# Patient Record
Sex: FEMALE | Race: WHITE | Employment: OTHER | URBAN - METROPOLITAN AREA
[De-identification: names, ages, dates, MRNs, and addresses within clinical notes are randomized per-mention and may not be internally consistent; named-entity substitution may affect disease eponyms.]

---

## 2020-11-02 RX ORDER — DOXAZOSIN MESYLATE 4 MG/1
4 TABLET ORAL NIGHTLY
COMMUNITY
End: 2020-11-19 | Stop reason: SDUPTHER

## 2020-11-02 RX ORDER — LOSARTAN POTASSIUM 100 MG/1
100 TABLET ORAL DAILY
COMMUNITY
End: 2020-11-19 | Stop reason: SDUPTHER

## 2020-11-02 RX ORDER — ATORVASTATIN CALCIUM 40 MG/1
40 TABLET, FILM COATED ORAL DAILY
COMMUNITY
End: 2020-11-19 | Stop reason: SDUPTHER

## 2020-11-02 RX ORDER — TRAZODONE HYDROCHLORIDE 100 MG/1
100 TABLET ORAL NIGHTLY
COMMUNITY
End: 2021-07-28 | Stop reason: SDUPTHER

## 2020-11-02 RX ORDER — FUROSEMIDE 40 MG/1
40 TABLET ORAL 2 TIMES DAILY
COMMUNITY
End: 2020-11-19 | Stop reason: SDUPTHER

## 2020-11-02 RX ORDER — CHLORAL HYDRATE 500 MG
3000 CAPSULE ORAL 3 TIMES DAILY
COMMUNITY
End: 2021-01-28

## 2020-11-02 RX ORDER — ASPIRIN 81 MG/1
81 TABLET ORAL DAILY
COMMUNITY

## 2020-11-19 ENCOUNTER — OFFICE VISIT (OUTPATIENT)
Dept: CARDIOLOGY CLINIC | Age: 80
End: 2020-11-19
Payer: COMMERCIAL

## 2020-11-19 VITALS
HEART RATE: 62 BPM | TEMPERATURE: 97.1 F | WEIGHT: 145 LBS | SYSTOLIC BLOOD PRESSURE: 140 MMHG | DIASTOLIC BLOOD PRESSURE: 70 MMHG

## 2020-11-19 PROBLEM — I65.23 BILATERAL CAROTID ARTERY STENOSIS: Status: ACTIVE | Noted: 2020-11-19

## 2020-11-19 PROBLEM — Z95.1 HX OF CABG: Status: ACTIVE | Noted: 2020-11-19

## 2020-11-19 PROBLEM — E78.5 DYSLIPIDEMIA: Status: ACTIVE | Noted: 2020-11-19

## 2020-11-19 PROBLEM — N18.4 STAGE 4 CHRONIC KIDNEY DISEASE (HCC): Status: ACTIVE | Noted: 2020-11-19

## 2020-11-19 PROBLEM — I10 ESSENTIAL HYPERTENSION: Status: ACTIVE | Noted: 2020-11-19

## 2020-11-19 PROBLEM — Z72.0 TOBACCO ABUSE: Status: ACTIVE | Noted: 2020-11-19

## 2020-11-19 PROCEDURE — G8484 FLU IMMUNIZE NO ADMIN: HCPCS | Performed by: INTERNAL MEDICINE

## 2020-11-19 PROCEDURE — 4040F PNEUMOC VAC/ADMIN/RCVD: CPT | Performed by: INTERNAL MEDICINE

## 2020-11-19 PROCEDURE — 1123F ACP DISCUSS/DSCN MKR DOCD: CPT | Performed by: INTERNAL MEDICINE

## 2020-11-19 PROCEDURE — 4004F PT TOBACCO SCREEN RCVD TLK: CPT | Performed by: INTERNAL MEDICINE

## 2020-11-19 PROCEDURE — G8421 BMI NOT CALCULATED: HCPCS | Performed by: INTERNAL MEDICINE

## 2020-11-19 PROCEDURE — 99205 OFFICE O/P NEW HI 60 MIN: CPT | Performed by: INTERNAL MEDICINE

## 2020-11-19 PROCEDURE — G8400 PT W/DXA NO RESULTS DOC: HCPCS | Performed by: INTERNAL MEDICINE

## 2020-11-19 PROCEDURE — G8427 DOCREV CUR MEDS BY ELIG CLIN: HCPCS | Performed by: INTERNAL MEDICINE

## 2020-11-19 PROCEDURE — 1090F PRES/ABSN URINE INCON ASSESS: CPT | Performed by: INTERNAL MEDICINE

## 2020-11-19 PROCEDURE — 93000 ELECTROCARDIOGRAM COMPLETE: CPT | Performed by: INTERNAL MEDICINE

## 2020-11-19 RX ORDER — FUROSEMIDE 40 MG/1
40 TABLET ORAL 2 TIMES DAILY
Qty: 180 TABLET | Refills: 3 | Status: SHIPPED | OUTPATIENT
Start: 2020-11-19 | End: 2021-10-29

## 2020-11-19 RX ORDER — DOXAZOSIN MESYLATE 4 MG/1
4 TABLET ORAL NIGHTLY
Qty: 90 TABLET | Refills: 3 | Status: SHIPPED | OUTPATIENT
Start: 2020-11-19 | End: 2021-09-08

## 2020-11-19 RX ORDER — LOSARTAN POTASSIUM 100 MG/1
100 TABLET ORAL DAILY
Qty: 90 TABLET | Refills: 3 | Status: SHIPPED | OUTPATIENT
Start: 2020-11-19 | End: 2021-10-29

## 2020-11-19 RX ORDER — ATORVASTATIN CALCIUM 40 MG/1
40 TABLET, FILM COATED ORAL DAILY
Qty: 90 TABLET | Refills: 3 | Status: SHIPPED | OUTPATIENT
Start: 2020-11-19 | End: 2021-10-29

## 2020-11-19 NOTE — PROGRESS NOTES
Chief Complaint   Patient presents with    Establish Cardiologist       11/19/2020: Patient presents for initial medical evaluation. Patient is followed on a regular basis by Dr. Fierro Suresh primary care provider on file. recently moved from [facility]. Hx of CAD s/p CABGx5 in 2010. Status post negative nuclear stress test in 2017. Pt denies chest pain, dyspnea, dyspnea on exertion, change in exercise capacity, fatigue,  nausea, vomiting, diarrhea, constipation, motor weakness, insomnia, weight loss, syncope, dizziness, lightheadedness, palpitations, PND, orthopnea, or claudication. No hx of PAD . No hx of CHF of arrhythmia. He continues to smoke approximate 10 cigarettes a day. Status post carotid ultrasound on April 2020 with 60% stenosis in the left and right internal carotid arteries. Patient Active Problem List   Diagnosis    Carotid bruit    Kidney disease    CAD (coronary artery disease)    Bilateral carotid artery stenosis    Hx of CABGx5    Tobacco abuse    Stage 4 chronic kidney disease ([facility] [address]    Essential hypertension    Dyslipidemia       No past surgical history on file. Social History     Socioeconomic History    Marital status:       Spouse name: Not on file    Number of children: Not on file    Years of education: Not on file    Highest education level: Not on file   Occupational History    Not on file   Social Needs    Financial resource strain: Not on file    Food insecurity     Worry: Not on file     Inability: Not on file    Transportation needs     Medical: Not on file     Non-medical: Not on file   Tobacco Use    Smoking status: Not on file   Substance and Sexual Activity    Alcohol use: Not on file    Drug use: Not on file    Sexual activity: Not on file   Lifestyle    Physical activity     Days per week: Not on file     Minutes per session: Not on file    Stress: Not on file   Relationships    Social connections     Talks on phone: Not on file     Gets together: Not on file     Attends Rastafarian service: Not on file     Active member of club or organization: Not on file     Attends meetings of clubs or organizations: Not on file     Relationship status: Not on file    Intimate partner violence     Fear of current or ex partner: Not on file     Emotionally abused: Not on file     Physically abused: Not on file     Forced sexual activity: Not on file   Other Topics Concern    Not on file   Social History Narrative    Not on file       No family history on file. Current Outpatient Medications   Medication Sig Dispense Refill    doxazosin (CARDURA) 4 MG tablet Take 1 tablet by mouth nightly 90 tablet 3    atorvastatin (LIPITOR) 40 MG tablet Take 1 tablet by mouth daily 90 tablet 3    furosemide (LASIX) 40 MG tablet Take 1 tablet by mouth 2 times daily 180 tablet 3    losartan (COZAAR) 100 MG tablet Take 1 tablet by mouth daily 90 tablet 3    metoprolol tartrate (LOPRESSOR) 25 MG tablet Take 1 tablet by mouth 2 times daily 180 tablet 3    aspirin 81 MG EC tablet Take 81 mg by mouth daily      Omega-3 Fatty Acids (FISH OIL) 1000 MG CAPS Take 3,000 mg by mouth 3 times daily      traZODone (DESYREL) 100 MG tablet Take 100 mg by mouth nightly       No current facility-administered medications for this visit. Amoxicillin    Review of Systems:  General ROS: negative  Psychological ROS: negative  Hematological and Lymphatic ROS: No history of blood clots or bleeding disorder.    Respiratory ROS: no cough, shortness of breath, or wheezing  Cardiovascular ROS: no chest pain or dyspnea on exertion  Gastrointestinal ROS: no abdominal pain, change in bowel habits, or black or bloody stools  Genito-Urinary ROS: no dysuria, trouble voiding, or hematuria  Musculoskeletal ROS: negative  Neurological ROS: no TIA or stroke symptoms  Dermatological ROS: negative    VITALS:  Blood pressure (!) 140/70, pulse 62, temperature 97.1 °F (36.2 °C), temperature source Infrared, weight 145 lb (65.8 kg). There is no height or weight on file to calculate BMI. Physical Examination:  General appearance - alert, well appearing, and in no distress  Mental status - alert, oriented to person, place, and time  Neck - Neck is supple, no JVD or carotid bruits. No thyromegaly or adenopathy. Chest - clear to auscultation, no wheezes, rales or rhonchi, symmetric air entry  Heart - normal rate, regular rhythm, normal S1, S2, no murmurs, rubs, clicks or gallops  Abdomen - soft, nontender, nondistended, no masses or organomegaly  Neurological - alert, oriented, normal speech, no focal findings or movement disorder noted  Extremities - peripheral pulses normal, no pedal edema, no clubbing or cyanosis  Skin - normal coloration and turgor, no rashes, no suspicious skin lesions noted      EKG: normal sinus rhythm, nonspecific ST and T waves changes    Orders Placed This Encounter   Procedures    US SCREENING FOR AAA    CT lung screen [Initial/Annual]    Comprehensive Metabolic Panel    Lipid Panel    CBC    TSH without Reflex    Magnesium    Sánchez Brock MD, Family Medicine, Ja Torre [facility], Nephrology, [facility] E[facility] 12 Lead    ECHO Complete 2D W Doppler W Color       ASSESSMENT:     Diagnosis Orders   1. Coronary artery disease involving native coronary artery of native heart, angina presence unspecified  EKG 12 Lead    ECHO Complete 2D W Doppler W Color   2. Carotid bruit, unspecified laterality     3. Bilateral carotid artery stenosis     4. Hx of CABGx5     5. Tobacco abuse  Petra Deshpande MD, [facility]   6. Stage 4 chronic kidney disease (HCC)  Fabian Yee MD, Nephrology, [place]   7. Essential hypertension  Comprehensive Metabolic Panel    Lipid Panel    CBC    TSH without Reflex    Magnesium    ECHO Complete 2D W Doppler W Color   8.  Dyslipidemia  Comprehensive Metabolic Panel    Lipid Panel    CBC    TSH without Reflex    Magnesium    Petra Tolbert MD, [facility], [place]   9. Screening for AAA (aortic abdominal aneurysm)  US SCREENING FOR AAA   10. Encounter for screening for lung cancer  CT lung screen [Initial/Annual]         PLAN:     Patient will need to continue to follow up with you for their general medical care    As always, aggressive risk factor modification is strongly recommended. We should adhere to the JNC VIII guidelines for HTN management and the NCEP ATP III guidelines for LDL-C management. Cardiac diet is always recommended with low fat, cholesterol, calories and sodium. Continue medications at current doses. Check Echo    Check labs    Check EKG    Check AAA US screen    Check CT of lungs screen. Refer to PCP- DR Micheal Asencio. Refer to Nephro. CT lung screen    Patient was advised and encouraged to check blood pressure at home or at a pharmacy, maintain a logbook, and also call us back if blood pressure are above the target ranges or if it is low. Patient clearly understands and agrees to the instructions. We will need to continue to monitor muscle and liver enzymes, BUN, CR, and electrolytes. Details of medical condition explained and patient was warned about adverse consequences of uncontrolled medical conditions and possible side effects of prescribed medications.

## 2020-12-10 ENCOUNTER — HOSPITAL ENCOUNTER (OUTPATIENT)
Dept: ULTRASOUND IMAGING | Age: 80
Discharge: HOME | End: 2020-12-12
Payer: MEDICARE

## 2020-12-10 ENCOUNTER — HOSPITAL ENCOUNTER (OUTPATIENT)
Dept: NON INVASIVE DIAGNOSTICS | Age: 80
Discharge: HOME | End: 2020-12-10
Payer: MEDICARE

## 2020-12-10 DIAGNOSIS — I10 ESSENTIAL HYPERTENSION: ICD-10-CM

## 2020-12-10 DIAGNOSIS — E78.5 DYSLIPIDEMIA: ICD-10-CM

## 2020-12-10 LAB
CHOLESTEROL, TOTAL: 186 MG/DL (ref 0–199)
HCT VFR BLD CALC: 33.6 % (ref 37–47)
HDLC SERPL-MCNC: 55 MG/DL (ref 40–59)
HEMOGLOBIN: 11.3 G/DL (ref 12–16)
LDL CHOLESTEROL CALCULATED: 97 MG/DL (ref 0–129)
LV EF: 55 %
LVEF MODALITY: NORMAL
MAGNESIUM: 2.4 MG/DL (ref 1.7–2.4)
MCH RBC QN AUTO: 31.4 PG (ref 27–31.3)
MCHC RBC AUTO-ENTMCNC: 33.7 % (ref 33–37)
MCV RBC AUTO: 93.4 FL (ref 82–100)
PDW BLD-RTO: 12.3 % (ref 11.5–14.5)
PLATELET # BLD: 224 K/UL (ref 130–400)
RBC # BLD: 3.6 M/UL (ref 4.2–5.4)
TRIGL SERPL-MCNC: 169 MG/DL (ref 0–150)
TSH SERPL DL<=0.05 MIU/L-ACNC: 2.07 UIU/ML (ref 0.44–3.86)
WBC # BLD: 6.7 K/UL (ref 4.8–10.8)

## 2020-12-10 PROCEDURE — 93306 TTE W/DOPPLER COMPLETE: CPT

## 2020-12-10 PROCEDURE — 76706 US ABDL AORTA SCREEN AAA: CPT | Performed by: INTERNAL MEDICINE

## 2020-12-10 PROCEDURE — 76706 US ABDL AORTA SCREEN AAA: CPT

## 2021-01-07 ENCOUNTER — OFFICE VISIT (OUTPATIENT)
Dept: CARDIOLOGY CLINIC | Age: 81
End: 2021-01-07
Payer: MEDICARE

## 2021-01-07 VITALS
HEART RATE: 56 BPM | OXYGEN SATURATION: 99 % | SYSTOLIC BLOOD PRESSURE: 136 MMHG | WEIGHT: 146 LBS | DIASTOLIC BLOOD PRESSURE: 72 MMHG | TEMPERATURE: 97 F | RESPIRATION RATE: 14 BRPM

## 2021-01-07 DIAGNOSIS — E78.5 DYSLIPIDEMIA: ICD-10-CM

## 2021-01-07 DIAGNOSIS — N18.4 STAGE 4 CHRONIC KIDNEY DISEASE (HCC): ICD-10-CM

## 2021-01-07 DIAGNOSIS — I25.10 CORONARY ARTERY DISEASE INVOLVING NATIVE CORONARY ARTERY OF NATIVE HEART, ANGINA PRESENCE UNSPECIFIED: Primary | ICD-10-CM

## 2021-01-07 DIAGNOSIS — Z95.1 HX OF CABG: ICD-10-CM

## 2021-01-07 DIAGNOSIS — Z72.0 TOBACCO ABUSE: ICD-10-CM

## 2021-01-07 DIAGNOSIS — I10 ESSENTIAL HYPERTENSION: ICD-10-CM

## 2021-01-07 DIAGNOSIS — R09.89 CAROTID BRUIT, UNSPECIFIED LATERALITY: ICD-10-CM

## 2021-01-07 DIAGNOSIS — I65.23 BILATERAL CAROTID ARTERY STENOSIS: ICD-10-CM

## 2021-01-07 DIAGNOSIS — N28.9 KIDNEY DISEASE: ICD-10-CM

## 2021-01-07 PROCEDURE — G8400 PT W/DXA NO RESULTS DOC: HCPCS | Performed by: INTERNAL MEDICINE

## 2021-01-07 PROCEDURE — G8427 DOCREV CUR MEDS BY ELIG CLIN: HCPCS | Performed by: INTERNAL MEDICINE

## 2021-01-07 PROCEDURE — 1090F PRES/ABSN URINE INCON ASSESS: CPT | Performed by: INTERNAL MEDICINE

## 2021-01-07 PROCEDURE — 99214 OFFICE O/P EST MOD 30 MIN: CPT | Performed by: INTERNAL MEDICINE

## 2021-01-07 PROCEDURE — 4004F PT TOBACCO SCREEN RCVD TLK: CPT | Performed by: INTERNAL MEDICINE

## 2021-01-07 PROCEDURE — G8484 FLU IMMUNIZE NO ADMIN: HCPCS | Performed by: INTERNAL MEDICINE

## 2021-01-07 PROCEDURE — G8421 BMI NOT CALCULATED: HCPCS | Performed by: INTERNAL MEDICINE

## 2021-01-07 PROCEDURE — 4040F PNEUMOC VAC/ADMIN/RCVD: CPT | Performed by: INTERNAL MEDICINE

## 2021-01-07 PROCEDURE — 1123F ACP DISCUSS/DSCN MKR DOCD: CPT | Performed by: INTERNAL MEDICINE

## 2021-01-07 NOTE — PROGRESS NOTES
Chief Complaint   Patient presents with    Results     echo 12/10/20    Carotid Disease    Coronary Artery Disease    Hypertension       11/19/2020: Patient presents for initial medical evaluation. Patient is followed on a regular basis by Dr. Thalia Lindsey MD recently moved from [facility]. Hx of CAD s/p CABGx5 in 2010. Status post negative nuclear stress test in 2017. Pt denies chest pain, dyspnea, dyspnea on exertion, change in exercise capacity, fatigue,  nausea, vomiting, diarrhea, constipation, motor weakness, insomnia, weight loss, syncope, dizziness, lightheadedness, palpitations, PND, orthopnea, or claudication. No hx of PAD . No hx of CHF of arrhythmia. He continues to smoke approximate 10 cigarettes a day. Status post carotid ultrasound on April 2020 with 60% stenosis in the left and right internal carotid arteries. 1/7/2021: Status post echocardiogram ejection fraction of 55%, 1-2+ aortic regurgitation, 1+ mitral and tricuspid regurgitation, RVSP of 27 mmHg, grade 2 diastolic dysfunction. Status post abdominal aorta ultrasound with no evidence of any aneurysm. She will follow up with primary care Dr. Alpha Dakin as well as nephrology. Pt denies chest pain, dyspnea, dyspnea on exertion, change in exercise capacity, fatigue,  nausea, vomiting, diarrhea, constipation, motor weakness, insomnia, weight loss, syncope, dizziness, lightheadedness, palpitations, PND, orthopnea, or claudication. she continues to smoke. Has CKD. Blood pressure is 136/71 heart rate of 56 bpm.  With history of coronary disease status post CABG x5 in 2010      Patient Active Problem List   Diagnosis    Carotid bruit    Kidney disease    CAD (coronary artery disease)    Bilateral carotid artery stenosis    Hx of CABGx5    Tobacco abuse    Stage 4 chronic kidney disease ([facility] [address]    Essential hypertension    Dyslipidemia       No past surgical history on file.     Social History     Socioeconomic History    Marital status:      Spouse name: Not on file    Number of children: Not on file    Years of education: Not on file    Highest education level: Not on file   Occupational History    Not on file   Social Needs    Financial resource strain: Not on file    Food insecurity     Worry: Not on file     Inability: Not on file    Transportation needs     Medical: Not on file     Non-medical: Not on file   Tobacco Use    Smoking status: Not on file   Substance and Sexual Activity    Alcohol use: Not on file    Drug use: Not on file    Sexual activity: Not on file   Lifestyle    Physical activity     Days per week: Not on file     Minutes per session: Not on file    Stress: Not on file   Relationships    Social connections     Talks on phone: Not on file     Gets together: Not on file     Attends Confucianism service: Not on file     Active member of club or organization: Not on file     Attends meetings of clubs or organizations: Not on file     Relationship status: Not on file    Intimate partner violence     Fear of current or ex partner: Not on file     Emotionally abused: Not on file     Physically abused: Not on file     Forced sexual activity: Not on file   Other Topics Concern    Not on file   Social History Narrative    Not on file       No family history on file.     Current Outpatient Medications   Medication Sig Dispense Refill    doxazosin (CARDURA) 4 MG tablet Take 1 tablet by mouth nightly 90 tablet 3    atorvastatin (LIPITOR) 40 MG tablet Take 1 tablet by mouth daily 90 tablet 3    furosemide (LASIX) 40 MG tablet Take 1 tablet by mouth 2 times daily 180 tablet 3    losartan (COZAAR) 100 MG tablet Take 1 tablet by mouth daily 90 tablet 3    metoprolol tartrate (LOPRESSOR) 25 MG tablet Take 1 tablet by mouth 2 times daily 180 tablet 3    aspirin 81 MG EC tablet Take 81 mg by mouth daily      Omega-3 Fatty Acids (FISH OIL) 1000 MG CAPS Take 3,000 mg by mouth 3 times daily      traZODone (DESYREL) 100 MG tablet Take 100 mg by mouth nightly       No current facility-administered medications for this visit. Amoxicillin    Review of Systems:  General ROS: negative  Psychological ROS: negative  Hematological and Lymphatic ROS: No history of blood clots or bleeding disorder. Respiratory ROS: no cough, shortness of breath, or wheezing  Cardiovascular ROS: no chest pain or dyspnea on exertion  Gastrointestinal ROS: no abdominal pain, change in bowel habits, or black or bloody stools  Genito-Urinary ROS: no dysuria, trouble voiding, or hematuria  Musculoskeletal ROS: negative  Neurological ROS: no TIA or stroke symptoms  Dermatological ROS: negative    VITALS:  Blood pressure 136/72, pulse 56, temperature 97 °F (36.1 °C), temperature source Infrared, resp. rate 14, weight 146 lb (66.2 kg), SpO2 99 %. There is no height or weight on file to calculate BMI. Physical Examination:  General appearance - alert, well appearing, and in no distress  Mental status - alert, oriented to person, place, and time  Neck - Neck is supple, no JVD or carotid bruits. No thyromegaly or adenopathy. Chest - clear to auscultation, no wheezes, rales or rhonchi, symmetric air entry  Heart - normal rate, regular rhythm, normal S1, S2, no murmurs, rubs, clicks or gallops  Abdomen - soft, nontender, nondistended, no masses or organomegaly  Neurological - alert, oriented, normal speech, no focal findings or movement disorder noted  Extremities - peripheral pulses normal, no pedal edema, no clubbing or cyanosis  Skin - normal coloration and turgor, no rashes, no suspicious skin lesions noted      EKG: normal sinus rhythm, nonspecific ST and T waves changes    No orders of the defined types were placed in this encounter. ASSESSMENT:     Diagnosis Orders   1. Coronary artery disease involving native coronary artery of native heart, angina presence unspecified     2. Bilateral carotid artery stenosis     3.  Essential hypertension     4. Dyslipidemia     5. Hx of CABGx5     6. Stage 4 chronic kidney disease ([address]     7. Tobacco abuse     8. Kidney disease     9. Carotid bruit, unspecified laterality           PLAN:     Patient will need to continue to follow up with you for their general medical care    As always, aggressive risk factor modification is strongly recommended. We should adhere to the JNC VIII guidelines for HTN management and the NCEP ATP III guidelines for LDL-C management. Cardiac diet is always recommended with low fat, cholesterol, calories and sodium. Continue medications at current doses. Check CT of lungs screen. Check carotid ultrasound in 1 year    Check echo in 1 to 2 years for aortic regurgitation    Patient was advised and encouraged to check blood pressure at home or at a pharmacy, maintain a logbook, and also call us back if blood pressure are above the target ranges or if it is low. Patient clearly understands and agrees to the instructions. We will need to continue to monitor muscle and liver enzymes, BUN, CR, and electrolytes. Details of medical condition explained and patient was warned about adverse consequences of uncontrolled medical conditions and possible side effects of prescribed medications.

## 2021-01-28 ENCOUNTER — OFFICE VISIT (OUTPATIENT)
Dept: FAMILY MEDICINE CLINIC | Age: 81
End: 2021-01-28
Payer: MEDICARE

## 2021-01-28 VITALS
DIASTOLIC BLOOD PRESSURE: 68 MMHG | SYSTOLIC BLOOD PRESSURE: 138 MMHG | HEART RATE: 68 BPM | RESPIRATION RATE: 16 BRPM | TEMPERATURE: 97.7 F | WEIGHT: 146 LBS | BODY MASS INDEX: 27.56 KG/M2 | OXYGEN SATURATION: 98 % | HEIGHT: 61 IN

## 2021-01-28 DIAGNOSIS — M81.0 SENILE OSTEOPOROSIS: ICD-10-CM

## 2021-01-28 DIAGNOSIS — N18.4 STAGE 4 CHRONIC KIDNEY DISEASE (HCC): ICD-10-CM

## 2021-01-28 DIAGNOSIS — E78.5 DYSLIPIDEMIA: ICD-10-CM

## 2021-01-28 DIAGNOSIS — I10 ESSENTIAL HYPERTENSION: Primary | ICD-10-CM

## 2021-01-28 DIAGNOSIS — T82.858D STENOSIS OF NONCORONARY BYPASS GRAFT, SUBSEQUENT ENCOUNTER: ICD-10-CM

## 2021-01-28 DIAGNOSIS — M43.16 SPONDYLOLISTHESIS OF LUMBAR REGION: ICD-10-CM

## 2021-01-28 DIAGNOSIS — R53.83 FATIGUE, UNSPECIFIED TYPE: ICD-10-CM

## 2021-01-28 PROCEDURE — 4004F PT TOBACCO SCREEN RCVD TLK: CPT | Performed by: FAMILY MEDICINE

## 2021-01-28 PROCEDURE — G8417 CALC BMI ABV UP PARAM F/U: HCPCS | Performed by: FAMILY MEDICINE

## 2021-01-28 PROCEDURE — G8484 FLU IMMUNIZE NO ADMIN: HCPCS | Performed by: FAMILY MEDICINE

## 2021-01-28 PROCEDURE — 99203 OFFICE O/P NEW LOW 30 MIN: CPT | Performed by: FAMILY MEDICINE

## 2021-01-28 PROCEDURE — G8400 PT W/DXA NO RESULTS DOC: HCPCS | Performed by: FAMILY MEDICINE

## 2021-01-28 PROCEDURE — 1123F ACP DISCUSS/DSCN MKR DOCD: CPT | Performed by: FAMILY MEDICINE

## 2021-01-28 PROCEDURE — 1090F PRES/ABSN URINE INCON ASSESS: CPT | Performed by: FAMILY MEDICINE

## 2021-01-28 PROCEDURE — G8427 DOCREV CUR MEDS BY ELIG CLIN: HCPCS | Performed by: FAMILY MEDICINE

## 2021-01-28 PROCEDURE — 4040F PNEUMOC VAC/ADMIN/RCVD: CPT | Performed by: FAMILY MEDICINE

## 2021-01-28 RX ORDER — TRAZODONE HYDROCHLORIDE 100 MG/1
100 TABLET ORAL NIGHTLY
Qty: 90 TABLET | Refills: 1 | Status: SHIPPED | OUTPATIENT
Start: 2021-01-28 | End: 2021-10-21 | Stop reason: SDUPTHER

## 2021-01-28 ASSESSMENT — PATIENT HEALTH QUESTIONNAIRE - PHQ9
SUM OF ALL RESPONSES TO PHQ9 QUESTIONS 1 & 2: 0
SUM OF ALL RESPONSES TO PHQ QUESTIONS 1-9: 0
SUM OF ALL RESPONSES TO PHQ QUESTIONS 1-9: 0

## 2021-01-28 NOTE — PROGRESS NOTES
Patient: Marlys Dawson (: 1940) is a 79 y.o. female,New patient, here for evaluation of the following chief complaint(s):  Chief Complaint   Patient presents with   [facility].     She is here today to establish care. She is due for labs and DEXA scan. Date: 21    Allergies   Allergen Reactions    Amoxicillin     Nsaids      CRF 4       Vitals:    21 1041 21 1052   BP: (!) 148/60 138/68   Site: Left Upper Arm Left Upper Arm   Position: Sitting Sitting   Cuff Size: Small Adult Small Adult   Pulse: 68    Resp: 16    Temp: 97.7 °F (36.5 °C)    TempSrc: Oral    SpO2: 98%    Weight: 146 lb (66.2 kg)    Height: 5' 1\" (1.549 m)       Body mass index is 27.59 kg/m². PHQ Scores 2021   PHQ2 Score 0   PHQ9 Score 0     Interpretation of Total Score Depression Severity: 1-4 = Minimal depression, 5-9 = Mild depression, 10-14 = Moderate depression, 15-19 = Moderately severe depression, 20-27 = Severe depression    HPI    This is a new patient. This is a new patient to me. I have reviewed all of the past medical history, social history and family history. I have reviewed all of the information on medication, surgeries and previous testing and working diagnoses. I have reviewed the allergies and health maintenance information and correlated it into my decision making for this patient for care, diagnostics, consultations and treatment for today's visit. She moved here from [facility] and she has her daughter with her today. Patient does not want a lot of active intervention or treatment. Her main complaint today is that her back hurts. She had a lumbar stabilization surgery many years ago and now she has trouble walking and has trouble with balance and must use a cane. She has not fallen. She has pain in her low back that is intermittent and pain in her left thigh. She has trouble flexing her right leg to get in the car she has to physically grab her leg and pick it up.   She has insomnia and uses trazodone which helps. Review of Systems    Constitutional: positive for fatigue, negative for fever and sweats. HEENT: Negative for eye discharge and vision loss. Negative for ear drainage, hearing loss and nasal drainage. Respiratory: positive for cough, negative for dyspnea and wheezing. Cardiovascular:  Negative for chest pain, claudication and irregular heartbeat/palpitations. Gastrointestinal: Negative for abdominal pain, nausea, constipation and diarrhea. Genitourinary: Negative for dysuria, patient postmenopausal.  Metabolic/Endocrine: Negative for cold intolerance, heat intolerance, polydipsia and polyphagia. No unintended weight loss or weight gain. Neuro/Psychiatric: Negative for gait disturbance. Negative for psychiatric symptoms. Dermatologic: Negative for pruritus and rash. Musculoskeletal: positive for bone/joint symptoms. No numbness or tingling. No loss of function. Hematology: Negative for bleeding and easy bruising. Immunology:  Negative for environmental allergies and food allergies. Physical Exam    Patient's medication, allergies, past medical, surgical, social and family histories were reviewed and updated as appropriate. PHYSICAL EXAM     General: Alert oriented pleasant cooperative no acute distress. When she walks she is ataxic and she drifts and would lose her balance if she did have something to hang onto she is nonicteric and she is not pale  Neck: Soft nontender no adenopathy or masses no carotid bruits  Lungs: Clear to auscultation without wheezes rhonchi or rales  Heart: Regular rate and rhythm without murmurs rubs or gallops  Extremities: No rashes or edema she has negative straight leg raises and no pain to palpation of her back she has weak right hip flexors but she has adequate strength in straightening and bending her knees              Assessment:   Diagnosis Orders   1. Essential hypertension  Comprehensive Metabolic Panel   2.  Senile osteoporosis  DEXA BONE DENSITY AXIAL SKELETON   3. Dyslipidemia  Lipid, Fasting   4. Stenosis of noncoronary bypass graft, subsequent encounter     5. Fatigue, unspecified type  TSH Without Reflex    CBC Auto Differential   6. Spondylolisthesis of lumbar region  XR LUMBAR SPINE (MIN 4 VIEWS)    XR LUMBAR SPINE FLEXION AND EXTENSION ONLY  Discussion several options as this patient would have a difficult time with an MRI with the hardware that she has. Wanted to get an EMG but she does not want to consider any intervention for treatment and does not want PT she cannot take nonsteroidal medications because of her kidneys. We will see what the x-ray looks like and then recommend something based on that         On this date 01/28/21 I have spent 35 minutes reviewing previous notes, test results and face to face with the patient discussing the diagnosis and importance of compliance with the treatment plan. Plan:  Current Outpatient Medications   Medication Sig Dispense Refill    traZODone (DESYREL) 100 MG tablet Take 1 tablet by mouth nightly 90 tablet 1    Handicap Placard MISC by Does not apply route Applying from 1/28/2021-1/27/2026. 1 each 0    doxazosin (CARDURA) 4 MG tablet Take 1 tablet by mouth nightly 90 tablet 3    atorvastatin (LIPITOR) 40 MG tablet Take 1 tablet by mouth daily 90 tablet 3    furosemide (LASIX) 40 MG tablet Take 1 tablet by mouth 2 times daily 180 tablet 3    losartan (COZAAR) 100 MG tablet Take 1 tablet by mouth daily 90 tablet 3    metoprolol tartrate (LOPRESSOR) 25 MG tablet Take 1 tablet by mouth 2 times daily 180 tablet 3    aspirin 81 MG EC tablet Take 81 mg by mouth daily      traZODone (DESYREL) 100 MG tablet Take 100 mg by mouth nightly       No current facility-administered medications for this visit.       Orders Placed This Encounter   Procedures    DEXA BONE DENSITY AXIAL SKELETON     Standing Status:   Future     Standing Expiration Date:   1/28/2022     Order Specific Question:   Reason for exam:     Answer:   routine    XR LUMBAR SPINE (MIN 4 VIEWS)     Standing Status:   Future     Standing Expiration Date:   1/28/2022    XR LUMBAR SPINE FLEXION AND EXTENSION ONLY     Standing Status:   Future     Standing Expiration Date:   1/28/2022    Lipid, Fasting     Standing Status:   Future     Standing Expiration Date:   1/28/2022    Comprehensive Metabolic Panel     Standing Status:   Future     Standing Expiration Date:   1/28/2022    TSH Without Reflex     Standing Status:   Future     Standing Expiration Date:   1/28/2022    CBC Auto Differential     Standing Status:   Future     Standing Expiration Date:   1/28/2022       Orders Placed This Encounter   Medications    traZODone (DESYREL) 100 MG tablet     Sig: Take 1 tablet by mouth nightly     Dispense:  90 tablet     Refill:  1    Handicap Placard MISC     Sig: by Does not apply route Applying from 1/28/2021-1/27/2026. Dispense:  1 each     Refill:  0              Return in about 6 months (around 7/28/2021). An electronic signature was used to authenticate this note.   Dr. Jeannine Bustillo      1/28/21  12:38 PM

## 2021-02-11 ENCOUNTER — HOSPITAL ENCOUNTER (OUTPATIENT)
Dept: WOMENS IMAGING | Age: 81
Discharge: HOME | End: 2021-02-13
Payer: MEDICARE

## 2021-02-11 ENCOUNTER — HOSPITAL ENCOUNTER (OUTPATIENT)
Dept: GENERAL RADIOLOGY | Age: 81
Discharge: HOME | End: 2021-02-13
Payer: MEDICARE

## 2021-02-11 DIAGNOSIS — M43.16 SPONDYLOLISTHESIS OF LUMBAR REGION: ICD-10-CM

## 2021-02-11 DIAGNOSIS — M81.0 SENILE OSTEOPOROSIS: ICD-10-CM

## 2021-02-11 PROCEDURE — 77080 DXA BONE DENSITY AXIAL: CPT

## 2021-02-11 PROCEDURE — 72114 X-RAY EXAM L-S SPINE BENDING: CPT

## 2021-07-06 ENCOUNTER — HOSPITAL ENCOUNTER (OUTPATIENT)
Dept: LAB | Age: 81
Discharge: HOME | End: 2021-07-06
Payer: MEDICARE

## 2021-07-06 LAB
ALBUMIN SERPL-MCNC: 4.3 G/DL (ref 3.5–4.6)
ANION GAP SERPL CALCULATED.3IONS-SCNC: 16 MEQ/L (ref 9–15)
BUN BLDV-MCNC: 30 MG/DL (ref 8–23)
CALCIUM SERPL-MCNC: 9.7 MG/DL (ref 8.5–9.9)
CHLORIDE BLD-SCNC: 105 MEQ/L (ref 95–107)
CO2: 22 MEQ/L (ref 20–31)
CREAT SERPL-MCNC: 1.32 MG/DL (ref 0.5–0.9)
GFR AFRICAN AMERICAN: 46.9
GFR NON-AFRICAN AMERICAN: 38.7
GLUCOSE BLD-MCNC: 129 MG/DL (ref 70–99)
HCT VFR BLD CALC: 32.6 % (ref 37–47)
HEMOGLOBIN: 11.1 G/DL (ref 12–16)
MCH RBC QN AUTO: 31.3 PG (ref 27–31.3)
MCHC RBC AUTO-ENTMCNC: 33.9 % (ref 33–37)
MCV RBC AUTO: 92.1 FL (ref 82–100)
PARATHYROID HORMONE INTACT: 64.2 PG/ML (ref 15–65)
PDW BLD-RTO: 13 % (ref 11.5–14.5)
PHOSPHORUS: 3.6 MG/DL (ref 2.3–4.8)
PLATELET # BLD: 215 K/UL (ref 130–400)
POTASSIUM SERPL-SCNC: 4.1 MEQ/L (ref 3.4–4.9)
RBC # BLD: 3.54 M/UL (ref 4.2–5.4)
REASON FOR REJECTION: NORMAL
REJECTED TEST: NORMAL
SODIUM BLD-SCNC: 143 MEQ/L (ref 135–144)
VITAMIN D 25-HYDROXY: 43.2 NG/ML (ref 30–100)
WBC # BLD: 6.4 K/UL (ref 4.8–10.8)

## 2021-07-06 PROCEDURE — 85027 COMPLETE CBC AUTOMATED: CPT

## 2021-07-06 PROCEDURE — 36415 COLL VENOUS BLD VENIPUNCTURE: CPT

## 2021-07-06 PROCEDURE — 80069 RENAL FUNCTION PANEL: CPT

## 2021-07-06 PROCEDURE — 83970 ASSAY OF PARATHORMONE: CPT

## 2021-07-06 PROCEDURE — 82306 VITAMIN D 25 HYDROXY: CPT

## 2021-07-08 ENCOUNTER — HOSPITAL ENCOUNTER (OUTPATIENT)
Dept: LAB | Age: 81
Discharge: HOME | End: 2021-07-08
Payer: MEDICARE

## 2021-07-08 LAB
BACTERIA: NEGATIVE /HPF
BILIRUBIN URINE: NEGATIVE
BLOOD, URINE: NEGATIVE
CLARITY: CLEAR
COLOR: YELLOW
CREATININE URINE: 22.2 MG/DL
EPITHELIAL CELLS, UA: NORMAL /HPF (ref 0–5)
GLUCOSE URINE: NEGATIVE MG/DL
HYALINE CASTS: NORMAL /HPF (ref 0–5)
KETONES, URINE: NEGATIVE MG/DL
LEUKOCYTE ESTERASE, URINE: ABNORMAL
NITRITE, URINE: NEGATIVE
PH UA: 6 (ref 5–9)
PROTEIN PROTEIN: <4 MG/DL
PROTEIN UA: NEGATIVE MG/DL
PROTEIN/CREAT RATIO: 0.2 ML/ML
PROTEIN/CREAT RATIO: 0.2 ML/ML (ref 0–0.2)
RBC UA: NORMAL /HPF (ref 0–5)
SPECIFIC GRAVITY UA: 1.01 (ref 1–1.03)
UROBILINOGEN, URINE: 0.2 E.U./DL
WBC UA: NORMAL /HPF (ref 0–5)

## 2021-07-08 PROCEDURE — 84156 ASSAY OF PROTEIN URINE: CPT

## 2021-07-08 PROCEDURE — 81001 URINALYSIS AUTO W/SCOPE: CPT

## 2021-07-28 ENCOUNTER — OFFICE VISIT (OUTPATIENT)
Dept: FAMILY MEDICINE CLINIC | Age: 81
End: 2021-07-28
Payer: MEDICARE

## 2021-07-28 VITALS
RESPIRATION RATE: 12 BRPM | DIASTOLIC BLOOD PRESSURE: 60 MMHG | OXYGEN SATURATION: 98 % | BODY MASS INDEX: 27.38 KG/M2 | TEMPERATURE: 97.9 F | HEIGHT: 61 IN | WEIGHT: 145 LBS | SYSTOLIC BLOOD PRESSURE: 132 MMHG | HEART RATE: 67 BPM

## 2021-07-28 DIAGNOSIS — N18.4 STAGE 4 CHRONIC KIDNEY DISEASE (HCC): ICD-10-CM

## 2021-07-28 DIAGNOSIS — E78.5 DYSLIPIDEMIA: ICD-10-CM

## 2021-07-28 DIAGNOSIS — R59.0 CERVICAL ADENOPATHY: ICD-10-CM

## 2021-07-28 DIAGNOSIS — I10 ESSENTIAL HYPERTENSION: ICD-10-CM

## 2021-07-28 DIAGNOSIS — Z23 NEED FOR PNEUMOCOCCAL VACCINATION: ICD-10-CM

## 2021-07-28 DIAGNOSIS — I10 ESSENTIAL HYPERTENSION: Primary | ICD-10-CM

## 2021-07-28 LAB
ALBUMIN SERPL-MCNC: 4.3 G/DL (ref 3.5–4.6)
ALP BLD-CCNC: 81 U/L (ref 40–130)
ALT SERPL-CCNC: 28 U/L (ref 0–33)
ANION GAP SERPL CALCULATED.3IONS-SCNC: 12 MEQ/L (ref 9–15)
AST SERPL-CCNC: 39 U/L (ref 0–35)
BILIRUB SERPL-MCNC: 0.3 MG/DL (ref 0.2–0.7)
BUN BLDV-MCNC: 27 MG/DL (ref 8–23)
CALCIUM SERPL-MCNC: 9.3 MG/DL (ref 8.5–9.9)
CHLORIDE BLD-SCNC: 105 MEQ/L (ref 95–107)
CHOLESTEROL, FASTING: 139 MG/DL (ref 0–199)
CO2: 23 MEQ/L (ref 20–31)
CREAT SERPL-MCNC: 1.61 MG/DL (ref 0.5–0.9)
GFR AFRICAN AMERICAN: 37.3
GFR NON-AFRICAN AMERICAN: 30.8
GLOBULIN: 2.7 G/DL (ref 2.3–3.5)
GLUCOSE BLD-MCNC: 106 MG/DL (ref 70–99)
HDLC SERPL-MCNC: 38 MG/DL (ref 40–59)
LDL CHOLESTEROL CALCULATED: 66 MG/DL (ref 0–129)
POTASSIUM SERPL-SCNC: 5.2 MEQ/L (ref 3.4–4.9)
SODIUM BLD-SCNC: 140 MEQ/L (ref 135–144)
TOTAL PROTEIN: 7 G/DL (ref 6.3–8)
TRIGLYCERIDE, FASTING: 174 MG/DL (ref 0–150)

## 2021-07-28 PROCEDURE — 1090F PRES/ABSN URINE INCON ASSESS: CPT | Performed by: FAMILY MEDICINE

## 2021-07-28 PROCEDURE — 90670 PCV13 VACCINE IM: CPT | Performed by: FAMILY MEDICINE

## 2021-07-28 PROCEDURE — 4040F PNEUMOC VAC/ADMIN/RCVD: CPT | Performed by: FAMILY MEDICINE

## 2021-07-28 PROCEDURE — 1123F ACP DISCUSS/DSCN MKR DOCD: CPT | Performed by: FAMILY MEDICINE

## 2021-07-28 PROCEDURE — G0009 ADMIN PNEUMOCOCCAL VACCINE: HCPCS | Performed by: FAMILY MEDICINE

## 2021-07-28 PROCEDURE — G8427 DOCREV CUR MEDS BY ELIG CLIN: HCPCS | Performed by: FAMILY MEDICINE

## 2021-07-28 PROCEDURE — 4004F PT TOBACCO SCREEN RCVD TLK: CPT | Performed by: FAMILY MEDICINE

## 2021-07-28 PROCEDURE — 99213 OFFICE O/P EST LOW 20 MIN: CPT | Performed by: FAMILY MEDICINE

## 2021-07-28 PROCEDURE — G8417 CALC BMI ABV UP PARAM F/U: HCPCS | Performed by: FAMILY MEDICINE

## 2021-07-28 PROCEDURE — G8399 PT W/DXA RESULTS DOCUMENT: HCPCS | Performed by: FAMILY MEDICINE

## 2021-07-28 RX ORDER — ZOSTER VACCINE RECOMBINANT, ADJUVANTED 50 MCG/0.5
0.5 KIT INTRAMUSCULAR SEE ADMIN INSTRUCTIONS
Qty: 0.5 ML | Refills: 0 | Status: SHIPPED | OUTPATIENT
Start: 2021-07-28 | End: 2022-01-24

## 2021-07-28 RX ORDER — CLINDAMYCIN HYDROCHLORIDE 150 MG/1
150 CAPSULE ORAL 3 TIMES DAILY
Qty: 30 CAPSULE | Refills: 0 | Status: SHIPPED | OUTPATIENT
Start: 2021-07-28 | End: 2021-07-28 | Stop reason: SDUPTHER

## 2021-07-28 RX ORDER — ZOSTER VACCINE RECOMBINANT, ADJUVANTED 50 MCG/0.5
0.5 KIT INTRAMUSCULAR SEE ADMIN INSTRUCTIONS
Qty: 0.5 ML | Refills: 0 | Status: SHIPPED | OUTPATIENT
Start: 2021-07-28 | End: 2021-07-28 | Stop reason: SDUPTHER

## 2021-07-28 RX ORDER — CLINDAMYCIN HYDROCHLORIDE 150 MG/1
150 CAPSULE ORAL 3 TIMES DAILY
Qty: 30 CAPSULE | Refills: 0 | Status: SHIPPED | OUTPATIENT
Start: 2021-07-28 | End: 2021-08-03 | Stop reason: SINTOL

## 2021-07-28 SDOH — ECONOMIC STABILITY: FOOD INSECURITY: WITHIN THE PAST 12 MONTHS, THE FOOD YOU BOUGHT JUST DIDN'T LAST AND YOU DIDN'T HAVE MONEY TO GET MORE.: NEVER TRUE

## 2021-07-28 SDOH — ECONOMIC STABILITY: FOOD INSECURITY: WITHIN THE PAST 12 MONTHS, YOU WORRIED THAT YOUR FOOD WOULD RUN OUT BEFORE YOU GOT MONEY TO BUY MORE.: NEVER TRUE

## 2021-07-28 ASSESSMENT — SOCIAL DETERMINANTS OF HEALTH (SDOH): HOW HARD IS IT FOR YOU TO PAY FOR THE VERY BASICS LIKE FOOD, HOUSING, MEDICAL CARE, AND HEATING?: NOT HARD AT ALL

## 2021-07-28 NOTE — PROGRESS NOTES
Patient: Faye Tariq (: 1940) is a [de-identified] y.o. female,Established patient, here for evaluation of the following chief complaint(s):  Chief Complaint   Patient presents with    6 Month Follow-Up       Date: 21    Allergies   Allergen Reactions    Amoxicillin     Nsaids      CRF 4       Vitals:    21 1024   BP: 132/60   Pulse: 67   Resp: 12   Temp: 97.9 °F (36.6 °C)   SpO2: 98%   Weight: 145 lb (65.8 kg)   Height: 5' 1\" (1.549 m)      Body mass index is 27.4 kg/m². PHQ Scores 2021   PHQ2 Score 0   PHQ9 Score 0     Interpretation of Total Score Depression Severity: 1-4 = Minimal depression, 5-9 = Mild depression, 10-14 = Moderate depression, 15-19 = Moderately severe depression, 20-27 = Severe depression    HPI    She comes in today to follow-up on her chronic medical illnesses. She recently had some lab work done by her nephrologist which I reviewed and is in the chart. Her only complaint today is of right-sided neck pain. Its been there for about 2 months and she has no ear pain no sore throat it feels just kind of sore and she rubs the front part of the right side of her neck. She has no cough fever chills chest pressure or difficulty swallowing nausea vomiting diarrhea weight loss or weight gain    Review of Systems    Constitutional: Negative for fatigue, fever and sweats. HEENT: Negative for eye discharge and vision loss. Negative for ear drainage, hearing loss and nasal drainage. Respiratory: Negative for cough, dyspnea and wheezing. Cardiovascular:  Negative for chest pain, claudication and irregular heartbeat/palpitations. Gastrointestinal: Negative for abdominal pain, nausea, constipation and diarrhea. Genitourinary: Negative for dysuria, hematuria, polyuria, dysmenorrhea, menorrhagia and vaginal discharge. Metabolic/Endocrine: Negative for cold intolerance, heat intolerance, polydipsia and polyphagia. No unintended weight loss or weight gain.   Neuro/Psychiatric: Negative for gait disturbance. Negative for psychiatric symptoms. Dermatologic: Negative for pruritus and rash. Musculoskeletal: Negative for bone/joint symptoms. No numbness or tingling. No loss of function. Hematology: Negative for bleeding and easy bruising. Immunology:  Negative for environmental allergies and food allergies. Physical Exam    Patient's medication, allergies, past medical, surgical, social and family histories were reviewed and updated as appropriate. PHYSICAL EXAM     General: Alert oriented pleasant cooperative no acute distress  HEENT: Eyes clear nonicteric facial muscles symmetrical.  Ears she has some ear wax in the canals and some occlusive bilaterally but no erythema or inflammation noted oropharynx clear no swelling induration exudates or ulceration. Uvula midline  Neck: Soft with one enlarged lymph node just to the anterior mid part of the sternocleidomastoid muscle. There is no tenderness no supraclavicular adenopathy no thyroid enlargement  Lungs: Clear to auscultation without wheezes rhonchi or rales  Heart: Regular rate and rhythm without murmurs rubs or gallops  Extremities: No rashes or edema. Assessment:   Diagnosis Orders   1. Essential hypertension  Comprehensive Metabolic Panel   2. Dyslipidemia  Lipid, Fasting   3. Stage 4 chronic kidney disease (HCC)  Comprehensive Metabolic Panel   4. Cervical adenopathy  US HEAD NECK SOFT TISSUE THYROID    clindamycin (CLEOCIN) 150 MG capsule CBC a few weeks ago had normal white blood cell count and distribution     Patient given Prevnar today in the office    On this date 07/28/21 I have spent 33 minutes reviewing previous notes, test results and face to face with the patient discussing the diagnosis and importance of compliance with the treatment plan.        Plan:  Current Outpatient Medications   Medication Sig Dispense Refill    clindamycin (CLEOCIN) 150 MG capsule Take 1 capsule by mouth 3 times daily for 10 days 30 capsule 0    zoster recombinant adjuvanted vaccine (SHINGRIX) 50 MCG/0.5ML SUSR injection Inject 0.5 mLs into the muscle See Admin Instructions 1 dose now and repeat in 2-6 months 0.5 mL 0    traZODone (DESYREL) 100 MG tablet Take 1 tablet by mouth nightly 90 tablet 1    Handicap Placard MISC by Does not apply route Applying from 1/28/2021-1/27/2026. 1 each 0    doxazosin (CARDURA) 4 MG tablet Take 1 tablet by mouth nightly 90 tablet 3    atorvastatin (LIPITOR) 40 MG tablet Take 1 tablet by mouth daily 90 tablet 3    furosemide (LASIX) 40 MG tablet Take 1 tablet by mouth 2 times daily 180 tablet 3    losartan (COZAAR) 100 MG tablet Take 1 tablet by mouth daily 90 tablet 3    metoprolol tartrate (LOPRESSOR) 25 MG tablet Take 1 tablet by mouth 2 times daily 180 tablet 3    aspirin 81 MG EC tablet Take 81 mg by mouth daily       No current facility-administered medications for this visit.      Orders Placed This Encounter   Procedures    US HEAD NECK SOFT TISSUE THYROID     Standing Status:   Future     Standing Expiration Date:   7/28/2022    PREVNAR 13 IM (Pneumococcal conjugate vaccine 13-valent)    Comprehensive Metabolic Panel     Standing Status:   Future     Standing Expiration Date:   7/28/2022    Lipid, Fasting     Standing Status:   Future     Standing Expiration Date:   7/28/2022       Orders Placed This Encounter   Medications    DISCONTD: zoster recombinant adjuvanted vaccine (SHINGRIX) 50 MCG/0.5ML SUSR injection     Sig: Inject 0.5 mLs into the muscle See Admin Instructions 1 dose now and repeat in 2-6 months     Dispense:  0.5 mL     Refill:  0    DISCONTD: clindamycin (CLEOCIN) 150 MG capsule     Sig: Take 1 capsule by mouth 3 times daily for 10 days     Dispense:  30 capsule     Refill:  0    clindamycin (CLEOCIN) 150 MG capsule     Sig: Take 1 capsule by mouth 3 times daily for 10 days     Dispense:  30 capsule     Refill:  0    zoster recombinant adjuvanted vaccine [facility]) 50 MCG/0.5ML SUSR injection     Sig: Inject 0.5 mLs into the muscle See Admin Instructions 1 dose now and repeat in 2-6 months     Dispense:  0.5 mL     Refill:  0              Return in about 6 months (around 1/28/2022), or Dr. Maria Luisa Good. An electronic signature was used to authenticate this note.   Dr. Ingrid Dominguez MD      7/28/21  11:09 AM

## 2021-07-29 DIAGNOSIS — E87.5 HYPERKALEMIA: Primary | ICD-10-CM

## 2021-07-30 ENCOUNTER — TELEPHONE (OUTPATIENT)
Dept: FAMILY MEDICINE CLINIC | Age: 81
End: 2021-07-30

## 2021-08-06 ENCOUNTER — HOSPITAL ENCOUNTER (OUTPATIENT)
Dept: ULTRASOUND IMAGING | Age: 81
Discharge: HOME | End: 2021-08-08
Payer: MEDICARE

## 2021-08-06 DIAGNOSIS — R59.0 CERVICAL ADENOPATHY: ICD-10-CM

## 2021-08-06 PROCEDURE — 76536 US EXAM OF HEAD AND NECK: CPT

## 2021-08-10 DIAGNOSIS — Z72.0 TOBACCO ABUSE: Primary | ICD-10-CM

## 2021-09-08 RX ORDER — DOXAZOSIN MESYLATE 4 MG/1
TABLET ORAL
Qty: 90 TABLET | Refills: 3 | Status: SHIPPED | OUTPATIENT
Start: 2021-09-08 | End: 2022-09-01 | Stop reason: SDUPTHER

## 2021-09-27 ENCOUNTER — TELEPHONE (OUTPATIENT)
Dept: CASE MANAGEMENT | Age: 81
End: 2021-09-27

## 2021-09-27 NOTE — TELEPHONE ENCOUNTER
According to CMS Guidelines, the patient does not meet the requirements for a CT Lung Screening. Waiting for insurance authorization to see if patient will be covered for CT Lung Screening. Patient is over 69. Ins approved scan.

## 2021-10-07 ENCOUNTER — HOSPITAL ENCOUNTER (OUTPATIENT)
Dept: CT IMAGING | Age: 81
Discharge: HOME | End: 2021-10-09
Payer: MEDICARE

## 2021-10-07 DIAGNOSIS — Z72.0 TOBACCO ABUSE: ICD-10-CM

## 2021-10-07 PROCEDURE — 71271 CT THORAX LUNG CANCER SCR C-: CPT

## 2021-10-14 ENCOUNTER — OFFICE VISIT (OUTPATIENT)
Dept: CARDIOLOGY CLINIC | Age: 81
End: 2021-10-14
Payer: MEDICARE

## 2021-10-14 VITALS
WEIGHT: 138 LBS | BODY MASS INDEX: 26.07 KG/M2 | HEART RATE: 62 BPM | TEMPERATURE: 97.2 F | DIASTOLIC BLOOD PRESSURE: 70 MMHG | SYSTOLIC BLOOD PRESSURE: 110 MMHG

## 2021-10-14 DIAGNOSIS — E78.5 DYSLIPIDEMIA: ICD-10-CM

## 2021-10-14 DIAGNOSIS — I25.10 CORONARY ARTERY DISEASE INVOLVING NATIVE CORONARY ARTERY OF NATIVE HEART WITHOUT ANGINA PECTORIS: ICD-10-CM

## 2021-10-14 DIAGNOSIS — Z72.0 TOBACCO ABUSE: ICD-10-CM

## 2021-10-14 DIAGNOSIS — I65.23 BILATERAL CAROTID ARTERY STENOSIS: ICD-10-CM

## 2021-10-14 DIAGNOSIS — Z95.1 HX OF CABG: ICD-10-CM

## 2021-10-14 DIAGNOSIS — I10 ESSENTIAL HYPERTENSION: Primary | ICD-10-CM

## 2021-10-14 DIAGNOSIS — N18.4 STAGE 4 CHRONIC KIDNEY DISEASE (HCC): ICD-10-CM

## 2021-10-14 DIAGNOSIS — R09.89 CAROTID BRUIT, UNSPECIFIED LATERALITY: ICD-10-CM

## 2021-10-14 PROCEDURE — 4040F PNEUMOC VAC/ADMIN/RCVD: CPT | Performed by: INTERNAL MEDICINE

## 2021-10-14 PROCEDURE — 93000 ELECTROCARDIOGRAM COMPLETE: CPT | Performed by: INTERNAL MEDICINE

## 2021-10-14 PROCEDURE — 4004F PT TOBACCO SCREEN RCVD TLK: CPT | Performed by: INTERNAL MEDICINE

## 2021-10-14 PROCEDURE — G8427 DOCREV CUR MEDS BY ELIG CLIN: HCPCS | Performed by: INTERNAL MEDICINE

## 2021-10-14 PROCEDURE — 1090F PRES/ABSN URINE INCON ASSESS: CPT | Performed by: INTERNAL MEDICINE

## 2021-10-14 PROCEDURE — G8417 CALC BMI ABV UP PARAM F/U: HCPCS | Performed by: INTERNAL MEDICINE

## 2021-10-14 PROCEDURE — 1123F ACP DISCUSS/DSCN MKR DOCD: CPT | Performed by: INTERNAL MEDICINE

## 2021-10-14 PROCEDURE — G8399 PT W/DXA RESULTS DOCUMENT: HCPCS | Performed by: INTERNAL MEDICINE

## 2021-10-14 PROCEDURE — 99214 OFFICE O/P EST MOD 30 MIN: CPT | Performed by: INTERNAL MEDICINE

## 2021-10-14 PROCEDURE — G8484 FLU IMMUNIZE NO ADMIN: HCPCS | Performed by: INTERNAL MEDICINE

## 2021-10-14 NOTE — PROGRESS NOTES
Chief Complaint   Patient presents with    Coronary Artery Disease    Carotid Disease       11/19/2020: Patient presents for initial medical evaluation. Patient is followed on a regular basis by Dr. Kylie Manzo MD recently moved from [facility]. Hx of CAD s/p CABGx5 in 2010. Status post negative nuclear stress test in 2017. Pt denies chest pain, dyspnea, dyspnea on exertion, change in exercise capacity, fatigue,  nausea, vomiting, diarrhea, constipation, motor weakness, insomnia, weight loss, syncope, dizziness, lightheadedness, palpitations, PND, orthopnea, or claudication. No hx of PAD . No hx of CHF of arrhythmia. He continues to smoke approximate 10 cigarettes a day. Status post carotid ultrasound on April 2020 with 60% stenosis in the left and right internal carotid arteries. 1/7/2021: Status post echocardiogram ejection fraction of 55%, 1-2+ aortic regurgitation, 1+ mitral and tricuspid regurgitation, RVSP of 27 mmHg, grade 2 diastolic dysfunction. Status post abdominal aorta ultrasound with no evidence of any aneurysm. She will follow up with primary care Dr. Jamila Elmore as well as nephrology. Pt denies chest pain, dyspnea, dyspnea on exertion, change in exercise capacity, fatigue,  nausea, vomiting, diarrhea, constipation, motor weakness, insomnia, weight loss, syncope, dizziness, lightheadedness, palpitations, PND, orthopnea, or claudication. she continues to smoke. Has CKD. Blood pressure is 136/71 heart rate of 56 bpm.  With history of coronary disease status post CABG x5 in 2010    10-14-21: Pt denies chest pain, dyspnea, dyspnea on exertion, change in exercise capacity, fatigue,  nausea, vomiting, diarrhea, constipation, motor weakness, insomnia, weight loss, syncope, dizziness, lightheadedness, palpitations, PND, orthopnea, or claudication. No nitro use. BP and hr are good. CAD is stable. No LE discoloration or ulcers. No LE edema. No CHF type symptoms.  Lipid profile is normal. No recent hospitalization. No change in meds. With history of coronary disease status post CABG x5 in 2010. Hx of moderate carotid LICA, 64%. He is to smoke unfortunately. Status post CT of the lung screening with an 11 mm thyroid nodule and some bilateral lung nodules recommended follow-up with 12 months repeat CT of the chest  EKG with NSR, no ischemia. Patient Active Problem List   Diagnosis    Carotid bruit    Kidney disease    CAD (coronary artery disease)    Bilateral carotid artery stenosis    Hx of CABGx5    Tobacco abuse    Stage 4 chronic kidney disease ([facility] [address]    Essential hypertension    Dyslipidemia    Stenosis of noncoronary bypass graft ([facility] [address]       Past Surgical History:   Procedure Laterality Date    BACK SURGERY  2003    BRAIN ANEURYSM SURGERY  2012    BREAST LUMPECTOMY Right     CARPAL TUNNEL RELEASE Bilateral     CORONARY ARTERY BYPASS GRAFT      PARTIAL HYSTERECTOMY  1997    TOTAL HIP ARTHROPLASTY Bilateral left side 2017 right side 2004       Social History     Socioeconomic History    Marital status:       Spouse name: Not on file    Number of children: Not on file    Years of education: Not on file    Highest education level: Not on file   Occupational History    Not on file   Tobacco Use    Smoking status: Current Every Day Smoker     Packs/day: 0.50     Years: 60.00     Pack years: 30.00    Smokeless tobacco: Never Used   Substance and Sexual Activity    Alcohol use: Not on file    Drug use: Not on file    Sexual activity: Not on file   Other Topics Concern    Not on file   Social History Narrative    Not on file     Social Determinants of Health     Financial Resource Strain: Low Risk     Difficulty of Paying Living Expenses: Not hard at all   Food Insecurity: No Food Insecurity    Worried About [address] in the Last Year: Never true    Jeannine of Food in the Last Year: Never true   Transportation Needs:     Lack of Transportation (Medical):  Lack of Transportation (Non-Medical):    Physical Activity:     Days of Exercise per Week:     Minutes of Exercise per Session:    Stress:     Feeling of Stress :    Social Connections:     Frequency of Communication with Friends and Family:     Frequency of Social Gatherings with Friends and Family:     Attends Restoration[employer] Services:     Active Member of Clubs or Organizations:     Attends Club or Organization Meetings:     Marital Status:    Intimate Partner Violence:     Fear of Current or Ex-Partner:     Emotionally Abused:     Physically Abused:     Sexually Abused:        No family history on file. Current Outpatient Medications   Medication Sig Dispense Refill    doxazosin (CARDURA) 4 MG tablet TAKE 1 TABLET AT BEDTIME 90 tablet 3    zoster recombinant adjuvanted vaccine (SHINGRIX) 50 MCG/0.5ML SUSR injection Inject 0.5 mLs into the muscle See Admin Instructions 1 dose now and repeat in 2-6 months 0.5 mL 0    traZODone (DESYREL) 100 MG tablet Take 1 tablet by mouth nightly 90 tablet 1    Handicap Placard MISC by Does not apply route Applying from 1/28/2021-1/27/2026. 1 each 0    atorvastatin (LIPITOR) 40 MG tablet Take 1 tablet by mouth daily 90 tablet 3    furosemide (LASIX) 40 MG tablet Take 1 tablet by mouth 2 times daily 180 tablet 3    losartan (COZAAR) 100 MG tablet Take 1 tablet by mouth daily 90 tablet 3    metoprolol tartrate (LOPRESSOR) 25 MG tablet Take 1 tablet by mouth 2 times daily 180 tablet 3    aspirin 81 MG EC tablet Take 81 mg by mouth daily       No current facility-administered medications for this visit. Amoxicillin and Nsaids    Review of Systems:  General ROS: negative  Psychological ROS: negative  Hematological and Lymphatic ROS: No history of blood clots or bleeding disorder.    Respiratory ROS: no cough, shortness of breath, or wheezing  Cardiovascular ROS: no chest pain or dyspnea on exertion  Gastrointestinal ROS: no abdominal pain, change in bowel habits, or black or bloody stools  Genito-Urinary ROS: no dysuria, trouble voiding, or hematuria  Musculoskeletal ROS: negative  Neurological ROS: no TIA or stroke symptoms  Dermatological ROS: negative    VITALS:  Blood pressure 110/70, pulse 62, temperature 97.2 °F (36.2 °C), temperature source Infrared, weight 138 lb (62.6 kg). Body mass index is 26.07 kg/m². Physical Examination:  General appearance - alert, well appearing, and in no distress  Mental status - alert, oriented to person, place, and time  Neck - Neck is supple, no JVD or carotid bruits. No thyromegaly or adenopathy. Chest - clear to auscultation, no wheezes, rales or rhonchi, symmetric air entry  Heart - normal rate, regular rhythm, normal S1, S2, no murmurs, rubs, clicks or gallops  Abdomen - soft, nontender, nondistended, no masses or organomegaly  Neurological - alert, oriented, normal speech, no focal findings or movement disorder noted  Extremities - peripheral pulses normal, no pedal edema, no clubbing or cyanosis  Skin - normal coloration and turgor, no rashes, no suspicious skin lesions noted      EKG: normal sinus rhythm, nonspecific ST and T waves changes    Orders Placed This Encounter   Procedures    EKG 12 Lead       ASSESSMENT:     Diagnosis Orders   1. Essential hypertension  EKG 12 Lead   2. Bilateral carotid artery stenosis     3. Coronary artery disease involving native coronary artery of native heart without angina pectoris     4. Carotid bruit, unspecified laterality     5. Dyslipidemia     6. Stage 4 chronic kidney disease ([address]     7. Tobacco abuse     8. Hx of CABGx5           PLAN:     Patient will need to continue to follow up with you for their general medical care    As always, aggressive risk factor modification is strongly recommended. We should adhere to the JNC VIII guidelines for HTN management and the NCEP ATP III guidelines for LDL-C management.     Cardiac diet is always recommended with low fat, cholesterol, calories and sodium. Continue medications at current doses. Follow-up with primary care physician for lung nodule/thyroid nodule follow-up. Check carotid ultrasound in 1 year    Check echo in 1 to 2 years for aortic regurgitation    Smoking cessation was strongly recommended    Patient was advised and encouraged to check blood pressure at home or at a pharmacy, maintain a logbook, and also call us back if blood pressure are above the target ranges or if it is low. Patient clearly understands and agrees to the instructions. We will need to continue to monitor muscle and liver enzymes, BUN, CR, and electrolytes. Details of medical condition explained and patient was warned about adverse consequences of uncontrolled medical conditions and possible side effects of prescribed medications.

## 2021-10-20 NOTE — TELEPHONE ENCOUNTER
Lisbeth Covert requesting medication refill. She states she has 5 day's left.     Rx requested:  Requested Prescriptions     Pending Prescriptions Disp Refills    traZODone (DESYREL) 100 MG tablet 90 tablet 1     Sig: Take 1 tablet by mouth nightly     Last Office Visit:   7/28/2021 Dr Verito Lee    Next Visit Date:  Future Appointments   Date Time Provider Aiden Guzman   1/27/2022  9:00 AM Glenna Chavez MD [facility]   7/21/2022 12:15 PM Phill De Paz, DO [facility]   conf pt's future appt //sxc

## 2021-10-21 RX ORDER — TRAZODONE HYDROCHLORIDE 100 MG/1
100 TABLET ORAL NIGHTLY
Qty: 90 TABLET | Refills: 1 | Status: SHIPPED | OUTPATIENT
Start: 2021-10-21 | End: 2022-04-15

## 2021-10-29 DIAGNOSIS — I10 ESSENTIAL HYPERTENSION: Primary | ICD-10-CM

## 2021-10-29 DIAGNOSIS — E78.5 DYSLIPIDEMIA: ICD-10-CM

## 2021-10-29 RX ORDER — FUROSEMIDE 40 MG/1
TABLET ORAL
Qty: 180 TABLET | Refills: 3 | Status: ON HOLD | OUTPATIENT
Start: 2021-10-29 | End: 2022-10-12

## 2021-10-29 RX ORDER — LOSARTAN POTASSIUM 100 MG/1
TABLET ORAL
Qty: 90 TABLET | Refills: 3 | Status: SHIPPED | OUTPATIENT
Start: 2021-10-29 | End: 2022-10-24

## 2021-10-29 RX ORDER — ATORVASTATIN CALCIUM 40 MG/1
TABLET, FILM COATED ORAL
Qty: 90 TABLET | Refills: 3 | Status: SHIPPED | OUTPATIENT
Start: 2021-10-29 | End: 2022-10-24

## 2021-10-29 NOTE — TELEPHONE ENCOUNTER
Requesting medication refill. Please approve or deny this request.    Rx requested:  Requested Prescriptions     Pending Prescriptions Disp Refills    metoprolol tartrate (LOPRESSOR) 25 MG tablet [Pharmacy Med Name: METOPROLOL TARTRATE 25 MG TAB] 180 tablet 3     Sig: TAKE 1 TABLET BY MOUTH TWICE A DAY    losartan (COZAAR) 100 MG tablet [Pharmacy Med Name: LOSARTAN POTASSIUM 100 MG TAB] 90 tablet 3     Sig: TAKE 1 TABLET BY MOUTH EVERY DAY    atorvastatin (LIPITOR) 40 MG tablet [Pharmacy Med Name: ATORVASTATIN 40 MG TABLET] 90 tablet 3     Sig: TAKE 1 TABLET BY MOUTH EVERY DAY    furosemide (LASIX) 40 MG tablet [Pharmacy Med Name: FUROSEMIDE 40 MG TABLET] 180 tablet 3     Sig: TAKE 1 TABLET BY MOUTH TWICE A DAY         Last Office Visit:   10/14/2021      Next Visit Date:  Future Appointments   Date Time Provider Aiden Guzman   1/27/2022  9:00 AM Lord Alistair MD [facility]   7/21/2022 12:15 PM Wes Monnie Meckel, DO [facility]               Last refill 11/19/20. Please approve or deny.

## 2022-01-27 ENCOUNTER — OFFICE VISIT (OUTPATIENT)
Dept: FAMILY MEDICINE CLINIC | Age: 82
End: 2022-01-27
Payer: MEDICARE

## 2022-01-27 VITALS
WEIGHT: 141 LBS | TEMPERATURE: 97.8 F | OXYGEN SATURATION: 98 % | HEIGHT: 61 IN | DIASTOLIC BLOOD PRESSURE: 64 MMHG | RESPIRATION RATE: 14 BRPM | HEART RATE: 67 BPM | BODY MASS INDEX: 26.62 KG/M2 | SYSTOLIC BLOOD PRESSURE: 110 MMHG

## 2022-01-27 VITALS
BODY MASS INDEX: 26.62 KG/M2 | TEMPERATURE: 97.8 F | OXYGEN SATURATION: 98 % | RESPIRATION RATE: 14 BRPM | DIASTOLIC BLOOD PRESSURE: 64 MMHG | HEIGHT: 61 IN | SYSTOLIC BLOOD PRESSURE: 110 MMHG | HEART RATE: 68 BPM | WEIGHT: 141 LBS

## 2022-01-27 DIAGNOSIS — I10 ESSENTIAL HYPERTENSION: Primary | ICD-10-CM

## 2022-01-27 DIAGNOSIS — E87.5 HYPERKALEMIA: ICD-10-CM

## 2022-01-27 DIAGNOSIS — F17.200 NICOTINE DEPENDENCE, UNCOMPLICATED, UNSPECIFIED NICOTINE PRODUCT TYPE: ICD-10-CM

## 2022-01-27 DIAGNOSIS — E78.5 DYSLIPIDEMIA: ICD-10-CM

## 2022-01-27 DIAGNOSIS — Z00.00 ROUTINE GENERAL MEDICAL EXAMINATION AT A HEALTH CARE FACILITY: Primary | ICD-10-CM

## 2022-01-27 DIAGNOSIS — N18.31 STAGE 3A CHRONIC KIDNEY DISEASE (HCC): ICD-10-CM

## 2022-01-27 PROCEDURE — G8427 DOCREV CUR MEDS BY ELIG CLIN: HCPCS | Performed by: INTERNAL MEDICINE

## 2022-01-27 PROCEDURE — 1123F ACP DISCUSS/DSCN MKR DOCD: CPT | Performed by: INTERNAL MEDICINE

## 2022-01-27 PROCEDURE — 4040F PNEUMOC VAC/ADMIN/RCVD: CPT | Performed by: INTERNAL MEDICINE

## 2022-01-27 PROCEDURE — 4004F PT TOBACCO SCREEN RCVD TLK: CPT | Performed by: INTERNAL MEDICINE

## 2022-01-27 PROCEDURE — 99214 OFFICE O/P EST MOD 30 MIN: CPT | Performed by: INTERNAL MEDICINE

## 2022-01-27 PROCEDURE — 1090F PRES/ABSN URINE INCON ASSESS: CPT | Performed by: INTERNAL MEDICINE

## 2022-01-27 PROCEDURE — G8399 PT W/DXA RESULTS DOCUMENT: HCPCS | Performed by: INTERNAL MEDICINE

## 2022-01-27 PROCEDURE — G0439 PPPS, SUBSEQ VISIT: HCPCS | Performed by: INTERNAL MEDICINE

## 2022-01-27 PROCEDURE — G8417 CALC BMI ABV UP PARAM F/U: HCPCS | Performed by: INTERNAL MEDICINE

## 2022-01-27 PROCEDURE — G8482 FLU IMMUNIZE ORDER/ADMIN: HCPCS | Performed by: INTERNAL MEDICINE

## 2022-01-27 ASSESSMENT — ENCOUNTER SYMPTOMS
VOICE CHANGE: 0
CONSTIPATION: 0
WHEEZING: 0
COLOR CHANGE: 0
EYE DISCHARGE: 0
COUGH: 0
ABDOMINAL PAIN: 0
SORE THROAT: 0
EYE PAIN: 0
VOMITING: 0
FACIAL SWELLING: 0
TROUBLE SWALLOWING: 0
BLOOD IN STOOL: 0
CHEST TIGHTNESS: 0
SHORTNESS OF BREATH: 0
PHOTOPHOBIA: 0
SINUS PRESSURE: 0
RHINORRHEA: 0
BACK PAIN: 0
DIARRHEA: 0
EYE ITCHING: 0
NAUSEA: 0
SINUS PAIN: 0
APNEA: 0
ABDOMINAL DISTENTION: 0
RECTAL PAIN: 0
EYE REDNESS: 0

## 2022-01-27 ASSESSMENT — PATIENT HEALTH QUESTIONNAIRE - PHQ9
SUM OF ALL RESPONSES TO PHQ QUESTIONS 1-9: 0
SUM OF ALL RESPONSES TO PHQ9 QUESTIONS 1 & 2: 0
SUM OF ALL RESPONSES TO PHQ QUESTIONS 1-9: 0
SUM OF ALL RESPONSES TO PHQ QUESTIONS 1-9: 0
2. FEELING DOWN, DEPRESSED OR HOPELESS: 0
SUM OF ALL RESPONSES TO PHQ QUESTIONS 1-9: 0
1. LITTLE INTEREST OR PLEASURE IN DOING THINGS: 0

## 2022-01-27 NOTE — PROGRESS NOTES
Medicare Annual Wellness Visit  Name: Libby Askew Date: 2022   MRN: [id number] Sex: Female   Age: [de-identified] y.o. Ethnicity: Non- / Non    : 1940 Race: White (non-)      Mile Reilly is here for Medicare AWV (Here for Medicare AWV)    Screenings for behavioral, psychosocial and functional/safety risks, and cognitive dysfunction are all negative except as indicated below. These results, as well as other patient data from the [address] form, are documented in Flowsheets linked to this Encounter. Allergies   Allergen Reactions    Amoxicillin     Nsaids      CRF 4       Prior to Visit Medications    Medication Sig Taking? Authorizing Provider   metoprolol tartrate (LOPRESSOR) 25 MG tablet TAKE 1 TABLET BY MOUTH TWICE A DAY Yes Sangeethasa WAQAR Craig, APRN - CNP   losartan (COZAAR) 100 MG tablet TAKE 1 TABLET BY MOUTH EVERY DAY Yes Sangeetha A Kiara, APRN - CNP   atorvastatin (LIPITOR) 40 MG tablet TAKE 1 TABLET BY MOUTH EVERY DAY Yes Sangeethasa WAQAR Craig, APRN - CNP   furosemide (LASIX) 40 MG tablet TAKE 1 TABLET BY MOUTH TWICE A DAY Yes Sangeetha A ko, APRN - CNP   traZODone (DESYREL) 100 MG tablet Take 1 tablet by mouth nightly Yes Yuni Dugan MD   doxazosin (CARDURA) 4 MG tablet TAKE 1 TABLET AT BEDTIME Yes Phill PRESLEY Holiday, DO   aspirin 81 MG EC tablet Take 81 mg by mouth daily Yes Historical Provider, MD   Handicap Placard MISC by Does not apply route Applying from 2021-2026.   Carolyne Seals MD       Past Medical History:   Diagnosis Date    Bilateral carotid artery stenosis 2020    CAD (coronary artery disease)     Carotid bruit     Dyslipidemia 2020    Essential hypertension 2020    Hx of CABG 2020    Hyperlipidemia     Kidney disease     Stage 4 chronic kidney disease ([address] 2020    Stenosis of noncoronary bypass graft ([facility] [address]     Tobacco abuse 2020       Past Surgical History:   Procedure Laterality Date    BACK SURGERY  2003    BRAIN ANEURYSM SURGERY  2012    BREAST LUMPECTOMY Right     CARPAL TUNNEL RELEASE Bilateral     CORONARY ARTERY BYPASS GRAFT      PARTIAL HYSTERECTOMY  1997    TOTAL HIP ARTHROPLASTY Bilateral left side 2017 right side 2004       History reviewed. No pertinent family history. CareTeam (Including outside providers/suppliers regularly involved in providing care):   Patient Care Team:  Leonard Gregory MD as PCP - General (Internal Medicine)  Leonard Gregory MD as PCP - [facility] Empaneled Provider  Leonard Gregory MD as Consulting Physician (Internal Medicine)    Wt Readings from Last 3 Encounters:   01/27/22 141 lb (64 kg)   01/27/22 141 lb (64 kg)   10/14/21 138 lb (62.6 kg)     Vitals:    01/27/22 0939   BP: 110/64   Site: Right Upper Arm   Position: Sitting   Cuff Size: Medium Adult   Pulse: 68   Resp: 14   Temp: 97.8 °F (36.6 °C)   TempSrc: Oral   SpO2: 98%   Weight: 141 lb (64 kg)   Height: 5' 1\" (1.549 m)     Body mass index is 26.64 kg/m². Based upon direct observation of the patient, evaluation of cognition reveals recent and remote memory intact. Patient's complete Health Risk Assessment and screening values have been reviewed and are found in Flowsheets. The following problems were reviewed today and where indicated follow up appointments were made and/or referrals ordered. Positive Risk Factor Screenings with Interventions:         Substance History:  Social History     Tobacco History     Smoking Status  Current Every Day Smoker Smoking Frequency  0.5 packs/day for 60 years (30 pk yrs)    Smokeless Tobacco Use  Never Used          Alcohol History     Alcohol Use Status  Never          Drug Use     Drug Use Status  Not Asked          Sexual Activity     Sexually Active  Not Currently               Alcohol Screening:       A score of 8 or more is associated with harmful or hazardous drinking.  A score of 13 or more in women, and 15 or more in men, is likely to indicate alcohol dependence. Substance Abuse Interventions:  · Tobacco abuse:  tobacco cessation tips and resources provided    General Health and ACP:  General  In general, how would you say your health is?: Very Good  In the past 7 days, have you experienced any of the following?  New or Increased Pain, New or Increased Fatigue, Loneliness, Social Isolation, Stress or Anger?: None of These  Do you get the social and emotional support that you need?: Yes  Do you have a Living Will?: Yes  Advance Directives     Power of [address] [place] [address] Will ACP-Advance Directive ACP-Power of     Not on File Not on File Not on File Not on File      General Health Risk Interventions:  · Stress: relaxation techniques discussed        Personalized Preventive Plan   Current Health Maintenance Status  Immunization History   Administered Date(s) Administered    COVID-19, Johnathan [employer], Primary or Immunocompromised, PF, 100mcg/0.5mL 02/12/2021, 03/12/2021, 12/13/2021    Influenza Virus Vaccine 09/15/2020, 09/15/2020    Influenza, Quadv, IM, PF (6 mo and older Fluzone, Flulaval, Fluarix, and 3 yrs and older Afluria) 09/11/2021    Pneumococcal Conjugate 13-valent (Arthuro Alejandra) 07/28/2021    Zoster Recombinant (Shingrix) 07/28/2021, 10/29/2021        Health Maintenance   Topic Date Due    DTaP/Tdap/Td vaccine (1 - Tdap) Never done   [employer] Visit (AWV)  Never done    Depression Screen  01/28/2022    Lipid screen  07/28/2022    Pneumococcal 65+ yrs at Risk Vaccine (2 of 2 - PPSV23) 07/28/2022    Potassium monitoring  07/28/2022    Creatinine monitoring  07/28/2022    Low dose CT lung screening  10/07/2022    DEXA (modify frequency per FRAX score)  Completed    Flu vaccine  Completed    Shingles Vaccine  Completed    COVID-19 Vaccine  Completed    Hepatitis A vaccine  Aged Out    Hepatitis B vaccine  Aged Out    Hib vaccine  Aged Out    Meningococcal (ACWY) vaccine  Aged Out     Recommendations for [employer] Due: see orders and patient instructions/AVS.  . Recommended screening schedule for the next 5-10 years is provided to the patient in written form: see Patient Instructions/AVS.    IMatthias LPN, 5/37/1789, performed the documented evaluation under the direct supervision of the attending physician. This encounter was performed under Julita gurrola MDs, direct supervision, 1/27/2022.

## 2022-01-27 NOTE — PATIENT INSTRUCTIONS
Personalized Preventive Plan for Mariangel Barnett - 1/27/2022  Medicare offers a range of preventive health benefits. Some of the tests and screenings are paid in full while other may be subject to a deductible, co-insurance, and/or copay. Some of these benefits include a comprehensive review of your medical history including lifestyle, illnesses that may run in your family, and various assessments and screenings as appropriate. After reviewing your medical record and screening and assessments performed today your provider may have ordered immunizations, labs, imaging, and/or referrals for you. A list of these orders (if applicable) as well as your Preventive Care list are included within your After Visit Summary for your review. Other Preventive Recommendations:    · A preventive eye exam performed by an eye specialist is recommended every 1-2 years to screen for glaucoma; cataracts, macular degeneration, and other eye disorders. · A preventive dental visit is recommended every 6 months. · Try to get at least 150 minutes of exercise per week or 10,000 steps per day on a pedometer . · Order or download the FREE \"Exercise & Physical Activity: Your Everyday Guide\" from [employer]. Call 3-975.347.7644 or search [employer] online. · You need 0826-5649 mg of calcium and 5980-8951 IU of vitamin D per day. It is possible to meet your calcium requirement with diet alone, but a vitamin D supplement is usually necessary to meet this goal.  · When exposed to the sun, use a sunscreen that protects against both UVA and UVB radiation with an SPF of 30 or greater. Reapply every 2 to 3 hours or after sweating, drying off with a towel, or swimming. · Always wear a seat belt when traveling in a car. Always wear a helmet when riding a bicycle or motorcycle. Heart-Healthy Diet   Sodium, Fat, and Cholesterol Controlled Diet       What Is a Heart Healthy Diet?    A heart-healthy diet is one that limits sodium , certain types of fat , and cholesterol . This type of diet is recommended for:   People with any form of cardiovascular disease (eg, coronary heart disease , peripheral vascular disease , previous heart attack , previous stroke )   People with risk factors for cardiovascular disease, such as high blood pressure , high cholesterol , or diabetes   Anyone who wants to lower their risk of developing cardiovascular disease   Sodium    Sodium is a mineral found in many foods. In general, most people consume much more sodium than they need. Diets high in sodium can increase blood pressure and lead to edema (water retention). On a heart-healthy diet, you should consume no more than 2,300 mg (milligrams) of sodium per dayabout the amount in one teaspoon of table salt. The foods highest in sodium include table salt (about 50% sodium), processed foods, convenience foods, and preserved foods. Cholesterol    Cholesterol is a fat-like, waxy substance in your blood. Our bodies make some cholesterol. It is also found in animal products, with the highest amounts in fatty meat, egg yolks, whole milk, cheese, shellfish, and organ meats. On a heart-healthy diet, you should limit your cholesterol intake to less than 200 mg per day. It is normal and important to have some cholesterol in your bloodstream. But too much cholesterol can cause plaque to build up within your arteries, which can eventually lead to a heart attack or stroke. The two types of cholesterol that are most commonly referred to are:   Low-density lipoprotein (LDL) cholesterol  Also known as bad cholesterol, this is the cholesterol that tends to build up along your arteries. Bad cholesterol levels are increased by eating fats that are saturated or hydrogenated. Optimal level of this cholesterol is less than 100. Over 130 starts to get risky for heart disease.    High-density lipoprotein (HDL) cholesterol  Also known as good cholesterol, this type of cholesterol actually carries cholesterol away from your arteries and may, therefore, help lower your risk of having a heart attack. You want this level to be high (ideally greater than 60). It is a risk to have a level less than 40. You can raise this good cholesterol by eating olive oil, canola oil, avocados, or nuts. Exercise raises this level, too. Fat    Fat is calorie dense and packs a lot of calories into a small amount of food. Even though fats should be limited due to their high calorie content, not all fats are bad. In fact, some fats are quite healthful. Fat can be broken down into four main types. The good-for-you fats are:   Monounsaturated fat  found in oils such as olive and canola, avocados, and nuts and natural nut butters; can decrease cholesterol levels, while keeping levels of HDL cholesterol high   Polyunsaturated fat  found in oils such as safflower, sunflower, soybean, corn, and sesame; can decrease total cholesterol and LDL cholesterol   Omega-3 fatty acids  particularly those found in fatty fish (such as salmon, trout, tuna, mackerel, herring, and sardines); can decrease risk of arrhythmias, decrease triglyceride levels, and slightly lower blood pressure   The fats that you want to limit are:   Saturated fat  found in animal products, many fast foods, and a few vegetables; increases total blood cholesterol, including LDL levels   Animal fats that are saturated include: butter, lard, whole-milk dairy products, meat fat, and poultry skin   Vegetable fats that are saturated include: hydrogenated shortening, palm oil, coconut oil, cocoa butter   Hydrogenated or trans fat  found in margarine and vegetable shortening, most shelf stable snack foods, and fried foods; increases LDL and decreases HDL     It is generally recommended that you limit your total fat for the day to less than 30% of your total calories.  If you follow an 1800-calorie heart healthy diet, for example, this would mean 60 grams of fat or less per day. Saturated fat and trans fat in your diet raises your blood cholesterol the most, much more than dietary cholesterol does. For this reason, on a heart-healthy diet, less than 7% of your calories should come from saturated fat and ideally 0% from trans fat. On an 1800-calorie diet, this translates into less than 14 grams of saturated fat per day, leaving 46 grams of fat to come from mono- and polyunsaturated fats.    Food Choices on a Heart Healthy Diet   Food Category   Foods Recommended   Foods to Avoid   Grains   Breads and rolls without salted tops Most dry and cooked cereals Unsalted crackers and breadsticks Low-sodium or homemade breadcrumbs or stuffing All rice and pastas   Breads, rolls, and crackers with salted tops High-fat baked goods (eg, muffins, donuts, pastries) Quick breads, self-rising flour, and biscuit mixes Regular bread crumbs Instant hot cereals Commercially prepared rice, pasta, or stuffing mixes   Vegetables   Most fresh, frozen, and low-sodium canned vegetables Low-sodium and salt-free vegetable juices Canned vegetables if unsalted or rinsed   Regular canned vegetables and juices, including sauerkraut and pickled vegetables Frozen vegetables with sauces Commercially prepared potato and vegetable mixes   Fruits   Most fresh, frozen, and canned fruits All fruit juices   Fruits processed with salt or sodium   Milk   Nonfat or low-fat (1%) milk Nonfat or low-fat yogurt Cottage cheese, low-fat ricotta, cheeses labeled as low-fat and low-sodium   Whole milk Reduced-fat (2%) milk Malted and chocolate milk Full fat yogurt Most cheeses (unless low-fat and low salt) Buttermilk (no more than 1 cup per week)   Meats and Beans   Lean cuts of fresh or frozen beef, veal, lamb, or pork (look for the word loin) Fresh or frozen poultry without the skin Fresh or frozen fish and some shellfish Egg whites and egg substitutes (Limit whole eggs to three per week) Tofu Nuts or seeds (unsalted, dry-roasted), low-sodium peanut butter Dried peas, beans, and lentils   Any smoked, cured, salted, or canned meat, fish, or poultry (including shields, chipped beef, cold cuts, hot dogs, sausages, sardines, and anchovies) Poultry skins Breaded and/or fried fish or meats Canned peas, beans, and lentils Salted nuts   Fats and Oils   Olive oil and canola oil Low-sodium, low-fat salad dressings and mayonnaise   Butter, margarine, coconut and palm oils, shields fat   Snacks, Sweets, and Condiments   Low-sodium or unsalted versions of broths, soups, soy sauce, and condiments Pepper, herbs, and spices; vinegar, lemon, or lime juice Low-fat frozen desserts (yogurt, sherbet, fruit bars) Sugar, cocoa powder, honey, syrup, jam, and preserves Low-fat, trans-fat free cookies, cakes, and pies [place] and animal crackers, fig bars, caroline snaps   High-fat desserts Broth, soups, gravies, and sauces, made from instant mixes or other high-sodium ingredients Salted snack foods Canned olives Meat tenderizers, seasoning salt, and most flavored vinegars   Beverages   Low-sodium carbonated beverages Tea and coffee in moderation Soy milk   Commercially softened water   Suggestions   Make whole grains, fruits, and vegetables the base of your diet. Choose heart-healthy fats such as canola, olive, and flaxseed oil, and foods high in heart-healthy fats, such as nuts, seeds, soybeans, tofu, and fish. Eat fish at least twice per week; the fish highest in omega-3 fatty acids and lowest in mercury include salmon, herring, mackerel, sardines, and canned chunk light tuna. If you eat fish less than twice per week or have high triglycerides, talk to your doctor about taking fish oil supplements. Read food labels.    For products low in fat and cholesterol, look for fat free, low-fat, cholesterol free, saturated fat free, and trans fat freeAlso scan the Nutrition Facts Label, which lists saturated fat, trans fat, and cholesterol amounts. For products low in sodium, look for sodium free, very low sodium, low sodium, no added salt, and unsalted   Skip the salt when cooking or at the table; if food needs more flavor, get creative and try out different herbs and spices. Garlic and onion also add substantial flavor to foods. Trim any visible fat off meat and poultry before cooking, and drain the fat off after mccann. Use cooking methods that require little or no added fat, such as grilling, boiling, baking, poaching, broiling, roasting, steaming, stir-frying, and sauting. Avoid fast food and convenience food. They tend to be high in saturated and trans fat and have a lot of added salt. Talk to a registered dietitian for individualized diet advice. Last Reviewed: March 2011 Donna Eddy MS, MPH, RD   Updated: 3/29/2011   ·     Keep Your Memory Chau Hines       Many factors can affect your ability to remembera hectic lifestyle, aging, stress, chronic disease, and certain medicines. But, there are steps you can take to sharpen your mind and help preserve your memory. Challenge Your Brain   Regularly challenging your mind may help keeps it in top shape. Good mental exercises include:   Crossword puzzlesUse a dictionary if you need it; you will learn more that way. Brainteasers Try some! Crafts, such as wood working and sewing   Hobbies, such as gardening and building model airplanes   SocializingVisit old friends or join groups to meet new ones. Reading   Learning a new language   Taking a class, whether it be art history or donna chi   TravelingExperience the food, history, and culture of your destination   Learning to use a computer   Going to museums, the theater, or thought-provoking movies   Changing things in your daily life, such as reversing your pattern in the grocery store or brushing your teeth using your nondominant hand   Use Memory Aids   There is no need to remember every detail on your own.  These memory aids can help:   Calendars and day planners   Electronic organizers to store all sorts of helpful informationThese devices can \"beep\" to remind you of appointments. A book of days to record birthdays, anniversaries, and other occasions that occur on the same date every year   Detailed \"to-do\" lists and strategically placed sticky notes   Quick \"study\" sessionsBefore a gathering, review who will be there so their names will be fresh in your mind. Establish routinesFor example, keep your keys, wallet, and umbrella in the same place all the time or take medicine with your 8:00 AM glass of juice   Live a Healthy Life   Many actions that will keep your body strong will do the same for your mind. For example:   Talk to Your Doctor About Herbs and Supplements    Malnutrition and vitamin deficiencies can impair your mental function. For example, vitamin B12 deficiency can cause a range of symptoms, including confusion. But, what if your nutritional needs are being met? Can herbs and supplements still offer a benefit? Researchers have investigated a range of natural remedies, such as ginkgo , ginseng , and the supplement phosphatidylserine (PS). So far, though, the evidence is inconsistent as to whether these products can improve memory or thinking. If you are interested in taking herbs and supplements, talk to your doctor first because they may interact with other medicines that you are taking. Exercise Regularly    Among the many benefits of regular exercise are increased blood flow to the brain and decreased risk of certain diseases that can interfere with memory function. One study found that even moderate exercise has a beneficial effect. Examples of \"moderate\" exercise include:   Playing 18 holes of golf once a week, without a cart   Playing tennis twice a week   Walking one mile per day   Manage Stress    It can be tough to remember what is important when your mind is cluttered.  Make time for relaxation. Choose activities that calm you down, and make it routine. Manage Chronic Conditions    Side effects of high blood pressure , diabetes, and heart disease can interfere with mental function. Many of the lifestyle steps discussed here can help manage these conditions. Strive to eat a healthy diet, exercise regularly, get stress under control, and follow your doctor's advice for your condition. Minimize Medications    Talk to your doctor about the medicines that you take. Some may be unnecessary. Also, healthy lifestyle habits may lower the need for certain drugs. Last Reviewed: April 2010 Ciara Bose MD   Updated: 4/13/2010   ·   Smoking Cessation  This document explains the best ways for you to quit smoking and new treatments to help. It lists new medicines that can double or triple your chances of quitting and quitting for good. It also considers ways to avoid relapses and concerns you may have about quitting, including weight gain. NICOTINE: A POWERFUL ADDICTION  If you have tried to quit smoking, you know how hard it can be. It is hard because nicotine is a very addictive drug. For some people, it can be as addictive as heroin or cocaine. Usually, people make 2 or 3 tries, or more, before finally being able to quit. Each time you try to quit, you can learn about what helps and what hurts. Quitting takes hard work and a lot of effort, but you can quit smoking. QUITTING SMOKING IS ONE OF THE MOST IMPORTANT THINGS YOU WILL EVER DO. You will live longer, feel better, and live better. The impact on your body of quitting smoking is felt almost immediately:  Within 20 minutes, blood pressure decreases. Pulse returns to its normal level. After 8 hours, carbon monoxide levels in the blood return to normal. Oxygen level increases. After 24 hours, chance of heart attack starts to decrease. Breath, hair, and body stop smelling like smoke. After 48 hours, damaged nerve endings begin to recover. Sense of taste and smell improve. After 72 hours, the body is virtually free of nicotine. Bronchial tubes relax and breathing becomes easier. After 2 to 12 weeks, lungs can hold more air. Exercise becomes easier and circulation improves. Quitting will reduce your risk of having a heart attack, stroke, cancer, or lung disease:  After 1 year, the risk of coronary heart disease is cut in half. After 5 years, the risk of stroke falls to the same as a nonsmoker. After 10 years, the risk of lung cancer is cut in half and the risk of other cancers decreases significantly. After 15 years, the risk of coronary heart disease drops, usually to the level of a nonsmoker. If you are pregnant, quitting smoking will improve your chances of having a healthy baby. The people you live with, especially your children, will be healthier. You will have extra money to spend on things other than cigarettes. FIVE KEYS TO QUITTING  Studies have shown that these 5 steps will help you quit smoking and quit for good. You have the best chances of quitting if you use them together:  Get ready. Get support and encouragement. Learn new skills and behaviors. Get medicine to reduce your nicotine addiction and use it correctly. Be prepared for relapse or difficult situations. Be determined to continue trying to quit, even if you do not succeed at first.  1. GET READY  Set a quit date. Change your environment. Get rid of ALL cigarettes, ashtrays, matches, and lighters in your home, car, and place of work. Do not let people smoke in your home. Review your past attempts to quit. Think about what worked and what did not. Once you quit, do not smoke. NOT EVEN A PUFF! 2. GET SUPPORT AND ENCOURAGEMENT  Studies have shown that you have a better chance of being successful if you have help. You can get support in many ways. Tell your family, friends, and coworkers that you are going to quit and need their support.  Ask them not to smoke around you. Talk to your caregivers (doctor, dentist, nurse, pharmacist, psychologist, and/or smoking counselor). Get individual, group, or telephone counseling and support. The more counseling you have, the better your chances are of quitting. Programs are available at [facility]. Call your local health department for information about programs in your area. Spiritual beliefs and practices may help some smokers quit. Quit meters are small computer programs online or downloadable that keep track of quit statistics, such as amount of \"quit-time,\" cigarettes not smoked, and money saved. Many smokers find one or more of the many self-help books available useful in helping them quit and stay off tobacco.  3. LEARN NEW SKILLS AND BEHAVIORS  Try to distract yourself from urges to smoke. Talk to someone, go for a walk, or occupy your time with a task. When you first try to quit, change your routine. Take a different route to work. Drink tea instead of coffee. Eat breakfast in a different place. Do something to reduce your stress. Take a hot bath, exercise, or read a book. Plan something enjoyable to do every day. Reward yourself for not smoking. Explore interactive web-based programs that specialize in helping you quit. 4. GET MEDICINE AND USE IT CORRECTLY  Medicines can help you stop smoking and decrease the urge to smoke. Combining medicine with the above behavioral methods and support can quadruple your chances of successfully quitting smoking. The [place] Food and Drug Administration (FDA) has approved 7 medicines to help you quit smoking. These medicines fall into 3 categories. Nicotine replacement therapy (delivers nicotine to your body without the negative effects and risks of smoking):  Nicotine gum: Available over-the-counter. Nicotine lozenges: Available over-the-counter. Nicotine inhaler: Available by prescription. Nicotine nasal spray: Available by prescription.   Nicotine skin patches (transdermal): Available by prescription and over-the-counter. Antidepressant medicine (helps people abstain from smoking, but how this works is unknown): Bupropion sustained-release (SR) tablets: Available by prescription. Nicotinic receptor partial agonist (simulates the effect of nicotine in your brain):  Varenicline tartrate tablets: Available by prescription. Ask your caregiver for advice about which medicines to use and how to use them. Carefully read the information on the package. Everyone who is trying to quit may benefit from using a medicine. If you are pregnant or trying to become pregnant, nursing an infant, you are under age 25, or you smoke fewer than 10 cigarettes per day, talk to your caregiver before taking any nicotine replacement medicines. You should stop using a nicotine replacement product and call your caregiver if you experience nausea, dizziness, weakness, vomiting, fast or irregular heartbeat, mouth problems with the lozenge or gum, or redness or swelling of the skin around the patch that does not go away. Do not use any other product containing nicotine while using a nicotine replacement product. Talk to your caregiver before using these products if you have diabetes, heart disease, asthma, stomach ulcers, you had a recent heart attack, you have high blood pressure that is not controlled with medicine, a history of irregular heartbeat, or you have been prescribed medicine to help you quit smoking. 5. BE PREPARED FOR RELAPSE OR DIFFICULT SITUATIONS  Most relapses occur within the first 3 months after quitting. Do not be discouraged if you start smoking again. Remember, most people try several times before they finally quit. You may have symptoms of withdrawal because your body is used to nicotine. You may crave cigarettes, be irritable, feel very hungry, cough often, get headaches, or have difficulty concentrating. The withdrawal symptoms are only temporary.  They are strongest when you first quit, but they will go away within 10 to 14 days. Here are some difficult situations to watch for:  Alcohol. Avoid drinking alcohol. Drinking lowers your chances of successfully quitting. Caffeine. Try to reduce the amount of caffeine you consume. It also lowers your chances of successfully quitting. Other smokers. Being around smoking can make you want to smoke. Avoid smokers. Weight gain. Many smokers will gain weight when they quit, usually less than 10 pounds. Eat a healthy diet and stay active. Do not let weight gain distract you from your main goal, quitting smoking. Some medicines that help you quit smoking may also help delay weight gain. You can always lose the weight gained after you quit. Bad mood or depression. There are a lot of ways to improve your mood other than smoking. If you are having problems with any of these situations, talk to your caregiver. SPECIAL SITUATIONS AND CONDITIONS  Studies suggest that everyone can quit smoking. Your situation or condition can give you a special reason to quit. Pregnant women/new mothers: By quitting, you protect your baby's health and your own. Hospitalized patients: By quitting, you reduce health problems and help healing. Heart attack patients: By quitting, you reduce your risk of a second heart attack. Lung, head, and neck cancer patients: By quitting, you reduce your chance of a second cancer. Parents of children and adolescents: By quitting, you protect your children from illnesses caused by secondhand smoke. QUESTIONS TO THINK ABOUT  Think about the following questions before you try to stop smoking. You may want to talk about your answers with your caregiver. Why do you want to quit? If you tried to quit in the past, what helped and what did not? What will be the most difficult situations for you after you quit? How will you plan to handle them? Who can help you through the tough times? Your family? Friends? Caregiver? What pleasures do you get from smoking? What ways can you still get pleasure if you quit? Here are some questions to ask your caregiver: How can you help me to be successful at quitting? What medicine do you think would be best for me and how should I take it? What should I do if I need more help? What is smoking withdrawal like? How can I get information on withdrawal?  Quitting takes hard work and a lot of effort, but you can quit smoking. FOR MORE INFORMATION   Smokefree. gov ([employer]) provides free, accurate, evidence-based information and professional assistance to help support the immediate and long-term needs of people trying to quit smoking. Document Released: 12/12/2002 Document Revised: 12/06/2012 Document Reviewed: 10/04/2010  MIRELA RIVERA [facility] Patient Information ©2012 [employer]. ·     Keeping Home a [facility]       As we get older, changes in balance, gait, strength, vision, hearing, and cognition make even the most youthful senior more prone to accidents. Falls are one of the leading health risks for older people. This increased risk of falling is related to:   Aging process (eg, decreased muscle strength, slowed reflexes)   Higher incidence of chronic health problems (eg, arthritis, diabetes) that may limit mobility, agility or sensory awareness   Side effects of medicine (eg, dizziness, blurred vision)especially medicines like prescription pain medicines and drugs used to treat mental health conditions   Depending on the brittleness of your bones, the consequences of a fall can be serious and long lasting. Home Life   Research by the Association of Aging [facility]) shows that some home accidents among older adults can be prevented by making simple lifestyle changes and basic modifications and repairs to the home environment. Here are some lifestyle changes that experts recommend:   Have your hearing and vision checked regularly.  Be sure to wear prescription glasses that are right for you. Speak to your doctor or pharmacist about the possible side effects of your medicines. A number of medicines can cause dizziness. If you have problems with sleep, talk to your doctor. Limit your intake of alcohol. If necessary, use a cane or walker to help maintain your balance. Wear supportive, rubber-soled shoes, even at home. If you live in a region that gets wintry weather, you may want to put special cleats on your shoes to prevent you from slipping on the snow and ice. Exercise regularly to help maintain muscle tone, agility, and balance. Always hold the banister when going up or down stairs. Also, use  bars when getting in or out of the bath or shower, or using the toilet. To avoid dizziness, get up slowly from a lying down position. Sit up first, dangling your legs for a minute or two before rising to a standing position. Overall Home Safety Check   According to the Consumer Product Safety Commision's \"Older Consumer Home Safety Checklist,\" it is important to check for potential hazards in each room. And remember, proper lighting is an essential factor in home safety. If you cannot see clearly, you are more likely to fall. Important questions to ask yourself include:   Are lamp, electric, extension, and telephone cords placed out of the flow of traffic and maintained in good condition? Have frayed cords been replaced? Are all small rugs and runners slip resistant? If not, you can secure them to the floor with a special double-sided carpet tape. Are smoke detectors properly locatedone on every floor of your home and one outside of every sleeping area? Are they in good working order? Are batteries replaced at least once a year? Do you have a well-maintained carbon monoxide detector outside every sleeping are in your home? Does your furniture layout leave plenty of space to maneuver between and around chairs, tables, beds, and sofas?    Are hallways, stairs not use built-in soap holders or glass shower doors as grab bars.)    Tub seats fitted with non-slip material on the legs allow you to wash sitting down. For people with limited mobility, bathtub transfer benches allow you to slide safely into the tub. Raised toilet seats and toilet safety rails are helpful for those with knee or hip problems. In the [facility]    Make sure you use a nightlight and that the area around your bed is clear of potential obstacles. Be careful with electric blankets and never go to sleep with a heating pad, which can cause serious burns even if on a low setting. Use fire-resistant mattress covers and pillows, and NEVER smoke in bed. Keep a phone next to the bed that is programmed to dial 911 at the push of a button. If you have a chronic condition, you may want to sign on with an automatic call-in service. Typically the system includes a small pendant that connects directly to an emergency medical voice-response system. You should also make arrangements to stay in contact with someonefriend, neighbor, family memberon a regular schedule. Fire Prevention   According to the [employer] (Smoke Alarms for Every) [address] [place] [address], senior citizens are one of the two highest risk groups for death and serious injuries due to residential fires. When cooking, wear short-sleeved items, never a bulky long-sleeved robe. The [facility]'s Safety Checklist for Older Consumers emphasizes the importance of checking basements, garages, workshops and storage areas for fire hazards, such as volatile liquids, piles of old rags or clothing and overloaded circuits. Never smoke in bed or when lying down on a couch or recliner chair. Small portable electric or kerosene heaters are responsible for many home fires and should be used cautiously if at all. If you do use one, be sure to keep them away from flammable materials.     In case of fire, make sure you have a pre-established emergency exit plan. Have a professional check your fireplace and other fuel-burning appliances yearly. Helping Hands   Baby boomers entering the goodwin years will continue to see the development of new products to help older adults live safely and independently in spite of age-related changes. Making Life More Livable  , by [employer], lists over 1,000 products for \"living well in the mature years,\" such as bathing and mobility aids, household security devices, ergonomically designed knives and peelers, and faucet valves and knobs for temperature control. Medical supply stores and organizations are good sources of information about products that improve your quality of life and insure your safety.      Last Reviewed: November 2009 Antoine Perdomo MD   Updated: 3/7/2011     ·

## 2022-01-27 NOTE — PROGRESS NOTES
Subjective:      Patient ID: Anila Joyce is a [de-identified] y.o. female Established patient, here for evaluation of the following chief complaint(s):  Chief Complaint   Patient presents with   Mayelin Bustillos Doctor       HPI     81-year-old female with history of hypertension, chronic kidney disease stage III, dyslipidemia, hyperlipidemia, nicotine dependence presents to establish continuity with me as her primary care doctor. Omar Smallwood was seen today for established new doctor. Diagnoses and all orders for this visit:    Essential hypertension-compliant with Cozaar 100 mg orally daily, Cardura 4 mg orally daily and Lopressor 25 mg twice daily    Stage 3 chronic kidney disease (HCC)    Dyslipidemia-compliant with Lipitor 40 mg orally daily    Hyperkalemia    Cervical adenopathy    Fatigue, unspecified type    Nicotine dependence, uncomplicated, unspecified nicotine product type-the patient continues to engage in nicotine dependence    Social : 2 dogs brook and briseno     At present he denies polyuria,  Polydipsia, constitutional, sinus, visual, cardiopulmonary, urologic, gastrointestinal, immunologic/hematologic, musculoskeletal, neurologic,dermatologic, or psychiatric complaints.     Current Outpatient Medications on File Prior to Visit   Medication Sig Dispense Refill    metoprolol tartrate (LOPRESSOR) 25 MG tablet TAKE 1 TABLET BY MOUTH TWICE A  tablet 3    losartan (COZAAR) 100 MG tablet TAKE 1 TABLET BY MOUTH EVERY DAY 90 tablet 3    atorvastatin (LIPITOR) 40 MG tablet TAKE 1 TABLET BY MOUTH EVERY DAY 90 tablet 3    furosemide (LASIX) 40 MG tablet TAKE 1 TABLET BY MOUTH TWICE A  tablet 3    traZODone (DESYREL) 100 MG tablet Take 1 tablet by mouth nightly 90 tablet 1    doxazosin (CARDURA) 4 MG tablet TAKE 1 TABLET AT BEDTIME 90 tablet 3    Handicap Placard MISC by Does not apply route Applying from 1/28/2021-1/27/2026. 1 each 0    aspirin 81 MG EC tablet Take 81 mg by mouth daily       No current facility-administered medications on file prior to visit. 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    Hyperlipidemia     Kidney disease     Stage 4 chronic kidney disease ([facility] [address] 11/19/2020    Stenosis of noncoronary bypass graft ([facility] [address]     Tobacco abuse 11/19/2020     Past Surgical History:   Procedure Laterality Date    BACK SURGERY  2003    BRAIN ANEURYSM SURGERY  2012    BREAST LUMPECTOMY Right     CARPAL TUNNEL RELEASE Bilateral     CORONARY ARTERY BYPASS GRAFT      PARTIAL HYSTERECTOMY  1997    TOTAL HIP ARTHROPLASTY Bilateral left side 2017 right side 2004     Social History     Socioeconomic History    Marital status:      Spouse name: Not on file    Number of children: Not on file    Years of education: Not on file    Highest education level: Not on file   Occupational History    Not on file   Tobacco Use    Smoking status: Current Every Day Smoker     Packs/day: 0.50     Years: 60.00     Pack years: 30.00    Smokeless tobacco: Never Used   Vaping Use    Vaping Use: Never used   Substance and Sexual Activity    Alcohol use: Never    Drug use: Not on file    Sexual activity: Not Currently   Other Topics Concern    Not on file   Social History Narrative    Not on file     Social Determinants of Health     Financial Resource Strain: Low Risk     Difficulty of Paying Living Expenses: Not hard at all   Food Insecurity: No Food Insecurity    Worried About Running Out of Food in the Last Year: Never true    Jeannine of Food in the Last Year: Never true   Transportation Needs:     Lack of Transportation (Medical): Not on file    Lack of Transportation (Non-Medical):  Not on file   Physical Activity:     Days of Exercise per Week: Not on file    Minutes of Exercise per Session: Not on file   Stress:     Feeling of Stress : Not on file   Social Connections:     Frequency of Communication with Friends and Family: Not on file    Frequency of Social Gatherings with Friends and Family: Not on file    Attends Sabianism Services: Not on file    Active Member of Clubs or Organizations: Not on file    Attends Club or Organization Meetings: Not on file    Marital Status: Not on file   Intimate Partner Violence:     Fear of Current or Ex-Partner: Not on file    Emotionally Abused: Not on file    Physically Abused: Not on file    Sexually Abused: Not on file   Housing Stability:     Unable to Pay for Housing in the Last Year: Not on file    Number of [place] in the Last Year: Not on file    Unstable Housing in the Last Year: Not on file     History reviewed. No pertinent family history. Review of Systems   Constitutional: Negative for chills, diaphoresis, fatigue and fever. HENT: Negative for congestion, dental problem, drooling, ear discharge, ear pain, facial swelling, hearing loss, mouth sores, nosebleeds, postnasal drip, rhinorrhea, sinus pressure, sinus pain, sneezing, sore throat, tinnitus, trouble swallowing and voice change. Eyes: Negative for photophobia, pain, discharge, redness, itching and visual disturbance. Respiratory: Negative for apnea, cough, chest tightness, shortness of breath and wheezing. Cardiovascular: Negative for chest pain, palpitations and leg swelling. Gastrointestinal: Negative for abdominal distention, abdominal pain, blood in stool, constipation, diarrhea, nausea, rectal pain and vomiting. Endocrine: Negative for cold intolerance, heat intolerance, polydipsia, polyphagia and polyuria. Genitourinary: Negative for decreased urine volume, difficulty urinating, dysuria, flank pain, frequency, genital sores, hematuria and urgency.    Musculoskeletal: Negative for arthralgias, back pain, gait problem, joint swelling, myalgias, neck pain and neck stiffness. Skin: Negative for color change, rash and wound. Allergic/Immunologic: Negative for environmental allergies and food allergies. Neurological: Negative for dizziness, tremors, seizures, syncope, facial asymmetry, speech difficulty, weakness, light-headedness, numbness and headaches. Hematological: Negative for adenopathy. Does not bruise/bleed easily. Psychiatric/Behavioral: Negative for agitation, confusion, decreased concentration, hallucinations, self-injury, sleep disturbance and suicidal ideas. The patient is not nervous/anxious. Objective:   /64   Pulse 67   Temp 97.8 °F (36.6 °C)   Resp 14   Ht 5' 1\" (1.549 m)   Wt 141 lb (64 kg)   SpO2 98%   BMI 26.64 kg/m²     Physical Exam  Constitutional:       General: She is not in acute distress. Appearance: She is well-developed. HENT:      Head: Normocephalic. Right Ear: External ear normal.      Left Ear: External ear normal.   Eyes:      Conjunctiva/sclera: Conjunctivae normal.   Neck:      Vascular: No JVD. Trachea: No tracheal deviation. Cardiovascular:      Rate and Rhythm: Normal rate and regular rhythm. Heart sounds: Normal heart sounds. Pulmonary:      Effort: Pulmonary effort is normal. No respiratory distress. Breath sounds: Normal breath sounds. No wheezing or rales. Chest:      Chest wall: No tenderness. Abdominal:      General: Bowel sounds are normal. There is no distension. Palpations: Abdomen is soft. There is no mass. Tenderness: There is no abdominal tenderness. There is no guarding or rebound. Musculoskeletal:         General: No tenderness or deformity. Cervical back: Neck supple. Skin:     General: Skin is warm and dry. Coloration: Skin is not pale. Findings: No erythema or rash. Neurological:      Mental Status: She is alert and oriented to person, place, and time. Motor: No abnormal muscle tone. Psychiatric:         Thought Content: Thought content normal.         Judgment: Judgment normal.         Assessment:       Diagnosis Orders   1. Essential hypertension  Comprehensive Metabolic Panel    CBC With Auto Differential    Lipid Panel   2. Dyslipidemia     3. Hyperkalemia     4. Nicotine dependence, uncomplicated, unspecified nicotine product type     5. Stage 3a chronic kidney disease ([facility] [address]          No results found for: LIPIDPAN, BMP, CMP, CBC, CBCAUTODIF  Plan:      [place] was seen today for established new doctor. Diagnoses and all orders for this visit:    Essential hypertension  -     Comprehensive Metabolic Panel; Future  -     CBC With Auto Differential; Future  -     Lipid Panel; Future  -Continue current antihypertensive regimen    Dyslipidemia-continue Lipitor 40 mg orally daily    Hyperkalemia-we will obtain a complete metabolic panel    Nicotine dependence, uncomplicated, unspecified nicotine product type-strongly encourage smoking cessation    Stage 3a chronic kidney disease (HCC)-we will monitor renal function closely and encourage the patient to avoid nephrotoxic agents. We will renally dose all medications at the time of prescribing them. Return in about 4 months (around 5/27/2022). On this date 01/27/22 I have spent 30   minutes reviewing previous notes, test results and face to face with the patient discussing the diagnosis and importance of compliance with the treatment plan. Royal Mcguire MD    Please note, this report has been partially produced using speech recognition software  and may cause  and /or contain errors related to that system including grammar, punctuation and spelling as well as words and phrases that may seem inappropriate. If there are questions or concerns please feel free to contact me to clarify.

## 2022-04-15 RX ORDER — TRAZODONE HYDROCHLORIDE 100 MG/1
TABLET ORAL
Qty: 90 TABLET | Refills: 1 | Status: SHIPPED | OUTPATIENT
Start: 2022-04-15 | End: 2022-10-10

## 2022-04-15 NOTE — TELEPHONE ENCOUNTER
pharmacy requesting medication refill.  Please approve or deny this request.    Rx requested:  Requested Prescriptions     Pending Prescriptions Disp Refills    traZODone (DESYREL) 100 MG tablet [Pharmacy Med Name: TRAZODONE 100 MG TABLET] 90 tablet 1     Sig: TAKE 1 TABLET BY MOUTH EVERY DAY AT NIGHT         Last Office Visit:   1/27/2022      Next Visit Date:  Future Appointments   Date Time Provider Aiden Guzman   5/27/2022 10:30 AM Ashlyn Watkins MD [facility]   7/21/2022 12:15 PM Phill Alvarado, DO [facility]

## 2022-04-22 ENCOUNTER — HOSPITAL ENCOUNTER (OUTPATIENT)
Dept: LAB | Age: 82
Discharge: HOME | End: 2022-04-22
Payer: MEDICARE

## 2022-04-22 LAB
ALBUMIN SERPL-MCNC: 4.2 G/DL (ref 3.5–4.6)
ALP BLD-CCNC: 77 U/L (ref 40–130)
ALT SERPL-CCNC: 8 U/L (ref 0–33)
ANION GAP SERPL CALCULATED.3IONS-SCNC: 12 MEQ/L (ref 9–15)
AST SERPL-CCNC: 15 U/L (ref 0–35)
BASOPHILS ABSOLUTE: 0 K/UL (ref 0–0.2)
BASOPHILS RELATIVE PERCENT: 0.3 %
BILIRUB SERPL-MCNC: 0.3 MG/DL (ref 0.2–0.7)
BUN BLDV-MCNC: 25 MG/DL (ref 8–23)
CALCIUM SERPL-MCNC: 9.4 MG/DL (ref 8.5–9.9)
CHLORIDE BLD-SCNC: 108 MEQ/L (ref 95–107)
CHOLESTEROL, TOTAL: 198 MG/DL (ref 0–199)
CO2: 23 MEQ/L (ref 20–31)
CREAT SERPL-MCNC: 1.47 MG/DL (ref 0.5–0.9)
EOSINOPHILS ABSOLUTE: 0.2 K/UL (ref 0–0.7)
EOSINOPHILS RELATIVE PERCENT: 3.2 %
GFR AFRICAN AMERICAN: 41.3
GFR NON-AFRICAN AMERICAN: 34.1
GLOBULIN: 3.1 G/DL (ref 2.3–3.5)
GLUCOSE BLD-MCNC: 98 MG/DL (ref 70–99)
HCT VFR BLD CALC: 31 % (ref 37–47)
HDLC SERPL-MCNC: 46 MG/DL (ref 40–59)
HEMOGLOBIN: 10.4 G/DL (ref 12–16)
LDL CHOLESTEROL CALCULATED: 118 MG/DL (ref 0–129)
LYMPHOCYTES ABSOLUTE: 1.7 K/UL (ref 1–4.8)
LYMPHOCYTES RELATIVE PERCENT: 24.3 %
MCH RBC QN AUTO: 31.4 PG (ref 27–31.3)
MCHC RBC AUTO-ENTMCNC: 33.7 % (ref 33–37)
MCV RBC AUTO: 93.2 FL (ref 82–100)
MONOCYTES ABSOLUTE: 0.5 K/UL (ref 0.2–0.8)
MONOCYTES RELATIVE PERCENT: 7.2 %
NEUTROPHILS ABSOLUTE: 4.4 K/UL (ref 1.4–6.5)
NEUTROPHILS RELATIVE PERCENT: 65 %
PDW BLD-RTO: 12.7 % (ref 11.5–14.5)
PLATELET # BLD: 245 K/UL (ref 130–400)
POTASSIUM SERPL-SCNC: 4.5 MEQ/L (ref 3.4–4.9)
RBC # BLD: 3.33 M/UL (ref 4.2–5.4)
SODIUM BLD-SCNC: 143 MEQ/L (ref 135–144)
TOTAL PROTEIN: 7.3 G/DL (ref 6.3–8)
TRIGL SERPL-MCNC: 170 MG/DL (ref 0–150)
WBC # BLD: 6.8 K/UL (ref 4.8–10.8)

## 2022-04-22 PROCEDURE — 80061 LIPID PANEL: CPT

## 2022-04-22 PROCEDURE — 80053 COMPREHEN METABOLIC PANEL: CPT

## 2022-04-22 PROCEDURE — 85025 COMPLETE CBC W/AUTO DIFF WBC: CPT

## 2022-05-23 ASSESSMENT — ENCOUNTER SYMPTOMS
TROUBLE SWALLOWING: 0
SINUS PRESSURE: 0
EYE PAIN: 0
NAUSEA: 0
DIARRHEA: 0
EYE DISCHARGE: 0
VOMITING: 0
ABDOMINAL PAIN: 0
BLOOD IN STOOL: 0
RECTAL PAIN: 0
APNEA: 0
SORE THROAT: 0
EYE ITCHING: 0
SHORTNESS OF BREATH: 0
RHINORRHEA: 0
COLOR CHANGE: 0
CHEST TIGHTNESS: 0
CONSTIPATION: 0
ABDOMINAL DISTENTION: 0
SINUS PAIN: 0
PHOTOPHOBIA: 0
WHEEZING: 0
EYE REDNESS: 0
VOICE CHANGE: 0
BACK PAIN: 0
FACIAL SWELLING: 0
COUGH: 0

## 2022-05-23 NOTE — PROGRESS NOTES
Subjective:      Patient ID: Talisha Zimmerman is a [de-identified] y.o. female Established patient, here for evaluation of the following chief complaint(s):  Chief Complaint   Patient presents with    Hypertension       HPI     81-year-old female with history of hypertension, chronic kidney disease stage III, dyslipidemia, hyperlipidemia, nicotine dependence presents for follow-up visit. The patient reports bilateral shoulder pain that is characterizes achy, nonradiating, associated with reduced ability to abduct her arms. Her discomfort is mildly relieved by over-the-counter analgesics    Essential hypertension-compliant with Cozaar 100 mg orally daily, Cardura 4 mg orally daily and Lopressor 25 mg twice daily    Stage 3 chronic kidney disease (HCC)    Dyslipidemia-compliant with Lipitor 40 mg orally daily    Hyperkalemia        Fatigue, unspecified type    Nicotine dependence, uncomplicated, unspecified nicotine product type-the patient continues to engage in nicotine dependence    Social : 2 dogs librado and briseno     At present he denies polyuria,  Polydipsia, constitutional, sinus, visual, cardiopulmonary, urologic, gastrointestinal, immunologic/hematologic, musculoskeletal, neurologic,dermatologic, or psychiatric complaints.     Current Outpatient Medications on File Prior to Visit   Medication Sig Dispense Refill    traZODone (DESYREL) 100 MG tablet TAKE 1 TABLET BY MOUTH EVERY DAY AT NIGHT 90 tablet 1    metoprolol tartrate (LOPRESSOR) 25 MG tablet TAKE 1 TABLET BY MOUTH TWICE A  tablet 3    losartan (COZAAR) 100 MG tablet TAKE 1 TABLET BY MOUTH EVERY DAY 90 tablet 3    atorvastatin (LIPITOR) 40 MG tablet TAKE 1 TABLET BY MOUTH EVERY DAY 90 tablet 3    furosemide (LASIX) 40 MG tablet TAKE 1 TABLET BY MOUTH TWICE A  tablet 3    doxazosin (CARDURA) 4 MG tablet TAKE 1 TABLET AT BEDTIME 90 tablet 3    Handicap Placard MISC by Does not apply route Applying from 1/28/2021-1/27/2026. 1 each 0    aspirin 81 MG EC tablet Take 81 mg by mouth daily       No current facility-administered medications on file prior to visit. 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    Hyperlipidemia     Kidney disease     Stage 4 chronic kidney disease ([address] 11/19/2020    Stenosis of noncoronary bypass graft ([address]     Tobacco abuse 11/19/2020     Past Surgical History:   Procedure Laterality Date    BACK SURGERY  2003    BRAIN ANEURYSM SURGERY  2012    BREAST LUMPECTOMY Right     CARPAL TUNNEL RELEASE Bilateral     CORONARY ARTERY BYPASS GRAFT      PARTIAL HYSTERECTOMY  1997    TOTAL HIP ARTHROPLASTY Bilateral left side 2017 right side 2004     Social History     Socioeconomic History    Marital status:      Spouse name: Not on file    Number of children: Not on file    Years of education: Not on file    Highest education level: Not on file   Occupational History    Not on file   Tobacco Use    Smoking status: Current Every Day Smoker     Packs/day: 0.50     Years: 60.00     Pack years: 30.00    Smokeless tobacco: Never Used   Vaping Use    Vaping Use: Never used   Substance and Sexual Activity    Alcohol use: Never    Drug use: Not on file    Sexual activity: Not Currently   Other Topics Concern    Not on file   Social History Narrative    Not on file     Social Determinants of Health     Financial Resource Strain: Low Risk     Difficulty of Paying Living Expenses: Not hard at all   Food Insecurity: No Food Insecurity    Worried About Running Out of Food in the Last Year: Never true    Jeannine of Food in the Last Year: Never true   Transportation Needs:     Lack of Transportation (Medical): Not on file    Lack of Transportation (Non-Medical):  Not on file   Physical Activity:  Days of Exercise per Week: Not on file    Minutes of Exercise per Session: Not on file   Stress:     Feeling of Stress : Not on file   Social Connections:     Frequency of Communication with Friends and Family: Not on file    Frequency of Social Gatherings with Friends and Family: Not on file    Attends [employer] Services: Not on file    Active Member of Clubs or Organizations: Not on file    Attends Club or Organization Meetings: Not on file    Marital Status: Not on file   Intimate Partner Violence:     Fear of Current or Ex-Partner: Not on file    Emotionally Abused: Not on file    Physically Abused: Not on file    Sexually Abused: Not on file   Housing Stability:     Unable to Pay for Housing in the Last Year: Not on file    Number of [place] in the Last Year: Not on file    Unstable Housing in the Last Year: Not on file     No family history on file. Review of Systems   Constitutional: Negative for chills, diaphoresis, fatigue and fever. HENT: Negative for congestion, dental problem, drooling, ear discharge, ear pain, facial swelling, hearing loss, mouth sores, nosebleeds, postnasal drip, rhinorrhea, sinus pressure, sinus pain, sneezing, sore throat, tinnitus, trouble swallowing and voice change. Eyes: Negative for photophobia, pain, discharge, redness, itching and visual disturbance. Respiratory: Negative for apnea, cough, chest tightness, shortness of breath and wheezing. Cardiovascular: Negative for chest pain, palpitations and leg swelling. Gastrointestinal: Negative for abdominal distention, abdominal pain, blood in stool, constipation, diarrhea, nausea, rectal pain and vomiting. Endocrine: Negative for cold intolerance, heat intolerance, polydipsia, polyphagia and polyuria. Genitourinary: Negative for decreased urine volume, difficulty urinating, dysuria, flank pain, frequency, genital sores, hematuria and urgency.    Musculoskeletal: Negative for arthralgias, back pain, gait problem, joint swelling, myalgias, neck pain and neck stiffness. Skin: Negative for color change, rash and wound. Allergic/Immunologic: Negative for environmental allergies and food allergies. Neurological: Negative for dizziness, tremors, seizures, syncope, facial asymmetry, speech difficulty, weakness, light-headedness, numbness and headaches. Hematological: Negative for adenopathy. Does not bruise/bleed easily. Psychiatric/Behavioral: Negative for agitation, confusion, decreased concentration, hallucinations, self-injury, sleep disturbance and suicidal ideas. The patient is not nervous/anxious. Objective:   /70   Pulse 70   Resp 14   Ht 5' 1\" (1.549 m)   BMI 26.64 kg/m²     Physical Exam  Constitutional:       General: She is not in acute distress. Appearance: She is well-developed. HENT:      Head: Normocephalic. Right Ear: External ear normal.      Left Ear: External ear normal.   Eyes:      Conjunctiva/sclera: Conjunctivae normal.   Neck:      Vascular: No JVD. Trachea: No tracheal deviation. Cardiovascular:      Rate and Rhythm: Normal rate and regular rhythm. Heart sounds: Normal heart sounds. Pulmonary:      Effort: Pulmonary effort is normal. No respiratory distress. Breath sounds: Normal breath sounds. No wheezing or rales. Chest:      Chest wall: No tenderness. Abdominal:      General: Bowel sounds are normal. There is no distension. Palpations: Abdomen is soft. There is no mass. Tenderness: There is no abdominal tenderness. There is no guarding or rebound. Musculoskeletal:         General: No tenderness or deformity. Cervical back: Neck supple. Comments: Reduction of the ability to abduct her arms greater than 45 degrees bilaterally with active movements. Skin:     General: Skin is warm and dry. Coloration: Skin is not pale. Findings: No erythema or rash.    Neurological:      Mental Status: She is alert and oriented to person, place, and time. Motor: No abnormal muscle tone. Psychiatric:         Thought Content: Thought content normal.         Judgment: Judgment normal.         Assessment:       Diagnosis Orders   1. Essential hypertension     2. Stage 3a chronic kidney disease ([address]     3. Nicotine dependence, uncomplicated, unspecified nicotine product type     4. Acute pain of right shoulder  XR SCAPULA RIGHT (COMPLETE)    traMADol (ULTRAM) 50 MG tablet    ANGELICA Kenney MD, Orthopaedic Surgery   5. Acute pain of left shoulder  XR SCAPULA LEFT (COMPLETE)    traMADol (ULTRAM) 50 MG tablet    ANGELICA Kenney MD, Orthopaedic Surgery        No results found for: LIPIDPAN, BMP, CMP, CBC, CBCAUTODIF  Plan:      Ady Fox was seen today for established new doctor. Diagnoses and all orders for this visit:    Essential hypertension-continue Cardura 4 mg orally daily Cozaar 100 mg orally daily and metoprolol 25 mg orally daily      Dyslipidemia-continue Lipitor 40 mg orally daily      Hyperkalemia-we will obtain a complete metabolic panel      Nicotine dependence, uncomplicated, unspecified nicotine product type-strongly encourage smoking cessation      Stage 3a chronic kidney disease (HCC)-we will monitor renal function closely and encourage the patient to avoid nephrotoxic agents. We will renally dose all medications at the time of prescribing them. Return in about 6 months (around 11/30/2022). On this date 05/29/22 I have spent 30   minutes reviewing previous notes, test results and face to face with the patient discussing the diagnosis and importance of compliance with the treatment plan. Addi Thurston MD    Please note, this report has been partially produced using speech recognition software  and may cause  and /or contain errors related to that system including grammar, punctuation and spelling as well as words and phrases that may seem inappropriate.  If there are questions or concerns please feel free to contact me to clarify.

## 2022-05-27 ENCOUNTER — OFFICE VISIT (OUTPATIENT)
Dept: FAMILY MEDICINE CLINIC | Age: 82
End: 2022-05-27

## 2022-05-27 VITALS
HEIGHT: 61 IN | RESPIRATION RATE: 14 BRPM | DIASTOLIC BLOOD PRESSURE: 70 MMHG | BODY MASS INDEX: 26.64 KG/M2 | HEART RATE: 70 BPM | SYSTOLIC BLOOD PRESSURE: 136 MMHG

## 2022-05-27 DIAGNOSIS — I10 ESSENTIAL HYPERTENSION: Primary | ICD-10-CM

## 2022-05-27 DIAGNOSIS — M25.511 ACUTE PAIN OF RIGHT SHOULDER: ICD-10-CM

## 2022-05-27 DIAGNOSIS — F17.200 NICOTINE DEPENDENCE, UNCOMPLICATED, UNSPECIFIED NICOTINE PRODUCT TYPE: ICD-10-CM

## 2022-05-27 DIAGNOSIS — N18.31 STAGE 3A CHRONIC KIDNEY DISEASE (HCC): ICD-10-CM

## 2022-05-27 DIAGNOSIS — M25.512 ACUTE PAIN OF LEFT SHOULDER: ICD-10-CM

## 2022-05-27 PROBLEM — N18.4 STAGE 4 CHRONIC KIDNEY DISEASE (HCC): Status: RESOLVED | Noted: 2020-11-19 | Resolved: 2022-05-27

## 2022-05-27 PROCEDURE — 4004F PT TOBACCO SCREEN RCVD TLK: CPT | Performed by: INTERNAL MEDICINE

## 2022-05-27 PROCEDURE — 1090F PRES/ABSN URINE INCON ASSESS: CPT | Performed by: INTERNAL MEDICINE

## 2022-05-27 PROCEDURE — 99214 OFFICE O/P EST MOD 30 MIN: CPT | Performed by: INTERNAL MEDICINE

## 2022-05-27 PROCEDURE — G8399 PT W/DXA RESULTS DOCUMENT: HCPCS | Performed by: INTERNAL MEDICINE

## 2022-05-27 PROCEDURE — G8417 CALC BMI ABV UP PARAM F/U: HCPCS | Performed by: INTERNAL MEDICINE

## 2022-05-27 PROCEDURE — G8427 DOCREV CUR MEDS BY ELIG CLIN: HCPCS | Performed by: INTERNAL MEDICINE

## 2022-05-27 PROCEDURE — 1123F ACP DISCUSS/DSCN MKR DOCD: CPT | Performed by: INTERNAL MEDICINE

## 2022-05-27 RX ORDER — GREEN TEA/HOODIA GORDONII 315-12.5MG
1 CAPSULE ORAL 2 TIMES DAILY
Qty: 20 TABLET | Refills: 0 | Status: CANCELLED | OUTPATIENT
Start: 2022-05-27 | End: 2022-06-06

## 2022-05-27 RX ORDER — TRAMADOL HYDROCHLORIDE 50 MG/1
50 TABLET ORAL EVERY 6 HOURS PRN
Qty: 28 TABLET | Refills: 0 | Status: SHIPPED | OUTPATIENT
Start: 2022-05-27 | End: 2022-06-03

## 2022-05-27 RX ORDER — SULFAMETHOXAZOLE AND TRIMETHOPRIM 800; 160 MG/1; MG/1
1 TABLET ORAL 2 TIMES DAILY
Qty: 14 TABLET | Refills: 0 | Status: CANCELLED | OUTPATIENT
Start: 2022-05-27 | End: 2022-06-03

## 2022-06-10 ENCOUNTER — OFFICE VISIT (OUTPATIENT)
Dept: ORTHOPEDIC SURGERY | Age: 82
End: 2022-06-10
Payer: MEDICARE

## 2022-06-10 VITALS
OXYGEN SATURATION: 99 % | RESPIRATION RATE: 16 BRPM | HEIGHT: 61 IN | WEIGHT: 141 LBS | BODY MASS INDEX: 26.62 KG/M2 | HEART RATE: 59 BPM | TEMPERATURE: 96.8 F

## 2022-06-10 DIAGNOSIS — M54.12 CERVICAL RADICULOPATHY: Primary | ICD-10-CM

## 2022-06-10 PROBLEM — N18.30 CHRONIC RENAL DISEASE, STAGE III (HCC): Status: ACTIVE | Noted: 2022-06-10

## 2022-06-10 PROCEDURE — 99204 OFFICE O/P NEW MOD 45 MIN: CPT | Performed by: ORTHOPAEDIC SURGERY

## 2022-06-10 PROCEDURE — G8427 DOCREV CUR MEDS BY ELIG CLIN: HCPCS | Performed by: ORTHOPAEDIC SURGERY

## 2022-06-10 PROCEDURE — G8399 PT W/DXA RESULTS DOCUMENT: HCPCS | Performed by: ORTHOPAEDIC SURGERY

## 2022-06-10 PROCEDURE — 1123F ACP DISCUSS/DSCN MKR DOCD: CPT | Performed by: ORTHOPAEDIC SURGERY

## 2022-06-10 PROCEDURE — 1090F PRES/ABSN URINE INCON ASSESS: CPT | Performed by: ORTHOPAEDIC SURGERY

## 2022-06-10 PROCEDURE — 4004F PT TOBACCO SCREEN RCVD TLK: CPT | Performed by: ORTHOPAEDIC SURGERY

## 2022-06-10 PROCEDURE — G8417 CALC BMI ABV UP PARAM F/U: HCPCS | Performed by: ORTHOPAEDIC SURGERY

## 2022-06-10 RX ORDER — METHYLPREDNISOLONE 4 MG/1
TABLET ORAL
Qty: 42 TABLET | Refills: 0 | Status: ON HOLD | OUTPATIENT
Start: 2022-06-10 | End: 2022-10-12

## 2022-06-10 NOTE — PROGRESS NOTES
Subjective:      Patient ID: Girma Coyle is a [de-identified] y.o. female who presents today for:  Chief Complaint   Patient presents with    Shoulder Pain     Bilateral shoulder pain, XR done 5/27/2022. Pt states they started to bother her a few months ago, denies any injury. Pt rates pain 9/10. HPI    Patient presents with shoulder pain. The pain is bilateral.  She has had x-rays done of her shoulders. Of that the opportunity review those films. Both the right and left shoulder. None of which demonstrate any osteoarthritis any significant abnormality in her glenohumeral changes or subluxation. That includes no AC joint arthritis. Patient claims her pains down her neck and runs down her shoulders to the elbows. It cannot be elicited with shoulder or elbow wrist range of motion per her report. Daughter who is with her today suffers from PMN. Past Medical History:   Diagnosis Date    Bilateral carotid artery stenosis 11/19/2020    CAD (coronary artery disease)     Carotid bruit     Dyslipidemia 11/19/2020    Essential hypertension 11/19/2020    Hx of CABG 11/19/2020    Hyperlipidemia     Kidney disease     Stage 4 chronic kidney disease ([address] 11/19/2020    Stenosis of noncoronary bypass graft [address]     Tobacco abuse 11/19/2020     Past Surgical History:   Procedure Laterality Date    BACK SURGERY  2003    BRAIN ANEURYSM SURGERY  2012    BREAST LUMPECTOMY Right     CARPAL TUNNEL RELEASE Bilateral     CORONARY ARTERY BYPASS GRAFT      PARTIAL HYSTERECTOMY (CERVIX NOT REMOVED)  1997    TOTAL HIP ARTHROPLASTY Bilateral left side 2017 right side 2004     Social History     Socioeconomic History    Marital status:       Spouse name: Not on file    Number of children: Not on file    Years of education: Not on file    Highest education level: Not on file   Occupational History    Not on file   Tobacco Use    Smoking status: Current Every Day Smoker     Packs/day: 0.50     Years: 60.00 Pack years: 30.00    Smokeless tobacco: Never Used   Vaping Use    Vaping Use: Never used   Substance and Sexual Activity    Alcohol use: Never    Drug use: Not on file    Sexual activity: Not Currently   Other Topics Concern    Not on file   Social History Narrative    Not on file     Social Determinants of Health     Financial Resource Strain: Low Risk     Difficulty of Paying Living Expenses: Not hard at all   Food Insecurity: No Food Insecurity    Worried About [address] in the Last Year: Never true    [facility] in the Last Year: Never true   Transportation Needs:     Lack of Transportation (Medical): Not on file    Lack of Transportation (Non-Medical): Not on file   Physical Activity:     Days of Exercise per Week: Not on file    Minutes of Exercise per Session: Not on file   Stress:     Feeling of Stress : Not on file   Social Connections:     Frequency of Communication with Friends and Family: Not on file    Frequency of Social Gatherings with Friends and Family: Not on file    Attends [employer] Services: Not on file    Active Member of [address] [place] [address] or Organizations: Not on file    Attends Club or Organization Meetings: Not on file    Marital Status: Not on file   Intimate Partner Violence:     Fear of Current or Ex-Partner: Not on file    Emotionally Abused: Not on file    Physically Abused: Not on file    Sexually Abused: Not on file   Housing Stability:     Unable to Pay for Housing in the Last Year: Not on file    Number of [place] in the Last Year: Not on file    Unstable Housing in the Last Year: Not on file     No family history on file.   Allergies   Allergen Reactions    Amoxicillin     Nsaids      CRF 4     Current Outpatient Medications on File Prior to Visit   Medication Sig Dispense Refill    traZODone (DESYREL) 100 MG tablet TAKE 1 TABLET BY MOUTH EVERY DAY AT NIGHT 90 tablet 1    metoprolol tartrate (LOPRESSOR) 25 MG tablet TAKE 1 TABLET BY MOUTH TWICE A  tablet 3    losartan (COZAAR) 100 MG tablet TAKE 1 TABLET BY MOUTH EVERY DAY 90 tablet 3    atorvastatin (LIPITOR) 40 MG tablet TAKE 1 TABLET BY MOUTH EVERY DAY 90 tablet 3    furosemide (LASIX) 40 MG tablet TAKE 1 TABLET BY MOUTH TWICE A  tablet 3    doxazosin (CARDURA) 4 MG tablet TAKE 1 TABLET AT BEDTIME 90 tablet 3    Handicap Placard MISC by Does not apply route Applying from 1/28/2021-1/27/2026. 1 each 0    aspirin 81 MG EC tablet Take 81 mg by mouth daily       No current facility-administered medications on file prior to visit. Review of Systems  Patient has no fever chills night sweats. She has no history of weakness in her extremities. Objective:   Pulse 59   Temp 96.8 °F (36 °C) (Temporal)   Resp 16   Ht 5' 1\" (1.549 m)   Wt 141 lb (64 kg)   SpO2 99%   BMI 26.64 kg/m²     ORTHOEXAM    Patient is neurologically intact but does have some neck related pain paraspinally mostly. She also has pain extending with her shoulders. I cannot elicit this pain with impingement testing range of motion but she has just generalized pain down the shoulders or she is neurologically intact in the upper extremities. Assessment:       Diagnosis Orders   1. Cervical radiculopathy  Ambulatory referral to Physical Therapy    methylPREDNISolone (MEDROL DOSEPACK) 4 MG tablet         Plan:   Fortunately it sounds like she has cervical radiculopathy. Fortunately she does not have any myelopathy. Therefore my recommendation is formal therapy. Unfortunately she has kidney disease and cannot take anti-inflammatories. She says that several times that she is done it and she is relieved her symptoms. Therefore her foraminal stenosis or disc herniation was not be bad enough at this point time that likely any type of surgery would be is considered. At this time again to go ahead and pursue a formal therapy program.  Have also written a Medrol Dosepak which she says she can take. Hopefully that will alleviate her symptoms. If it does not she needs to talk to her primary about any potential medication she can take. Follow-up can be as needed at which point time if that occurs it should include 5 views of the cervical spine. Orders Placed This Encounter   Procedures    Ambulatory referral to Physical Therapy     Referral Priority:   Routine     Referral Type:   Eval and Treat     Referral Reason:   Specialty Services Required     Requested Specialty:   Physical Therapist     Number of Visits Requested:   1     Orders Placed This Encounter   Medications    methylPREDNISolone (MEDROL DOSEPACK) 4 MG tablet     Sig: On days 1-6 take as directed on package for first 6-day course. On days 7-12 take as directed on (second) package for (second) 6-day course. Dispense:  42 tablet     Refill:  0       Return if symptoms worsen or fail to improve, for f/u spine surgeon if fails pt and meds.       Mane Canales MD

## 2022-06-29 ENCOUNTER — HOSPITAL ENCOUNTER (OUTPATIENT)
Dept: PHYSICAL THERAPY | Age: 82
Setting detail: THERAPIES SERIES
Discharge: HOME | End: 2022-06-29
Payer: MEDICARE

## 2022-06-29 PROCEDURE — 97162 PT EVAL MOD COMPLEX 30 MIN: CPT

## 2022-06-29 PROCEDURE — 97110 THERAPEUTIC EXERCISES: CPT

## 2022-06-29 ASSESSMENT — PAIN DESCRIPTION - LOCATION: LOCATION: SHOULDER;NECK

## 2022-06-29 ASSESSMENT — PAIN DESCRIPTION - ONSET: ONSET: ON-GOING

## 2022-06-29 ASSESSMENT — PAIN DESCRIPTION - FREQUENCY: FREQUENCY: CONTINUOUS

## 2022-06-29 ASSESSMENT — PAIN DESCRIPTION - DESCRIPTORS: DESCRIPTORS: BURNING

## 2022-06-29 ASSESSMENT — PAIN DESCRIPTION - DIRECTION: RADIATING_TOWARDS: BILATERAL HANDS/FINGERS

## 2022-06-29 ASSESSMENT — PAIN SCALES - GENERAL: PAINLEVEL_OUTOF10: 6

## 2022-06-29 ASSESSMENT — PAIN DESCRIPTION - PAIN TYPE: TYPE: ACUTE PAIN;CHRONIC PAIN

## 2022-06-29 ASSESSMENT — PAIN - FUNCTIONAL ASSESSMENT: PAIN_FUNCTIONAL_ASSESSMENT: PREVENTS OR INTERFERES WITH MANY ACTIVE NOT PASSIVE ACTIVITIES

## 2022-06-29 NOTE — PROGRESS NOTES
[address] [facility]     Ph: [phone]  Fax: [phone]      [x] Certification  [] Recertification [x]  Plan of Care  [] Progress Note [] Discharge      Referring Provider: Alex Delarosa MD   From:  Humble Anderson, PT , DPT  Patient: Jimmie Che (69 y.o. female) : 1940 Date: 2022   Medical Diagnosis: Radiculopathy, cervical region [M54.12] Cervical radiculopathy  Treatment Diagnosis: increased neck & bilateral UE pain, decreased pain free cervical & bilateral UE ROM & strength, decreased ability to perform ADLs without pain, & decreased functional endurance & postural awareness    Plan of Care/Certification Expiration Date: : 22   Progress Report Period from:  2022  to 2022    Visits to Date: 1 No Show: 0 Cancelled Appts: 0    OBJECTIVE:   Short Term Goals - Time Frame for Short term goals: 2-4 weeks    Goals Current/Discharge status  Status   Short term goal 1: The patient will demonstrate improved postural awareness requiring <25% verbal cueing during functional mobility tasks & exercises  On-going, decreased postural awareness New   Short term goal 2: The patient will have a decrease in NDI score >/=6 points in order to increase functional activity tolerance  Exam: UEFI = 34/80; NDI = 15/50     New   Short term goal 3: The patient will have an increase in UEFI score >/=9 points in order to increase functional activity tolerance  Exam: UEFI = 34/80; NDI = 15/50     New     Long Term Goals - Time Frame for Long term goals : 4-6 weeks  Goals Current/ Discharge status Status   Long term goal 1: The patient will improve pain free cervical & bilateral UE AROM >/= 10-15* in order to increase ability to perform functional mobility tasks efficiently.     AROM LUE (degrees)  LUE AROM :  (pain)  LUE General AROM: shoulder flexion = 88*; shoulder abduction = 83*; shoulder IR = T12; shoulder ER = top of shoulder; shoulder extension = WNL   AROM RUE (degrees)  RUE AROM :  (pain)  RUE General AROM: shoulder flexion = 108*; shoulder abduction = 83*; shoulder IR = T12; shoulder ER = C7      PROM LUE (degrees)  LUE PROM:  (pain)  LUE General PROM: shoulder flexion = 128*; shoulder abduction = 102*; shoulder IR = full; shoulder ER = 45*   PROM RUE (degrees)  RUE PROM:  (pain)  RUE General PROM: shoulder flexion = 130*; shoulder abduction = 101*; shoulder IR = 45*; shoulder ER = full      Spine  Cervical: flexion = 25*; extension = 35*; left side bend = 22*; right side bend = 22*; left rotation = 60*; right rotation = 79* (minimal pain)  New   Long term goal 2: The patient will demonstrate competency with HEP to progress towards self management of symptoms upon D/C On-going, initiated this date New   Long term goal 3: The patient will report decreased neck & bilateral shoulder pain </=4/10 consistently in order to improve ability to perform functional mobility tasks Pain Screening  Patient Currently in Pain: Yes  Pain Assessment: 0-10  Pain Level: 6  Best Pain Level: 5  Worst Pain Level: 9  Date pain first started:  (~months ago)  Patient's Stated Pain Goal: 4  Pain Type: Acute pain,Chronic pain  Pain Location: Shoulder,Neck  Pain Orientation: Left,Right,Posterior,Anterior,Upper  Pain Radiating Towards: bilateral hands/fingers  Pain Descriptors: Burning  Pain Frequency: Continuous  Pain Onset: On-going  Functional Pain Assessment: Prevents or interferes with many active not passive activities  Pain Management/Relieving Factors: Rest,Medications New   Long term goal 4: The patient will demonstrate improved cervical & B UE strength >/=4/5 in order to lift/carry with decreased pain Strength LUE  Comment: shoulder flexion = 3-/5; shoulder abduction = 3-/5; shoulder IR = 3-/5; shoulder ER = 3-/5; shoulder extension = 3/5  Strength RUE  Comment: shoulder flexion = 3-/5; shoulder abduction = 3-/5; shoulder IR = 3-/5; shoulder ER = 3-/5; shoulder extension = 3/5  New     Body Structures, Functions, Activity Limitations Requiring Skilled Therapeutic Intervention: Decreased ADL status,Decreased ROM,Decreased strength,Decreased endurance,Increased pain,Decreased posture  Assessment: Patient is an [de-identified]year old female who presents with acute/chronic neck & bilateral UE pain that began ~few months ago. Patient demonstrates increased neck & bilateral UE pain, decreased pain free cervical & bilateral UE ROM & strength, decreased ability to perform ADLs without pain, & decreased functional endurance & postural awareness. Patient would benefit from outpatient PT services in order to address these impairments as well as improve patient's QOL & ease with ADLs. Therapy Prognosis: Good    PT Education: PT Role;Goals;Plan of Care;Evaluative findings; Insurance;Home Exercise Program    PLAN: [x] Evaluate and Treat  Frequency/Duration:  Plan Frequency: 2xs/wk  Plan weeks: 4-6 weeks  Current Treatment Recommendations: Strengthening,ROM,Functional mobility training,Modalities,Positioning,Therapeutic activities,Patient/Caregiver education & training,Safety education & training,Home exercise program,Pain management,Manual Therapy - Soft Tissue Mobilization,Manual Therapy - Joint Manipulation,Endurance training,ADL/Self-care training,Neuromuscular re-education,Aquatics     Precautions:       metal implants, arthritis, chronic pain, heart disease, kidney disease, heart attack, high blood pressure (controlled), bilateral hip replacements, low back surgery, open heart surgery                     Patient Status:[x] Continue/ Initiate plan of Care    [] Discharge PT. Recommend pt continue with HEP.      [] Additional visits requested, Please re-certify for additional visits:    [] Hold         Signature: Electronically signed by Aleida Abrams PT on 6/29/22 at 11:56 AM EDT      If you have any questions or concerns, please don't hesitate to call.  Thank you for your referral.    I have reviewed this plan of care and certify a need for medically necessary rehabilitation services.     Physician Signature:__________________________________________________________  Date:  Please sign and return

## 2022-07-05 ENCOUNTER — HOSPITAL ENCOUNTER (OUTPATIENT)
Dept: PHYSICAL THERAPY | Age: 82
Setting detail: THERAPIES SERIES
Discharge: HOME | End: 2022-07-05
Payer: MEDICARE

## 2022-07-05 PROCEDURE — 97110 THERAPEUTIC EXERCISES: CPT

## 2022-07-05 PROCEDURE — 97140 MANUAL THERAPY 1/> REGIONS: CPT

## 2022-07-05 NOTE — PROGRESS NOTES
[facility]  Outpatient Physical Therapy    Treatment Note        Date: 2022  Patient: Gio Dawn  : 1940   Confirmed: Yes  MRN: [id number]  Referring Provider: Levert Gitelman, MD   Secondary Referring Provider (If applicable):     Medical Diagnosis: Radiculopathy, cervical region [M54.12]    Treatment Diagnosis: increased neck & bilateral UE pain, decreased pain free cervical & bilateral UE ROM & strength, decreased ability to perform ADLs without pain, & decreased functional endurance & postural awareness    Visit Information:  Insurance: Payor: MEDICARE / Plan: MEDICARE PART A AND B / Product Type: *No Product type* /   PT Visit Information  Onset Date:  (~months ago)  Total # of Visits Approved: 99  Total # of Visits to Date: 2  Plan of Care/Certification Expiration Date: 22  No Show: 0  Progress Note Due Date: 22  Canceled Appointment: 0  Progress Note Counter:  (30 day PN due 22)    Subjective Information:  Subjective: Patient reports increased soreness/pain this morning for which she took Tylenol & decreased the pain. Patient reports non-compliance with HEP since it was her birthday weekend, but she promises she will be more compliant in the future.   HEP Compliance:  [] Good [] Fair [x] Poor [x] Reports not doing due to: birthday weekend    Treatment:  Exercises:  Exercises  Exercise 1: table slides, 1 set x 10 reps x 5 second hold each direction each UE, flexion & scaption, bilateral UE  Exercise 2: pulleys, 2 minutes flexion & 2 minutes scaption  Exercise 3: scapular retraction, 1 set x 10 reps x 5 second hold  Exercise 4: wall slides*  Exercise 5: cervical retraction, 1 set x 10 reps x 5 second hold  Exercise 6: SCM/upper trap stretches*  Exercise 7: supine cervical retraction*  Exercise 8: bilateral shoulder PROM*  Exercise 9: shoulder AAROM*  Exercise 10: shoudler AROM*  Exercise 20: HEP: table slides, scapular retraction    Manual:   Manual Therapy  Joint Mobilization: grades 1-2 mobilizations bilateral shoulders for improved ROM with OP & distractional techniques used with PROM  Soft Tissue Mobilizaton: cervical region* bilateral shoulders*  Other: x8 minutes total    *Indicates exercise, modality, or manual techniques to be initiated when appropriate    Objective Measures:     Strength: [x] NT  [] MMT completed:    ROM: [] NT  [x] ROM measurements:  AROM LUE (degrees)  LUE AROM :  (pain)  LUE General AROM: shoulder flexion = 114*; shoulder abduction = 120*   AROM RUE (degrees)  RUE AROM :  (pain)  RUE General AROM: shoulder flexion = 125*; shoulder abduction = 98*     Assessment: Body Structures, Functions, Activity Limitations Requiring Skilled Therapeutic Intervention: Decreased ADL status,Decreased ROM,Decreased strength,Decreased endurance,Increased pain,Decreased posture  Assessment: Initiated patient's PT POC this date with addition of pulleys for improved bilateral shoulder ROM as well as cervical retraction for improved postural awareness & cervical ROM. Continued with table slides & scapular retractions with increased repetitions. Performed bilateral shoulder joint mobilizations to facilitate improved ROM with good tolerance & improvement bilaterally in shoulder flexion & abduction noted post-manual.  Declined modalities. Treatment Diagnosis: increased neck & bilateral UE pain, decreased pain free cervical & bilateral UE ROM & strength, decreased ability to perform ADLs without pain, & decreased functional endurance & postural awareness  Therapy Prognosis: Good    Post-Pain Assessment:       Pain Rating (0-10 pain scale):   \"better\"/10   Location and pain description same as pre-treatment unless indicated.    Action: [] NA   [x] Perform HEP  [] Meds as prescribed  [x] Modalities as prescribed   [] Call Physician     GOALS   Patient Goal(s): Patient goals : \"decrease pain\"    Short Term Goals Completed by 2-4 weeks Goal Status   STG 1 The patient will demonstrate improved postural awareness requiring <25% verbal cueing during functional mobility tasks & exercises In progress   STG 2 The patient will have a decrease in NDI score >/=6 points in order to increase functional activity tolerance In progress   STG 3 The patient will have an increase in UEFI score >/=9 points in order to increase functional activity tolerance In progress       Long Term Goals Completed by 4-6 weeks Goal Status   LTG 1 The patient will improve pain free cervical & bilateral UE AROM >/= 10-15* in order to increase ability to perform functional mobility tasks efficiently. In progress   LTG 2 The patient will demonstrate competency with HEP to progress towards self management of symptoms upon D/C In progress   LTG 3 The patient will report decreased neck & bilateral shoulder pain </=4/10 consistently in order to improve ability to perform functional mobility tasks In progress   LTG 4 The patient will demonstrate improved cervical & B UE strength >/=4/5 in order to lift/carry with decreased pain In progress     Plan:  Frequency/Duration:  Plan  Plan Frequency: 2xs/wk  Plan weeks: 4-6 weeks  Current Treatment Recommendations: Strengthening,ROM,Functional mobility training,Modalities,Positioning,Therapeutic activities,Patient/Caregiver education & training,Safety education & training,Home exercise program,Pain management,Manual Therapy - Soft Tissue Mobilization,Manual Therapy - Joint Manipulation,Endurance training,ADL/Self-care training,Neuromuscular re-education,Aquatics  Pt to continue current HEP. See objective section for any therapeutic exercise changes, additions or modifications this date.     Therapy Time:      PT Individual Minutes  Time In: 0065  Time Out: 1107  Minutes: 40  Timed Code Treatment Minutes: 40 Minutes  Procedure Minutes: 0 minutes  Timed Activity Minutes Units   Ther Ex 32 2   Manual  8 1     Electronically signed by Isi Serna PT on 7/5/22 at 11:11 AM EDT

## 2022-07-07 ENCOUNTER — HOSPITAL ENCOUNTER (OUTPATIENT)
Dept: PHYSICAL THERAPY | Age: 82
Setting detail: THERAPIES SERIES
Discharge: HOME | End: 2022-07-07
Payer: MEDICARE

## 2022-07-07 PROCEDURE — 97140 MANUAL THERAPY 1/> REGIONS: CPT

## 2022-07-07 PROCEDURE — 97110 THERAPEUTIC EXERCISES: CPT

## 2022-07-07 ASSESSMENT — PAIN DESCRIPTION - LOCATION: LOCATION: SHOULDER

## 2022-07-07 ASSESSMENT — PAIN DESCRIPTION - DESCRIPTORS: DESCRIPTORS: ACHING;BURNING

## 2022-07-07 ASSESSMENT — PAIN DESCRIPTION - FREQUENCY: FREQUENCY: CONTINUOUS

## 2022-07-07 ASSESSMENT — PAIN DESCRIPTION - ORIENTATION: ORIENTATION: LEFT;RIGHT

## 2022-07-07 ASSESSMENT — PAIN SCALES - GENERAL: PAINLEVEL_OUTOF10: 6

## 2022-07-07 NOTE — PROGRESS NOTES
[facility]  Outpatient Physical Therapy    Treatment Note        Date: 2022  Patient: Cresencio Brambila  : 1940   Confirmed: Yes  MRN: [id number]  Referring Provider: Ronny Thorne MD   Secondary Referring Provider (If applicable):     Medical Diagnosis: Radiculopathy, cervical region [M54.12]    Treatment Diagnosis: increased neck & bilateral UE pain, decreased pain free cervical & bilateral UE ROM & strength, decreased ability to perform ADLs without pain, & decreased functional endurance & postural awareness    Visit Information:  Insurance: Payor: MEDICARE / Plan: MEDICARE PART A AND B / Product Type: *No Product type* /   PT Visit Information  Onset Date:  (~months ago)  Total # of Visits Approved: 99  Total # of Visits to Date: 2  Plan of Care/Certification Expiration Date: 22  No Show: 0  Progress Note Due Date: 22  Canceled Appointment: 0  Progress Note Counter: 3/4- (30 day PN due 22)    Subjective Information:  Subjective: Patient denies increased pain/soreness after last visit. C/o increased pain \"Today is not a good day. \"  HEP Compliance:  [x] Good [] Fair [] Poor [] Reports not doing due to:    Pain Screening  Patient Currently in Pain: Yes  Pain Assessment: 0-10  Pain Level: 6  Pain Location: Shoulder  Pain Orientation: Left,Right  Pain Descriptors: Aching,Burning  Pain Frequency: Continuous    Treatment:  Exercises:  Exercises  Exercise 1: table slides, 1 set x 10 reps x 5 second hold each direction each UE, flexion & scaption, bilateral UE  Exercise 2: pulleys, 2 minutes flexion & 2 minutes scaption  Exercise 3: scapular retraction, 1 set x 10 reps x 5 second hold  Exercise 5: cervical retraction, 1 set x 10 reps x 5 second hold  Exercise 6: SCM/upper trap stretches 3x30\"    Manual:   Manual Therapy  Joint Mobilization: grades 1-2 mobilizations bilateral shoulders for improved ROM with OP & distractional techniques used with PROM  Soft Tissue Mobilizaton: cervical region, bilateral shoulders  Other: x8 minutes total   Assessment: Body Structures, Functions, Activity Limitations Requiring Skilled Therapeutic Intervention: Decreased ADL status,Decreased ROM,Decreased strength,Decreased endurance,Increased pain,Decreased posture  Assessment: Continued current POC with focus on bilateral Shoulder mobility and postural strength. Patient requires verbal cues to decrease speed of exercises. C/o palpable tenderness surrounding Right shoulder joint during mobilizations. Noted \"cyst like\" lump near patient's right UT during STM. Patient reports she has had it for some time however has never mentioned it to her doctor. Treatment Diagnosis: increased neck & bilateral UE pain, decreased pain free cervical & bilateral UE ROM & strength, decreased ability to perform ADLs without pain, & decreased functional endurance & postural awareness  Therapy Prognosis: Good  Post-Pain Assessment:       Pain Rating (0-10 pain scale):   4-5/10   Location and pain description same as pre-treatment unless indicated. Action: [] NA   [] Perform HEP  [] Meds as prescribed  [] Modalities as prescribed   [] Call Physician     GOALS   Patient Goal(s): Patient goals : \"decrease pain\"    Short Term Goals Completed by 2-4 weeks Goal Status   STG 1 The patient will demonstrate improved postural awareness requiring <25% verbal cueing during functional mobility tasks & exercises In progress   STG 2 The patient will have a decrease in NDI score >/=6 points in order to increase functional activity tolerance In progress   STG 3 The patient will have an increase in UEFI score >/=9 points in order to increase functional activity tolerance In progress     Long Term Goals Completed by 4-6 weeks Goal Status   LTG 1 The patient will improve pain free cervical & bilateral UE AROM >/= 10-15* in order to increase ability to perform functional mobility tasks efficiently.  In progress   LTG 2 The patient will demonstrate competency with HEP to progress towards self management of symptoms upon D/C In progress   LTG 3 The patient will report decreased neck & bilateral shoulder pain </=4/10 consistently in order to improve ability to perform functional mobility tasks In progress   LTG 4 The patient will demonstrate improved cervical & B UE strength >/=4/5 in order to lift/carry with decreased pain In progress     Plan:  Frequency/Duration:  Plan  Plan Frequency: 2xs/wk  Plan weeks: 4-6 weeks  Current Treatment Recommendations: Strengthening,ROM,Functional mobility training,Modalities,Positioning,Therapeutic activities,Patient/Caregiver education & training,Safety education & training,Home exercise program,Pain management,Manual Therapy - Soft Tissue Mobilization,Manual Therapy - Joint Manipulation,Endurance training,ADL/Self-care training,Neuromuscular re-education,Aquatics  Pt to continue current HEP. See objective section for any therapeutic exercise changes, additions or modifications this date.     Therapy Time:      PT Individual Minutes  Time In: 0920  Time Out: 1000  Minutes: 40  Timed Code Treatment Minutes: 40 Minutes  Procedure Minutes:  Timed Activity Minutes Units   Ther Ex 32 2   Manual  8 1     Electronically signed by Macey Hurtado PTA on 7/7/22 at 10:13 AM EDT

## 2022-07-12 ENCOUNTER — HOSPITAL ENCOUNTER (OUTPATIENT)
Dept: PHYSICAL THERAPY | Age: 82
Setting detail: THERAPIES SERIES
Discharge: HOME | End: 2022-07-12
Payer: MEDICARE

## 2022-07-12 PROCEDURE — 97110 THERAPEUTIC EXERCISES: CPT

## 2022-07-12 PROCEDURE — 97140 MANUAL THERAPY 1/> REGIONS: CPT

## 2022-07-12 ASSESSMENT — PAIN DESCRIPTION - FREQUENCY: FREQUENCY: CONTINUOUS

## 2022-07-12 ASSESSMENT — PAIN DESCRIPTION - LOCATION: LOCATION: SHOULDER

## 2022-07-12 ASSESSMENT — PAIN DESCRIPTION - DESCRIPTORS: DESCRIPTORS: ACHING;BURNING

## 2022-07-12 ASSESSMENT — PAIN DESCRIPTION - PAIN TYPE: TYPE: ACUTE PAIN;CHRONIC PAIN

## 2022-07-12 ASSESSMENT — PAIN SCALES - GENERAL: PAINLEVEL_OUTOF10: 6

## 2022-07-12 ASSESSMENT — PAIN DESCRIPTION - ORIENTATION: ORIENTATION: RIGHT

## 2022-07-12 NOTE — PROGRESS NOTES
[facility]  Outpatient Physical Therapy    Treatment Note        Date: 2022  Patient: Codi Leavitt  : 1940   Confirmed: Yes  MRN: [id number]  Referring Provider: Andrew Jose MD   Secondary Referring Provider (If applicable):     Medical Diagnosis: Radiculopathy, cervical region [M54.12]    Treatment Diagnosis: increased neck & bilateral UE pain, decreased pain free cervical & bilateral UE ROM & strength, decreased ability to perform ADLs without pain, & decreased functional endurance & postural awareness    Visit Information:  Insurance: Payor: MEDICARE / Plan: MEDICARE PART A AND B / Product Type: *No Product type* /   PT Visit Information  Onset Date:  (~months ago)  Total # of Visits Approved: 99  Total # of Visits to Date: 3  Plan of Care/Certification Expiration Date: 22  No Show: 0  Progress Note Due Date: 22  Canceled Appointment: 0  Progress Note Counter: - (30 day PN due 22)    Subjective Information:  Subjective: Patient reports \"slight improvements\" with therapy. Says she feels increased pain in the morning then it relaxes a little bit throughout the day. HEP Compliance:  [x] Good [] Fair [] Poor [] Reports not doing due to:    Pain Screening  Patient Currently in Pain: Yes  Pain Assessment: 0-10  Pain Level: 6  Pain Type: Acute pain,Chronic pain  Pain Location: Shoulder  Pain Orientation: Right  Pain Descriptors: Aching,Burning  Pain Frequency: Continuous    Treatment:  Exercises:  Exercises  Exercise 1: table slides, 1 set x 10 reps x 5 second hold each direction each UE, flexion & scaption, bilateral UE  Exercise 2: pulleys, 2 minutes flexion & 2 minutes scaption  Exercise 3: scapular retraction, 1 set x 10 reps x 5 second hold  Exercise 4: wall slides 5 x 10s  Exercise 6: Lev.  Scap/UT stretches 3x30\"  Exercise 7: supine cervical retraction x 10 -5s  Exercise 10: shoudler AROM w. cane x 5 -5s  Exercise 20: HEP: table slides, scapular retraction       Manual:   Manual Therapy  Soft Tissue Mobilizaton: cervical region, bilateral shoulders  Other: x 8 minutes total         Objective Measures:     Strength: [x] NT  [] MMT completed:     ROM: [x] NT  [] ROM measurements:       Assessment: Body Structures, Functions, Activity Limitations Requiring Skilled Therapeutic Intervention: Decreased ADL status,Decreased ROM,Decreased strength,Decreased endurance,Increased pain,Decreased posture  Assessment: Continued to progress patient through current exercises to improve shoulder mobility and strength. Initiated supine AROM with cane, wall slides, and cervical retractions with good tolerance by patient. Patient noted fatigue in mike neck musculature post cervical retractions. Continued to perform STM with good relief noted to UT and mike shoulders. Treatment Diagnosis: increased neck & bilateral UE pain, decreased pain free cervical & bilateral UE ROM & strength, decreased ability to perform ADLs without pain, & decreased functional endurance & postural awareness  Therapy Prognosis: Good          Post-Pain Assessment:       Pain Rating (0-10 pain scale):  5-6 /10   Location and pain description same as pre-treatment unless indicated.    Action: [] NA   [x] Perform HEP  [] Meds as prescribed  [] Modalities as prescribed   [] Call Physician     GOALS   Patient Goal(s): Patient goals : \"decrease pain\"    Short Term Goals Completed by 2-4 weeks Goal Status   STG 1 The patient will demonstrate improved postural awareness requiring <25% verbal cueing during functional mobility tasks & exercises In progress   STG 2 The patient will have a decrease in NDI score >/=6 points in order to increase functional activity tolerance In progress   STG 3 The patient will have an increase in UEFI score >/=9 points in order to increase functional activity tolerance In progress       Long Term Goals Completed by 4-6 weeks Goal Status   LTG 1 The patient will improve pain free cervical & bilateral UE AROM >/= 10-15* in order to increase ability to perform functional mobility tasks efficiently. In progress   LTG 2 The patient will demonstrate competency with HEP to progress towards self management of symptoms upon D/C In progress   LTG 3 The patient will report decreased neck & bilateral shoulder pain </=4/10 consistently in order to improve ability to perform functional mobility tasks In progress   LTG 4 The patient will demonstrate improved cervical & B UE strength >/=4/5 in order to lift/carry with decreased pain In progress     Plan:  Frequency/Duration:  Plan  Plan Frequency: 2xs/wk  Plan weeks: 4-6 weeks  Current Treatment Recommendations: Strengthening,ROM,Functional mobility training,Modalities,Positioning,Therapeutic activities,Patient/Caregiver education & training,Safety education & training,Home exercise program,Pain management,Manual Therapy - Soft Tissue Mobilization,Manual Therapy - Joint Manipulation,Endurance training,ADL/Self-care training,Neuromuscular re-education,Aquatics  Pt to continue current HEP. See objective section for any therapeutic exercise changes, additions or modifications this date.     Therapy Time:      PT Individual Minutes  Time In: 2584  Time Out: 1115  Minutes: 39  Timed Code Treatment Minutes: 39 Minutes  Procedure Minutes: 0  Timed Activity Minutes Units   Ther Ex 31 2   Manual  8 1     Electronically signed by Lord Lindsey PTA on 7/12/22 at 12:05 PM EDT

## 2022-07-14 ENCOUNTER — HOSPITAL ENCOUNTER (OUTPATIENT)
Dept: PHYSICAL THERAPY | Age: 82
Setting detail: THERAPIES SERIES
Discharge: HOME | End: 2022-07-14
Payer: MEDICARE

## 2022-07-14 PROCEDURE — 97110 THERAPEUTIC EXERCISES: CPT

## 2022-07-14 PROCEDURE — 97140 MANUAL THERAPY 1/> REGIONS: CPT

## 2022-07-14 ASSESSMENT — PAIN DESCRIPTION - LOCATION: LOCATION: SHOULDER

## 2022-07-14 ASSESSMENT — PAIN SCALES - GENERAL: PAINLEVEL_OUTOF10: 2

## 2022-07-14 ASSESSMENT — PAIN DESCRIPTION - ORIENTATION: ORIENTATION: LEFT;RIGHT

## 2022-07-14 NOTE — PROGRESS NOTES
[facility]  Outpatient Physical Therapy    Treatment Note        Date: 2022  Patient: Avel Gerard  : 1940   Confirmed: Yes  MRN: [id number]  Referring Provider: Vero Zaldivar MD   Secondary Referring Provider (If applicable):     Medical Diagnosis: Radiculopathy, cervical region [M54.12]    Treatment Diagnosis: increased neck & bilateral UE pain, decreased pain free cervical & bilateral UE ROM & strength, decreased ability to perform ADLs without pain, & decreased functional endurance & postural awareness    Visit Information:  Insurance: Payor: MEDICARE / Plan: MEDICARE PART A AND B / Product Type: *No Product type* /   PT Visit Information  Onset Date:  (~months ago)  Total # of Visits Approved: 99  Total # of Visits to Date: 5  Plan of Care/Certification Expiration Date: 22  No Show: 0  Progress Note Due Date: 22  Canceled Appointment: 0  Progress Note Counter:  (30 day PN due 22)    Subjective Information:  Subjective: \"my pain is good today\" \"I forget to do my exercises\"  HEP Compliance:  [] Good [] Fair [x] Poor [x] Reports not doing due to: \"forgetting\"    Pain Screening  Patient Currently in Pain: Yes  Pain Level: 2  Pain Location: Shoulder  Pain Orientation: Left,Right    Treatment:  Exercises:  Exercises  Exercise 2: pulleys, 2 minutes flexion & 2 minutes scaption  Exercise 3: scapular retraction, 1 set x 10 reps x 5 second hold  Exercise 4: wall slides 5 x 10s  Exercise 7: supine cervical retraction x 10 -5s  Exercise 11: supine chest pull 2 way YTB X 10 ea  Exercise 12: row/lats YTB X 10 ea  Exercise 13:  B D2 flexion X 10  Exercise 20: HEP: continue current due to decreased participation with HEP per pt report    Manual:   Manual Therapy  Soft Tissue Mobilizaton: cervical region, bilateral shoulders X 8 min  Other: UT stretch PROM with overpressure X 4 min  *Indicates exercise, modality, or manual techniques to be initiated when appropriate    Objective Measures:   Strength: [x] NT  [] MMT completed:    ROM: [x] NT  [] ROM measurements:    Assessment: Body Structures, Functions, Activity Limitations Requiring Skilled Therapeutic Intervention: Decreased ADL status,Decreased ROM,Decreased strength,Decreased endurance,Increased pain,Decreased posture  Assessment: Pt to PT session with decreased pain which pt attributes to therapy sessions. Pt states decreased compliance with HEP- PT educated pt in regular participation for decreased pain- pt stated understanding. Continued progression of strength of posture muscles for decreased shoulder pain. Pt exhibits muscle fatigue with exercises but no increased pain. Continued PT indicated to progress toward goals for return to PLOF without pain. Treatment Diagnosis: increased neck & bilateral UE pain, decreased pain free cervical & bilateral UE ROM & strength, decreased ability to perform ADLs without pain, & decreased functional endurance & postural awareness  Therapy Prognosis: Good    Post-Pain Assessment:       Pain Rating (0-10 pain scale):   3/10   Location and pain description same as pre-treatment unless indicated.    Action: [] NA   [x] Perform HEP  [] Meds as prescribed  [x] Modalities as prescribed   [] Call Physician     GOALS   Patient Goal(s): Patient goals : \"decrease pain\"    Short Term Goals Completed by 2-4 weeks Goal Status   STG 1 The patient will demonstrate improved postural awareness requiring <25% verbal cueing during functional mobility tasks & exercises In progress   STG 2 The patient will have a decrease in NDI score >/=6 points in order to increase functional activity tolerance In progress   STG 3 The patient will have an increase in UEFI score >/=9 points in order to increase functional activity tolerance In progress     Long Term Goals Completed by 4-6 weeks Goal Status   LTG 1 The patient will improve pain free cervical & bilateral UE AROM >/= 10-15* in order to increase ability to perform functional mobility tasks efficiently. In progress   LTG 2 The patient will demonstrate competency with HEP to progress towards self management of symptoms upon D/C In progress   LTG 3 The patient will report decreased neck & bilateral shoulder pain </=4/10 consistently in order to improve ability to perform functional mobility tasks In progress   LTG 4 The patient will demonstrate improved cervical & B UE strength >/=4/5 in order to lift/carry with decreased pain In progress     Plan:  Frequency/Duration:  Plan  Plan Frequency: 2xs/wk  Plan weeks: 4-6 weeks  Current Treatment Recommendations: Strengthening,ROM,Functional mobility training,Modalities,Positioning,Therapeutic activities,Patient/Caregiver education & training,Safety education & training,Home exercise program,Pain management,Manual Therapy - Soft Tissue Mobilization,Manual Therapy - Joint Manipulation,Endurance training,ADL/Self-care training,Neuromuscular re-education,Aquatics  Pt to continue current HEP. See objective section for any therapeutic exercise changes, additions or modifications this date.     Therapy Time:      PT Individual Minutes  Time In: 1030  Time Out: 1110  Minutes: 40  Timed Code Treatment Minutes: 40 Minutes  Procedure Minutes: 0 min    Timed Activity Minutes Units   Ther Ex 28 2   Manual  12 1     Electronically signed by Luise Mohs, PT on 7/14/22 at 11:15 AM EDT

## 2022-07-19 ENCOUNTER — HOSPITAL ENCOUNTER (OUTPATIENT)
Dept: PHYSICAL THERAPY | Age: 82
Setting detail: THERAPIES SERIES
Discharge: HOME | End: 2022-07-19
Payer: MEDICARE

## 2022-07-19 ENCOUNTER — HOSPITAL ENCOUNTER (OUTPATIENT)
Dept: LAB | Age: 82
Discharge: HOME | End: 2022-07-19
Payer: MEDICARE

## 2022-07-19 LAB
ALBUMIN SERPL-MCNC: 4.3 G/DL (ref 3.5–4.6)
ANION GAP SERPL CALCULATED.3IONS-SCNC: 15 MEQ/L (ref 9–15)
BUN BLDV-MCNC: 20 MG/DL (ref 8–23)
CALCIUM SERPL-MCNC: 9.6 MG/DL (ref 8.5–9.9)
CHLORIDE BLD-SCNC: 104 MEQ/L (ref 95–107)
CO2: 22 MEQ/L (ref 20–31)
CREAT SERPL-MCNC: 1.27 MG/DL (ref 0.5–0.9)
GFR AFRICAN AMERICAN: 48.9
GFR NON-AFRICAN AMERICAN: 40.4
GLUCOSE BLD-MCNC: 119 MG/DL (ref 70–99)
HCT VFR BLD CALC: 32.7 % (ref 37–47)
HEMOGLOBIN: 10.8 G/DL (ref 12–16)
MCH RBC QN AUTO: 30 PG (ref 27–31.3)
MCHC RBC AUTO-ENTMCNC: 32.8 % (ref 33–37)
MCV RBC AUTO: 91.3 FL (ref 82–100)
PDW BLD-RTO: 13.9 % (ref 11.5–14.5)
PHOSPHORUS: 3.4 MG/DL (ref 2.3–4.8)
PLATELET # BLD: 272 K/UL (ref 130–400)
POTASSIUM SERPL-SCNC: 4.1 MEQ/L (ref 3.4–4.9)
RBC # BLD: 3.59 M/UL (ref 4.2–5.4)
SODIUM BLD-SCNC: 141 MEQ/L (ref 135–144)
WBC # BLD: 6.8 K/UL (ref 4.8–10.8)

## 2022-07-19 PROCEDURE — 80069 RENAL FUNCTION PANEL: CPT

## 2022-07-19 PROCEDURE — 85027 COMPLETE CBC AUTOMATED: CPT

## 2022-07-19 PROCEDURE — 83970 ASSAY OF PARATHORMONE: CPT

## 2022-07-19 PROCEDURE — 82306 VITAMIN D 25 HYDROXY: CPT

## 2022-07-19 NOTE — PROGRESS NOTES
Therapy                            Cancellation/No-show Note    Date: 2022  Patient: Dimas Sterling (12 y.o. female)  : 1940  MRN:  [id number]  Referring Physician: Ernst Patiño MD     Medical Diagnosis: Radiculopathy, cervical region [M54.12]      Visit Information:  Insurance: Payor: MEDICARE / Plan: MEDICARE PART A AND B / Product Type: *No Product type* /   Visits to Date: 5   No Show/Cancelled Appts: 0       For today's appointment patient:  [x]  Cancelled  []  Rescheduled appointment  []  No-show   []  Called pt to remind of next appointment     Reason given by patient:  []  Patient ill  [x]  Conflicting appointment  []  No transportation    []  Conflict with work  []  No reason given  []  Other:      [x] Pt has future appointments scheduled, no follow up needed  [] Pt requests to be on hold.     Reason:   If > 2 weeks please discuss with therapist.  [] Therapist to call pt for follow up     Comments:       Signature: Electronically signed by Raj Torres PT on 22 at 9:19 AM EDT

## 2022-07-20 ENCOUNTER — HOSPITAL ENCOUNTER (OUTPATIENT)
Dept: LAB | Age: 82
Discharge: HOME | End: 2022-07-20
Payer: MEDICARE

## 2022-07-20 LAB
BACTERIA: ABNORMAL /HPF
BILIRUBIN URINE: NEGATIVE
BLOOD, URINE: NEGATIVE
CLARITY: CLEAR
COLOR: YELLOW
CREATININE URINE: 42.6 MG/DL
EPITHELIAL CELLS, UA: ABNORMAL /HPF (ref 0–5)
GLUCOSE URINE: NEGATIVE MG/DL
HYALINE CASTS: ABNORMAL /HPF (ref 0–5)
KETONES, URINE: NEGATIVE MG/DL
LEUKOCYTE ESTERASE, URINE: ABNORMAL
NITRITE, URINE: NEGATIVE
PH UA: 7 (ref 5–9)
PROTEIN PROTEIN: 10 MG/DL
PROTEIN UA: NEGATIVE MG/DL
PROTEIN/CREAT RATIO: 0.2 ML/ML
PROTEIN/CREAT RATIO: 0.2 ML/ML (ref 0–0.2)
RBC UA: ABNORMAL /HPF (ref 0–5)
SPECIFIC GRAVITY UA: 1.01 (ref 1–1.03)
UROBILINOGEN, URINE: 0.2 E.U./DL
VITAMIN D 25-HYDROXY: 40.1 NG/ML
WBC UA: ABNORMAL /HPF (ref 0–5)

## 2022-07-20 PROCEDURE — 84156 ASSAY OF PROTEIN URINE: CPT

## 2022-07-20 PROCEDURE — 81001 URINALYSIS AUTO W/SCOPE: CPT

## 2022-07-21 ENCOUNTER — HOSPITAL ENCOUNTER (OUTPATIENT)
Dept: PHYSICAL THERAPY | Age: 82
Setting detail: THERAPIES SERIES
Discharge: HOME | End: 2022-07-21
Payer: MEDICARE

## 2022-07-21 PROCEDURE — 97110 THERAPEUTIC EXERCISES: CPT

## 2022-07-21 NOTE — PROGRESS NOTES
[facility]  Outpatient Physical Therapy    Treatment Note        Date: 2022  Patient: Nancy Sanabria  : 1940   Confirmed: Yes  MRN: [id number]  Referring Provider: Stevie Medina MD   Secondary Referring Provider (If applicable):     Medical Diagnosis: Radiculopathy, cervical region [M54.12]    Treatment Diagnosis: increased neck & bilateral UE pain, decreased pain free cervical & bilateral UE ROM & strength, decreased ability to perform ADLs without pain, & decreased functional endurance & postural awareness    Visit Information:  Insurance: Payor: MEDICARE / Plan: MEDICARE PART A AND B / Product Type: *No Product type* /   PT Visit Information  Onset Date:  (~months ago)  Total # of Visits Approved: 99  Total # of Visits to Date: 6  Plan of Care/Certification Expiration Date: 22  No Show: 0  Progress Note Due Date: 22  Canceled Appointment: 1  Progress Note Counter:  (30 day PN due 22)    Subjective Information:  Subjective: Patient reports significant improvement since participating in outpatient PT, 80% better. No real pain just soreness this date.   HEP Compliance:  [x] Good [] Fair [] Poor [] Reports not doing due to:    Pain Screening  Patient Currently in Pain: Denies    Treatment:  Exercises:  Exercises  Exercise 14: shoulder AROM supine, 1 set x 10 reps x 5 second, bilateral, flexion & abduction  Exercise 15: shoulder AROM standing with mirror for visual cueing, 1 set x 10 reps x 5 second, bilateral, flexion & scaption  Exercise 16: UBE, level 1.0, 3 minutes forward & backwards, 6 minutes total  Exercise 20: HEP: continue with current + shoulder AROM      *Indicates exercise, modality, or manual techniques to be initiated when appropriate    Objective Measures:        Strength: [x] NT  [] MMT completed:      ROM: [] NT  [x] ROM measurements:     AROM LUE (degrees)  LUE AROM :  (no pain)  LUE General AROM: shoulder flexion = 130*; shoulder abduction = 125*; ER = C7-T1; IR = L4-L5   AROM RUE (degrees)  RUE AROM :  (no pain)  RUE General AROM: shoulder flexion = 140*; shoulder abduction = 120*; ER = C7-T1; IR = L4-L5     Assessment: Body Structures, Functions, Activity Limitations Requiring Skilled Therapeutic Intervention: Decreased ADL status, Decreased ROM, Decreased strength, Decreased endurance, Increased pain, Decreased posture  Assessment: Addition of AROM shoulder exercises as well as continued strengthening exercises with no increased pain just difficulty. Significant improvement noted in patient's bilateral UE AROM with little to no pain at end ranges. Planned discharge next visit due to patient's positive progress towards goals & readiness to be independent with PT HEP. Treatment Diagnosis: increased neck & bilateral UE pain, decreased pain free cervical & bilateral UE ROM & strength, decreased ability to perform ADLs without pain, & decreased functional endurance & postural awareness  Therapy Prognosis: Good    Post-Pain Assessment:       Pain Rating (0-10 pain scale):   0/10   Location and pain description same as pre-treatment unless indicated.    Action: [] NA   [x] Perform HEP  [] Meds as prescribed  [x] Modalities as prescribed   [] Call Physician     GOALS   Patient Goal(s): Patient goals : \"decrease pain\"    Short Term Goals Completed by 2-4 weeks Goal Status   STG 1 The patient will demonstrate improved postural awareness requiring <25% verbal cueing during functional mobility tasks & exercises In progress   STG 2 The patient will have a decrease in NDI score >/=6 points in order to increase functional activity tolerance In progress   STG 3 The patient will have an increase in UEFI score >/=9 points in order to increase functional activity tolerance In progress     Long Term Goals Completed by 4-6 weeks Goal Status   LTG 1 The patient will improve pain free cervical & bilateral UE AROM >/= 10-15* in order to increase ability to perform functional mobility tasks efficiently. In progress   LTG 2 The patient will demonstrate competency with HEP to progress towards self management of symptoms upon D/C In progress   LTG 3 The patient will report decreased neck & bilateral shoulder pain </=4/10 consistently in order to improve ability to perform functional mobility tasks In progress   LTG 4 The patient will demonstrate improved cervical & B UE strength >/=4/5 in order to lift/carry with decreased pain In progress       Plan:  Frequency/Duration:  Plan  Plan Frequency: 2xs/wk  Plan weeks: 4-6 weeks  Current Treatment Recommendations: Strengthening, ROM, Functional mobility training, Modalities, Positioning, Therapeutic activities, Patient/Caregiver education & training, Safety education & training, Home exercise program, Pain management, Manual Therapy - Soft Tissue Mobilization, Manual Therapy - Joint Manipulation, Endurance training, ADL/Self-care training, Neuromuscular re-education, Aquatics  Pt to continue current HEP. See objective section for any therapeutic exercise changes, additions or modifications this date.     Therapy Time:      PT Individual Minutes  Time In: 4188  Time Out: 3224  Minutes: 38  Timed Code Treatment Minutes: 38 Minutes  Procedure Minutes: 0 minutes  Timed Activity Minutes Units   Ther Ex 38 3          Electronically signed by Dewayne Solares PT on 7/21/22 at 12:09 PM EDT

## 2022-07-22 LAB
COMMENT: NORMAL
MISCELLANEOUS LAB TEST RESULT: NORMAL

## 2022-07-26 ENCOUNTER — HOSPITAL ENCOUNTER (OUTPATIENT)
Dept: PHYSICAL THERAPY | Age: 82
Setting detail: THERAPIES SERIES
Discharge: HOME | End: 2022-07-26
Payer: MEDICARE

## 2022-07-26 PROCEDURE — 97110 THERAPEUTIC EXERCISES: CPT

## 2022-07-26 NOTE — PROGRESS NOTES
The patient will have an increase in UEFI score >/=9 points in order to increase functional activity tolerance In progress       Long Term Goals Completed by 4-6 weeks Goal Status   LTG 1 The patient will improve pain free cervical & bilateral UE AROM >/= 10-15* in order to increase ability to perform functional mobility tasks efficiently. In progress   LTG 2 The patient will demonstrate competency with HEP to progress towards self management of symptoms upon D/C In progress   LTG 3 The patient will report decreased neck & bilateral shoulder pain </=4/10 consistently in order to improve ability to perform functional mobility tasks In progress   LTG 4 The patient will demonstrate improved cervical & B UE strength >/=4/5 in order to lift/carry with decreased pain In progress            Plan:  Frequency/Duration:  Plan  Plan Comment: D/C per request  Pt to continue current HEP. See objective section for any therapeutic exercise changes, additions or modifications this date.     Therapy Time:      PT Individual Minutes  Time In: 7861  Time Out: 3820  Minutes: 38  Timed Code Treatment Minutes: 38 Minutes  Procedure Minutes:0  Timed Activity Minutes Units   Ther Ex 38 3     Electronically signed by Chantal Thomas PTA on 7/26/22 at 12:12 PM EDT

## 2022-07-26 NOTE — PROGRESS NOTES
[address]     Ph: [phone]  Fax: [phone]      [] Certification  [] Recertification []  Plan of Care  [] Progress Note [x] Discharge      Referring Provider: Catrachito Kenney MD      From:  Shyann Christian, PT , DPT  Patient: Raffi Lantigua (73 y.o. female) : 1940 Date: 2022   Medical Diagnosis: Radiculopathy, cervical region [M54.12]    Treatment Diagnosis: increased neck & bilateral UE pain, decreased pain free cervical & bilateral UE ROM & strength, decreased ability to perform ADLs without pain, & decreased functional endurance & postural awareness    Plan of Care/Certification Expiration Date: : 22   Progress Report Period from:  2022  to 2022    Visits to Date: 7 No Show: 0 Cancelled Appts: 1    OBJECTIVE:   Short Term Goals - Time Frame for Short term goals: 2-4 weeks    Goals Current/Discharge status  Status   Short term goal 1: The patient will demonstrate improved postural awareness requiring <25% verbal cueing during functional mobility tasks & exercises  Improved posture noted Met   Short term goal 2: The patient will have a decrease in NDI score >/=6 points in order to increase functional activity tolerance  NDI=5/50 Met   Short term goal 3: The patient will have an increase in UEFI score >/=9 points in order to increase functional activity tolerance  UEFI=52/80 Met     Long Term Goals - Time Frame for Long term goals : 4-6 weeks  Goals Current/ Discharge status Status   Long term goal 1: The patient will improve pain free cervical & bilateral UE AROM >/= 10-15* in order to increase ability to perform functional mobility tasks efficiently.  AROM LUE (degrees)  L Shoulder Flexion 0-180: 130 deg  L Shoulder ABduction 0-180: 123 deg  L Shoulder Int Rotation  0-70: T-8  L Shoulder Ext Rotation  0-90: T-1  Spine  Cervical: flexion 33 deg, ext 46 deg, SB right 24 deg, left 32 deg, rotation right 72 deg, left 61 deg  Met   Long term goal 2: The patient will demonstrate competency with HEP to progress towards self management of symptoms upon D/C Indep/compliant w/ HEP Met   Long term goal 3: The patient will report decreased neck & bilateral shoulder pain </=4/10 consistently in order to improve ability to perform functional mobility tasks No recent pain Met   Long term goal 4: The patient will demonstrate improved cervical & B UE strength >/=4/5 in order to lift/carry with decreased pain  Strength RUE  R Shoulder Flexion: 4+/5  R Shoulder ABduction: 4/5  R Shoulder Internal Rotation: 5/5  R Shoulder External Rotation: 4/5  R Elbow Flexion: 5/5  R Elbow Extension: 4+/5  Strength LUE  L Shoulder Flexion: 4+/5  L Shoulder ABduction: 4/5  L Shoulder Internal Rotation: 5/5  L Shoulder External Rotation: 4/5  L Elbow Flexion: 5/5  L Elbow Extension: 4+/5      Cervical = Improved Met       Assessment: Patient is an [de-identified]year old female who presented on 6/29/22 with acute/chronic neck & bilateral UE pain that had began ~few months prior who has participated in 7 total outpatient PT sessions 6/29/22-7/26/22. Patient has made significant progress towards all of her goals, meeting all goals upon discharge with self-reported improved function. Patient is appropriate for discharge from outpatient PT services this date with recommendation for patient to continue to perform PT HEP regularly every day as well as to follow-up with MD as appropriate. PLAN:     Plan Comment: D/C per request                Patient Status:[] Continue/ Initiate plan of Care    [x] Discharge PT. Recommend pt continue with HEP.      [] Additional visits requested, Please re-certify for additional visits:    [] Hold         Signature: obj info byElectronically signed by Aleja Abebe PTA on 7/26/22 at 12:14 PM EDT    Electronically signed by Akash Rivas PT on 8/4/2022 at 11:20 AM       If you have any questions or concerns, please don't hesitate to call. Thank you for your referral.    I have reviewed this plan of care and certify a need for medically necessary rehabilitation services.     Physician Signature:__________________________________________________________  Date:  Please sign and return

## 2022-08-31 NOTE — TELEPHONE ENCOUNTER
Provider Department Appt Notes   9/1/2022 Allison Gey,  [facility],Third Floor and [facility] [place] 9M FU   8/18/22 LMOM TCB TO RESCHEDULE

## 2022-09-01 ENCOUNTER — OFFICE VISIT (OUTPATIENT)
Dept: CARDIOLOGY CLINIC | Age: 82
End: 2022-09-01
Payer: MEDICARE

## 2022-09-01 VITALS
SYSTOLIC BLOOD PRESSURE: 122 MMHG | HEIGHT: 61 IN | DIASTOLIC BLOOD PRESSURE: 70 MMHG | HEART RATE: 63 BPM | WEIGHT: 141 LBS | OXYGEN SATURATION: 99 % | BODY MASS INDEX: 26.62 KG/M2

## 2022-09-01 DIAGNOSIS — E78.5 DYSLIPIDEMIA: ICD-10-CM

## 2022-09-01 DIAGNOSIS — R09.89 CAROTID BRUIT, UNSPECIFIED LATERALITY: ICD-10-CM

## 2022-09-01 DIAGNOSIS — I65.23 BILATERAL CAROTID ARTERY STENOSIS: ICD-10-CM

## 2022-09-01 DIAGNOSIS — Z72.0 TOBACCO ABUSE: ICD-10-CM

## 2022-09-01 DIAGNOSIS — Z95.1 HX OF CABG: ICD-10-CM

## 2022-09-01 DIAGNOSIS — N18.30 STAGE 3 CHRONIC KIDNEY DISEASE, UNSPECIFIED WHETHER STAGE 3A OR 3B CKD (HCC): ICD-10-CM

## 2022-09-01 DIAGNOSIS — I10 HYPERTENSION, UNSPECIFIED TYPE: Primary | ICD-10-CM

## 2022-09-01 DIAGNOSIS — I25.10 CORONARY ARTERY DISEASE INVOLVING NATIVE CORONARY ARTERY OF NATIVE HEART WITHOUT ANGINA PECTORIS: ICD-10-CM

## 2022-09-01 DIAGNOSIS — I10 ESSENTIAL HYPERTENSION: ICD-10-CM

## 2022-09-01 PROBLEM — E87.70 HYPERVOLEMIA: Status: ACTIVE | Noted: 2022-09-01

## 2022-09-01 PROBLEM — B35.1 ONYCHOMYCOSIS: Status: ACTIVE | Noted: 2022-04-20

## 2022-09-01 PROBLEM — I73.9 PERIPHERAL VASCULAR DISEASE OF FOOT (HCC): Status: ACTIVE | Noted: 2022-04-20

## 2022-09-01 PROCEDURE — 93000 ELECTROCARDIOGRAM COMPLETE: CPT | Performed by: INTERNAL MEDICINE

## 2022-09-01 PROCEDURE — 99214 OFFICE O/P EST MOD 30 MIN: CPT | Performed by: INTERNAL MEDICINE

## 2022-09-01 PROCEDURE — 1090F PRES/ABSN URINE INCON ASSESS: CPT | Performed by: INTERNAL MEDICINE

## 2022-09-01 PROCEDURE — 1123F ACP DISCUSS/DSCN MKR DOCD: CPT | Performed by: INTERNAL MEDICINE

## 2022-09-01 PROCEDURE — G8427 DOCREV CUR MEDS BY ELIG CLIN: HCPCS | Performed by: INTERNAL MEDICINE

## 2022-09-01 PROCEDURE — G8399 PT W/DXA RESULTS DOCUMENT: HCPCS | Performed by: INTERNAL MEDICINE

## 2022-09-01 PROCEDURE — G8417 CALC BMI ABV UP PARAM F/U: HCPCS | Performed by: INTERNAL MEDICINE

## 2022-09-01 PROCEDURE — 4004F PT TOBACCO SCREEN RCVD TLK: CPT | Performed by: INTERNAL MEDICINE

## 2022-09-01 RX ORDER — DOXAZOSIN MESYLATE 4 MG/1
TABLET ORAL
Qty: 90 TABLET | Refills: 3 | Status: ON HOLD | OUTPATIENT
Start: 2022-09-01 | End: 2022-10-13 | Stop reason: HOSPADM

## 2022-09-01 RX ORDER — DOXAZOSIN MESYLATE 4 MG/1
TABLET ORAL
Qty: 90 TABLET | Refills: 3 | OUTPATIENT
Start: 2022-09-01

## 2022-09-01 NOTE — PROGRESS NOTES
use. BP and hr are good. CAD is stable. No LE discoloration or ulcers. No LE edema. No CHF type symptoms. Lipid profile is normal. No recent hospitalization. No change in meds. With history of coronary disease status post CABG x5 in 2010. Hx of moderate carotid LICA, 15%. He is to smoke unfortunately. Status post CT of the lung screening with an 11 mm thyroid nodule and some bilateral lung nodules recommended follow-up with 12 months repeat CT of the chest  EKG with NSR, no ischemia. 9-1-22: With history of coronary disease status post CABG x5 in 2010. Hx of moderate carotid LICA, 59%. He is to smoke unfortunately. Doing well. No issues. Pt denies chest pain, dyspnea, dyspnea on exertion, change in exercise capacity, fatigue,  nausea, vomiting, diarrhea, constipation, motor weakness, insomnia, weight loss, syncope, dizziness, lightheadedness, palpitations, PND, orthopnea, or claudication. + smoker. Patient Active Problem List   Diagnosis    Carotid bruit    Kidney disease    CAD (coronary artery disease)    Bilateral carotid artery stenosis    Hx of CABGx5    Tobacco abuse    Essential hypertension    Dyslipidemia    Chronic renal disease, stage III ([facility] [address] [[id number]]    Cervical radiculopathy       Past Surgical History:   Procedure Laterality Date    BACK SURGERY  2003    BRAIN ANEURYSM SURGERY  2012    BREAST LUMPECTOMY Right     CARPAL TUNNEL RELEASE Bilateral     CORONARY ARTERY BYPASS GRAFT      PARTIAL HYSTERECTOMY (CERVIX NOT REMOVED)  1997    TOTAL HIP ARTHROPLASTY Bilateral left side 2017 right side 2004       Social History     Socioeconomic History    Marital status:       Spouse name: None    Number of children: None    Years of education: None    Highest education level: None   Tobacco Use    Smoking status: Every Day     Packs/day: 0.50     Years: 60.00     Pack years: 30.00     Types: Cigarettes    Smokeless tobacco: Never   Vaping Use    Vaping Use: Never used   Substance and time  Neck - Neck is supple, no JVD or carotid bruits. No thyromegaly or adenopathy. Chest - clear to auscultation, no wheezes, rales or rhonchi, symmetric air entry  Heart - normal rate, regular rhythm, normal S1, S2, no murmurs, rubs, clicks or gallops  Abdomen - soft, nontender, nondistended, no masses or organomegaly  Neurological - alert, oriented, normal speech, no focal findings or movement disorder noted  Extremities - peripheral pulses normal, no pedal edema, no clubbing or cyanosis  Skin - normal coloration and turgor, no rashes, no suspicious skin lesions noted      EKG: normal sinus rhythm, nonspecific ST and T waves changes    Orders Placed This Encounter   Procedures    EKG 12 lead       ASSESSMENT:     Diagnosis Orders   1. Hypertension, unspecified type  EKG 12 lead      2. Coronary artery disease involving native coronary artery of native heart without angina pectoris        3. Bilateral carotid artery stenosis        4. Carotid bruit, unspecified laterality        5. Stage 3 chronic kidney disease, unspecified whether stage 3a or 3b CKD ([facility] [address]        6. Dyslipidemia        7. Essential hypertension        8. Hx of CABGx5        9. Tobacco abuse              PLAN:     Patient will need to continue to follow up with you for their general medical care    As always, aggressive risk factor modification is strongly recommended. We should adhere to the JNC VIII guidelines for HTN management and the NCEP ATP III guidelines for LDL-C management. Cardiac diet is always recommended with low fat, cholesterol, calories and sodium. Continue medications at current doses. Follow-up with primary care physician for lung nodule/thyroid nodule follow-up. Check carotid ultrasound in 9 months    Check echo in 1 yr for aortic regurgitation    Smoking cessation was strongly recommended    Consider PAD evaluation in future if warranted by symptoms.      Patient was advised and encouraged to check blood pressure at home or at a pharmacy, maintain a logbook, and also call us back if blood pressure are above the target ranges or if it is low. Patient clearly understands and agrees to the instructions. We will need to continue to monitor muscle and liver enzymes, BUN, CR, and electrolytes. Details of medical condition explained and patient was warned about adverse consequences of uncontrolled medical conditions and possible side effects of prescribed medications.

## 2022-10-10 ENCOUNTER — NURSE ONLY (OUTPATIENT)
Dept: FAMILY MEDICINE CLINIC | Age: 82
End: 2022-10-10

## 2022-10-10 ENCOUNTER — TELEMEDICINE (OUTPATIENT)
Dept: FAMILY MEDICINE CLINIC | Age: 82
End: 2022-10-10
Payer: MEDICARE

## 2022-10-10 ENCOUNTER — NURSE TRIAGE (OUTPATIENT)
Dept: OTHER | Facility: CLINIC | Age: 82
End: 2022-10-10

## 2022-10-10 VITALS — SYSTOLIC BLOOD PRESSURE: 200 MMHG | DIASTOLIC BLOOD PRESSURE: 62 MMHG

## 2022-10-10 DIAGNOSIS — I10 ESSENTIAL HYPERTENSION: Primary | ICD-10-CM

## 2022-10-10 PROCEDURE — 99442 PR PHYS/QHP TELEPHONE EVALUATION 11-20 MIN: CPT | Performed by: INTERNAL MEDICINE

## 2022-10-10 RX ORDER — AMLODIPINE BESYLATE 5 MG/1
5 TABLET ORAL DAILY
Qty: 30 TABLET | Refills: 3 | Status: ON HOLD | OUTPATIENT
Start: 2022-10-10 | End: 2022-10-13 | Stop reason: HOSPADM

## 2022-10-10 RX ORDER — TRAZODONE HYDROCHLORIDE 100 MG/1
TABLET ORAL
Qty: 90 TABLET | Refills: 1 | Status: SHIPPED | OUTPATIENT
Start: 2022-10-10

## 2022-10-10 SDOH — ECONOMIC STABILITY: FOOD INSECURITY: WITHIN THE PAST 12 MONTHS, YOU WORRIED THAT YOUR FOOD WOULD RUN OUT BEFORE YOU GOT MONEY TO BUY MORE.: NEVER TRUE

## 2022-10-10 SDOH — ECONOMIC STABILITY: FOOD INSECURITY: WITHIN THE PAST 12 MONTHS, THE FOOD YOU BOUGHT JUST DIDN'T LAST AND YOU DIDN'T HAVE MONEY TO GET MORE.: NEVER TRUE

## 2022-10-10 ASSESSMENT — ENCOUNTER SYMPTOMS
CHEST TIGHTNESS: 0
BLOOD IN STOOL: 0
EYE PAIN: 0
VOICE CHANGE: 0
FACIAL SWELLING: 0
DIARRHEA: 0
SORE THROAT: 0
COLOR CHANGE: 0
VOMITING: 0
EYE DISCHARGE: 0
RECTAL PAIN: 0
SINUS PRESSURE: 0
WHEEZING: 0
CONSTIPATION: 0
COUGH: 0
BACK PAIN: 0
SINUS PAIN: 0
EYE REDNESS: 0
NAUSEA: 0
SHORTNESS OF BREATH: 0
EYE ITCHING: 0
RHINORRHEA: 0
TROUBLE SWALLOWING: 0
PHOTOPHOBIA: 0
APNEA: 0
ABDOMINAL DISTENTION: 0
ABDOMINAL PAIN: 0

## 2022-10-10 ASSESSMENT — SOCIAL DETERMINANTS OF HEALTH (SDOH): HOW HARD IS IT FOR YOU TO PAY FOR THE VERY BASICS LIKE FOOD, HOUSING, MEDICAL CARE, AND HEATING?: NOT HARD AT ALL

## 2022-10-10 NOTE — TELEPHONE ENCOUNTER
Received call from Kaylin Jones at [facility] with Red Flag Complaint. Requesting appointment for today      Current Symptoms: intermittent dizziness - no dizziness at present     Reports BP this morning 221/84 HR 74    Speech is clear     States has not missed any doses of BP medications    Denies - chest pain / shortness of breath / numbness or weakness on one side of the body / diaphoresis / visual changes      Onset: 9 days ago;     Pain Severity: 0/10; Temperature: denies     What has been tried: tylenol    Recommended disposition: Go to [facility]    Care advice provided, patient verbalizes understanding; denies any other questions or concerns; instructed to call back for any new or worsening symptoms. Patient/Caller agrees with recommended disposition; writer provided warm transfer to Stephanie Soler at [facility] for appointment scheduling    Attention Provider: Thank you for allowing me to participate in the care of your patient. The patient was connected to triage in response to information provided to the ECC/PSC. Please do not respond through this encounter as the response is not directed to a shared pool.           Reason for Disposition   Systolic BP >= 128 OR Diastolic >= 118 and having NO cardiac or neurologic symptoms    Protocols used: Blood Pressure - High-ADULT-OH

## 2022-10-10 NOTE — PROGRESS NOTES
Subjective:      Patient ID: Ric Busch is a 81 y.o. female Established patient, here for evaluation of the following chief complaint(s):  Chief Complaint   Patient presents with    Hypertension     Pt came in for bp check 200/62 patient states it has been like this for over 12 days       HPI     67-year-old female with history of hypertension, chronic kidney disease stage III, dyslipidemia, hyperlipidemia, nicotine dependence presents for follow-up visit. The patient reports bilateral shoulder pain that is characterizes achy, nonradiating, associated with reduced ability to abduct her arms. Her discomfort is mildly relieved by over-the-counter analgesics    Essential hypertension-compliant with Cozaar 100 mg orally daily, Cardura 4 mg orally daily and Lopressor 25 mg twice daily. The patient's current blood pressure remains markedly elevated at this time. Stage 3 chronic kidney disease (HCC)    Dyslipidemia-compliant with Lipitor 40 mg orally daily    Hyperkalemia      Fatigue, unspecified type    Nicotine dependence, uncomplicated, unspecified nicotine product type-the patient continues to engage in nicotine dependence    Social : 2 dogs librado and briseno     At present he denies polyuria,  Polydipsia, constitutional, sinus, visual, cardiopulmonary, urologic, gastrointestinal, immunologic/hematologic, musculoskeletal, neurologic,dermatologic, or psychiatric complaints. Current Outpatient Medications on File Prior to Visit   Medication Sig Dispense Refill    traZODone (DESYREL) 100 MG tablet TAKE 1 TABLET BY MOUTH EVERY DAY AT NIGHT 90 tablet 1    doxazosin (CARDURA) 4 MG tablet TAKE 1 TABLET AT BEDTIME 90 tablet 3    methylPREDNISolone (MEDROL DOSEPACK) 4 MG tablet On days 1-6 take as directed on package for first 6-day course. On days 7-12 take as directed on (second) package for (second) 6-day course.  42 tablet 0    metoprolol tartrate (LOPRESSOR) 25 MG tablet TAKE 1 TABLET BY MOUTH TWICE A DAY 180 tablet 3    losartan (COZAAR) 100 MG tablet TAKE 1 TABLET BY MOUTH EVERY DAY 90 tablet 3    atorvastatin (LIPITOR) 40 MG tablet TAKE 1 TABLET BY MOUTH EVERY DAY 90 tablet 3    furosemide (LASIX) 40 MG tablet TAKE 1 TABLET BY MOUTH TWICE A  tablet 3    Handicap Placard MISC by Does not apply route Applying from 1/28/2021-1/27/2026. 1 each 0    aspirin 81 MG EC tablet Take 81 mg by mouth daily       No current facility-administered medications on file prior to visit. 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    Hyperlipidemia     Kidney disease     Stage 4 chronic kidney disease ([facility] [address] 11/19/2020    Stenosis of noncoronary bypass graft ([facility]     Tobacco abuse 11/19/2020     Past Surgical History:   Procedure Laterality Date    BACK SURGERY  2003    BRAIN ANEURYSM SURGERY  2012    BREAST LUMPECTOMY Right     CARPAL TUNNEL RELEASE Bilateral     CORONARY ARTERY BYPASS GRAFT      PARTIAL HYSTERECTOMY (CERVIX NOT REMOVED)  1997    TOTAL HIP ARTHROPLASTY Bilateral left side 2017 right side 2004     Social History     Socioeconomic History    Marital status:       Spouse name: Not on file    Number of children: Not on file    Years of education: Not on file    Highest education level: Not on file   Occupational History    Not on file   Tobacco Use    Smoking status: Every Day     Packs/day: 0.50     Years: 60.00     Pack years: 30.00     Types: Cigarettes    Smokeless tobacco: Never   Vaping Use    Vaping Use: Never used   Substance and Sexual Activity    Alcohol use: Never    Drug use: Not on file    Sexual activity: Not Currently   Other Topics Concern    Not on file   Social History Narrative    Not on file     Social Determinants of Health     Financial Resource Strain: Low Risk     Difficulty of Paying Living Expenses: Not hard at all   Food Insecurity: No Food Insecurity    Worried About Running Out of Food in the Last Year: Never true    Ran Out of Food in the Last Year: Never true   Transportation Needs: Not on file   Physical Activity: Not on file   Stress: Not on file   Social Connections: Not on file   Intimate Partner Violence: Not on file   Housing Stability: Not on file     No family history on file. Review of Systems   Constitutional:  Negative for chills, diaphoresis, fatigue and fever. HENT:  Negative for congestion, dental problem, drooling, ear discharge, ear pain, facial swelling, hearing loss, mouth sores, nosebleeds, postnasal drip, rhinorrhea, sinus pressure, sinus pain, sneezing, sore throat, tinnitus, trouble swallowing and voice change. Eyes:  Negative for photophobia, pain, discharge, redness, itching and visual disturbance. Respiratory:  Negative for apnea, cough, chest tightness, shortness of breath and wheezing. Cardiovascular:  Negative for chest pain, palpitations and leg swelling. Gastrointestinal:  Negative for abdominal distention, abdominal pain, blood in stool, constipation, diarrhea, nausea, rectal pain and vomiting. Endocrine: Negative for cold intolerance, heat intolerance, polydipsia, polyphagia and polyuria. Genitourinary:  Negative for decreased urine volume, difficulty urinating, dysuria, flank pain, frequency, genital sores, hematuria and urgency. Musculoskeletal:  Negative for arthralgias, back pain, gait problem, joint swelling, myalgias, neck pain and neck stiffness. Skin:  Negative for color change, rash and wound. Allergic/Immunologic: Negative for environmental allergies and food allergies. Neurological:  Negative for dizziness, tremors, seizures, syncope, facial asymmetry, speech difficulty, weakness, light-headedness, numbness and headaches. Hematological:  Negative for adenopathy. Does not bruise/bleed easily. Psychiatric/Behavioral:  Negative for agitation, confusion, decreased concentration, hallucinations, self-injury, sleep disturbance and suicidal ideas. The patient is not nervous/anxious. Objective: There were no vitals taken for this visit. Assessment:       Diagnosis Orders   1. Essential hypertension             No results found for: LIPIDPAN, BMP, CMP, CBC, CBCAUTODIF  Plan:      Kaylin Pack was seen today for established new doctor. Diagnoses and all orders for this visit:    Essential hypertension-continue Cardura 4 mg orally daily Cozaar 100 mg orally daily and metoprolol 25 mg orally daily Norvasc 5 mg orally daily. Dyslipidemia-continue Lipitor 40 mg orally daily      Hyperkalemia-we will obtain a complete metabolic panel      Nicotine dependence, uncomplicated, unspecified nicotine product type-strongly encourage smoking cessation      Stage 3a chronic kidney disease (HCC)-we will monitor renal function closely and encourage the patient to avoid nephrotoxic agents. We will renally dose all medications at the time of prescribing them. TELEHEALTH EVALUATION -- Audio/Visual (During YXVIR-39 public health emergency)    -   Zev Glasgow is a 81 y.o. female being evaluated by a Virtual Visit (video visit) encounter to address concerns as mentioned above. A caregiver was present when appropriate. Due to this being a TeleHealth encounter (During VMFZL-13 public health emergency), evaluation of the following organ systems was limited: Vitals/Constitutional/EENT/Resp/CV/GI//MS/Neuro/Skin/Heme-Lymph-Imm. Pursuant to the emergency declaration under the [facility], [address] [place] [address] authority and the [email or web address] and [employer] Act, this Virtual Visit was conducted with patient's (and/or legal guardian's) consent, to reduce the patient's risk of exposure to COVID-19 and provide necessary medical care.   The patient (and/or legal guardian) has also been advised to contact this office for worsening conditions or problems, and seek emergency medical treatment and/or call 911 if deemed necessary. Services were provided through a video synchronous discussion virtually to substitute for in-person clinic visit. Type of encounter was _X_telephone encounter__ MyChart ___Facetime    Patient was located at their home. Provider was located at their ___ home or        __X__ office. Quirino James is a 81 y.o. female evaluated via telephone on 10/10/2022 for Hypertension (Pt came in for bp check 200/62 patient states it has been like this for over 12 days)  . Documentation:  I communicated with the patient and/or health care decision maker about ESSENTIAL HYPERTENSION. Details of this discussion including any medical advice provided: please refer to note above    Total Time: minutes: 11-20 minutes    Quirino James was evaluated through a synchronous (real-time) audio encounter. Patient identification was verified at the start of the visit. She (or guardian if applicable) is aware that this is a billable service, which includes applicable co-pays. This visit was conducted with the patient's (and/or legal guardian's) verbal consent. She has not had a related appointment within my department in the past 7 days or scheduled within the next 24 hours. The patient was located at Home: [address] [facility] [address] [facility]. The provider was located at [facility] (Appt Dept): [address] [facility],  [address] [place] [address]. Note: not billable if this call serves to triage the patient into an appointment for the relevant concern    Yaniv Byrd MD       --Yaniv Byrd MD on 10/10/2022 at 12:52 PM    An electronic signature was used to authenticate this note. Return in about 7 weeks (around 11/30/2022).       On this date 10/10/22 I have spent 30   minutes reviewing previous notes, test results and face to face with the patient discussing the diagnosis and importance of compliance with the treatment plan. Yoselin Solomon MD    Please note, this report has been partially produced using speech recognition software  and may cause  and /or contain errors related to that system including grammar, punctuation and spelling as well as words and phrases that may seem inappropriate. If there are questions or concerns please feel free to contact me to clarify.

## 2022-10-12 ENCOUNTER — APPOINTMENT (OUTPATIENT)
Dept: CT IMAGING | Age: 82
End: 2022-10-12
Payer: MEDICARE

## 2022-10-12 ENCOUNTER — APPOINTMENT (OUTPATIENT)
Dept: GENERAL RADIOLOGY | Age: 82
End: 2022-10-12
Payer: MEDICARE

## 2022-10-12 ENCOUNTER — HOSPITAL ENCOUNTER (OUTPATIENT)
Age: 82
Setting detail: OBSERVATION
Discharge: HOME | End: 2022-10-13
Attending: STUDENT IN AN ORGANIZED HEALTH CARE EDUCATION/TRAINING PROGRAM | Admitting: INTERNAL MEDICINE
Payer: MEDICARE

## 2022-10-12 DIAGNOSIS — R42 DIZZINESS: Primary | ICD-10-CM

## 2022-10-12 DIAGNOSIS — R55 POSTURAL DIZZINESS WITH NEAR SYNCOPE: ICD-10-CM

## 2022-10-12 DIAGNOSIS — R42 POSTURAL DIZZINESS WITH NEAR SYNCOPE: ICD-10-CM

## 2022-10-12 DIAGNOSIS — F17.200 TOBACCO DEPENDENCE: ICD-10-CM

## 2022-10-12 DIAGNOSIS — I10 ESSENTIAL HYPERTENSION: ICD-10-CM

## 2022-10-12 DIAGNOSIS — I10 UNCONTROLLED HYPERTENSION: ICD-10-CM

## 2022-10-12 PROBLEM — I16.0 HYPERTENSIVE URGENCY: Status: ACTIVE | Noted: 2022-10-12

## 2022-10-12 LAB
ALBUMIN SERPL-MCNC: 4.1 G/DL (ref 3.5–4.6)
ALP BLD-CCNC: 58 U/L (ref 40–130)
ALT SERPL-CCNC: 13 U/L (ref 0–33)
ANION GAP SERPL CALCULATED.3IONS-SCNC: 9 MEQ/L (ref 9–15)
APTT: 28.1 SEC (ref 24.4–36.8)
AST SERPL-CCNC: 16 U/L (ref 0–35)
BASOPHILS ABSOLUTE: 0 K/UL (ref 0–0.2)
BASOPHILS RELATIVE PERCENT: 0.5 %
BILIRUB SERPL-MCNC: 0.3 MG/DL (ref 0.2–0.7)
BUN BLDV-MCNC: 21 MG/DL (ref 8–23)
C-REACTIVE PROTEIN, HIGH SENSITIVITY: 1 MG/L (ref 0–5)
CALCIUM SERPL-MCNC: 9.2 MG/DL (ref 8.5–9.9)
CHLORIDE BLD-SCNC: 108 MEQ/L (ref 95–107)
CHOLESTEROL, TOTAL: 175 MG/DL (ref 0–199)
CO2: 23 MEQ/L (ref 20–31)
CREAT SERPL-MCNC: 1.26 MG/DL (ref 0.5–0.9)
EOSINOPHILS ABSOLUTE: 0.1 K/UL (ref 0–0.7)
EOSINOPHILS RELATIVE PERCENT: 2.2 %
GFR AFRICAN AMERICAN: 49.3
GFR NON-AFRICAN AMERICAN: 40.7
GLOBULIN: 2.7 G/DL (ref 2.3–3.5)
GLUCOSE BLD-MCNC: 120 MG/DL (ref 70–99)
HBA1C MFR BLD: 6.1 % (ref 4.8–5.9)
HCT VFR BLD CALC: 30.1 % (ref 37–47)
HDLC SERPL-MCNC: 58 MG/DL (ref 40–59)
HEMOGLOBIN: 10.4 G/DL (ref 12–16)
INR BLD: 1
LDL CHOLESTEROL CALCULATED: 90 MG/DL (ref 0–129)
LYMPHOCYTES ABSOLUTE: 1.2 K/UL (ref 1–4.8)
LYMPHOCYTES RELATIVE PERCENT: 18 %
MAGNESIUM: 2.3 MG/DL (ref 1.7–2.4)
MCH RBC QN AUTO: 31.4 PG (ref 27–31.3)
MCHC RBC AUTO-ENTMCNC: 34.5 % (ref 33–37)
MCV RBC AUTO: 91.1 FL (ref 82–100)
MONOCYTES ABSOLUTE: 0.6 K/UL (ref 0.2–0.8)
MONOCYTES RELATIVE PERCENT: 9.4 %
NEUTROPHILS ABSOLUTE: 4.5 K/UL (ref 1.4–6.5)
NEUTROPHILS RELATIVE PERCENT: 69.9 %
PDW BLD-RTO: 14.2 % (ref 11.5–14.5)
PLATELET # BLD: 195 K/UL (ref 130–400)
POTASSIUM SERPL-SCNC: 3.9 MEQ/L (ref 3.4–4.9)
PRO-BNP: 1052 PG/ML
PROTHROMBIN TIME: 13.3 SEC (ref 12.3–14.9)
RBC # BLD: 3.3 M/UL (ref 4.2–5.4)
SODIUM BLD-SCNC: 140 MEQ/L (ref 135–144)
TOTAL CK: 67 U/L (ref 0–170)
TOTAL PROTEIN: 6.8 G/DL (ref 6.3–8)
TRIGL SERPL-MCNC: 133 MG/DL (ref 0–150)
TROPONIN: <0.01 NG/ML (ref 0–0.01)
WBC # BLD: 6.4 K/UL (ref 4.8–10.8)

## 2022-10-12 PROCEDURE — 36415 COLL VENOUS BLD VENIPUNCTURE: CPT

## 2022-10-12 PROCEDURE — 82550 ASSAY OF CK (CPK): CPT

## 2022-10-12 PROCEDURE — 96372 THER/PROPH/DIAG INJ SC/IM: CPT

## 2022-10-12 PROCEDURE — 85610 PROTHROMBIN TIME: CPT

## 2022-10-12 PROCEDURE — 96374 THER/PROPH/DIAG INJ IV PUSH: CPT

## 2022-10-12 PROCEDURE — 80053 COMPREHEN METABOLIC PANEL: CPT

## 2022-10-12 PROCEDURE — 85025 COMPLETE CBC W/AUTO DIFF WBC: CPT

## 2022-10-12 PROCEDURE — 70496 CT ANGIOGRAPHY HEAD: CPT

## 2022-10-12 PROCEDURE — 83880 ASSAY OF NATRIURETIC PEPTIDE: CPT

## 2022-10-12 PROCEDURE — 96375 TX/PRO/DX INJ NEW DRUG ADDON: CPT

## 2022-10-12 PROCEDURE — 83735 ASSAY OF MAGNESIUM: CPT

## 2022-10-12 PROCEDURE — 85730 THROMBOPLASTIN TIME PARTIAL: CPT

## 2022-10-12 PROCEDURE — 83036 HEMOGLOBIN GLYCOSYLATED A1C: CPT

## 2022-10-12 PROCEDURE — 70498 CT ANGIOGRAPHY NECK: CPT

## 2022-10-12 PROCEDURE — 2500000003 HC RX 250 WO HCPCS: Performed by: STUDENT IN AN ORGANIZED HEALTH CARE EDUCATION/TRAINING PROGRAM

## 2022-10-12 PROCEDURE — 86141 C-REACTIVE PROTEIN HS: CPT

## 2022-10-12 PROCEDURE — 6360000004 HC RX CONTRAST MEDICATION: Performed by: STUDENT IN AN ORGANIZED HEALTH CARE EDUCATION/TRAINING PROGRAM

## 2022-10-12 PROCEDURE — 2580000003 HC RX 258: Performed by: INTERNAL MEDICINE

## 2022-10-12 PROCEDURE — 84484 ASSAY OF TROPONIN QUANT: CPT

## 2022-10-12 PROCEDURE — 99222 1ST HOSP IP/OBS MODERATE 55: CPT | Performed by: NURSE PRACTITIONER

## 2022-10-12 PROCEDURE — 71046 X-RAY EXAM CHEST 2 VIEWS: CPT

## 2022-10-12 PROCEDURE — 6360000002 HC RX W HCPCS: Performed by: STUDENT IN AN ORGANIZED HEALTH CARE EDUCATION/TRAINING PROGRAM

## 2022-10-12 PROCEDURE — 80061 LIPID PANEL: CPT

## 2022-10-12 PROCEDURE — 81001 URINALYSIS AUTO W/SCOPE: CPT

## 2022-10-12 PROCEDURE — G0378 HOSPITAL OBSERVATION PER HR: HCPCS

## 2022-10-12 PROCEDURE — 99285 EMERGENCY DEPT VISIT HI MDM: CPT

## 2022-10-12 PROCEDURE — 93005 ELECTROCARDIOGRAM TRACING: CPT | Performed by: STUDENT IN AN ORGANIZED HEALTH CARE EDUCATION/TRAINING PROGRAM

## 2022-10-12 PROCEDURE — 6370000000 HC RX 637 (ALT 250 FOR IP): Performed by: INTERNAL MEDICINE

## 2022-10-12 PROCEDURE — 6360000002 HC RX W HCPCS: Performed by: INTERNAL MEDICINE

## 2022-10-12 RX ORDER — ACETAMINOPHEN 325 MG/1
650 TABLET ORAL EVERY 6 HOURS PRN
Status: DISCONTINUED | OUTPATIENT
Start: 2022-10-12 | End: 2022-10-13 | Stop reason: HOSPADM

## 2022-10-12 RX ORDER — FUROSEMIDE 40 MG/1
40 TABLET ORAL 2 TIMES DAILY
Status: DISCONTINUED | OUTPATIENT
Start: 2022-10-12 | End: 2022-10-12

## 2022-10-12 RX ORDER — AMLODIPINE BESYLATE 5 MG/1
5 TABLET ORAL DAILY
Status: DISCONTINUED | OUTPATIENT
Start: 2022-10-12 | End: 2022-10-13

## 2022-10-12 RX ORDER — POLYETHYLENE GLYCOL 3350 17 G/17G
17 POWDER, FOR SOLUTION ORAL DAILY PRN
Status: DISCONTINUED | OUTPATIENT
Start: 2022-10-12 | End: 2022-10-13 | Stop reason: HOSPADM

## 2022-10-12 RX ORDER — LOSARTAN POTASSIUM 100 MG/1
100 TABLET ORAL DAILY
Status: DISCONTINUED | OUTPATIENT
Start: 2022-10-12 | End: 2022-10-13 | Stop reason: HOSPADM

## 2022-10-12 RX ORDER — SODIUM CHLORIDE 0.9 % (FLUSH) 0.9 %
5-40 SYRINGE (ML) INJECTION PRN
Status: DISCONTINUED | OUTPATIENT
Start: 2022-10-12 | End: 2022-10-13 | Stop reason: HOSPADM

## 2022-10-12 RX ORDER — ONDANSETRON 4 MG/1
4 TABLET, ORALLY DISINTEGRATING ORAL EVERY 8 HOURS PRN
Status: DISCONTINUED | OUTPATIENT
Start: 2022-10-12 | End: 2022-10-13 | Stop reason: HOSPADM

## 2022-10-12 RX ORDER — HYDRALAZINE HYDROCHLORIDE 20 MG/ML
10 INJECTION INTRAMUSCULAR; INTRAVENOUS ONCE
Status: COMPLETED | OUTPATIENT
Start: 2022-10-12 | End: 2022-10-12

## 2022-10-12 RX ORDER — ONDANSETRON 2 MG/ML
4 INJECTION INTRAMUSCULAR; INTRAVENOUS EVERY 6 HOURS PRN
Status: DISCONTINUED | OUTPATIENT
Start: 2022-10-12 | End: 2022-10-13 | Stop reason: HOSPADM

## 2022-10-12 RX ORDER — ACETAMINOPHEN 650 MG/1
650 SUPPOSITORY RECTAL EVERY 6 HOURS PRN
Status: DISCONTINUED | OUTPATIENT
Start: 2022-10-12 | End: 2022-10-13 | Stop reason: HOSPADM

## 2022-10-12 RX ORDER — TRAZODONE HYDROCHLORIDE 100 MG/1
100 TABLET ORAL NIGHTLY PRN
Status: DISCONTINUED | OUTPATIENT
Start: 2022-10-12 | End: 2022-10-13 | Stop reason: HOSPADM

## 2022-10-12 RX ORDER — SODIUM CHLORIDE 0.9 % (FLUSH) 0.9 %
5-40 SYRINGE (ML) INJECTION EVERY 12 HOURS SCHEDULED
Status: DISCONTINUED | OUTPATIENT
Start: 2022-10-12 | End: 2022-10-13 | Stop reason: HOSPADM

## 2022-10-12 RX ORDER — LABETALOL HYDROCHLORIDE 5 MG/ML
10 INJECTION, SOLUTION INTRAVENOUS EVERY 4 HOURS PRN
Status: DISCONTINUED | OUTPATIENT
Start: 2022-10-12 | End: 2022-10-13 | Stop reason: HOSPADM

## 2022-10-12 RX ORDER — ASPIRIN 81 MG/1
81 TABLET ORAL DAILY
Status: DISCONTINUED | OUTPATIENT
Start: 2022-10-12 | End: 2022-10-13 | Stop reason: HOSPADM

## 2022-10-12 RX ORDER — SODIUM CHLORIDE 9 MG/ML
INJECTION, SOLUTION INTRAVENOUS PRN
Status: DISCONTINUED | OUTPATIENT
Start: 2022-10-12 | End: 2022-10-13 | Stop reason: HOSPADM

## 2022-10-12 RX ORDER — LABETALOL HYDROCHLORIDE 5 MG/ML
10 INJECTION, SOLUTION INTRAVENOUS ONCE
Status: COMPLETED | OUTPATIENT
Start: 2022-10-12 | End: 2022-10-12

## 2022-10-12 RX ORDER — ENOXAPARIN SODIUM 100 MG/ML
30 INJECTION SUBCUTANEOUS DAILY
Status: DISCONTINUED | OUTPATIENT
Start: 2022-10-12 | End: 2022-10-13 | Stop reason: HOSPADM

## 2022-10-12 RX ORDER — CARVEDILOL 6.25 MG/1
6.25 TABLET ORAL 2 TIMES DAILY WITH MEALS
Status: DISCONTINUED | OUTPATIENT
Start: 2022-10-12 | End: 2022-10-13 | Stop reason: HOSPADM

## 2022-10-12 RX ORDER — ATORVASTATIN CALCIUM 40 MG/1
40 TABLET, FILM COATED ORAL DAILY
Status: DISCONTINUED | OUTPATIENT
Start: 2022-10-12 | End: 2022-10-13 | Stop reason: HOSPADM

## 2022-10-12 RX ADMIN — ASPIRIN 81 MG: 81 TABLET, COATED ORAL at 16:07

## 2022-10-12 RX ADMIN — AMLODIPINE BESYLATE 5 MG: 5 TABLET ORAL at 15:22

## 2022-10-12 RX ADMIN — ENOXAPARIN SODIUM 30 MG: 100 INJECTION SUBCUTANEOUS at 15:29

## 2022-10-12 RX ADMIN — ATORVASTATIN CALCIUM 40 MG: 40 TABLET, FILM COATED ORAL at 20:17

## 2022-10-12 RX ADMIN — Medication 5 ML: at 20:45

## 2022-10-12 RX ADMIN — LABETALOL HYDROCHLORIDE 10 MG: 5 INJECTION, SOLUTION INTRAVENOUS at 09:24

## 2022-10-12 RX ADMIN — HYDRALAZINE HYDROCHLORIDE 10 MG: 20 INJECTION INTRAMUSCULAR; INTRAVENOUS at 10:52

## 2022-10-12 RX ADMIN — CARVEDILOL 6.25 MG: 6.25 TABLET, FILM COATED ORAL at 19:05

## 2022-10-12 RX ADMIN — IOPAMIDOL 75 ML: 612 INJECTION, SOLUTION INTRAVENOUS at 09:39

## 2022-10-12 ASSESSMENT — ENCOUNTER SYMPTOMS
NAUSEA: 0
CHEST TIGHTNESS: 0
WHEEZING: 0
SINUS PRESSURE: 0
COLOR CHANGE: 0
VOMITING: 0
ABDOMINAL DISTENTION: 0
COUGH: 0
BACK PAIN: 0
DIARRHEA: 0
TROUBLE SWALLOWING: 0
SHORTNESS OF BREATH: 0
ABDOMINAL PAIN: 0

## 2022-10-12 ASSESSMENT — PAIN DESCRIPTION - DESCRIPTORS: DESCRIPTORS: ACHING

## 2022-10-12 ASSESSMENT — PAIN - FUNCTIONAL ASSESSMENT
PAIN_FUNCTIONAL_ASSESSMENT: 0-10
PAIN_FUNCTIONAL_ASSESSMENT: NONE - DENIES PAIN
PAIN_FUNCTIONAL_ASSESSMENT: ACTIVITIES ARE NOT PREVENTED

## 2022-10-12 ASSESSMENT — PAIN DESCRIPTION - LOCATION: LOCATION: HEAD

## 2022-10-12 ASSESSMENT — PAIN DESCRIPTION - FREQUENCY: FREQUENCY: CONTINUOUS

## 2022-10-12 ASSESSMENT — PAIN SCALES - GENERAL: PAINLEVEL_OUTOF10: 4

## 2022-10-12 ASSESSMENT — PAIN DESCRIPTION - PAIN TYPE: TYPE: ACUTE PAIN

## 2022-10-12 NOTE — CONSULTS
[facility] Neurology Consult Note  Name: Rafael Shaver  Age: 81 y.o. Gender: female  CodeStatus: Full Code  Allergies: Amoxicillin  Nsaids    Chief Complaint:Dizziness    Primary Care Provider: Susy Last MD  InpatientTreatment Team: Treatment Team: Attending Provider: Gregg Jorge DO; Consulting Physician: Barb Simon MD; Consulting Physician: Gregg Jorge DO; Registered Nurse: Ravi Fitzpatrick RN  Admission Date: 10/12/2022      HPI   Consulting provider: Dr. John Paul Panda for consult and Dr. Marce Lepe for dizziness  Pt seen and examined for neurology consult. Patient is a 60-year-old female with past medical history of tobacco abuse, CAD status post CABG, CKD stage IV, hyperlipidemia, who presents to, carotid artery stenosis, but an aneurysm who presented to [facility] emergency room today, 10/12/2022, with complaints of 2 weeks of dizziness and lightheadedness. Patient reports feeling as if she was going to pass out. Patient reports she began checking her blood pressure approximately 2 weeks ago when dizzy spells began and noted that her systolic blood pressure was in the 200s consistently. Attempted to call her kidney doctor and was instructed to call primary care. She was unable to get through to her primary care doctor. She denies any associated chest pain pressure or palpitations with dizzy spells. No associated ear pain, tinnitus, hearing changes. Dizzy spells occur when at rest and with activity. She reports this morning she had presyncopal episode with transient loss of vision. Denies associated headache. No paresthesias or one-sided weakness. No dysphagia or dysarthria. No seizure activity reported. Does have history of brain aneurysm repair x2. On aspirin 81 mg daily prior to admission and reports compliance. Hypertensive on presentation at 224/74. Afebrile. No hypoxia. No focal neurodeficits. Orthostatic blood pressures negative.      Lab/diagnostic testing: Due to [facility] 6.4, hemoglobin 10.4, hematocrit 30.1, Platelets 044, CK 67, BNP 1052, sodium 140, potassium 3.9, chloride 108, CO2 23, glucose 120, BUN 21, creatinine 1.26, alk phos 58, ALT 13, AST 16, CRP 81, magnesium 2.3, INR 1.0, troponin less than 0.010, CT of the head and CTA of the head and neck completed. No acute intracranial findings. 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 No significant stenosis or occlusion. There is evidence of prior aneurysmal clipping in the region of the anterior communicating artery and bifurcation of the left MCA without recurrent aneurysm. Vitals:    10/12/22 1409   BP:    Pulse: 64   Resp:    Temp:    SpO2:    History reviewed. No pertinent family history. Social History     Socioeconomic History    Marital status:      Spouse name: Not on file    Number of children: Not on file    Years of education: Not on file    Highest education level: Not on file   Occupational History    Not on file   Tobacco Use    Smoking status: Every Day     Packs/day: 0.50     Years: 60.00     Pack years: 30.00     Types: Cigarettes    Smokeless tobacco: Never   Vaping Use    Vaping Use: Never used   Substance and Sexual Activity    Alcohol use: Never    Drug use: Not on file    Sexual activity: Not Currently   Other Topics Concern    Not on file   Social History Narrative    Not on file     Social Determinants of Health     Financial Resource Strain: Low Risk     Difficulty of Paying Living Expenses: Not hard at all   Food Insecurity: No Food Insecurity    Worried About Running Out of Food in the Last Year: Never true    Ran Out of Food in the Last Year: Never true   Transportation Needs: Not on file   Physical Activity: Not on file   Stress: Not on file   Social Connections: Not on file   Intimate Partner Violence: Not on file   Housing Stability: Not on file          Review of Systems   Constitutional:  Negative for appetite change, fatigue and fever.    HENT:  Negative for hearing loss and trouble swallowing. Eyes:  Negative for visual disturbance. Respiratory:  Negative for cough, chest tightness, shortness of breath and wheezing. Cardiovascular:  Negative for chest pain, palpitations and leg swelling. Gastrointestinal:  Negative for abdominal distention, abdominal pain, nausea and vomiting. Genitourinary:  Negative for difficulty urinating. Musculoskeletal:  Positive for gait problem. Negative for back pain, neck pain and neck stiffness. Skin:  Negative for color change and rash. Neurological:  Positive for dizziness and light-headedness. Negative for tremors, seizures, syncope, facial asymmetry, speech difficulty, weakness, numbness and headaches. Psychiatric/Behavioral:  Negative for agitation, confusion and hallucinations. The patient is not nervous/anxious. Physical Exam  Vitals and nursing note reviewed. Constitutional:       General: She is not in acute distress. Appearance: She is not diaphoretic. HENT:      Head: Normocephalic and atraumatic. Eyes:      General: No visual field deficit. Extraocular Movements: Extraocular movements intact. Pupils: Pupils are equal, round, and reactive to light. Cardiovascular:      Rate and Rhythm: Normal rate and regular rhythm. Pulmonary:      Effort: Pulmonary effort is normal. No respiratory distress. Breath sounds: Normal breath sounds. Abdominal:      General: Bowel sounds are normal.      Palpations: Abdomen is soft. Skin:     General: Skin is warm and dry. Neurological:      Mental Status: She is alert and oriented to person, place, and time. Cranial Nerves: No cranial nerve deficit, dysarthria or facial asymmetry. Sensory: No sensory deficit. Motor: No weakness, tremor, atrophy, abnormal muscle tone, seizure activity or pronator drift.       Coordination: Coordination normal. Finger-Nose-Finger Test normal.             Medications:  Reviewed    Infusion Medications: sodium chloride       Scheduled Medications:    amLODIPine  5 mg Oral Daily    carvedilol  6.25 mg Oral BID WC    losartan  100 mg Oral Daily    aspirin  81 mg Oral Daily    atorvastatin  40 mg Oral Daily    sodium chloride flush  5-40 mL IntraVENous 2 times per day    enoxaparin  30 mg SubCUTAneous Daily     PRN Meds: traZODone, sodium chloride flush, sodium chloride, ondansetron **OR** ondansetron, polyethylene glycol, acetaminophen **OR** acetaminophen, labetalol    Labs:   Recent Labs     10/12/22  0900   WBC 6.4   HGB 10.4*   HCT 30.1*        Recent Labs     10/12/22  0900      K 3.9   *   CO2 23   BUN 21   CREATININE 1.26*   CALCIUM 9.2     Recent Labs     10/12/22  0900   AST 16   ALT 13   BILITOT 0.3   ALKPHOS 58     Recent Labs     10/12/22  0900   INR 1.0     Recent Labs     10/12/22  0900   CKTOTAL 79   TROPONINI <0.010       Urinalysis:   Lab Results   Component Value Date/Time    NITRU Negative 07/20/2022 12:55 PM    WBCUA 10-20 07/20/2022 12:55 PM    BACTERIA MODERATE 07/20/2022 12:55 PM    RBCUA 0-2 07/20/2022 12:55 PM    BLOODU Negative 07/20/2022 12:55 PM    SPECGRAV 1.012 07/20/2022 12:55 PM    GLUCOSEU Negative 07/20/2022 12:55 PM       Radiology:   Most recent    EEG No valid procedures specified. MRI of Brain No results found for this or any previous visit. No results found for this or any previous visit. MRA of the Head and Neck: No results found for this or any previous visit. No results found for this or any previous visit. No results found for this or any previous visit. CT of the Head: No results found for this or any previous visit. No results found for this or any previous visit. No results found for this or any previous visit. Carotid duplex: No results found for this or any previous visit. No results found for this or any previous visit. No results found for this or any previous visit.       Echo No results found for this or any previous visit. Assessment/Plan:  Patient is an 50-year-old  female with past medical history of carotid artery stenosis, hypertension, hyperlipidemia, CAD post CABG, CKD stage IV, brain aneurysm with repair who presents with 2 weeks of ongoing lightheaded and dizziness in the setting of hypertensive urgency. No focal neurodeficits. No associated cardiac symptoms. No associated inner ear symptoms. CT of the head and CTA of the head and neck have been completed. Results show no acute findings. There is evidence of aneurysmal clipping in the MARQUIS and left MCA. Also evidence of remote bilateral frontal infarcts. Orthostatic blood pressures are negative. Blood pressure medications have been adjusted. Continue aspirin 81 mg daily. Unable to obtain MRI of the brain due to presence of aneurysmal clips. Symptoms likely secondary to hypertensive urgency. Patient will need ongoing attention to risk factor modification. Hemoglobin A1c is pending. Will assess lipid panel. We will follow patient's clinical course.         Collaborating physicians: Dr Brisieda Wyatt    Electronically signed by LORENZO Carnes CNP on 10/12/2022 at 3:07 PM

## 2022-10-12 NOTE — PROGRESS NOTES
This  visited with this patient and her daughter. Very pleasant women with wonderful senses of humor. Patient admitted to being a [employer], but not practicing. She declined communion and anointing. Patient expressed that she was in for hypertension. She has been hospitalized in the past for bi-pass surgery, aneurisms, and other cardiac related issues. She is hopeful that the doctors will be able to discover what is going on, why blood pressure so high and why she has been feeling light headed. Spiritual care to follow up for support. Also, no AD in Epic for this patient.

## 2022-10-12 NOTE — ED TRIAGE NOTES
Pt has co dizziness times two weeks. Pt states she felt like she was going to pass out today but did not fall. Pt is aox4 pwd.

## 2022-10-12 NOTE — ED PROVIDER NOTES
[address] [facility] ED  eMERGENCY dEPARTMENT eNCOUnter      Pt Name: Emanuel Castro  MRN: [id number]  [place] 1940  Date of evaluation: 10/12/2022  Provider: Rodney Clayton, [address] [place] [address]       Chief Complaint   Patient presents with    Dizziness         HISTORY OF PRESENT ILLNESS   (Location/Symptom, Timing/Onset,Context/Setting, Quality, Duration, Modifying Factors, Severity)  Note limiting factors. Emanuel Castro is a 81 y.o. female who presents to the emergency department with 2 weeks of dizziness as in extreme lightheadedness. Patient states she feels faint and like she could pass out. Patient states this happens all the time and has been getting worse. Patient denies any black, bloody or tarry stools. Patient denies any shortness of breath or chest pain. The patient is an avid smoker curettes. Denies fever, chills, cough, nausea or vomiting. Denies any visual changes or hearing loss. States when she is upright she gets dizzy and feels that she could pass out. Blood pressure is elevated patient is taken all of her medications has not missed any. 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 Deny modifying factors making the symptoms better or worse. Patient's 2 daughters are present help contribute to history. The history is provided by the patient and a relative. NursingNotes were reviewed. REVIEW OF SYSTEMS    (2-9 systems for level 4, 10 or more for level 5)     Review of Systems   Constitutional:  Negative for activity change, appetite change, chills, fever and unexpected weight change. HENT:  Negative for drooling, ear pain, nosebleeds, sinus pressure and trouble swallowing. Respiratory:  Negative for cough, chest tightness and shortness of breath. Cardiovascular:  Negative for chest pain and leg swelling.    Gastrointestinal: Negative for abdominal pain, diarrhea and vomiting. Endocrine: Negative for polydipsia and polyphagia. Genitourinary:  Negative for dysuria, flank pain and frequency. Musculoskeletal:  Negative for back pain and myalgias. Skin:  Negative for pallor and rash. Neurological:  Positive for light-headedness. Negative for syncope, weakness and headaches. Near Syncope   Hematological:  Does not bruise/bleed easily. All other systems reviewed and are negative. Except as noted above the remainder of the review of systems was reviewed and negative. 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/19/2020    Hyperlipidemia     Kidney disease     Stage 4 chronic kidney disease (HCC) 11/19/2020    Stenosis of noncoronary bypass graft ([facility] [address]     Tobacco abuse 11/19/2020         SURGICALHISTORY       Past Surgical History:   Procedure Laterality Date    BACK SURGERY  2003    BRAIN ANEURYSM SURGERY  2012    BREAST LUMPECTOMY Right     CARPAL TUNNEL RELEASE Bilateral     CORONARY ARTERY BYPASS GRAFT      PARTIAL HYSTERECTOMY (CERVIX NOT REMOVED)  1997    TOTAL HIP ARTHROPLASTY Bilateral left side 2017 right side 2004         CURRENT MEDICATIONS       Previous Medications    AMLODIPINE (NORVASC) 5 MG TABLET    Take 1 tablet by mouth daily    ASPIRIN 81 MG EC TABLET    Take 81 mg by mouth daily    ATORVASTATIN (LIPITOR) 40 MG TABLET    TAKE 1 TABLET BY MOUTH EVERY DAY    DOXAZOSIN (CARDURA) 4 MG TABLET    TAKE 1 TABLET AT BEDTIME    FUROSEMIDE (LASIX) 40 MG TABLET    TAKE 1 TABLET BY MOUTH TWICE A DAY    HANDICAP PLACARD MISC    by Does not apply route Applying from 1/28/2021-1/27/2026.     LOSARTAN (COZAAR) 100 MG TABLET    TAKE 1 TABLET BY MOUTH EVERY DAY    METHYLPREDNISOLONE (MEDROL DOSEPACK) 4 MG TABLET    On days 1-6 take as directed on package for first 6-day course. On days 7-12 take as directed on (second) package for (second) 6-day course. METOPROLOL TARTRATE (LOPRESSOR) 25 MG TABLET    TAKE 1 TABLET BY MOUTH TWICE A DAY    TRAZODONE (DESYREL) 100 MG TABLET    TAKE 1 TABLET BY MOUTH EVERY DAY AT NIGHT       ALLERGIES     Amoxicillin and Nsaids    FAMILY HISTORY     History reviewed. No pertinent family history. SOCIAL HISTORY       Social History     Socioeconomic History    Marital status:      Spouse name: None    Number of children: None    Years of education: None    Highest education level: None   Tobacco Use    Smoking status: Every Day     Packs/day: 0.50     Years: 60.00     Pack years: 30.00     Types: Cigarettes    Smokeless tobacco: Never   Vaping Use    Vaping Use: Never used   Substance and Sexual Activity    Alcohol use: Never    Sexual activity: Not Currently     Social Determinants of Health     Financial Resource Strain: Low Risk     Difficulty of Paying Living Expenses: Not hard at all   Food Insecurity: No Food Insecurity    Worried About Running Out of Food in the Last Year: Never true    Ran Out of Food in the Last Year: Never true       SCREENINGS   NIH Stroke Scale  Level of Consciousness (1a): Alert  LOC Questions (1b): Answers both correctly  LOC Commands (1c): Performs both tasks correctly  Best Gaze (2): Normal  Visual (3): No visual loss  Facial Palsy (4): Normal symmetrical movement  Motor Arm, Left (5a): No drift  Motor Arm, Right (5b): No drift  Motor Leg, Left (6a): No drift  Motor Leg, Right (6b):  No drift  Limb Ataxia (7): Absent  Sensory (8): Normal  Best Language (9): No aphasia  Dysarthria (10): Normal  Extinction and Inattention (11): No abnormality  Total: 0Glasgow Coma Scale  Eye Opening: Spontaneous  Best Verbal Response: Oriented  Best Motor Response: Obeys commands  Banner Coma Scale Score: 15 @FLOW([id number])@      PHYSICAL EXAM    (up to 7 for level 4, 8 or more for level 5)     ED Triage Vitals [10/12/22 0817]   BP Temp Temp Source Heart Rate Resp SpO2 Height Weight   (!) 224/72 98 °F (36.7 °C) Temporal 73 18 98 % 5' 1\" (1.549 m) 138 lb (62.6 kg)       Physical Exam  Vitals and nursing note reviewed. Constitutional:       General: She is awake. She is in acute distress. Appearance: Normal appearance. She is well-developed and normal weight. She is not ill-appearing, toxic-appearing or diaphoretic. Comments: No photophobia. No phonophobia. HENT:      Head: Normocephalic and atraumatic. No Robles's sign. Right Ear: External ear normal.      Left Ear: External ear normal.      Nose: Nose normal. No congestion or rhinorrhea. Mouth/Throat:      Mouth: Mucous membranes are moist.      Pharynx: Oropharynx is clear. No oropharyngeal exudate or posterior oropharyngeal erythema. Eyes:      General: No visual field deficit or scleral icterus. Right eye: No foreign body or discharge. Left eye: No discharge. Extraocular Movements: Extraocular movements intact. Conjunctiva/sclera: Conjunctivae normal.      Left eye: No exudate. Pupils: Pupils are equal, round, and reactive to light. Comments: mypopic pupils bilaterally. No anisocoria. Neck:      Vascular: Carotid bruit (bilateral) and hepatojugular reflux present. No JVD. Trachea: No tracheal deviation. Comments: No meningismus. Cardiovascular:      Rate and Rhythm: Normal rate and regular rhythm. Pulses: Normal pulses. Heart sounds: Normal heart sounds. Heart sounds not distant. No murmur heard. No friction rub. No gallop. Pulmonary:      Effort: Pulmonary effort is normal. No respiratory distress. Breath sounds: Normal breath sounds. No stridor. No wheezing, rhonchi or rales. Chest:      Chest wall: No tenderness. Abdominal:      General: Abdomen is flat. Bowel sounds are normal. There is no distension.       Palpations: Abdomen is soft. There is no mass. Tenderness: There is no abdominal tenderness. There is no right CVA tenderness, left CVA tenderness, guarding or rebound. Hernia: No hernia is present. Musculoskeletal:         General: No swelling, tenderness, deformity or signs of injury. Normal range of motion. Cervical back: Full passive range of motion without pain, normal range of motion and neck supple. No rigidity. Lymphadenopathy:      Head:      Right side of head: No submental adenopathy. Left side of head: No submental adenopathy. Skin:     General: Skin is warm and dry. Capillary Refill: Capillary refill takes less than 2 seconds. Coloration: Skin is not jaundiced or pale. Findings: No bruising, erythema, lesion or rash. Neurological:      General: No focal deficit present. Mental Status: She is alert and oriented to person, place, and time. Mental status is at baseline. GCS: GCS eye subscore is 4. GCS verbal subscore is 5. GCS motor subscore is 6. Cranial Nerves: Cranial nerves 2-12 are intact. No cranial nerve deficit, dysarthria or facial asymmetry. Sensory: Sensation is intact. No sensory deficit. Motor: Motor function is intact. No weakness, tremor, atrophy, abnormal muscle tone, seizure activity or pronator drift. Coordination: Coordination is intact. Coordination normal. Finger-Nose-Finger Test normal.      Deep Tendon Reflexes: Reflexes are normal and symmetric. Comments: All 4 extremities purposefully and symmetrically. Gross muscle strength is 5 out of 5 bilateral upper and lower extremities. No pronator drift. No leg drift. Normal rapid alternating movements. Mild nystagmus in the horizontal plane on exam but does not reproduce his dizziness. Psychiatric:         Mood and Affect: Mood normal.         Behavior: Behavior normal. Behavior is cooperative. Thought Content:  Thought content normal.         Judgment: Judgment normal.       DIAGNOSTIC RESULTS     EKG: All EKG's are interpreted by the Emergency Department Physician who either signs or Co-signsthis chart in the absence of a cardiologist.    EKG: Sinus rhythm with PACs at 66 bpm.  Normal axis. Normal ST segments. QT interval is 442 ms. QTC is 442 ms. There are no PVCs. There is LVH voltage. RADIOLOGY:   Non-plain filmimages such as CT, Ultrasound and MRI are read by the radiologist. Plain radiographic images are visualized and preliminarily interpreted by the emergency physician with the below findings:        Interpretation per the Radiologist below, if available at the time ofthis note:    XR CHEST (2 VW)   Final Result   No acute process. CTA NECK W WO CONTRAST   Final Result   1. No acute intracranial abnormality. 2. Remote bilateral frontal lobe infarcts. 3. No significant stenosis or evidence for occlusion. 4. Evidence of prior aneurysm clipping in the region of the anterior   communicating artery and bifurcation of the left middle cerebral artery   without recurrent aneurysm. CTA HEAD W WO CONTRAST   Final Result   1. No acute intracranial abnormality. 2. Remote bilateral frontal lobe infarcts. 3. No significant stenosis or evidence for occlusion. 4. Evidence of prior aneurysm clipping in the region of the anterior   communicating artery and bifurcation of the left middle cerebral artery   without recurrent aneurysm.                ED BEDSIDE ULTRASOUND:   Performed by ED Physician - none    LABS:  Labs Reviewed   CBC WITH AUTO DIFFERENTIAL - Abnormal; Notable for the following components:       Result Value    RBC 3.30 (*)     Hemoglobin 10.4 (*)     Hematocrit 30.1 (*)     MCH 31.4 (*)     All other components within normal limits   COMPREHENSIVE METABOLIC PANEL - Abnormal; Notable for the following components:    Chloride 108 (*)     Glucose 120 (*)     Creatinine 1.26 (*)     GFR Non- 40.7 (*)     GFR  American 49.3 (*)     All other components within normal limits   APTT   BRAIN NATRIURETIC PEPTIDE   CK   HIGH SENSITIVITY CRP   MAGNESIUM   PROTIME-INR   TROPONIN   URINALYSIS WITH REFLEX TO CULTURE   HEMOGLOBIN A1C   POCT GLUCOSE       All other labs were within normal range or not returned as of this dictation. EMERGENCY DEPARTMENT COURSE and DIFFERENTIAL DIAGNOSIS/MDM:   Vitals:    Vitals:    10/12/22 0915 10/12/22 0954 10/12/22 1030 10/12/22 1100   BP:  (!) 188/55 (!) 200/50 (!) 182/50   Pulse:   65 68   Resp: 20  17 20   Temp:  98 °F (36.7 °C)     TempSrc:  Oral     SpO2: 99%  99% 95%   Weight:       Height:               MDM    She is hypertensive. History of aneurysm with clips and allegedly has 1 more aneurysm according to her daughter. CT angio of the head and neck were done out of concern the patient could be a dissection or a vessel occlusion. CT is unremarkable. CT did show old infarcts in the frontal lobe. Patient's blood pressure is uncontrolled and she is a smoker. Egophony on exam but no pneumonia. Carotid bruits on exam.  Neurology consult obtained with Dr. Tia Campbell and he will follow in consultation. Patient already took aspirin today. No further cerebrovascular protective measures at this time. Case discussed with the hospitalist (Dr. David Palacios) who accepted the patient. CONSULTS:  IP CONSULT TO NEUROLOGY  IP CONSULT TO HOSPITALIST  IP CONSULT TO NEUROLOGY    PROCEDURES:  Unless otherwise noted below, none     Procedures    FINAL IMPRESSION      1. Dizziness    2. Postural dizziness with near syncope    3. Uncontrolled hypertension    4. Tobacco dependence          DISPOSITION/PLAN   DISPOSITION Admitted 10/12/2022 11:30:12 AM      PATIENT REFERRED TO:  No follow-up provider specified.     DISCHARGE MEDICATIONS:  New Prescriptions    No medications on file          (Please note that portions of this note were completed with a voice recognition program.  Efforts were made to edit the dictations but occasionally words are mis-transcribed.)    [address],  (electronically signed)  Attending Emergency Physician         [address],   10/12/22 7116

## 2022-10-12 NOTE — H&P
Hospital Medicine  History and Physical    Patient:  Dao Simpson  MRN: [id number]    CHIEF COMPLAINT:    Chief Complaint   Patient presents with    Dizziness       History Obtained From:  Patient, EMR  Primary Care Physician: Evelin Day MD    HISTORY OF PRESENT ILLNESS:   80yo F PMH HTN, CAD (prior CABG), CKD III presents with 2 weeks history of dizziness and lightheadedness. Earlier today, the patient almost passed out twice, prompting her to come to the ED. She otherwise denies HA, blurry vision, chest discomfort, dyspnea. She also denies recent illness, fevers, vomiting, diarrea. In the ED, she was noted to have systolic blood pressure 962. She does smoke 1PPD (>60PY smoking history). She drinks etOH only socially. No illicit drug use. Past Medical History:      Diagnosis Date    Bilateral carotid artery stenosis 11/19/2020    CAD (coronary artery disease)     Carotid bruit     Dyslipidemia 11/19/2020    Essential hypertension 11/19/2020    Hx of CABG 11/19/2020    Hyperlipidemia     Kidney disease     Stage 4 chronic kidney disease ([facility] [address] 11/19/2020    Stenosis of noncoronary bypass graft ([facility] [address]     Tobacco abuse 11/19/2020       Past Surgical History:      Procedure Laterality Date    BACK SURGERY  2003    BRAIN ANEURYSM SURGERY  2012    BREAST LUMPECTOMY Right     CARPAL TUNNEL RELEASE Bilateral     CORONARY ARTERY BYPASS GRAFT      PARTIAL HYSTERECTOMY (CERVIX NOT REMOVED)  1997    TOTAL HIP ARTHROPLASTY Bilateral left side 2017 right side 2004       Medications Prior to Admission:    Prior to Admission medications    Medication Sig Start Date End Date Taking?  Authorizing Provider   traZODone (DESYREL) 100 MG tablet TAKE 1 TABLET BY MOUTH EVERY DAY AT NIGHT 10/10/22   Evelin Day MD   amLODIPine [facility]) 5 MG tablet Take 1 tablet by mouth daily 10/10/22   Evelin Day MD   doxazosin (CARDURA) 4 MG tablet TAKE 1 TABLET AT BEDTIME 9/1/22   Phill J Holiday, DO   metoprolol tartrate (LOPRESSOR) 25 MG tablet TAKE 1 TABLET BY MOUTH TWICE A DAY 10/29/21   LORENZO Trujillo - CNP   losartan (COZAAR) 100 MG tablet TAKE 1 TABLET BY MOUTH EVERY DAY 10/29/21   LORENZO Trujillo - CNP   atorvastatin (LIPITOR) 40 MG tablet TAKE 1 TABLET BY MOUTH EVERY DAY 10/29/21   Lisa CouLORENZO bell - CNP   Handicap Placard MISC by Does not apply route Applying from 1/28/2021-1/27/2026. 1/28/21   Jose Eaton MD   aspirin 81 MG EC tablet Take 81 mg by mouth daily    Historical Provider, MD       Allergies:  Amoxicillin and Nsaids    Social History:   TOBACCO:   reports that she has been smoking cigarettes. She has a 30.00 pack-year smoking history. She has never used smokeless tobacco.  ETOH:   reports no history of alcohol use. Family History:   History reviewed. No pertinent family history. REVIEW OF SYSTEMS:  Ten systems reviewed and negative except for stated in HPI    Physical Exam:    Vitals: BP (!) 170/64   Pulse 78   Temp 97.6 °F (36.4 °C) (Oral)   Resp 18   Ht 5' 1\" (1.549 m)   Wt 141 lb 9.6 oz (64.2 kg)   SpO2 100%   BMI 26.76 kg/m²   General appearance: alert, appears stated age and cooperative. NAD. elderly  Skin: Skin color, texture, turgor normal. No rashes or lesions  HEENT: eomi, perrla. MMM  Neck: No JVD or lymphadenopathy  Lungs: CTA bilaterally. No wheeze  Heart: RRR, no murmur or gallp  Abdomen: soft, nontender. Bsx4. No masses or organomegaly  Extremities: no edema, redness, or tenderness in calves.  Cap refill <2s  Neurologic: No focal deficits    Recent Labs     10/12/22  0900   WBC 6.4   HGB 10.4*        Recent Labs     10/12/22  0900      K 3.9   *   CO2 23   BUN 21   CREATININE 1.26*   GLUCOSE 120*   AST 16   ALT 13   BILITOT 0.3   ALKPHOS 58     Troponin T:   Recent Labs     10/12/22  0900   TROPONINI <0.010       ABGs: No results found for: PHART, PO2ART, TAI2CJS  INR:   Recent Labs     10/12/22  0900   INR 1.0     URINALYSIS:No results for input(s): NITRITE, COLORU, PHUR, LABCAST, WBCUA, RBCUA, MUCUS, TRICHOMONAS, YEAST, BACTERIA, CLARITYU, SPECGRAV, LEUKOCYTESUR, UROBILINOGEN, BILIRUBINUR, BLOODU, GLUCOSEU, AMORPHOUS in the last 72 hours. Invalid input(s): Isabella Senters  -----------------------------------------------------------------   CTA HEAD W WO CONTRAST    Result Date: 10/12/2022  EXAMINATION: CTA OF THE HEAD WITHOUT AND WITH CONTRAST; CTA OF THE NECK 10/12/2022 9:40 am: TECHNIQUE: CTA of the head/brain was performed without and with the administration of intravenous contrast. Multiplanar reformatted images are provided for review. MIP images are provided for review. Automated exposure control, iterative reconstruction, and/or weight based adjustment of the mA/kV was utilized to reduce the radiation dose to as low as reasonably achievable.; CTA of the neck was performed with the administration of intravenous contrast. Multiplanar reformatted images are provided for review. MIP images are provided for review. Stenosis of the internal carotid arteries measured using NASCET criteria. Automated exposure control, iterative reconstruction, and/or weight based adjustment of the mA/kV was utilized to reduce the radiation dose to as low as reasonably achievable. Noncontrast CT of the head with reconstructed 2-D images are also provided for review. COMPARISON: None. HISTORY: ORDERING SYSTEM PROVIDED HISTORY: 2 weeks dizzy, feels off balance, HTN; Hx of 3 aneurysms; 2 repaired. B/L carotid bruits, cva TECHNOLOGIST PROVIDED HISTORY: Reason for exam:->2 weeks dizzy, feels off balance, HTN; Hx of 3 aneurysms; 2 repaired. B/L carotid bruits Reason for exam:->cva Decision Support Exception - unselect if not a suspected or confirmed emergency medical condition->Emergency Medical Condition (MA) What reading provider will be dictating this exam?->CRC FINDINGS: CT HEAD: BRAIN/VENTRICLES:  There is no acute intracranial hemorrhage, mass effect, or midline shift. There is satisfactory overall gray-white matter differentiation. There are remote bilateral frontal lobe infarcts. The ventricular structures are symmetric and unremarkable. The infratentorial structures are unremarkable. ORBITS: The visualized portion of the orbits demonstrate no acute abnormality. SINUSES:  The visualized paranasal sinuses and mastoid air cells demonstrate no acute abnormality. SOFT TISSUES/SKULL: No acute abnormality of the visualized skull or soft tissues. CTA NECK: AORTIC ARCH/ARCH VESSELS: No dissection or arterial injury. No significant stenosis of the brachiocephalic or subclavian arteries. CAROTID ARTERIES: The common carotid arteries are patent. There is calcification of the right carotid bulb and proximal right internal carotid artery without significant stenosis. There is calcification of the left carotid bulb and proximal left internal carotid artery with minimal stenosis of the proximal left internal carotid artery. The remaining cervical internal carotid arteries are patent. VERTEBRAL ARTERIES: There is a dominant right vertebral artery and markedly diminutive left vertebral artery. The cervical vertebral arteries are patent. SOFT TISSUES: There is emphysematous change in the visualized lungs. There is a focus of ground-glass attenuation in the right upper lobe laterally that measures approximately 0.8 x 0.6 cm. No cervical or superior mediastinal lymphadenopathy. The larynx and pharynx are unremarkable. No acute abnormality of the salivary and thyroid glands. BONES: No acute osseous abnormality. CTA HEAD: ANTERIOR CIRCULATION: No significant stenosis of the intracranial internal carotid, anterior cerebral, or middle cerebral arteries. There is evidence of prior aneurysm clipping within the region of the anterior communicating artery and left middle cerebral artery bifurcation without evidence for recurrent aneurysm.  POSTERIOR CIRCULATION: No significant stenosis of the vertebral, basilar, or posterior cerebral arteries. No aneurysm. OTHER: No dural venous sinus thrombosis on this non-dedicated study. 1. No acute intracranial abnormality. 2. Remote bilateral frontal lobe infarcts. 3. No significant stenosis or evidence for occlusion. 4. Evidence of prior aneurysm clipping in the region of the anterior communicating artery and bifurcation of the left middle cerebral artery without recurrent aneurysm. XR CHEST (2 VW)    Result Date: 10/12/2022  EXAMINATION: TWO XRAY VIEWS OF THE CHEST 10/12/2022 9:58 am COMPARISON: None. HISTORY: ORDERING SYSTEM PROVIDED HISTORY: [place], Hepatojugular reflux on exam; dizzy, extreme HTN; assess for heart failure TECHNOLOGIST PROVIDED HISTORY: Reason for exam:->[place], Hepatojugular reflux on exam; dizzy, extreme HTN; assess for heart failure What reading provider will be dictating this exam?->CRC FINDINGS: The lungs are without acute focal process. There is no effusion or pneumothorax. The cardiomediastinal silhouette is without acute process. The osseous structures are without acute process. The patient is status post CABG. No acute process. CTA NECK W WO CONTRAST    Result Date: 10/12/2022  EXAMINATION: CTA OF THE HEAD WITHOUT AND WITH CONTRAST; CTA OF THE NECK 10/12/2022 9:40 am: TECHNIQUE: CTA of the head/brain was performed without and with the administration of intravenous contrast. Multiplanar reformatted images are provided for review. MIP images are provided for review. Automated exposure control, iterative reconstruction, and/or weight based adjustment of the mA/kV was utilized to reduce the radiation dose to as low as reasonably achievable.; CTA of the neck was performed with the administration of intravenous contrast. Multiplanar reformatted images are provided for review. MIP images are provided for review. Stenosis of the internal carotid arteries measured using NASCET criteria.  Automated exposure control, iterative reconstruction, and/or weight based adjustment of the mA/kV was utilized to reduce the radiation dose to as low as reasonably achievable. Noncontrast CT of the head with reconstructed 2-D images are also provided for review. COMPARISON: None. HISTORY: ORDERING SYSTEM PROVIDED HISTORY: 2 weeks dizzy, feels off balance, HTN; Hx of 3 aneurysms; 2 repaired. B/L carotid bruits, cva TECHNOLOGIST PROVIDED HISTORY: Reason for exam:->2 weeks dizzy, feels off balance, HTN; Hx of 3 aneurysms; 2 repaired. B/L carotid bruits Reason for exam:->cva Decision Support Exception - unselect if not a suspected or confirmed emergency medical condition->Emergency Medical Condition (MA) What reading provider will be dictating this exam?->CRC FINDINGS: CT HEAD: BRAIN/VENTRICLES:  There is no acute intracranial hemorrhage, mass effect, or midline shift. There is satisfactory overall gray-white matter differentiation. There are remote bilateral frontal lobe infarcts. The ventricular structures are symmetric and unremarkable. The infratentorial structures are unremarkable. ORBITS: The visualized portion of the orbits demonstrate no acute abnormality. SINUSES:  The visualized paranasal sinuses and mastoid air cells demonstrate no acute abnormality. SOFT TISSUES/SKULL: No acute abnormality of the visualized skull or soft tissues. CTA NECK: AORTIC ARCH/ARCH VESSELS: No dissection or arterial injury. No significant stenosis of the brachiocephalic or subclavian arteries. CAROTID ARTERIES: The common carotid arteries are patent. There is calcification of the right carotid bulb and proximal right internal carotid artery without significant stenosis. There is calcification of the left carotid bulb and proximal left internal carotid artery with minimal stenosis of the proximal left internal carotid artery. The remaining cervical internal carotid arteries are patent.  VERTEBRAL ARTERIES: There is a dominant right vertebral artery and markedly diminutive left vertebral artery. The cervical vertebral arteries are patent. SOFT TISSUES: There is emphysematous change in the visualized lungs. There is a focus of ground-glass attenuation in the right upper lobe laterally that measures approximately 0.8 x 0.6 cm. No cervical or superior mediastinal lymphadenopathy. The larynx and pharynx are unremarkable. No acute abnormality of the salivary and thyroid glands. BONES: No acute osseous abnormality. CTA HEAD: ANTERIOR CIRCULATION: No significant stenosis of the intracranial internal carotid, anterior cerebral, or middle cerebral arteries. There is evidence of prior aneurysm clipping within the region of the anterior communicating artery and left middle cerebral artery bifurcation without evidence for recurrent aneurysm. POSTERIOR CIRCULATION: No significant stenosis of the vertebral, basilar, or posterior cerebral arteries. No aneurysm. OTHER: No dural venous sinus thrombosis on this non-dedicated study. 1. No acute intracranial abnormality. 2. Remote bilateral frontal lobe infarcts. 3. No significant stenosis or evidence for occlusion. 4. Evidence of prior aneurysm clipping in the region of the anterior communicating artery and bifurcation of the left middle cerebral artery without recurrent aneurysm. Assessment and Plan     Dizziness / Lightheadedness suspect secondary to hypertensive urgency  -optimize bp control. Change to carvedilol. If bp remains elevated, will add diuretic  -neurology consulted in ED    Remote infarcts noted on CTA head: ASA/statin  CAD  CKD III  H/o prior aneurysm clipping  Tobacco use disorder: counselled on cessation. Patient not interested at this time. Lovenox  Full Code    45 minutes in total care time.      Jose Cornelius DO  Admitting Hospitalist

## 2022-10-12 NOTE — CARE COORDINATION
[facility] Case Management Initial Discharge Assessment    Met with Patient to discuss discharge plan. PCP: Ayad Cox MD                                Date of Last Visit: 7/22   [facility] Patient: No        [facility] Notified: no    If no PCP, list provided? N/A    Discharge Planning    Living Arrangements: independently at home    Who do you live with? ALONE    Who helps you with your care:  Cris Mcfadden    If lives at home:     Do you have any barriers navigating in your home? no    Patient can perform ADL? Yes AMBULATES INDEPENDENTLY, OCC USES A CANE INDEPENDENT W ALL ADLS    Current Services (outpatient and in home) :  None    Dialysis: No    Is transportation available to get to your appointments? Yes PT DRIVES    DME Equipment:  yes - CANE    Respiratory equipment: None    Respiratory provider:  no     Pharmacy:  yes - [facility] [place] [facility]    Consult with Medication Assistance Program?  No      Patient agreeable to [facility]? N/A    Patient agreeable to SNF/Rehab? N/A    Other discharge needs identified? Other CM TO REASSESS FOR ANY NEW NEEDS    Does Patient Have a High-Risk for Readmission Diagnosis (CHF, PN, MI, COPD)? No      Initial Discharge Plan? (Note: please see concurrent daily documentation for any updates after initial note).     RETURN HOME ALONE DECLINES ANY SERVICES OFFERED    Readmission Risk              Risk of Unplanned Readmission:  0         Electronically signed by Myles Cao RN on 10/12/2022 at 11:53 AM

## 2022-10-13 VITALS
OXYGEN SATURATION: 99 % | HEIGHT: 61 IN | DIASTOLIC BLOOD PRESSURE: 84 MMHG | BODY MASS INDEX: 26.47 KG/M2 | WEIGHT: 140.2 LBS | TEMPERATURE: 98.1 F | RESPIRATION RATE: 16 BRPM | HEART RATE: 75 BPM | SYSTOLIC BLOOD PRESSURE: 176 MMHG

## 2022-10-13 PROBLEM — R42 DIZZINESS: Status: ACTIVE | Noted: 2022-10-13

## 2022-10-13 LAB
ANION GAP SERPL CALCULATED.3IONS-SCNC: 10 MEQ/L (ref 9–15)
BACTERIA: NEGATIVE /HPF
BILIRUBIN URINE: NEGATIVE
BLOOD, URINE: NEGATIVE
BUN BLDV-MCNC: 19 MG/DL (ref 8–23)
CALCIUM SERPL-MCNC: 8.8 MG/DL (ref 8.5–9.9)
CHLORIDE BLD-SCNC: 110 MEQ/L (ref 95–107)
CLARITY: CLEAR
CO2: 23 MEQ/L (ref 20–31)
COLOR: YELLOW
CREAT SERPL-MCNC: 1.12 MG/DL (ref 0.5–0.9)
EPITHELIAL CELLS, UA: NORMAL /HPF (ref 0–5)
GFR AFRICAN AMERICAN: 56.5
GFR NON-AFRICAN AMERICAN: 46.7
GLUCOSE BLD-MCNC: 100 MG/DL (ref 70–99)
GLUCOSE URINE: NEGATIVE MG/DL
HCT VFR BLD CALC: 31.5 % (ref 37–47)
HEMOGLOBIN: 10.8 G/DL (ref 12–16)
HYALINE CASTS: NORMAL /HPF (ref 0–5)
KETONES, URINE: NEGATIVE MG/DL
LEUKOCYTE ESTERASE, URINE: ABNORMAL
MCH RBC QN AUTO: 31.9 PG (ref 27–31.3)
MCHC RBC AUTO-ENTMCNC: 34.3 % (ref 33–37)
MCV RBC AUTO: 92.8 FL (ref 82–100)
NITRITE, URINE: NEGATIVE
PDW BLD-RTO: 13.9 % (ref 11.5–14.5)
PH UA: 5.5 (ref 5–9)
PLATELET # BLD: 208 K/UL (ref 130–400)
POTASSIUM REFLEX MAGNESIUM: 3.9 MEQ/L (ref 3.4–4.9)
PROTEIN UA: NEGATIVE MG/DL
RBC # BLD: 3.39 M/UL (ref 4.2–5.4)
RBC UA: NORMAL /HPF (ref 0–5)
SODIUM BLD-SCNC: 143 MEQ/L (ref 135–144)
SPECIFIC GRAVITY UA: 1.02 (ref 1–1.03)
URINE REFLEX TO CULTURE: ABNORMAL
UROBILINOGEN, URINE: 0.2 E.U./DL
WBC # BLD: 6.4 K/UL (ref 4.8–10.8)
WBC UA: NORMAL /HPF (ref 0–5)

## 2022-10-13 PROCEDURE — 99226 PR SBSQ OBSERVATION CARE/DAY 35 MINUTES: CPT | Performed by: PSYCHIATRY & NEUROLOGY

## 2022-10-13 PROCEDURE — 80048 BASIC METABOLIC PNL TOTAL CA: CPT

## 2022-10-13 PROCEDURE — 6360000002 HC RX W HCPCS: Performed by: INTERNAL MEDICINE

## 2022-10-13 PROCEDURE — APPSS15 APP SPLIT SHARED TIME 0-15 MINUTES: Performed by: NURSE PRACTITIONER

## 2022-10-13 PROCEDURE — 96372 THER/PROPH/DIAG INJ SC/IM: CPT

## 2022-10-13 PROCEDURE — G0378 HOSPITAL OBSERVATION PER HR: HCPCS

## 2022-10-13 PROCEDURE — 36415 COLL VENOUS BLD VENIPUNCTURE: CPT

## 2022-10-13 PROCEDURE — 85027 COMPLETE CBC AUTOMATED: CPT

## 2022-10-13 PROCEDURE — 2580000003 HC RX 258: Performed by: INTERNAL MEDICINE

## 2022-10-13 PROCEDURE — 6370000000 HC RX 637 (ALT 250 FOR IP): Performed by: INTERNAL MEDICINE

## 2022-10-13 RX ORDER — AMLODIPINE BESYLATE 10 MG/1
10 TABLET ORAL DAILY
Status: DISCONTINUED | OUTPATIENT
Start: 2022-10-14 | End: 2022-10-13 | Stop reason: HOSPADM

## 2022-10-13 RX ORDER — AMLODIPINE BESYLATE 5 MG/1
5 TABLET ORAL ONCE
Status: DISCONTINUED | OUTPATIENT
Start: 2022-10-13 | End: 2022-10-13 | Stop reason: HOSPADM

## 2022-10-13 RX ORDER — FUROSEMIDE 40 MG/1
40 TABLET ORAL DAILY
Status: DISCONTINUED | OUTPATIENT
Start: 2022-10-13 | End: 2022-10-13 | Stop reason: HOSPADM

## 2022-10-13 RX ORDER — AMLODIPINE BESYLATE 10 MG/1
10 TABLET ORAL DAILY
Qty: 30 TABLET | Refills: 3 | Status: SHIPPED | OUTPATIENT
Start: 2022-10-14

## 2022-10-13 RX ORDER — CARVEDILOL 6.25 MG/1
6.25 TABLET ORAL 2 TIMES DAILY WITH MEALS
Qty: 60 TABLET | Refills: 3 | Status: SHIPPED | OUTPATIENT
Start: 2022-10-13 | End: 2022-10-18 | Stop reason: DRUGHIGH

## 2022-10-13 RX ORDER — FUROSEMIDE 40 MG/1
40 TABLET ORAL DAILY
Qty: 30 TABLET | Refills: 1 | Status: SHIPPED | OUTPATIENT
Start: 2022-10-13

## 2022-10-13 RX ADMIN — ENOXAPARIN SODIUM 30 MG: 100 INJECTION SUBCUTANEOUS at 10:04

## 2022-10-13 RX ADMIN — CARVEDILOL 6.25 MG: 6.25 TABLET, FILM COATED ORAL at 16:53

## 2022-10-13 RX ADMIN — CARVEDILOL 6.25 MG: 6.25 TABLET, FILM COATED ORAL at 10:09

## 2022-10-13 RX ADMIN — AMLODIPINE BESYLATE 5 MG: 5 TABLET ORAL at 10:04

## 2022-10-13 RX ADMIN — ATORVASTATIN CALCIUM 40 MG: 40 TABLET, FILM COATED ORAL at 10:04

## 2022-10-13 RX ADMIN — LOSARTAN POTASSIUM 100 MG: 100 TABLET, FILM COATED ORAL at 10:04

## 2022-10-13 RX ADMIN — Medication 10 ML: at 10:05

## 2022-10-13 RX ADMIN — ASPIRIN 81 MG: 81 TABLET, COATED ORAL at 10:04

## 2022-10-13 ASSESSMENT — ENCOUNTER SYMPTOMS
COLOR CHANGE: 0
ABDOMINAL PAIN: 0
ABDOMINAL DISTENTION: 0
COUGH: 0
NAUSEA: 0
VOMITING: 0
SHORTNESS OF BREATH: 0
CHEST TIGHTNESS: 0
TROUBLE SWALLOWING: 0
WHEEZING: 0

## 2022-10-13 NOTE — DISCHARGE SUMMARY
[facility] Medicine Discharge Summary    Pierre Moya  :  1940  MRN:  [id number]    Admit date:  10/12/2022  Discharge date:  10/13/2022    Admitting Physician:  Óscar Godfrey DO  Primary Care Physician:  Jacey Paula MD    Discharge Diagnoses:    Principal Problem:    Hypertensive urgency  Resolved Problems:    * No resolved hospital problems. *    Chief Complaint   Patient presents with    Dizziness       Condition: improved   Activity: no restrictions  Diet: regular  Disposition: home  Functional Status: ambulatory    Significant Findings:     Labs Renal Latest Ref Rng & Units 10/13/2022 10/12/2022 2022 2022 2021   BUN 8 - 23 mg/dL 19 21 20 25(H) 27(H)   Cr 0.50 - 0.90 mg/dL 1.12(H) 1.26(H) 1.27(H) 1.47(H) 1.61(H)   K 3.4 - 4.9 mEq/L 3.9 3.9 4.1 4.5 5.2(H)   Na 135 - 144 mEq/L 143 140 141 143 140     Troponin negative    CTA Head/Neck:  1. No acute intracranial abnormality. 2. Remote bilateral frontal lobe infarcts. 3. No significant stenosis or evidence for occlusion. 4. Evidence of prior aneurysm clipping in the region of the anterior   communicating artery and bifurcation of the left middle cerebral artery   without recurrent aneurysm. Hospital Course:   50-year-old female with history of CAD, CKD 3, prior aneurysm clipping presented with 2-week history of lightheadedness and dizziness. Her systolic blood pressure was 224 on arrival.  She admitted to uncontrolled blood pressure for the past 2 weeks. Her symptoms were felt to be secondary to hypertensive urgency and subsided with blood pressure control. She also had left-sided ear lavage treatment for cerumen impaction. She had her Lopressor changed to carvedilol. Her amlodipine was increased from 5 to 10 mg. She was also started on diuretic-Lasix 40 mg daily.   She will continue to follow closely with her PCP and cardiologist.  She will likely need continued blood pressure medication adjustment as outpatient. She does continue to regularly smoke cigarettes. She was counseled on cessation but is not interested in quitting at this time. Exam on Discharge:   BP (!) 147/59   Pulse 75   Temp 98.1 °F (36.7 °C) (Oral)   Resp 16   Ht 5' 1\" (1.549 m)   Wt 140 lb 3.2 oz (63.6 kg)   SpO2 99%   BMI 26.49 kg/m²   General appearance: alert, cooperative and no distress  Mental Status: oriented to person, place and time and normal affect  Lungs: clear to auscultation bilaterally, normal effort  Heart: regular rate and rhythm, no murmur  Abdomen: soft, nontender, nondistended, bowel sounds present, no masses  Extremities: no edema, redness, tenderness in the calves  Skin: no gross lesions, rashes    Discharge Medication List:     Medication List        START taking these medications      carvedilol 6.25 MG tablet  Commonly known as: COREG  Take 1 tablet by mouth 2 times daily (with meals)     furosemide 40 MG tablet  Commonly known as: LASIX  Take 1 tablet by mouth daily            CHANGE how you take these medications      amLODIPine 10 MG tablet  Commonly known as: NORVASC  Take 1 tablet by mouth daily  Start taking on: October 14, 2022  What changed:   medication strength  how much to take            CONTINUE taking these medications      aspirin 81 MG EC tablet     atorvastatin 40 MG tablet  Commonly known as: LIPITOR  TAKE 1 TABLET BY MOUTH EVERY DAY     Handicap Placard Misc  by Does not apply route Applying from 1/28/2021-1/27/2026.      losartan 100 MG tablet  Commonly known as: COZAAR  TAKE 1 TABLET BY MOUTH EVERY DAY     traZODone 100 MG tablet  Commonly known as: DESYREL  TAKE 1 TABLET BY MOUTH EVERY DAY AT NIGHT            STOP taking these medications      doxazosin 4 MG tablet  Commonly known as: CARDURA     metoprolol tartrate 25 MG tablet  Commonly known as: LOPRESSOR               Where to Get Your Medications        These medications were sent to [place] [address], [place] - [address] [address]      Phone: [phone]   amLODIPine 10 MG tablet  carvedilol 6.25 MG tablet  furosemide 40 MG tablet         DC time 37 minutes    Signed:  Dory North DO  10/13/2022, 1:42 PM

## 2022-10-13 NOTE — PROGRESS NOTES
[address] [facility] Neurology Daily Progress Note  Name: Timoteo Carlos  Age: 81 y.o. Gender: female  CodeStatus: Full Code  Allergies: Amoxicillin  Nsaids    Chief Complaint:Dizziness    Primary Care Provider: Julianne Rashid MD  InpatientTreatment Team: Treatment Team: Attending Provider: Thai Suarez DO; Consulting Physician: Nerissa Camarillo MD; Consulting Physician: Thai Suarez DO; : Ashly Loco RN; Registered Nurse: Ratna Cisneros RN; Patient Care Tech: Anai Odom; Utilization Reviewer: Andrés Cadena RN  Admission Date: 10/12/2022      HPI   Pt seen and examined for neuro follow up for lightheadedness and dizziness in the setting of hypertensive urgency. Patient currently alert and oriented x3, no acute distress, cooperative. Lightheadedness and dizziness has improved. No focal neurodeficits. No seizure activity. Blood pressure control improved. Pt seen and examined,  not dizzy now though intermittent     Vitals:    10/13/22 0959   BP: (!) 147/59   Pulse: 75   Resp: 16   Temp: 98.1 °F (36.7 °C)   SpO2:         Review of Systems   Constitutional:  Negative for appetite change and fever. HENT:  Negative for hearing loss and trouble swallowing. Eyes:  Negative for visual disturbance. Respiratory:  Negative for cough, chest tightness, shortness of breath and wheezing. Cardiovascular:  Negative for chest pain, palpitations and leg swelling. Gastrointestinal:  Negative for abdominal distention, abdominal pain, nausea and vomiting. Musculoskeletal:  Negative for gait problem. Skin:  Negative for color change and rash. Neurological:  Negative for dizziness, tremors, seizures, syncope, facial asymmetry, speech difficulty, weakness, light-headedness, numbness and headaches. Psychiatric/Behavioral:  Negative for agitation, confusion and hallucinations. The patient is not nervous/anxious. Physical Exam  Vitals and nursing note reviewed.    Constitutional: General: She is not in acute distress. Appearance: She is not diaphoretic. HENT:      Head: Normocephalic and atraumatic. Eyes:      Extraocular Movements: Extraocular movements intact. Pupils: Pupils are equal, round, and reactive to light. Cardiovascular:      Rate and Rhythm: Normal rate and regular rhythm. Pulmonary:      Effort: Pulmonary effort is normal. No respiratory distress. Breath sounds: Normal breath sounds. Abdominal:      General: Bowel sounds are normal. There is no distension. Palpations: Abdomen is soft. Tenderness: There is no abdominal tenderness. Skin:     General: Skin is warm and dry. Neurological:      General: No focal deficit present. Mental Status: She is alert and oriented to person, place, and time. Mental status is at baseline.              Medications:  Reviewed    Infusion Medications:    sodium chloride       Scheduled Medications:    [START ON 10/14/2022] amLODIPine  10 mg Oral Daily    furosemide  40 mg Oral Daily    amLODIPine  5 mg Oral Once    carvedilol  6.25 mg Oral BID WC    losartan  100 mg Oral Daily    aspirin  81 mg Oral Daily    atorvastatin  40 mg Oral Daily    sodium chloride flush  5-40 mL IntraVENous 2 times per day    enoxaparin  30 mg SubCUTAneous Daily     PRN Meds: traZODone, sodium chloride flush, sodium chloride, ondansetron **OR** ondansetron, polyethylene glycol, acetaminophen **OR** acetaminophen, labetalol    Labs:   Recent Labs     10/12/22  0900 10/13/22  0532   WBC 6.4 6.4   HGB 10.4* 10.8*   HCT 30.1* 31.5*    208     Recent Labs     10/12/22  0900 10/13/22  0532    143   K 3.9 3.9   * 110*   CO2 23 23   BUN 21 19   CREATININE 1.26* 1.12*   CALCIUM 9.2 8.8     Recent Labs     10/12/22  0900   AST 16   ALT 13   BILITOT 0.3   ALKPHOS 58     Recent Labs     10/12/22  0900   INR 1.0     Recent Labs     10/12/22  0900   CKTOTAL 79   TROPONINI <0.010       Urinalysis:   Lab Results   Component Value Date/Time    NITRU Negative 10/12/2022 09:00 AM    WBCUA 3-5 10/12/2022 09:00 AM    BACTERIA Negative 10/12/2022 09:00 AM    RBCUA 0-2 10/12/2022 09:00 AM    BLOODU Negative 10/12/2022 09:00 AM    SPECGRAV 1.018 10/12/2022 09:00 AM    GLUCOSEU Negative 10/12/2022 09:00 AM       Radiology:   Most recent    EEG No valid procedures specified. MRI of Brain No results found for this or any previous visit. No results found for this or any previous visit. MRA of the Head and Neck: No results found for this or any previous visit. No results found for this or any previous visit. No results found for this or any previous visit. CT of the Head: No results found for this or any previous visit. No results found for this or any previous visit. No results found for this or any previous visit. Carotid duplex: No results found for this or any previous visit. No results found for this or any previous visit. No results found for this or any previous visit. Echo No results found for this or any previous visit. Assessment/Plan:  10/12/22:  Patient is an 27-year-old  female with past medical history of carotid artery stenosis, hypertension, hyperlipidemia, CAD post CABG, CKD stage IV, brain aneurysm with repair who presents with 2 weeks of ongoing lightheaded and dizziness in the setting of hypertensive urgency. No focal neurodeficits. No associated cardiac symptoms. No associated inner ear symptoms. CT of the head and CTA of the head and neck have been completed. Results show no acute findings. There is evidence of aneurysmal clipping in the MARQUIS and left MCA. Also evidence of remote bilateral frontal infarcts. Orthostatic blood pressures are negative. Blood pressure medications have been adjusted. Continue aspirin 81 mg daily. Unable to obtain MRI of the brain due to presence of aneurysmal clips.   Symptoms likely secondary to hypertensive urgency. Patient will need ongoing attention to risk factor modification. Hemoglobin A1c is pending. Will assess lipid panel. We will follow patient's clinical course. 10/13/22:  Dizziness and lightheadedness improving with improvement of blood pressure. Patient also had left ear wax removed which has helped. Blood pressure control improved  Continue aspirin 81 mg daily  Discussed with patient and family her need for ongoing attention to risk factor modification and blood pressure control. Patient to follow-up in the office in 6 weeks with Dr. Nancy Alfaro. Okay to [place] from neurology standpoint. I have personally performed a face to face diagnostic evaluation on this patient, reviewed all data and investigations, and am the sole provider of all clinical decisions on the neurological status of this patient. 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

## 2022-10-13 NOTE — DISCHARGE INSTR - DIET
Good nutrition is important when healing from an illness, injury, or surgery. Follow any nutrition recommendations given to you during your hospital stay. If you were given an oral nutrition supplement while in the hospital, continue to take this supplement at home. You can take it with meals, in-between meals, and/or before bedtime. These supplements can be purchased at most local grocery stores, pharmacies, and chain [employer]-stores. If you have any questions about your diet or nutrition, call the hospital and ask for the dietitian. Low fat, low salt, heart healthy diet.

## 2022-10-13 NOTE — DISCHARGE INSTR - COC
Continuity of Care Form    Patient Name: Clarissa Auguste   :  1940  MRN:  [id number]    Admit date:  10/12/2022  Discharge date:  ***    Code Status Order: Full Code   Advance Directives:     Admitting Physician:  Aravind Antoine DO  PCP: Forest Loomis MD    Discharging Nurse: [facility] Unit/Room#: W444/D115-33  Discharging Unit Phone Number: ***    Emergency Contact:   Extended Emergency Contact Information  Primary Emergency Contact: JUAN HERNANDEZ  Home Phone: [phone]  Relation: Child   needed?  No    Past Surgical History:  Past Surgical History:   Procedure Laterality Date    BACK SURGERY      BRAIN ANEURYSM SURGERY  2012    BREAST LUMPECTOMY Right     CARPAL TUNNEL RELEASE Bilateral     CORONARY ARTERY BYPASS GRAFT      PARTIAL HYSTERECTOMY (CERVIX NOT REMOVED)  1997    TOTAL HIP ARTHROPLASTY Bilateral left side 2017 right side        Immunization History:   Immunization History   Administered Date(s) Administered    COVID-19, MODERNA BLUE border, Primary or Immunocompromised, (age 12y+), IM, 100 mcg/0.5mL 2021, 2021, 2021    Influenza Virus Vaccine 09/15/2020, 09/15/2020    Influenza, FLUARIX, FLULAVAL, FLUZONE (age 10 mo+) AND AFLURIA, (age 1 y+), PF, 0.5mL 2021    Pneumococcal Conjugate 13-valent ([place]ie [place]) 2021    Zoster Recombinant (Shingrix) 2021, 10/29/2021       Active Problems:  Patient Active Problem List   Diagnosis Code    Carotid bruit R09.89    Kidney disease N28.9    CAD (coronary artery disease) I25.10    Bilateral carotid artery stenosis I65.23    Hx of CABGx5 Z95.1    Tobacco abuse Z72.0    Essential hypertension I10    Dyslipidemia E78.5    Chronic renal disease, stage III ([facility] [address] [113448] N18.30    Cervical radiculopathy M54.12    Hypervolemia E87.70    Onychomycosis B35.1    Peripheral vascular disease of foot (HCC) I73.9    Hypertensive urgency I16.0       Isolation/Infection:   Isolation No Isolation          Patient Infection Status       None to display            Nurse Assessment:  Last Vital Signs: BP (!) 147/59   Pulse 75   Temp 98.1 °F (36.7 °C) (Oral)   Resp 16   Ht 5' 1\" (1.549 m)   Wt 140 lb 3.2 oz (63.6 kg)   SpO2 99%   BMI 26.49 kg/m²     Last documented pain score (0-10 scale): Pain Level: 4  Last Weight:   Wt Readings from Last 1 Encounters:   10/13/22 140 lb 3.2 oz (63.6 kg)     Mental Status:  {IP PT MENTAL STATUS:20030}    IV Access:  { KAUSHIK IV ACCESS:231144684}    Nursing Mobility/ADLs:  Walking   {CHP DME FTAK:636582266}  Transfer  {CHP DME GIEV:526201415}  Bathing  {CHP DME CCJQ:884144778}  Dressing  {CHP DME XTPT:937862959}  Toileting  {CHP DME XBKD:702617039}  Feeding  {[facility] DME AYUO:712508953}  Med Admin  {P DME JYJM:926441402}  Med Delivery   { KAUSHIK MED Delivery:633289671}    Wound Care Documentation and Therapy:        Elimination:  Continence: Bowel: {YES / RR:97644}  Bladder: {YES / LR:11239}  Urinary Catheter: {Urinary Catheter:732983927}   Colostomy/Ileostomy/Ileal Conduit: {YES / Sinhala:59065}       Date of Last BM: ***    Intake/Output Summary (Last 24 hours) at 10/13/2022 1122  Last data filed at 10/13/2022 0524  Gross per 24 hour   Intake 220 ml   Output 1400 ml   Net -1180 ml     I/O last 3 completed shifts:   In: 220 [P.O.:220]  Out: 0 [Urine:1400]    Safety Concerns:     [address] Safety Concerns:668190182}    Impairments/Disabilities:      [address] Impairments/Disabilities:349660066}    Nutrition Therapy:  Current Nutrition Therapy:   [address] Diet List:914585387}    Routes of Feeding: {CHP DME Other Feedings:885169384}  Liquids: {Slp liquid thickness:65910}  Daily Fluid Restriction: {CHP DME Yes amt example:758895464}  Last Modified Barium Swallow with Video (Video Swallowing Test): {Done Not Done TU:208774277}    Treatments at the Time of Hospital Discharge:   Respiratory Treatments: ***  Oxygen Therapy:  {Therapy; copd oxygen:58909}  Ventilator:    [facility] CC Vent TITN:755414028}    Rehab Therapies: {THERAPEUTIC INTERVENTION:2664930643}  Weight Bearing Status/Restrictions: {[facility] Weight Bearin}  Other Medical Equipment (for information only, NOT a DME order):  {EQUIPMENT:973389654}  Other Treatments: ***    Patient's personal belongings (please select all that are sent with patient):  {P DME Belongings:766820480}    RN SIGNATURE:  {Esignature:975169867}    CASE MANAGEMENT/SOCIAL WORK SECTION    Inpatient Status Date: ***    Readmission Risk Assessment Score:  Readmission Risk              Risk of Unplanned Readmission:  0           Discharging to Facility/ Agency   Name:   Address:  Phone:  Fax:    Dialysis Facility (if applicable)   Name:  Address:  Dialysis Schedule:  Phone:  Fax:    / signature: {Esignature:518007184}    PHYSICIAN SECTION    Prognosis: {Prognosis:4978298364}    Condition at Discharge: [address] [place] [address] Patient Condition:865856507}    Rehab Potential (if transferring to Rehab): {Prognosis:5263904474}    Recommended Labs or Other Treatments After Discharge: ***    Physician Certification: I certify the above information and transfer of Tanesha Timmons  is necessary for the continuing treatment of the diagnosis listed and that she requires {Admit to Appropriate Level of Care:45269} for {GREATER/LESS:375100389} 30 days.      Update Admission H&P: {CHP DME Changes in HLNYM:993391148}    PHYSICIAN SIGNATURE:  {Esignature:081151483}

## 2022-10-14 ENCOUNTER — CARE COORDINATION (OUTPATIENT)
Dept: CASE MANAGEMENT | Age: 82
End: 2022-10-14

## 2022-10-14 DIAGNOSIS — I16.0 HYPERTENSIVE URGENCY: Primary | ICD-10-CM

## 2022-10-14 PROCEDURE — 1111F DSCHRG MED/CURRENT MED MERGE: CPT | Performed by: INTERNAL MEDICINE

## 2022-10-14 NOTE — CARE COORDINATION
[facility] Care Transitions Initial Follow Up Call    Call within 2 business days of discharge: Yes    Care Transition Nurse contacted the patient by telephone to perform post hospital discharge assessment. Verified name and  with patient as identifiers. Provided introduction to self, and explanation of the Care Transition Nurse role. Patient: Luisito Pettit Patient : 1940   MRN: [id number]  Reason for Admission: Hypertensive Urgency  Discharge Date: 10/13/22 RARS: No data recorded    Last Discharge [address]       Date Complaint Diagnosis Description Type Department Provider    10/12/22 Dizziness Dizziness . .. ED to Hosp-Admission (Discharged) (ADMITTED)  [facility], DO; Chuck Guadarrama. .. Was this an external facility discharge? No Discharge Facility: [place] [address] to be reviewed by the provider   Additional needs identified to be addressed with provider: No  none               Method of communication with provider: none. Spoke with patient today 10/14/22 for initial TCM/hospital discharge follow up for Hypertensive Urgency. Patient states she is feeling better since discharge and admits dizziness has improved. States BP this morning was 165/74 (before meds) which is an improvement from 224/72 on admission. Denies any headaches, confusion or visual changes. Denies any shortness of breath, chest pain or chest discomfort. Provided a complete review of home meds with patient who confirms her DTR will be picking up es scripts for (Coreg/Lasix/Amlodipine) later today at [facility] pharmacy. Confirmed with patient she has Amlodipine 5 mg tabs from previous prescription and will take 2 tabs to equal 10 mg until she gets new script. 1111F code entered. Encouraged patient to log BP readings and take to each ov with physician to help guide treatment plan and make dose adjustments on meds if needed which she acknowledges.      Advised to follow low sodium diet as part of hypertension management. Patient states \" I love salt\". Advised of AHA guidelines to sticking to 1,500-2,000 mg of salt/day for hypertension which patient verbalizes understanding. Patient declines dietician referral at this time. Patient states she is a current smoker. States she smokes 1 ppd of cigarettes. States she has been smoking for 60 plus years. Denies any interest in quitting at this time. Denies interest in NRT or attending smoking cessation program. Discussed benefits of quitting smoking and potential risk involved if she continues to smoke. Confirmed patient is scheduled for HFU appt with PCP at [facility] on 10/18/22 at 10 am and that she will call today to schedule f/u appt with Dr. Sage Huertas (PCP). Patient states she is independent in driving and/or  can take to appts. Denies any complaints or needs at this time. CTN will continue to follow for Care Transition. Care Transition Nurse reviewed discharge instructions, medical action plan, and red flags with patient who verbalized understanding. The patient was given an opportunity to ask questions and does not have any further questions or concerns at this time. Were discharge instructions available to patient? Yes. Reviewed appropriate site of care based on symptoms and resources available to patient including: PCP  Specialist  Urgent care clinics  When to call 911. The patient agrees to contact the PCP office for questions related to their healthcare. Advance Care Planning:   Does patient have an Advance Directive: reviewed and current. Medication reconciliation was performed with patient, who verbalizes understanding of administration of home medications.  Medications reviewed, 1111F entered: yes    Was patient discharged with a pulse oximeter? no    Non-face-to-face services provided:  Scheduled appointment with PCP-CTN confirmed patient is scheduled for HFU appt with PCP at [facility] on 10/18/22 at 10 am.  Scheduled appointment with Specialist-Confirmed with patient she will call today to schedule f/u ppt [facility] Dr. Jim Pierson (neuro) and understands to follow up in 6 weeks as directed. Obtained and reviewed discharge summary and/or continuity of care documents    Offered patient enrollment in the Remote Patient Monitoring (RPM) program for in-home monitoring: Patient declined. Care Transitions 24 Hour Call    Schedule Follow Up Appointment with PCP: Anila Mccall you have a copy of your discharge instructions?: Yes  Do you have all of your prescriptions and are they filled?: No  Have you been contacted by a Cheryle Davies Pharmacist?: No  Have you scheduled your follow up appointment?: No  Do you have support at home?: Alone  Do you feel like you have everything you need to keep you well at home?: Yes  Care Transitions Interventions         Follow Up  Future Appointments   Date Time Provider Aiden Guzman   10/18/2022 10:00 [facility],  [place]   11/30/2022 10:30 AM Renetta Braxton  [place]   6/8/2023 12:00 [place] [address] [place] [address] Transition Nurse provided contact information. Plan for follow-up call in 5-7 days based on severity of symptoms and risk factors. Plan for next call: follow-up appointment-did patient get scheduled to f/u with Dr. Acacia Modi (neuro)?     LORENZO Prasad

## 2022-10-15 LAB
EKG ATRIAL RATE: 66 BPM
EKG P AXIS: 83 DEGREES
EKG P-R INTERVAL: 160 MS
EKG Q-T INTERVAL: 422 MS
EKG QRS DURATION: 88 MS
EKG QTC CALCULATION (BAZETT): 442 MS
EKG R AXIS: 63 DEGREES
EKG T AXIS: 65 DEGREES
EKG VENTRICULAR RATE: 66 BPM

## 2022-10-15 PROCEDURE — 93010 ELECTROCARDIOGRAM REPORT: CPT | Performed by: INTERNAL MEDICINE

## 2022-10-18 ENCOUNTER — OFFICE VISIT (OUTPATIENT)
Dept: FAMILY MEDICINE CLINIC | Age: 82
End: 2022-10-18

## 2022-10-18 VITALS
WEIGHT: 140.2 LBS | OXYGEN SATURATION: 99 % | HEART RATE: 70 BPM | HEIGHT: 61 IN | BODY MASS INDEX: 26.47 KG/M2 | SYSTOLIC BLOOD PRESSURE: 139 MMHG | TEMPERATURE: 97.2 F | DIASTOLIC BLOOD PRESSURE: 56 MMHG

## 2022-10-18 DIAGNOSIS — H81.12 BENIGN PAROXYSMAL POSITIONAL VERTIGO OF LEFT EAR: ICD-10-CM

## 2022-10-18 DIAGNOSIS — Z09 HOSPITAL DISCHARGE FOLLOW-UP: Primary | ICD-10-CM

## 2022-10-18 DIAGNOSIS — I73.9 PERIPHERAL VASCULAR DISEASE OF FOOT (HCC): ICD-10-CM

## 2022-10-18 DIAGNOSIS — H61.23 BILATERAL IMPACTED CERUMEN: ICD-10-CM

## 2022-10-18 DIAGNOSIS — I10 ESSENTIAL HYPERTENSION: ICD-10-CM

## 2022-10-18 RX ORDER — CARVEDILOL 12.5 MG/1
12.5 TABLET ORAL 2 TIMES DAILY
Qty: 60 TABLET | Refills: 5 | Status: SHIPPED | OUTPATIENT
Start: 2022-10-18

## 2022-10-18 NOTE — PROGRESS NOTES
Post-Discharge Transitional Care Follow Up      Nicole Awan   YOB: 1940    Date of Office Visit:  10/18/2022  Date of Hospital Admission: 10/12/22  Date of Hospital Discharge: 10/13/22  Readmission Risk Score (high >=14%. Medium >=10%):No data recorded    Care management risk score Rising risk (score 2-5) and Complex Care (Scores >=6): No Risk Score On File     Non face to face  following discharge, date last encounter closed (first attempt may have been earlier): 10/14/2022     Call initiated 2 business days of discharge: Yes     Hospital discharge follow-up  -     NM DISCHARGE MEDS RECONCILED W/ CURRENT OUTPATIENT MED LIST  Patient with appointment for hospital discharge follow-up. Medication reconciliation completed. Hospital course reviewed. Essential hypertension  -     carvedilol (COREG) 12.5 MG tablet; Take 1 tablet by mouth 2 times daily, Disp-60 tablet, R-5Normal  Patient with essential hypertension. She does remain hypertensive today. She is currently on amlodipine 10 mg daily, carvedilol 6.25 mg, Lasix 40 mg daily and losartan 100 mg daily. She remains hypertensive so we will plan to increase her Coreg from 6.25 mg twice daily to 12.5 mg twice daily. Follow-up with PCP in 4 to 6 weeks to assess response to treatment. Continue to check blood pressures at home. If symptoms worsen or change, return to care sooner. Bilateral impacted cerumen  Patient with bilateral impacted cerumen. It is dry and difficult to remove. We discussed the use of Debrox. She will attempt to use this over the coming weeks. At her follow-up appointment, she will see if it is soft enough to be removed. If she would like it removed sooner, she can return for nurse visit. Benign paroxysmal positional vertigo of left ear  Patient with likely BPPV on the left. We discussed Epley maneuvers. She is going to try these over the next couple weeks. Follow-up as scheduled with PCP.   If symptoms worsen or change, return to care sooner. Follow-up as scheduled. Peripheral vascular disease of foot (HCC)  Stable, chronic. Medical Decision Making: High complexity  Return if symptoms worsen or fail to improve. Subjective:     Chief Complaint   Patient presents with    Follow-Up from [facility] to the ER 10/12/22 with lightheadedness, dizziness & elevated BP. Was admitted. D/C home 10/13/22 with med changes. States she is doing better. Still has some lightheadedness/dizziness, but is much better than it was. States it comes & goes & doesn't last to long usually. Denies any chest pain, heart palpitations or SOB. Is not sched with cardiology until June. HPI    Inpatient course: Discharge summary reviewed- see chart. Interval history/Current status: Patient with appointment for hospital discharge follow-up. She went to the ER on 10/12 with lightheadedness dizziness and elevated blood pressure. She was admitted and discharged home on 10/13 with medication changes. She states she is doing better. She states she still has some lightheadedness/dizziness but is doing much better than it was. She states that it comes and goes but does not last too long. She denies any chest pain, shortness of breath, palpitations. She does follow with cardiology. She does not see them again until June. She was also noted that if she turns her head to the left she noticed the dizziness come on. She states that if she went from a laying down to a standing position she noticed symptoms. She states that the symptoms only lasted a couple minutes. She has no other concerns at this time.     Patient Active Problem List   Diagnosis    Carotid bruit    Kidney disease    CAD (coronary artery disease)    Bilateral carotid artery stenosis    Hx of CABGx5    Tobacco abuse    Essential hypertension    Dyslipidemia    Chronic renal disease, stage III ([facility]) [614067]    Cervical radiculopathy    Hypervolemia Onychomycosis    Peripheral vascular disease of foot ([facility] [address]    Hypertensive urgency    Dizziness       Medications listed as ordered at the time of discharge from hospital     Medication List            Accurate as of October 18, 2022 11:59 PM. If you have any questions, ask your nurse or doctor. START taking these medications      carvedilol 12.5 MG tablet  Commonly known as: COREG  Take 1 tablet by mouth 2 times daily  Started by: Gustavo Kim DO            CONTINUE taking these medications      amLODIPine 10 MG tablet  Commonly known as: NORVASC  Take 1 tablet by mouth daily     aspirin 81 MG EC tablet     atorvastatin 40 MG tablet  Commonly known as: LIPITOR  TAKE 1 TABLET BY MOUTH EVERY DAY     furosemide 40 MG tablet  Commonly known as: LASIX  Take 1 tablet by mouth daily     Handicap Placard Misc  by Does not apply route Applying from 1/28/2021-1/27/2026.      losartan 100 MG tablet  Commonly known as: COZAAR  TAKE 1 TABLET BY MOUTH EVERY DAY     traZODone 100 MG tablet  Commonly known as: DESYREL  TAKE 1 TABLET BY MOUTH EVERY DAY AT NIGHT               Where to Get Your Medications        These medications were sent to [place] [id number] [place] [facility], [place] - [address] [place]      Phone: [phone]   carvedilol 12.5 MG tablet          Medications marked \"taking\" at this time  Outpatient Medications Marked as Taking for the 10/18/22 encounter (Office Visit) with Gustavo Kim DO   Medication Sig Dispense Refill    carvedilol (COREG) 12.5 MG tablet Take 1 tablet by mouth 2 times daily 60 tablet 5    amLODIPine (NORVASC) 10 MG tablet Take 1 tablet by mouth daily 30 tablet 3    furosemide (LASIX) 40 MG tablet Take 1 tablet by mouth daily 30 tablet 1    traZODone (DESYREL) 100 MG tablet TAKE 1 TABLET BY MOUTH EVERY DAY AT NIGHT 90 tablet 1    losartan (COZAAR) 100 MG tablet TAKE 1 TABLET BY MOUTH EVERY DAY 90 tablet 3 atorvastatin (LIPITOR) 40 MG tablet TAKE 1 TABLET BY MOUTH EVERY DAY 90 tablet 3    Handicap Placard MISC by Does not apply route Applying from 1/28/2021-1/27/2026. 1 each 0    aspirin 81 MG EC tablet Take 81 mg by mouth daily          Medications patient taking as of now reconciled against medications ordered at time of hospital discharge: Yes    Review of Systems   Constitutional:  Negative for chills, fatigue, fever and unexpected weight change. HENT:  Negative for congestion, rhinorrhea and sore throat. Eyes:  Negative for discharge. Respiratory:  Negative for cough, shortness of breath and wheezing. Cardiovascular:  Negative for chest pain, palpitations and leg swelling. Gastrointestinal:  Negative for abdominal pain, diarrhea, nausea and vomiting. Genitourinary:  Negative for difficulty urinating. Musculoskeletal:  Negative for arthralgias and joint swelling. Skin:  Negative for rash. Neurological:  Positive for dizziness and light-headedness. Negative for weakness, numbness and headaches. Psychiatric/Behavioral:  Negative for confusion and suicidal ideas. The patient is not nervous/anxious. Objective:    BP (!) 139/56   Pulse 70   Temp 97.2 °F (36.2 °C)   Ht 5' 1\" (1.549 m)   Wt 140 lb 3.2 oz (63.6 kg)   SpO2 99%   BMI 26.49 kg/m²   Physical Exam  Vitals reviewed. Constitutional:       General: She is not in acute distress. HENT:      Head: Normocephalic and atraumatic. Right Ear: There is impacted cerumen. Left Ear: There is impacted cerumen. Eyes:      Conjunctiva/sclera: Conjunctivae normal.   Neck:      Thyroid: No thyromegaly or thyroid tenderness. Cardiovascular:      Rate and Rhythm: Normal rate and regular rhythm. Heart sounds: No murmur heard. No friction rub. No gallop. Pulmonary:      Effort: Pulmonary effort is normal.      Breath sounds: Normal breath sounds. No wheezing, rhonchi or rales.    Abdominal:      General: Bowel sounds are normal. There is no distension. Palpations: Abdomen is soft. Tenderness: There is no abdominal tenderness. Musculoskeletal:         General: No swelling or deformity. Cervical back: Normal range of motion and neck supple. Right lower leg: No edema. Left lower leg: No edema. Lymphadenopathy:      Cervical: No cervical adenopathy. Skin:     General: Skin is warm and dry. Findings: No rash. Neurological:      General: No focal deficit present. Mental Status: She is alert. Gait: Gait is intact. Comments: Positive Yoko-Hallpike on left   Psychiatric:         Mood and Affect: Mood normal.         Behavior: Behavior normal.       An electronic signature was used to authenticate this note.   --Nury Ortiz DO

## 2022-10-19 ASSESSMENT — ENCOUNTER SYMPTOMS
SORE THROAT: 0
SHORTNESS OF BREATH: 0
NAUSEA: 0
VOMITING: 0
COUGH: 0
WHEEZING: 0
ABDOMINAL PAIN: 0
RHINORRHEA: 0
EYE DISCHARGE: 0
DIARRHEA: 0

## 2022-10-21 ENCOUNTER — CARE COORDINATION (OUTPATIENT)
Dept: CASE MANAGEMENT | Age: 82
End: 2022-10-21

## 2022-10-21 NOTE — CARE COORDINATION
[facility] Care Transitions Follow Up Call    Care Transition Nurse contacted the patient by telephone to follow up after admission on 10/12/22. Verified name and  with patient as identifiers. Patient: Zev Glasgow  Patient : 1940   MRN: [id number]  Reason for Admission: Hypertensive Urgency  Discharge Date: 10/13/22 RARS: No data recorded    Needs to be reviewed by the provider   Additional needs identified to be addressed with provider: No  none             Method of communication with provider: none. Spoke with patient today 10/21/22 for TCM/hospital discharge follow up sub call. Patient states BP still running high but improving. States States BP today after meds was 167/60. Denies any headaches, confusion, visual changes, shortness of breath, chest pain or chest discomfort. Confirmed with patient she completed HFU appt with PCP on 10/18/22 ho increased dose on Coreg to 12.5 mg twice dialy to 6.25 mg twice daily. Confirmed with patient she obtained all new meds ordered on discharge since last CTN call. Patient she bought salt substitute and trying to adhere to a low sodium diet as part of HTC control. States she is she is scheduled to follow up with Dr. Juan Hinton (neuro) on 22. Denies any complaints or needs at  this time. CTN will continue to follow for Care Transition. Addressed changes since last contact:   Patient following a low sodium diet, bought salt substitute and obtained all new meds ordered on discharge. Discussed follow-up appointments. If no appointment was previously scheduled, appointment scheduling offered: Yes. Is follow up appointment scheduled within 7 days of discharge? Yes.     Follow Up  Future Appointments   Date Time Provider Aiden Guzman   2022 10:30 AM [facility]   2022  9:00 AM Jason Anaya  [facility]   2023 12:00 PM Phill Finn DO [address] [place] [address] Transition Nurse reviewed discharge instructions and medical action plan with patient and discussed any barriers to care and/or understanding of plan of care after discharge. Discussed appropriate site of care based on symptoms and resources available to patient including: PCP  Specialist  Urgent care clinics  When to call 911. The patient agrees to contact the PCP office for questions related to their healthcare. Advance Care Planning:   reviewed and current. Patients top risk factors for readmission: medical condition-CKD, PVD, CAD and polypharmacy    Offered patient enrollment in the Remote Patient Monitoring (RPM) program for in-home monitoring: Patient declined. Care Transitions Subsequent and Final Call    Subsequent and Final Calls  Do you have any ongoing symptoms?: No  Have your medications changed?: Yes  Patient Reports: CTN confirmed with patient that PCP increased Coreg 10 12.50 mg twice daily from 6.25 mg twice daily  Do you have any questions related to your medications?: No  Do you currently have any active services?: No  Do you have any needs or concerns that I can assist you with?: No  Identified Barriers: None  Care Transitions Interventions    Registered Dietician: Declined      Smoking Cessation: Declined    Other Interventions:             Care Transition Nurse provided contact information for future needs. Plan for follow-up call in 5-7 days based on severity of symptoms and risk factors.       Max Carter, APRN

## 2022-10-22 DIAGNOSIS — I10 ESSENTIAL HYPERTENSION: ICD-10-CM

## 2022-10-22 DIAGNOSIS — E78.5 DYSLIPIDEMIA: ICD-10-CM

## 2022-10-24 RX ORDER — ATORVASTATIN CALCIUM 40 MG/1
TABLET, FILM COATED ORAL
Qty: 90 TABLET | Refills: 3 | Status: SHIPPED | OUTPATIENT
Start: 2022-10-24

## 2022-10-24 RX ORDER — LOSARTAN POTASSIUM 100 MG/1
TABLET ORAL
Qty: 90 TABLET | Refills: 3 | Status: SHIPPED | OUTPATIENT
Start: 2022-10-24 | End: 2022-11-01 | Stop reason: ALTCHOICE

## 2022-10-24 NOTE — TELEPHONE ENCOUNTER
Requesting medication refill. Please approve or deny this request.    Rx requested:  Requested Prescriptions     Pending Prescriptions Disp Refills    losartan (COZAAR) 100 MG tablet [Pharmacy Med Name: LOSARTAN POTASSIUM 100 MG TAB] 90 tablet 3     Sig: TAKE 1 TABLET BY MOUTH EVERY DAY    atorvastatin (LIPITOR) 40 MG tablet [Pharmacy Med Name: ATORVASTATIN 40 MG TABLET] 90 tablet 3     Sig: TAKE 1 TABLET BY MOUTH EVERY DAY         Last Office Visit:   9/1/22      Next Visit Date:  Future Appointments   Date Time Provider [facility]   11/30/2022 10:30 AM Jacey Paula MD [facility]   12/16/2022  9:00 AM Geraldo Marroquin MD [facility]   6/8/2023 12:00 PM Phill Brennan, DO [facility]               Last refill 10/29/22. Please approve or deny.

## 2022-10-28 ENCOUNTER — CARE COORDINATION (OUTPATIENT)
Dept: CASE MANAGEMENT | Age: 82
End: 2022-10-28

## 2022-10-28 NOTE — CARE COORDINATION
[facility] Care Transitions Follow Up Call    Care Transition Nurse contacted the patient by telephone to follow up after admission on 10/12/22. Verified name and  with patient as identifiers. Patient: Edith Allen  Patient : 1940   MRN: [id number]  Reason for Admission: Hypertensive Urgency  Discharge Date: 10/13/22 RARS: No data recorded    Needs to be reviewed by the provider   Additional needs identified to be addressed with provider: Yes  Patient having dizziness r/t high blood presure. Patient scheduled to f/u with Stephan Aragon in offic eamon 22. Encouraged to bring BP log with readings for review which will help guide treatment plan and make any med adjustments which she acknowledges. Method of communication with provider: chart routing. Spoke with patient today 10/28/22 for TCM/hospital discharge follow up sub call. Patient complains of dizziness that does not revolve around any precipitating events. Denies feeling faint or having any falls. States BP this morning before meds was 187/71. Patient took BP while on phone with this CTN and admits BP reading has come down to 112/59 (after meds). States she continues taking Coreg as directed and is on Losartan/Amlodipine/Lasix as well. Noted history includes CKD. Denies any headaches, confusion, visual changes, shortness of breath, chest pain or chest discomfort. Patient states she continues to follow low sodium diet as part of HTN management. Confirmed with patient she is scheduled to follow up with PCP on 22. Encouraged to continue logging BP readings and take log to PCP for review which will help guide treatment plan and make any med changes which she acknowledges. Denies any other complaints or concerns at this time. CTN will continue to follow for Care Transition. Note route to PCP. Addressed changes since last contact:  none  Discussed follow-up appointments.  If no appointment was previously scheduled, appointment scheduling offered: Yes. Is follow up appointment scheduled within 7 days of discharge? Yes. Follow Up  Future Appointments   Date Time Provider Aiden Guzman   11/1/2022  8:15 AM Nando Stanton DO [facility]   11/30/2022 10:30 AM Nemo Roberts  [place] [facility] [place] [facility]   12/16/2022  9:00 AM Camille Ochoa  [facility]   6/8/2023 12:00 PM Phill Kulkarni DO [facility] Transition Nurse reviewed discharge instructions, medical action plan, and red flags with patient and discussed any barriers to care and/or understanding of plan of care after discharge. Discussed appropriate site of care based on symptoms and resources available to patient including: PCP  Specialist  Urgent care clinics  When to call 911. The patient agrees to contact the PCP office for questions related to their healthcare. Advance Care Planning:   reviewed and current. Patients top risk factors for readmission: medical condition-CKD, PVD, CAD and polypharmacy    Offered patient enrollment in the Remote Patient Monitoring (RPM) program for in-home monitoring: Patient declined. Care Transitions Subsequent and Final Call    Subsequent and Final Calls  Do you have any ongoing symptoms?: Yes  Onset of Patient-reported symptoms: Other  Patient-reported symptoms: Other  Have your medications changed?: No  Do you have any questions related to your medications?: No  Do you currently have any active services?: No  Do you have any needs or concerns that I can assist you with?: No  Identified Barriers: Lack of Education  Care Transitions Interventions    Registered Dietician: Declined      Smoking Cessation: Declined    Other Interventions:             Care Transition Nurse provided contact information for future needs. Plan for follow-up call in 5-7 days based on severity of symptoms and risk factors.   Plan for next call: symptom management-has dizziness improved?     Luis Eduardo Navarrete, APRN

## 2022-11-01 ENCOUNTER — OFFICE VISIT (OUTPATIENT)
Dept: FAMILY MEDICINE CLINIC | Age: 82
End: 2022-11-01
Payer: MEDICARE

## 2022-11-01 VITALS
HEIGHT: 61 IN | SYSTOLIC BLOOD PRESSURE: 162 MMHG | BODY MASS INDEX: 26.24 KG/M2 | OXYGEN SATURATION: 98 % | HEART RATE: 68 BPM | TEMPERATURE: 97.1 F | DIASTOLIC BLOOD PRESSURE: 68 MMHG | WEIGHT: 139 LBS

## 2022-11-01 DIAGNOSIS — I10 ESSENTIAL HYPERTENSION: Primary | ICD-10-CM

## 2022-11-01 PROCEDURE — G8427 DOCREV CUR MEDS BY ELIG CLIN: HCPCS | Performed by: STUDENT IN AN ORGANIZED HEALTH CARE EDUCATION/TRAINING PROGRAM

## 2022-11-01 PROCEDURE — 3078F DIAST BP <80 MM HG: CPT | Performed by: STUDENT IN AN ORGANIZED HEALTH CARE EDUCATION/TRAINING PROGRAM

## 2022-11-01 PROCEDURE — G8482 FLU IMMUNIZE ORDER/ADMIN: HCPCS | Performed by: STUDENT IN AN ORGANIZED HEALTH CARE EDUCATION/TRAINING PROGRAM

## 2022-11-01 PROCEDURE — 1123F ACP DISCUSS/DSCN MKR DOCD: CPT | Performed by: STUDENT IN AN ORGANIZED HEALTH CARE EDUCATION/TRAINING PROGRAM

## 2022-11-01 PROCEDURE — 1090F PRES/ABSN URINE INCON ASSESS: CPT | Performed by: STUDENT IN AN ORGANIZED HEALTH CARE EDUCATION/TRAINING PROGRAM

## 2022-11-01 PROCEDURE — 99214 OFFICE O/P EST MOD 30 MIN: CPT | Performed by: STUDENT IN AN ORGANIZED HEALTH CARE EDUCATION/TRAINING PROGRAM

## 2022-11-01 PROCEDURE — G8417 CALC BMI ABV UP PARAM F/U: HCPCS | Performed by: STUDENT IN AN ORGANIZED HEALTH CARE EDUCATION/TRAINING PROGRAM

## 2022-11-01 PROCEDURE — 3074F SYST BP LT 130 MM HG: CPT | Performed by: STUDENT IN AN ORGANIZED HEALTH CARE EDUCATION/TRAINING PROGRAM

## 2022-11-01 PROCEDURE — G8399 PT W/DXA RESULTS DOCUMENT: HCPCS | Performed by: STUDENT IN AN ORGANIZED HEALTH CARE EDUCATION/TRAINING PROGRAM

## 2022-11-01 PROCEDURE — 4004F PT TOBACCO SCREEN RCVD TLK: CPT | Performed by: STUDENT IN AN ORGANIZED HEALTH CARE EDUCATION/TRAINING PROGRAM

## 2022-11-01 RX ORDER — LOSARTAN POTASSIUM AND HYDROCHLOROTHIAZIDE 12.5; 1 MG/1; MG/1
1 TABLET ORAL DAILY
Qty: 30 TABLET | Refills: 1 | Status: SHIPPED | OUTPATIENT
Start: 2022-11-01 | End: 2022-11-23

## 2022-11-01 ASSESSMENT — ENCOUNTER SYMPTOMS
DIARRHEA: 0
RHINORRHEA: 0
ABDOMINAL PAIN: 0
SORE THROAT: 0
COUGH: 0
SHORTNESS OF BREATH: 0
EYE DISCHARGE: 0
VOMITING: 0
NAUSEA: 0
WHEEZING: 0

## 2022-11-01 NOTE — PROGRESS NOTES
Subjective  Timoteomary Carlos, 81 y.o. female presents today with:  Chief Complaint   Patient presents with    Follow-up    Blood Pressure Check    Hypertension       HPI    Patient with follow-up appointment for essentially pretension. She is currently on amlodipine 10 mg daily, carvedilol 12.5 mg twice daily, losartan 100 mg daily. She has been checking her blood pressures at home. She does note that after taking her blood pressure medications her blood pressure does fall to almost normal range. She states that it is high throughout the rest of the day. She does get dizzy at times. She denies any chest pain or shortness of breath. No headaches. She does feel like her blood pressure needs to be under better control. She has no other concerns at this time. Review of Systems   Constitutional:  Negative for chills, fatigue, fever and unexpected weight change. HENT:  Negative for congestion, rhinorrhea and sore throat. Eyes:  Negative for discharge. Respiratory:  Negative for cough, shortness of breath and wheezing. Cardiovascular:  Negative for chest pain, palpitations and leg swelling. Gastrointestinal:  Negative for abdominal pain, diarrhea, nausea and vomiting. Genitourinary:  Negative for difficulty urinating. Musculoskeletal:  Negative for arthralgias and joint swelling. Skin:  Negative for rash. Neurological:  Positive for dizziness. Negative for weakness, light-headedness, numbness and headaches. Psychiatric/Behavioral:  Negative for confusion. The patient is not nervous/anxious.       Past Medical History:   Diagnosis Date    Bilateral carotid artery stenosis 11/19/2020    CAD (coronary artery disease)     Carotid bruit     Dyslipidemia 11/19/2020    Essential hypertension 11/19/2020    Hx of CABG 11/19/2020    Hyperlipidemia     Kidney disease     Stage 4 chronic kidney disease ([facility] [address] 11/19/2020    Stenosis of noncoronary bypass graft ([facility] [address]     Tobacco abuse 11/19/2020 Past Surgical History:   Procedure Laterality Date    BACK SURGERY  2003    BRAIN ANEURYSM SURGERY  2012    BREAST LUMPECTOMY Right     CARPAL TUNNEL RELEASE Bilateral     CORONARY ARTERY BYPASS GRAFT      PARTIAL HYSTERECTOMY (CERVIX NOT REMOVED)  1997    TOTAL HIP ARTHROPLASTY Bilateral left side 2017 right side 2004     Current Outpatient Medications   Medication Sig Dispense Refill    losartan-hydroCHLOROthiazide (HYZAAR) 100-12.5 MG per tablet Take 1 tablet by mouth daily 30 tablet 1    atorvastatin (LIPITOR) 40 MG tablet TAKE 1 TABLET BY MOUTH EVERY DAY 90 tablet 3    carvedilol (COREG) 12.5 MG tablet Take 1 tablet by mouth 2 times daily 60 tablet 5    amLODIPine (NORVASC) 10 MG tablet Take 1 tablet by mouth daily 30 tablet 3    furosemide (LASIX) 40 MG tablet Take 1 tablet by mouth daily 30 tablet 1    traZODone (DESYREL) 100 MG tablet TAKE 1 TABLET BY MOUTH EVERY DAY AT NIGHT 90 tablet 1    Handicap Placard MISC by Does not apply route Applying from 1/28/2021-1/27/2026. 1 each 0    aspirin 81 MG EC tablet Take 81 mg by mouth daily       No current facility-administered medications for this visit. PMH, Surgical Hx, Family Hx, and Social Hx reviewed and updated. Health Maintenance reviewed. Objective    Vitals:    11/01/22 0815 11/01/22 0820 11/01/22 0833   BP: (!) 158/62 (!) 158/64 (!) 162/68   Pulse: 68     Temp: 97.1 °F (36.2 °C)     TempSrc: Temporal     SpO2: 98%     Weight: 139 lb (63 kg)     Height: 5' 1\" (1.549 m)         Physical Exam  Vitals reviewed. Constitutional:       General: She is not in acute distress. HENT:      Head: Normocephalic and atraumatic. Eyes:      Conjunctiva/sclera: Conjunctivae normal.   Neck:      Thyroid: No thyromegaly or thyroid tenderness. Cardiovascular:      Rate and Rhythm: Normal rate and regular rhythm. Heart sounds: No murmur heard. No friction rub. No gallop.    Pulmonary:      Effort: Pulmonary effort is normal.      Breath sounds: Normal breath sounds. No wheezing, rhonchi or rales. Musculoskeletal:         General: No swelling or deformity. Cervical back: Normal range of motion and neck supple. Right lower leg: No edema. Left lower leg: No edema. Lymphadenopathy:      Cervical: No cervical adenopathy. Skin:     General: Skin is warm and dry. Findings: No rash. Neurological:      General: No focal deficit present. Mental Status: She is alert. Gait: Gait is intact. Psychiatric:         Mood and Affect: Mood normal.         Behavior: Behavior normal.          Assessment & Plan       1. Essential hypertension  Not currently controlled. We will add 12.5 mg of hydrochlorothiazide to her regimen. Continue amlodipine 10 mg, losartan 100 mg and carvedilol 12.5 mg. We did discuss increasing the carvedilol, however, pulse is borderline in the 60s. We discussed blood pressure medications at length. All questions were answered. Follow-up with PCP as scheduled. If symptoms worsen or change, return to care sooner. - losartan-hydroCHLOROthiazide (HYZAAR) 100-12.5 MG per tablet; Take 1 tablet by mouth daily  Dispense: 30 tablet; Refill: 1  No orders of the defined types were placed in this encounter. Orders Placed This Encounter   Medications    losartan-hydroCHLOROthiazide (HYZAAR) 100-12.5 MG per tablet     Sig: Take 1 tablet by mouth daily     Dispense:  30 tablet     Refill:  1     Medications Discontinued During This Encounter   Medication Reason    losartan (COZAAR) 100 MG tablet Alternate therapy     Return as scheduled. Reviewed with the patient: current clinical status,medications, activities and diet. Side effects, adverse effects of themedication prescribed today, as well as treatment plan/ rationale and result expectations have been discussed with the patient who expresses understanding and desires to proceed. Close follow up to evaluate treatment results and for coordination of care.   I have reviewed the patient's medical history in detail and updated the computerized patient record. Please note, this report has been partially produced using speech recognition software and may cause  and /or contain errors related to that system including grammar, punctuation and spelling as well as words and phrases that may seem inappropriate. If there are questions or concerns please feel free to contact me to clarify.     Abdirahman Giron, DO

## 2022-11-04 ENCOUNTER — CARE COORDINATION (OUTPATIENT)
Dept: CARE COORDINATION | Age: 82
End: 2022-11-04

## 2022-11-04 NOTE — CARE COORDINATION
[facility] Care Transitions Follow Up Call    Care Transition Nurse contacted the patient by telephone to follow up after admission on 10/12/22. Verified name and  with patient as identifiers. Patient: Fariha Esquivel  Patient : 1940   MRN: [id number]  Reason for Admission: 10/12-10/13/22 Hypertensive Urgency  Discharge Date: 10/13/22 RARS: No data recorded    Needs to be reviewed by the provider   Additional needs identified to be addressed with provider: No  none             Method of communication with provider: none. Spoke with Pt who reports continued lightheadedness/dizziness. Pt states BP this am 183/70. Pt states that BP does fall to a normal range after taking am medications, but by late afternoon it is elevated again. Pt had recent PCP office visit and Losartan Potassium 100 mg was discontinued and Losartan Potassium-HCTZ 100/12.5mg was added. Pt taking as prescribed. Pt continues to keep BP log. Pt has PCP OV scheduled 22. Pt denies CP, SOB, headache. Discussed falls precautions d/t dizziness. Pt v/u. Pt states that she is trying to adhere to a low sodium diet. Declined dietician referral. Discussed trying Mrs Edward Coppola for a salt supplement. Pt v/u. Pt denies needs at this time. Addressed changes since last contact:  medications-as listed above  Discussed follow-up appointments. If no appointment was previously scheduled, appointment scheduling offered: n/a. Is follow up appointment scheduled within 7 days of discharge? Yes.     Follow Up  Future Appointments   Date Time Provider Aiden Guzman   2022 10:30 AM [facility] Solomon  [facility]   2022  9:00 AM MD Merritt Conway [facility] Neurology -   2023 12:00 PM Phill Garcia, DO [address]     [id number] [address] follow up appointment(s):     Care Transition Nurse reviewed discharge instructions, medical action plan, and red flags with patient and discussed any barriers to care and/or understanding of plan of care after discharge. Discussed appropriate site of care based on symptoms and resources available to patient including: PCP  When to call [address] The patient agrees to contact the PCP office for questions related to their healthcare. Advance Care Planning:   Not addressed . Patients top risk factors for readmission: medical condition-HTN, CKD, PVD and polypharmacy  Interventions to address risk factors: Obtained and reviewed discharge summary and/or continuity of care documents    Offered patient enrollment in the Remote Patient Monitoring (RPM) program for in-home monitoring: NA.     Care Transitions Subsequent and Final Call    Schedule Follow Up Appointment with PCP: Completed  Subsequent and Final Calls  Do you have any ongoing symptoms?: Yes  Onset of Patient-reported symptoms: In the past 7 days  Have your medications changed?: Yes  Patient Reports: Losartan Potassiun 100g discontinued; N.O. placed for Lasartan Potassium-HCTZ 100-12.5 mg  Do you have any questions related to your medications?: No  Do you currently have any active services?: No  Do you have any needs or concerns that I can assist you with?: No  Identified Barriers: None  Care Transitions Interventions    Registered Dietician: Declined      Smoking Cessation: Declined    Other Interventions:             Care Transition Nurse provided contact information for future needs. Plan for follow-up call in 5-7 days based on severity of symptoms and risk factors.   Plan for next call: symptom management-HTN  self management-monitor BP, follow low sodium diet  medication management-taking as prescribed, changes    Tayler Delarosa LPN

## 2022-11-09 DIAGNOSIS — I10 ESSENTIAL HYPERTENSION: ICD-10-CM

## 2022-11-09 RX ORDER — CARVEDILOL 12.5 MG/1
TABLET ORAL
Qty: 60 TABLET | Refills: 5 | Status: SHIPPED | OUTPATIENT
Start: 2022-11-09

## 2022-11-09 NOTE — TELEPHONE ENCOUNTER
LOV 11-1-22 NOV no future appointments     Name from pharmacy: CARVEDILOL 12.5 MG TABLET          Will file in chart as: carvedilol (COREG) 12.5 MG tablet    Sig: TAKE 1 TABLET BY MOUTH TWICE A DAY    Disp:  60 tablet    Refills:  5    Start: 11/9/2022    Class: Normal    Non-formulary For: Essential hypertension    Last ordered: 3 weeks ago by Emma Fiore, DO Last refill: 10/18/2022    Rx #: [id number]    Beta-Blockers Protocol Passed 11/09/2022 09:35 AM   Protocol Details  Last Pulse reading greater than 50 recorded within past year    Visit with authorizing provider in past 9 months or upcoming 90 days      To be filled at: [facility] [place], [place] - [address] [place] [address] [phone]

## 2022-11-11 ENCOUNTER — CARE COORDINATION (OUTPATIENT)
Dept: CASE MANAGEMENT | Age: 82
End: 2022-11-11

## 2022-11-11 NOTE — CARE COORDINATION
[facility] Care Transitions Follow Up Call      Patient: Tanesha Timmons  Patient : 1940   MRN: [id number]  Reason for Admission: Hypertensive Urgency  Discharge Date: 10/13/22 RARS: No data recorded    Attempted to contact patient today 22 for TCM/hospital discharge follow up sub call. Left message requesting a return call back to CTN and provided contact information. Noted patient completed f/u appt with PCP on 22 for hypertension and HCTZ 12.5 mg daily was added. CTN will continue to follow for Care Transition.      Tammy Roldan, APRN

## 2022-11-22 ENCOUNTER — CARE COORDINATION (OUTPATIENT)
Dept: CASE MANAGEMENT | Age: 82
End: 2022-11-22

## 2022-11-22 NOTE — CARE COORDINATION
[facility] Care Transitions Follow Up Call    Care Transition Nurse contacted the patient by telephone to follow up after admission on 10/12/22. Verified name and  with patient as identifiers. Patient: Tiffani Cardenas  Patient : 1940   MRN: [id number]  Reason for Admission: Hypertensive Urgency  Discharge Date: 10/13/22 RARS: No data recorded    Needs to be reviewed by the provider   Additional needs identified to be addressed with provider: No  none             Method of communication with provider: none. Spoke with patient today 22 for final TCM/hospital discharge follow up call. Patient states she continues with ongoing dizziness that is no worse. States dizziness does not evolve around any time of day or activity. Denies feeling faint or falls. States she ambulates independently but has cane she uses as needed. CTN advised to refrain from sudden movements such as turning head side-to-side too quick or leaning head too far back or too far forward. Patient states she continues monitoring BP/HR three times daily. States BP readings over the past few days include. : BP/HR= 162/70 and 67 in am, 130/63 and 72 in afternoon, 140/60 and 75 in pm. : BP/HR= 151/65 and 73 in am, didn't check in afternoon, 122/56 and 78 in pm. : BP/HR= 158/63 and 70 in am.     CTN confirmed with patient that PCP started her on Losartan/HCTZ daily and Coreg twice daily which she is taking as directed and tolerating. Patient denies any headaches, confusion or visual changes. Denies any shortness of breath, chest pain or chest discomfort. CTN confirmed with patent she follows with Dr. Humble Wen for cardiology and Dr. Kurt Rehman for neurology. Confirmed with patient she is scheduled to follow up with Dr. Kurt Rehman (neuro) on 22 and with PCP again on 22.      CTN encouraged patient to continue monitoring BP/HR readings and take log to next PCP visit for review which will help guide treatment plan which she verbalizes understanding. Denies any other complaints or concerns. Patient in agreement to final call. CTN signing off. Discussed follow-up appointments. If no appointment was previously scheduled, appointment scheduling offered: Yes. Is follow up appointment scheduled within 7 days of discharge? Yes. Follow Up  Future Appointments   Date Time Provider Aiden Guzman   11/30/2022 10:30 AM Yaniv Byrd  [place] [address]   12/16/2022  9:00 AM MD C[facility] Macias Neurology -   6/8/2023 12:00 PM Phill Dominguez DO [address] Transition Nurse reviewed discharge instructions, medical action plan, and red flags with patient and discussed any barriers to care and/or understanding of plan of care after discharge. Discussed appropriate site of care based on symptoms and resources available to patient including: PCP  Specialist  When to call 911. The patient agrees to contact the PCP office for questions related to their healthcare. Advance Care Planning:   reviewed and current. Patients top risk factors for readmission: lack of knowledge about disease, medical condition-CKD, PVD, CAD, and polypharmacy    Offered patient enrollment in the Remote Patient Monitoring (RPM) program for in-home monitoring: Declined. Care Transitions Subsequent and Final Call    Subsequent and Final Calls  Do you have any ongoing symptoms?: Yes  Onset of Patient-reported symptoms: Other  Patient-reported symptoms: Other  Have your medications changed?: Yes  Patient Reports: CTN confirmed with patient PCP started her on Losartan/HCTZ daily and Coreg twice daily which she is taking as directed.   Do you have any questions related to your medications?: No  Do you currently have any active services?: No  Do you have any needs or concerns that I can assist you with?: No  Identified Barriers: Lack of Education  Care Transitions Interventions    Registered Dietician: Declined      Smoking Cessation: Declined    Other Interventions:             Care Transition Nurse provided contact information for future needs. No further follow-up call indicated based on severity of symptoms and risk factors.       LORENZO Sandra

## 2022-11-23 DIAGNOSIS — I10 ESSENTIAL HYPERTENSION: ICD-10-CM

## 2022-11-23 RX ORDER — LOSARTAN POTASSIUM AND HYDROCHLOROTHIAZIDE 12.5; 1 MG/1; MG/1
TABLET ORAL
Qty: 90 TABLET | Refills: 1 | Status: SHIPPED | OUTPATIENT
Start: 2022-11-23

## 2022-11-30 ENCOUNTER — OFFICE VISIT (OUTPATIENT)
Dept: FAMILY MEDICINE CLINIC | Age: 82
End: 2022-11-30

## 2022-11-30 VITALS
OXYGEN SATURATION: 97 % | DIASTOLIC BLOOD PRESSURE: 64 MMHG | BODY MASS INDEX: 26.43 KG/M2 | WEIGHT: 140 LBS | HEIGHT: 61 IN | SYSTOLIC BLOOD PRESSURE: 128 MMHG | RESPIRATION RATE: 14 BRPM | HEART RATE: 72 BPM

## 2022-11-30 DIAGNOSIS — E04.1 THYROID NODULE: ICD-10-CM

## 2022-11-30 DIAGNOSIS — I10 ESSENTIAL HYPERTENSION: Primary | ICD-10-CM

## 2022-11-30 DIAGNOSIS — N18.4 STAGE 4 CHRONIC KIDNEY DISEASE (HCC): ICD-10-CM

## 2022-11-30 DIAGNOSIS — F17.200 NICOTINE DEPENDENCE, UNCOMPLICATED, UNSPECIFIED NICOTINE PRODUCT TYPE: ICD-10-CM

## 2022-11-30 DIAGNOSIS — N18.31 STAGE 3A CHRONIC KIDNEY DISEASE (HCC): ICD-10-CM

## 2022-11-30 DIAGNOSIS — H81.12 BENIGN PAROXYSMAL POSITIONAL VERTIGO OF LEFT EAR: ICD-10-CM

## 2022-11-30 DIAGNOSIS — E78.5 DYSLIPIDEMIA: ICD-10-CM

## 2022-11-30 DIAGNOSIS — R91.1 LUNG NODULE: ICD-10-CM

## 2022-11-30 DIAGNOSIS — I73.9 PERIPHERAL VASCULAR DISEASE OF FOOT (HCC): ICD-10-CM

## 2022-11-30 LAB
ALBUMIN SERPL-MCNC: 4.2 G/DL (ref 3.5–4.6)
ALP BLD-CCNC: 76 U/L (ref 40–130)
ALT SERPL-CCNC: 13 U/L (ref 0–33)
ANION GAP SERPL CALCULATED.3IONS-SCNC: 16 MEQ/L (ref 9–15)
AST SERPL-CCNC: 23 U/L (ref 0–35)
BILIRUB SERPL-MCNC: 0.3 MG/DL (ref 0.2–0.7)
BUN BLDV-MCNC: 41 MG/DL (ref 8–23)
CALCIUM SERPL-MCNC: 9.9 MG/DL (ref 8.5–9.9)
CHLORIDE BLD-SCNC: 99 MEQ/L (ref 95–107)
CO2: 26 MEQ/L (ref 20–31)
CREAT SERPL-MCNC: 1.94 MG/DL (ref 0.5–0.9)
GFR SERPL CREATININE-BSD FRML MDRD: 25.5 ML/MIN/{1.73_M2}
GLOBULIN: 3.3 G/DL (ref 2.3–3.5)
GLUCOSE BLD-MCNC: 122 MG/DL (ref 70–99)
POTASSIUM SERPL-SCNC: 4.6 MEQ/L (ref 3.4–4.9)
SODIUM BLD-SCNC: 141 MEQ/L (ref 135–144)
TOTAL PROTEIN: 7.5 G/DL (ref 6.3–8)

## 2022-11-30 ASSESSMENT — ENCOUNTER SYMPTOMS
CONSTIPATION: 0
EYE PAIN: 0
COLOR CHANGE: 0
SINUS PAIN: 0
SINUS PRESSURE: 0
BLOOD IN STOOL: 0
VOMITING: 0
RECTAL PAIN: 0
VOICE CHANGE: 0
EYE REDNESS: 0
APNEA: 0
CHEST TIGHTNESS: 0
FACIAL SWELLING: 0
PHOTOPHOBIA: 0
ABDOMINAL PAIN: 0
COUGH: 0
EYE ITCHING: 0
ABDOMINAL DISTENTION: 0
RHINORRHEA: 0
WHEEZING: 0
SHORTNESS OF BREATH: 0
BACK PAIN: 0
TROUBLE SWALLOWING: 0
DIARRHEA: 0
NAUSEA: 0
EYE DISCHARGE: 0
SORE THROAT: 0

## 2022-11-30 NOTE — PROGRESS NOTES
Ct Subjective:      Patient ID: Timoteo Carlos is a 81 y.o. female Established patient, here for evaluation of the following chief complaint(s):  Chief Complaint   Patient presents with    Hypertension       HPI     80-year-old female with history of hypertension, chronic kidney disease stage III, dyslipidemia, hyperlipidemia, nicotine dependence presents for follow-up visit. The patient reports bilateral shoulder pain that is characterizes achy, nonradiating, associated with reduced ability to abduct her arms. Her discomfort is mildly relieved by over-the-counter analgesics    Essential hypertension-compliant with Cozaar 100 mg orally daily, Cardura 4 mg orally daily and Lopressor 25 mg twice daily. The patient's current blood pressure remains markedly elevated at this time. Stage 3 chronic kidney disease (HCC)    Dyslipidemia-compliant with Lipitor 40 mg orally daily    Hyperkalemia      Fatigue, unspecified type    Nicotine dependence, uncomplicated, unspecified nicotine product type-the patient continues to engage in nicotine dependence      Thyroid nodule: The patient has had a history of a thyroid nodule. He was last evaluated last year. Social : 1 dogs brook and briseno-passed away     At present he denies polyuria,  Polydipsia, constitutional, sinus, visual, cardiopulmonary, urologic, gastrointestinal, immunologic/hematologic, musculoskeletal, neurologic,dermatologic, or psychiatric complaints.     Current Outpatient Medications on File Prior to Visit   Medication Sig Dispense Refill    losartan-hydroCHLOROthiazide (HYZAAR) 100-12.5 MG per tablet TAKE 1 TABLET BY MOUTH EVERY DAY 90 tablet 1    carvedilol (COREG) 12.5 MG tablet TAKE 1 TABLET BY MOUTH TWICE A DAY 60 tablet 5    atorvastatin (LIPITOR) 40 MG tablet TAKE 1 TABLET BY MOUTH EVERY DAY 90 tablet 3    amLODIPine (NORVASC) 10 MG tablet Take 1 tablet by mouth daily 30 tablet 3    furosemide (LASIX) 40 MG tablet Take 1 tablet by mouth daily 30 tablet 1    traZODone (DESYREL) 100 MG tablet TAKE 1 TABLET BY MOUTH EVERY DAY AT NIGHT 90 tablet 1    Handicap Placard MISC by Does not apply route Applying from 1/28/2021-1/27/2026. 1 each 0    aspirin 81 MG EC tablet Take 81 mg by mouth daily       No current facility-administered medications on file prior to visit. 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    Hyperlipidemia     Kidney disease     Stage 4 chronic kidney disease ([facility] 11/19/2020    Stenosis of noncoronary bypass graft ([facility]     Tobacco abuse 11/19/2020     Past Surgical History:   Procedure Laterality Date    BACK SURGERY  2003    BRAIN ANEURYSM SURGERY  2012    BREAST LUMPECTOMY Right     CARPAL TUNNEL RELEASE Bilateral     CORONARY ARTERY BYPASS GRAFT      PARTIAL HYSTERECTOMY (CERVIX NOT REMOVED)  1997    TOTAL HIP ARTHROPLASTY Bilateral left side 2017 right side 2004     Social History     Socioeconomic History    Marital status:       Spouse name: Not on file    Number of children: Not on file    Years of education: Not on file    Highest education level: Not on file   Occupational History    Not on file   Tobacco Use    Smoking status: Every Day     Packs/day: 0.50     Years: 60.00     Pack years: 30.00     Types: Cigarettes    Smokeless tobacco: Never   Vaping Use    Vaping Use: Never used   Substance and Sexual Activity    Alcohol use: Never    Drug use: Not on file    Sexual activity: Not Currently   Other Topics Concern    Not on file   Social History Narrative    Not on file     Social Determinants of Health     Financial Resource Strain: Low Risk     Difficulty of Paying Living Expenses: Not hard at all   Food Insecurity: No Food Insecurity    Worried About [facility] in the Last Year: Never true    Ran Out of Food in the Last Year: Never true   Transportation Needs: Not on file   Physical Activity: Not on file   Stress: Not on file   Social Connections: Not on file   Intimate Partner Violence: Not on file   Housing Stability: Not on file     No family history on file. Review of Systems   Constitutional:  Negative for chills, diaphoresis, fatigue and fever. HENT:  Negative for congestion, dental problem, drooling, ear discharge, ear pain, facial swelling, hearing loss, mouth sores, nosebleeds, postnasal drip, rhinorrhea, sinus pressure, sinus pain, sneezing, sore throat, tinnitus, trouble swallowing and voice change. Eyes:  Negative for photophobia, pain, discharge, redness, itching and visual disturbance. Respiratory:  Negative for apnea, cough, chest tightness, shortness of breath and wheezing. Cardiovascular:  Negative for chest pain, palpitations and leg swelling. Gastrointestinal:  Negative for abdominal distention, abdominal pain, blood in stool, constipation, diarrhea, nausea, rectal pain and vomiting. Endocrine: Negative for cold intolerance, heat intolerance, polydipsia, polyphagia and polyuria. Genitourinary:  Negative for decreased urine volume, difficulty urinating, dysuria, flank pain, frequency, genital sores, hematuria and urgency. Musculoskeletal:  Negative for arthralgias, back pain, gait problem, joint swelling, myalgias, neck pain and neck stiffness. Skin:  Negative for color change, rash and wound. Allergic/Immunologic: Negative for environmental allergies and food allergies. Neurological:  Negative for dizziness, tremors, seizures, syncope, facial asymmetry, speech difficulty, weakness, light-headedness, numbness and headaches. Hematological:  Negative for adenopathy. Does not bruise/bleed easily. Psychiatric/Behavioral:  Negative for agitation, confusion, decreased concentration, hallucinations, self-injury, sleep disturbance and suicidal ideas.  The patient is not nervous/anxious. Objective:   /64   Pulse 72   Resp 14   Ht 5' 1\" (1.549 m)   Wt 140 lb (63.5 kg)   SpO2 97%   BMI 26.45 kg/m²     Physical Exam  Constitutional:       General: She is not in acute distress. Appearance: She is well-developed. HENT:      Head: Normocephalic. Right Ear: External ear normal.      Left Ear: External ear normal.   Eyes:      Conjunctiva/sclera: Conjunctivae normal.      Pupils: Pupils are equal, round, and reactive to light. Neck:      Vascular: Carotid bruit present. No JVD. Trachea: No tracheal deviation. Cardiovascular:      Rate and Rhythm: Normal rate and regular rhythm. Heart sounds: Normal heart sounds. Pulmonary:      Effort: Pulmonary effort is normal. No respiratory distress. Breath sounds: Normal breath sounds. No wheezing or rales. Chest:      Chest wall: No tenderness. Abdominal:      General: Bowel sounds are normal. There is no distension. Palpations: Abdomen is soft. There is no mass. Tenderness: There is no abdominal tenderness. There is no guarding or rebound. Musculoskeletal:         General: No tenderness or deformity. Cervical back: Neck supple. Lymphadenopathy:      Cervical: No cervical adenopathy. Skin:     General: Skin is warm and dry. Coloration: Skin is not pale. Findings: No erythema or rash. Neurological:      Mental Status: She is alert and oriented to person, place, and time. Motor: No abnormal muscle tone. Psychiatric:         Thought Content: Thought content normal.         Judgment: Judgment normal.         Assessment:       Diagnosis Orders   1. Essential hypertension        2. Dyslipidemia        3. Stage 3a chronic kidney disease ([address]        4. Benign paroxysmal positional vertigo of left ear        5. Peripheral vascular disease of foot ([address]        6.  Nicotine dependence, uncomplicated, unspecified nicotine product type  CT lung screen [Initial/Annual] 7. Thyroid nodule  US HEAD NECK SOFT TISSUE THYROID      8. Lung nodule        9. Stage 4 chronic kidney disease (HCC)  Comprehensive Metabolic Panel           No results found for: LIPIDPAN, BMP, CMP, CBC, CBCAUTODIF  Plan:          Essential hypertension-continue Cardura 4 mg orally daily Cozaar 100 mg orally daily and metoprolol 25 mg orally daily Norvasc 5 mg orally daily. Dyslipidemia-continue Lipitor 40 mg orally daily      History of hyperkalemia-monitoring. Nicotine dependence, uncomplicated, unspecified nicotine product type-strongly encourage smoking cessation. Obtain a CT scan of the lung. Stage 3a chronic kidney disease (HCC)-we are monitoring renal function closely and encourage the patient to avoid nephrotoxic agents. We will renally dose all medications at the time of prescribing them. Emanuel Castro was evaluated through a synchronous (real-time) audio encounter. Patient identification was verified at the start of the visit. She (or guardian if applicable) is aware that this is a billable service, which includes applicable co-pays. This visit was conducted with the patient's (and/or legal guardian's) verbal consent. She has not had a related appointment within my department in the past 7 days or scheduled within the next 24 hours. The patient was located at Home: [address] [facility] [address] [facility]. The provider was located at [facility] (Appt Dept): [address] [facility],  [address] [place] [address]. Return in about 5 months (around 4/30/2023). On this date 12/04/22 I have spent 30   minutes reviewing previous notes, test results and face to face with the patient discussing the diagnosis and importance of compliance with the treatment plan.      Azar Thomas MD    Please note, this report has been partially produced using speech recognition software  and may cause  and /or contain errors related to that system including grammar, punctuation and spelling as well as words and phrases that may seem inappropriate. If there are questions or concerns please feel free to contact me to clarify.

## 2022-12-01 ENCOUNTER — HOSPITAL ENCOUNTER (OUTPATIENT)
Dept: ULTRASOUND IMAGING | Age: 82
Discharge: HOME | End: 2022-12-03
Payer: MEDICARE

## 2022-12-01 ENCOUNTER — HOSPITAL ENCOUNTER (OUTPATIENT)
Dept: CT IMAGING | Age: 82
Discharge: HOME | End: 2022-12-03
Payer: MEDICARE

## 2022-12-01 DIAGNOSIS — R91.1 LUNG NODULE: ICD-10-CM

## 2022-12-01 DIAGNOSIS — E04.1 THYROID NODULE: ICD-10-CM

## 2022-12-01 DIAGNOSIS — R91.1 LUNG NODULE: Primary | ICD-10-CM

## 2022-12-01 PROCEDURE — 76536 US EXAM OF HEAD AND NECK: CPT

## 2022-12-01 PROCEDURE — 71250 CT THORAX DX C-: CPT

## 2022-12-08 ENCOUNTER — TELEMEDICINE (OUTPATIENT)
Dept: FAMILY MEDICINE CLINIC | Age: 82
End: 2022-12-08

## 2022-12-08 DIAGNOSIS — R91.1 LUNG NODULE: ICD-10-CM

## 2022-12-08 DIAGNOSIS — N17.9 AKI (ACUTE KIDNEY INJURY) (HCC): Primary | ICD-10-CM

## 2022-12-08 ASSESSMENT — ENCOUNTER SYMPTOMS
EYE DISCHARGE: 0
SORE THROAT: 0
BACK PAIN: 0
PHOTOPHOBIA: 0
ABDOMINAL PAIN: 0
VOICE CHANGE: 0
DIARRHEA: 0
NAUSEA: 0
RECTAL PAIN: 0
ABDOMINAL DISTENTION: 0
CHEST TIGHTNESS: 0
RHINORRHEA: 0
SHORTNESS OF BREATH: 0
WHEEZING: 0
VOMITING: 0
CONSTIPATION: 0
EYE REDNESS: 0
BLOOD IN STOOL: 0
SINUS PAIN: 0
COUGH: 0
COLOR CHANGE: 0
SINUS PRESSURE: 0
TROUBLE SWALLOWING: 0
FACIAL SWELLING: 0
APNEA: 0
EYE ITCHING: 0
EYE PAIN: 0

## 2022-12-08 NOTE — PROGRESS NOTES
Ct Subjective:      Patient ID: Iona Bubba is a 81 y.o. female Established patient, here for evaluation of the following chief complaint(s):  Chief Complaint   Patient presents with    Discuss Labs       HPI     81-year-old female with history of hypertension, chronic kidney disease stage III, dyslipidemia, hyperlipidemia, nicotine dependence presents for follow-up visit of an acutely elevated creatinine and CT scan of the lung which revealed a lung nodule          Acute kidney injury on chronic kidney disease stage III: The patient's most recent labs are noted below:      Component      Latest Ref Rng & Units 11/30/2022          11:18 AM   Sodium      135 - 144 mEq/L 141   Potassium      3.4 - 4.9 mEq/L 4.6   Chloride      95 - 107 mEq/L 99   CO2      20 - 31 mEq/L 26   Anion Gap      9 - 15 mEq/L 16 (H)   Glucose, Random      70 - 99 mg/dL 122 (H)   BUN,BUNPL      8 - 23 mg/dL 41 (H)   Creatinine      0.50 - 0.90 mg/dL 1.94 (H)   Est, Glom Filt Rate      >60 25.5 (L)   CALCIUM, SERUM, 337960      8.5 - 9.9 mg/dL 9.9   Total Protein      6.3 - 8.0 g/dL 7.5   Albumin      3.5 - 4.6 g/dL 4.2   Bilirubin      0.2 - 0.7 mg/dL 0.3   Alk Phos      40 - 130 U/L 76   ALT      0 - 33 U/L 13   AST      0 - 35 U/L 23   Globulin      2.3 - 3.5 g/dL 3.3     She has a history of chronic kidney disease stage III however as noted her creatinine is acutely elevated. Lung nodule: CT scan of the lung revealed nodules as noted below:        A nodule that is seen in the lingula has increased in size from 5.6 mm to 8.6   mm. Other nodules that  measure less than 5 mm are stable. RECOMMENDATIONS:   Lung rads 4 x       Findings:  Category 3 or 4 nodules with additional features or imaging   findings that increases the suspicion of malignancy. Management: Chest CT with or without contrast, PET/CT and/or tissue sampling   depending on the probability of malignancy and comorbidities.  PET/CT may be   used when there is a > 8 mm  solid component. For new large nodules that   develop on an annual repeat screening CT, a 1 month LDCT may be recommended   to address potentially infectious or inflammatory conditions. The patient previously reported bilateral shoulder pain that is characterizes achy, nonradiating, associated with reduced ability to abduct her arms. Her discomfort is mildly relieved by over-the-counter analgesics    Essential hypertension-compliant with Cozaar 100 mg orally daily, Cardura 4 mg orally daily and Lopressor 25 mg twice daily. The patient's current blood pressure remains markedly elevated at this time. Dyslipidemia-compliant with Lipitor 40 mg orally daily    Hyperkalemia      Fatigue, unspecified type    Nicotine dependence, uncomplicated, unspecified nicotine product type-the patient continues to engage in nicotine dependence      Thyroid nodule: The patient has had a history of a thyroid nodule. He was last evaluated last year. Social : 1 dogs brook and briseno-passed away     At present he denies polyuria,  Polydipsia, constitutional, sinus, visual, cardiopulmonary, urologic, gastrointestinal, immunologic/hematologic, musculoskeletal, neurologic,dermatologic, or psychiatric complaints. Current Outpatient Medications on File Prior to Visit   Medication Sig Dispense Refill    losartan-hydroCHLOROthiazide (HYZAAR) 100-12.5 MG per tablet TAKE 1 TABLET BY MOUTH EVERY DAY 90 tablet 1    carvedilol (COREG) 12.5 MG tablet TAKE 1 TABLET BY MOUTH TWICE A DAY 60 tablet 5    atorvastatin (LIPITOR) 40 MG tablet TAKE 1 TABLET BY MOUTH EVERY DAY 90 tablet 3    amLODIPine (NORVASC) 10 MG tablet Take 1 tablet by mouth daily 30 tablet 3    furosemide (LASIX) 40 MG tablet Take 1 tablet by mouth daily 30 tablet 1    traZODone (DESYREL) 100 MG tablet TAKE 1 TABLET BY MOUTH EVERY DAY AT NIGHT 90 tablet 1    Handicap Placard MISC by Does not apply route Applying from 1/28/2021-1/27/2026. 1 each 0    aspirin 81 MG EC tablet Take 81 mg by mouth daily       No current facility-administered medications on file prior to visit. 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    Hyperlipidemia     Kidney disease     Stage 4 chronic kidney disease ([facility] [address] 11/19/2020    Stenosis of noncoronary bypass graft ([facility] [address]     Tobacco abuse 11/19/2020     Past Surgical History:   Procedure Laterality Date    BACK SURGERY  2003    BRAIN ANEURYSM SURGERY  2012    BREAST LUMPECTOMY Right     CARPAL TUNNEL RELEASE Bilateral     CORONARY ARTERY BYPASS GRAFT      PARTIAL HYSTERECTOMY (CERVIX NOT REMOVED)  1997    TOTAL HIP ARTHROPLASTY Bilateral left side 2017 right side 2004     Social History     Socioeconomic History    Marital status:       Spouse name: Not on file    Number of children: Not on file    Years of education: Not on file    Highest education level: Not on file   Occupational History    Not on file   Tobacco Use    Smoking status: Every Day     Packs/day: 0.50     Years: 60.00     Pack years: 30.00     Types: Cigarettes    Smokeless tobacco: Never   Vaping Use    Vaping Use: Never used   Substance and Sexual Activity    Alcohol use: Never    Drug use: Not on file    Sexual activity: Not Currently   Other Topics Concern    Not on file   Social History Narrative    Not on file     Social Determinants of Health     Financial Resource Strain: Low Risk     Difficulty of Paying Living Expenses: Not hard at all   Food Insecurity: No Food Insecurity    Worried About Running Out of Food in the Last Year: Never true    Ran Out of Food in the Last Year: Never true   Transportation Needs: Not on file   Physical Activity: Not on file   Stress: Not on file   Social Connections: Not on file   Intimate Partner Violence: Not on file   Housing Stability: Not on file     No family history on file. Review of Systems   Constitutional:  Negative for chills, diaphoresis, fatigue and fever. HENT:  Negative for congestion, dental problem, drooling, ear discharge, ear pain, facial swelling, hearing loss, mouth sores, nosebleeds, postnasal drip, rhinorrhea, sinus pressure, sinus pain, sneezing, sore throat, tinnitus, trouble swallowing and voice change. Eyes:  Negative for photophobia, pain, discharge, redness, itching and visual disturbance. Respiratory:  Negative for apnea, cough, chest tightness, shortness of breath and wheezing. Cardiovascular:  Negative for chest pain, palpitations and leg swelling. Gastrointestinal:  Negative for abdominal distention, abdominal pain, blood in stool, constipation, diarrhea, nausea, rectal pain and vomiting. Endocrine: Negative for cold intolerance, heat intolerance, polydipsia, polyphagia and polyuria. Genitourinary:  Negative for decreased urine volume, difficulty urinating, dysuria, flank pain, frequency, genital sores, hematuria and urgency. Musculoskeletal:  Negative for arthralgias, back pain, gait problem, joint swelling, myalgias, neck pain and neck stiffness. Skin:  Negative for color change, rash and wound. Allergic/Immunologic: Negative for environmental allergies and food allergies. Neurological:  Negative for dizziness, tremors, seizures, syncope, facial asymmetry, speech difficulty, weakness, light-headedness, numbness and headaches. Hematological:  Negative for adenopathy. Does not bruise/bleed easily. Psychiatric/Behavioral:  Negative for agitation, confusion, decreased concentration, hallucinations, self-injury, sleep disturbance and suicidal ideas. The patient is not nervous/anxious. Objective: There were no vitals taken for this visit. Assessment:       Diagnosis Orders   1.  NAZARIO (acute kidney injury) [facility])  Basic Metabolic Panel    Basic Metabolic Panel      2. Lung nodule  Ramon Krishnamurthy MD, Pulmonology, [facility]           No results found for: LIPIDPAN, BMP, CMP, CBC, CBCAUTODIF  Plan:          Essential hypertension-continue Cardura 4 mg orally daily Cozaar 100 mg orally daily and metoprolol 25 mg orally daily Norvasc 5 mg orally daily. Dyslipidemia-continue Lipitor 40 mg orally daily      History of hyperkalemia-monitoring. Nicotine dependence, uncomplicated, unspecified nicotine product type-strongly encourage smoking cessation. Obtain a CT scan of the lung. Stage 3a chronic kidney disease (HCC)-we are monitoring renal function closely and encourage the patient to avoid nephrotoxic agents. We will renally dose all medications at the time of prescribing them. TELEHEALTH EVALUATION -- Audio/Visual (During LOU-99 public health emergency)    -   Oscar Brenner is a 81 y.o. female being evaluated by a Virtual Visit (video visit) encounter to address concerns as mentioned above. A caregiver was present when appropriate. Due to this being a TeleHealth encounter (During BHKZD-95 public [facility] emergency), evaluation of the following organ systems was limited: Vitals/Constitutional/EENT/Resp/CV/GI//MS/Neuro/Skin/Heme-Lymph-Imm. Pursuant to the emergency declaration under the [address] [place] [address] authority and the [employer] Act, this Virtual Visit was conducted with patient's (and/or legal guardian's) consent, to reduce the patient's risk of exposure to COVID-19 and provide necessary medical care. The patient (and/or legal guardian) has also been advised to contact this office for worsening conditions or problems, and seek emergency medical treatment and/or call 911 if deemed necessary. Services were provided through a video synchronous discussion virtually to substitute for in-person clinic visit.  Type of encounter was _x_ Doxy __ MyChart ___Facetime    Patient was located at their home. Provider was located at their ___ home or        __x__ office. --Celsa Kellogg MD on 12/8/2022 at 5:20 PM    An electronic signature was used to authenticate this note. Gee Cazares was evaluated through a synchronous (real-time) audio encounter. Patient identification was verified at the start of the visit. She (or guardian if applicable) is aware that this is a billable service, which includes applicable co-pays. This visit was conducted with the patient's (and/or legal guardian's) verbal consent. She has not had a related appointment within my department in the past 7 days or scheduled within the next 24 hours. The patient was located at Home: [address]. The provider was located at [facility] (Appt Dept): [address],  [address] [facility]. No follow-ups on file. On this date 12/08/22 I have spent 30   minutes reviewing previous notes, test results and face to face with the patient discussing the diagnosis and importance of compliance with the treatment plan. Celsa Kellogg MD    Please note, this report has been partially produced using speech recognition software  and may cause  and /or contain errors related to that system including grammar, punctuation and spelling as well as words and phrases that may seem inappropriate. If there are questions or concerns please feel free to contact me to clarify.

## 2022-12-09 DIAGNOSIS — N17.9 AKI (ACUTE KIDNEY INJURY) (HCC): ICD-10-CM

## 2022-12-09 PROBLEM — N28.9 RENAL IMPAIRMENT: Status: ACTIVE | Noted: 2020-11-02

## 2022-12-09 PROBLEM — R09.89 CAROTID BRUIT: Status: ACTIVE | Noted: 2020-11-02

## 2022-12-09 PROBLEM — I25.10 CORONARY ATHEROSCLEROSIS: Status: ACTIVE | Noted: 2020-11-02

## 2022-12-09 LAB
ANION GAP SERPL CALCULATED.3IONS-SCNC: 12 MEQ/L (ref 9–15)
BUN BLDV-MCNC: 38 MG/DL (ref 8–23)
CALCIUM SERPL-MCNC: 10 MG/DL (ref 8.5–9.9)
CHLORIDE BLD-SCNC: 101 MEQ/L (ref 95–107)
CO2: 25 MEQ/L (ref 20–31)
CREAT SERPL-MCNC: 1.78 MG/DL (ref 0.5–0.9)
GFR SERPL CREATININE-BSD FRML MDRD: 28.3 ML/MIN/{1.73_M2}
GLUCOSE BLD-MCNC: 133 MG/DL (ref 70–99)
POTASSIUM SERPL-SCNC: 4.4 MEQ/L (ref 3.4–4.9)
SODIUM BLD-SCNC: 138 MEQ/L (ref 135–144)

## 2022-12-12 ENCOUNTER — OFFICE VISIT (OUTPATIENT)
Dept: PULMONOLOGY | Age: 82
End: 2022-12-12
Payer: MEDICARE

## 2022-12-12 VITALS
DIASTOLIC BLOOD PRESSURE: 62 MMHG | BODY MASS INDEX: 26.51 KG/M2 | RESPIRATION RATE: 16 BRPM | HEIGHT: 61 IN | SYSTOLIC BLOOD PRESSURE: 136 MMHG | OXYGEN SATURATION: 99 % | WEIGHT: 140.4 LBS | TEMPERATURE: 96.9 F | HEART RATE: 66 BPM

## 2022-12-12 DIAGNOSIS — F17.200 SMOKING: ICD-10-CM

## 2022-12-12 DIAGNOSIS — R91.8 LUNG NODULES: ICD-10-CM

## 2022-12-12 DIAGNOSIS — R91.1 LUNG NODULE: Primary | ICD-10-CM

## 2022-12-12 DIAGNOSIS — Z95.1 HX OF CABG: ICD-10-CM

## 2022-12-12 PROCEDURE — G8427 DOCREV CUR MEDS BY ELIG CLIN: HCPCS | Performed by: INTERNAL MEDICINE

## 2022-12-12 PROCEDURE — 3078F DIAST BP <80 MM HG: CPT | Performed by: INTERNAL MEDICINE

## 2022-12-12 PROCEDURE — 1123F ACP DISCUSS/DSCN MKR DOCD: CPT | Performed by: INTERNAL MEDICINE

## 2022-12-12 PROCEDURE — G8417 CALC BMI ABV UP PARAM F/U: HCPCS | Performed by: INTERNAL MEDICINE

## 2022-12-12 PROCEDURE — G8399 PT W/DXA RESULTS DOCUMENT: HCPCS | Performed by: INTERNAL MEDICINE

## 2022-12-12 PROCEDURE — 3074F SYST BP LT 130 MM HG: CPT | Performed by: INTERNAL MEDICINE

## 2022-12-12 PROCEDURE — G8482 FLU IMMUNIZE ORDER/ADMIN: HCPCS | Performed by: INTERNAL MEDICINE

## 2022-12-12 PROCEDURE — 99204 OFFICE O/P NEW MOD 45 MIN: CPT | Performed by: INTERNAL MEDICINE

## 2022-12-12 PROCEDURE — 4004F PT TOBACCO SCREEN RCVD TLK: CPT | Performed by: INTERNAL MEDICINE

## 2022-12-12 PROCEDURE — 1090F PRES/ABSN URINE INCON ASSESS: CPT | Performed by: INTERNAL MEDICINE

## 2022-12-12 RX ORDER — VARENICLINE TARTRATE 1 MG/1
TABLET, FILM COATED ORAL
Qty: 60 TABLET | Refills: 1 | Status: SHIPPED | OUTPATIENT
Start: 2022-12-12

## 2022-12-12 NOTE — PROGRESS NOTES
Subjective:     Zev Glasgow is a 81 y.o. female who complains today of:     Chief Complaint   Patient presents with    New Patient     Lung Nodule per Dr. Kat Escudero       HPI  Patient presents for lung nodule    Patient presents for evaluation of lung nodule, found on CT chest for lung cancer screening, increased in size from 5 mm to 8.6 mm in 1 year. She has multiple other nodules, some with groundglass changes. She denies chest pain, minimal dyspnea on significant exertion, she has chronic cough productive of phlegm but she does not know the color, no hemoptysis, her weight is stable, she is a smoker for 60 years, currently half a pack and sometimes more per day. No family history of lung cancer, no personal history of malignancy, no nasal congestion or postnasal drip, no heartburn, her weight is stable, no snoring or choking while asleep. Allergies:  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    Hyperlipidemia     Kidney disease     Stage 4 chronic kidney disease ([address] 11/19/2020    Stenosis of noncoronary bypass graft ([facility] [address]     Tobacco abuse 11/19/2020     Past Surgical History:   Procedure Laterality Date    BACK SURGERY  2003    BRAIN ANEURYSM SURGERY  2012    BREAST LUMPECTOMY Right     CARPAL TUNNEL RELEASE Bilateral     CORONARY ARTERY BYPASS GRAFT      PARTIAL HYSTERECTOMY (CERVIX NOT REMOVED)  1997    TOTAL HIP ARTHROPLASTY Bilateral left side 2017 right side 2004     History reviewed. No pertinent family history. Social History     Socioeconomic History    Marital status:       Spouse name: Not on file    Number of children: Not on file    Years of education: Not on file    Highest education level: Not on file   Occupational History    Not on file   Tobacco Use    Smoking status: Every Day     Packs/day: 0.50     Years: 60.00     Pack years: 30.00     Types: Cigarettes    Smokeless tobacco: Never   Vaping Use    Vaping Use: Never used   Substance and Sexual Activity    Alcohol use: Never    Drug use: Not on file    Sexual activity: Not Currently   Other Topics Concern    Not on file   Social History Narrative    Not on file     Social Determinants of Health     Financial Resource Strain: Low Risk     Difficulty of Paying Living Expenses: Not hard at all   Food Insecurity: No Food Insecurity    Worried About Running Out of Food in the Last Year: Never true    Ran Out of Food in the Last Year: Never true   Transportation Needs: Not on file   Physical Activity: Not on file   Stress: Not on file   Social Connections: Not on file   Intimate Partner Violence: Not on file   Housing Stability: Not on file         Review of Systems      ROS: 10 organs review of system is done including general, psychological, ENT, hematological, endocrine, respiratory, cardiovascular, gastrointestinal,musculoskeletal, neurological,  allergy and Immunology is done and is otherwise negative.     Current Outpatient Medications   Medication Sig Dispense Refill    varenicline (CHANTIX) 1 MG tablet 0.5 mg daily x 3 days then 0.5 mg bid x 4 days then 1 mg BID 60 tablet 1    losartan-hydroCHLOROthiazide (HYZAAR) 100-12.5 MG per tablet TAKE 1 TABLET BY MOUTH EVERY DAY 90 tablet 1    carvedilol (COREG) 12.5 MG tablet TAKE 1 TABLET BY MOUTH TWICE A DAY 60 tablet 5    atorvastatin (LIPITOR) 40 MG tablet TAKE 1 TABLET BY MOUTH EVERY DAY 90 tablet 3    amLODIPine (NORVASC) 10 MG tablet Take 1 tablet by mouth daily 30 tablet 3    traZODone (DESYREL) 100 MG tablet TAKE 1 TABLET BY MOUTH EVERY DAY AT NIGHT 90 tablet 1    Handicap Placard MISC by Does not apply route Applying from 1/28/2021-1/27/2026. 1 each 0    aspirin 81 MG EC tablet Take 81 mg by mouth daily      furosemide (LASIX) 40 MG tablet Take 1 tablet by mouth daily (Patient not taking: Reported on 12/12/2022) 30 tablet 1     No current facility-administered medications for this visit. Objective:     Vitals:    12/12/22 0841   BP: 136/62   Site: Right Upper Arm   Position: Sitting   Cuff Size: Medium Adult   Pulse: 66   Resp: 16   Temp: 96.9 °F (36.1 °C)   TempSrc: Infrared   SpO2: 99%   Weight: 140 lb 6.4 oz (63.7 kg)   Height: 5' 1\" (1.549 m)         Physical Exam  Constitutional:       General: She is not in acute distress. Appearance: She is well-developed. She is not diaphoretic. HENT:      Head: Normocephalic and atraumatic. Eyes:      Conjunctiva/sclera: Conjunctivae normal.      Pupils: Pupils are equal, round, and reactive to light. Cardiovascular:      Rate and Rhythm: Normal rate and regular rhythm. Heart sounds: No murmur heard. No friction rub. No gallop. Pulmonary:      Effort: Pulmonary effort is normal. No respiratory distress. Breath sounds: Normal breath sounds. No wheezing or rales. Chest:      Chest wall: No tenderness. Abdominal:      General: There is no distension. Palpations: Abdomen is soft. Tenderness: There is no abdominal tenderness. There is no rebound. Musculoskeletal:         General: No tenderness. Cervical back: Normal range of motion and neck supple. Right lower leg: No edema. Left lower leg: No edema. Lymphadenopathy:      Cervical: No cervical adenopathy. Skin:     General: Skin is warm and dry. Findings: No erythema. Neurological:      Mental Status: She is alert and oriented to person, place, and time. Psychiatric:         Judgment: Judgment normal.       Imaging studies reviewed and interpreted by me CT chest reviewed with patient and her daughter, inferior lingula nodule noted, 8.6 mm in size, multiple other nodules, she also has a groundglass nodule in the right upper lobe  Lab results reviewed in chart     Assessment and Plan       Diagnosis Orders   1.  Lung nodule  PET CT Skull Base To Mid Thigh      2. Smoking  varenicline (CHANTIX) 1 MG tablet      3. Lung nodules        4. Hx of CABGx5          Lung nodule, inferior lingula, increasing in size, patient is a smoker, malignancy risk 15%, will obtain PET scan, if positive consider wedge biopsy, nodule is not accessible with navigational bronchoscopy, also too small for CT-guided biopsy. Groundglass nodule right upper lobe, needs monitoring with repeat CT chest.  Smoking cessation counseling done, reviewed with the patient STA RT steps, Chantix, and nicotine replacement therapy. She is agreeable to start Chantix, reviewed with the patient risk of suicidal ideation, vivid dreams and personality change with Chantix use she is instructed to stop medication and call me if this occurred. Also reviewed with her risk of GI side effects. Orders Placed This Encounter   Procedures    PET CT Skull Base To Mid Thigh     Standing Status:   Future     Standing Expiration Date:   2023     Orders Placed This Encounter   Medications    varenicline (CHANTIX) 1 MG tablet     Si.5 mg daily x 3 days then 0.5 mg bid x 4 days then 1 mg BID     Dispense:  60 tablet     Refill:  1            Discussed with patient the importance of exercise and weight control and  overall health and well-being. Reviewed with the patient: current clinical status, medications, activities and diet. Side effects, adverse effects of the medication prescribed today, as well as treatment plan and result expectations have been discussed with the patient who expresses understanding and desires to proceed. Return in about 4 weeks (around 2023).       Zohra Raphael MD

## 2022-12-16 DIAGNOSIS — N17.9 AKI (ACUTE KIDNEY INJURY) (HCC): ICD-10-CM

## 2022-12-16 LAB
ANION GAP SERPL CALCULATED.3IONS-SCNC: 12 MEQ/L (ref 9–15)
BUN BLDV-MCNC: 30 MG/DL (ref 8–23)
CALCIUM SERPL-MCNC: 9.9 MG/DL (ref 8.5–9.9)
CHLORIDE BLD-SCNC: 105 MEQ/L (ref 95–107)
CO2: 22 MEQ/L (ref 20–31)
CREAT SERPL-MCNC: 1.51 MG/DL (ref 0.5–0.9)
GFR SERPL CREATININE-BSD FRML MDRD: 34.4 ML/MIN/{1.73_M2}
GLUCOSE BLD-MCNC: 103 MG/DL (ref 70–99)
POTASSIUM SERPL-SCNC: 4.4 MEQ/L (ref 3.4–4.9)
SODIUM BLD-SCNC: 139 MEQ/L (ref 135–144)

## 2022-12-28 ENCOUNTER — HOSPITAL ENCOUNTER (OUTPATIENT)
Dept: CT IMAGING | Age: 82
Discharge: HOME | End: 2022-12-30
Payer: MEDICARE

## 2022-12-28 DIAGNOSIS — R91.1 LUNG NODULE: ICD-10-CM

## 2022-12-28 PROCEDURE — A9552 F18 FDG: HCPCS | Performed by: INTERNAL MEDICINE

## 2022-12-28 PROCEDURE — 78815 PET IMAGE W/CT SKULL-THIGH: CPT

## 2022-12-28 PROCEDURE — 3430000000 HC RX DIAGNOSTIC RADIOPHARMACEUTICAL: Performed by: INTERNAL MEDICINE

## 2022-12-28 RX ORDER — FLUDEOXYGLUCOSE F 18 200 MCI/ML
15.3 INJECTION, SOLUTION INTRAVENOUS
Status: COMPLETED | OUTPATIENT
Start: 2022-12-28 | End: 2022-12-28

## 2022-12-28 RX ADMIN — FLUDEOXYGLUCOSE F 18 15.3 MILLICURIE: 200 INJECTION, SOLUTION INTRAVENOUS at 09:05

## 2023-01-02 DIAGNOSIS — R91.1 LUNG NODULE: Primary | ICD-10-CM

## 2023-01-03 DIAGNOSIS — F17.200 SMOKING: ICD-10-CM

## 2023-01-04 RX ORDER — VARENICLINE TARTRATE 1 MG/1
TABLET, FILM COATED ORAL
Qty: 60 TABLET | Refills: 1 | Status: SHIPPED | OUTPATIENT
Start: 2023-01-04

## 2023-01-04 NOTE — TELEPHONE ENCOUNTER
Rx requested:  Requested Prescriptions     Pending Prescriptions Disp Refills    varenicline (CHANTIX) 1 MG tablet [Pharmacy Med Name: VARENICLINE 1 MG TABLET] 60 tablet 1     Sig: TAKE 1/2 TABLET DAILY X3 DAYS, THEN 1/2 TAB TWICE DAILY X4 DAYS, THEN 1 TAB TWICE DAILY THEREAFTER       Last Office Visit:   12/12/2022      Next Visit Date:  Future Appointments   Date Time Provider Aiden Guzman   1/12/2023 11:00 AM Rody Sahni MD [facility]   5/3/2023 10:45 AM Joleen Cerrato MD [facility]   6/8/2023 12:00 PM Phill Flores, DO [facility]

## 2023-01-12 ENCOUNTER — OFFICE VISIT (OUTPATIENT)
Dept: PULMONOLOGY | Age: 83
End: 2023-01-12
Payer: MEDICARE

## 2023-01-12 VITALS
RESPIRATION RATE: 16 BRPM | BODY MASS INDEX: 26.17 KG/M2 | TEMPERATURE: 96.9 F | SYSTOLIC BLOOD PRESSURE: 134 MMHG | HEART RATE: 68 BPM | HEIGHT: 61 IN | DIASTOLIC BLOOD PRESSURE: 60 MMHG | WEIGHT: 138.6 LBS | OXYGEN SATURATION: 99 %

## 2023-01-12 DIAGNOSIS — F17.200 SMOKING: ICD-10-CM

## 2023-01-12 DIAGNOSIS — R91.1 LUNG NODULE: Primary | ICD-10-CM

## 2023-01-12 DIAGNOSIS — R91.8 LUNG NODULES: ICD-10-CM

## 2023-01-12 PROCEDURE — 1090F PRES/ABSN URINE INCON ASSESS: CPT | Performed by: INTERNAL MEDICINE

## 2023-01-12 PROCEDURE — 3078F DIAST BP <80 MM HG: CPT | Performed by: INTERNAL MEDICINE

## 2023-01-12 PROCEDURE — 3075F SYST BP GE 130 - 139MM HG: CPT | Performed by: INTERNAL MEDICINE

## 2023-01-12 PROCEDURE — G8399 PT W/DXA RESULTS DOCUMENT: HCPCS | Performed by: INTERNAL MEDICINE

## 2023-01-12 PROCEDURE — G8427 DOCREV CUR MEDS BY ELIG CLIN: HCPCS | Performed by: INTERNAL MEDICINE

## 2023-01-12 PROCEDURE — 1123F ACP DISCUSS/DSCN MKR DOCD: CPT | Performed by: INTERNAL MEDICINE

## 2023-01-12 PROCEDURE — G8482 FLU IMMUNIZE ORDER/ADMIN: HCPCS | Performed by: INTERNAL MEDICINE

## 2023-01-12 PROCEDURE — 99214 OFFICE O/P EST MOD 30 MIN: CPT | Performed by: INTERNAL MEDICINE

## 2023-01-12 PROCEDURE — G8417 CALC BMI ABV UP PARAM F/U: HCPCS | Performed by: INTERNAL MEDICINE

## 2023-01-12 PROCEDURE — 4004F PT TOBACCO SCREEN RCVD TLK: CPT | Performed by: INTERNAL MEDICINE

## 2023-01-12 NOTE — PROGRESS NOTES
Subjective:     Carina Kebede is a 82 y.o. female who complains today of:     Chief Complaint   Patient presents with    Follow-up     4 Week F/U for Lung Nodule    Results     PET Scan       HPI  Patient presents for lung nodule      Patient presents for lung nodule follow-up, she is doing good, no cough, no chest pain, and no shortness of breath, continues to smoke unfortunately, no fever no chills, no lower extremity edema, and weight is stable no hemoptysis      Allergies:  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    Hyperlipidemia     Kidney disease     Stage 4 chronic kidney disease (HCC) 11/19/2020    Stenosis of noncoronary bypass graft (HCC)     Tobacco abuse 11/19/2020     Past Surgical History:   Procedure Laterality Date    BACK SURGERY  2003    BRAIN ANEURYSM SURGERY  2012    BREAST LUMPECTOMY Right     CARPAL TUNNEL RELEASE Bilateral     CORONARY ARTERY BYPASS GRAFT      PARTIAL HYSTERECTOMY (CERVIX NOT REMOVED)  1997    TOTAL HIP ARTHROPLASTY Bilateral left side 2017 right side 2004     History reviewed. No pertinent family history.  Social History     Socioeconomic History    Marital status:      Spouse name: Not on file    Number of children: Not on file    Years of education: Not on file    Highest education level: Not on file   Occupational History    Not on file   Tobacco Use    Smoking status: Every Day     Packs/day: 0.50     Years: 60.00     Pack years: 30.00     Types: Cigarettes    Smokeless tobacco: Never   Vaping Use    Vaping Use: Never used   Substance and Sexual Activity    Alcohol use: Never    Drug use: Not on file    Sexual activity: Not Currently   Other Topics Concern    Not on file   Social History Narrative    Not on file     Social Determinants of Health  Financial Resource Strain: Low Risk     Difficulty of Paying Living Expenses: Not hard at all   Food Insecurity: No Food Insecurity    Worried About Running Out of Food in the Last Year: Never true    Ran Out of Food in the Last Year: Never true   Transportation Needs: Not on file   Physical Activity: Not on file   Stress: Not on file   Social Connections: Not on file   Intimate Partner Violence: Not on file   Housing Stability: Not on file         Review of Systems      ROS: 10 organs review of system is done including general, psychological, ENT, hematological, endocrine, respiratory, cardiovascular, gastrointestinal,musculoskeletal, neurological,  allergy and Immunology is done and is otherwise negative. Current Outpatient Medications   Medication Sig Dispense Refill    losartan-hydroCHLOROthiazide (HYZAAR) 100-12.5 MG per tablet TAKE 1 TABLET BY MOUTH EVERY DAY 90 tablet 1    carvedilol (COREG) 12.5 MG tablet TAKE 1 TABLET BY MOUTH TWICE A DAY 60 tablet 5    atorvastatin (LIPITOR) 40 MG tablet TAKE 1 TABLET BY MOUTH EVERY DAY 90 tablet 3    amLODIPine (NORVASC) 10 MG tablet Take 1 tablet by mouth daily 30 tablet 3    traZODone (DESYREL) 100 MG tablet TAKE 1 TABLET BY MOUTH EVERY DAY AT NIGHT 90 tablet 1    Handicap Placard MISC by Does not apply route Applying from 1/28/2021-1/27/2026. 1 each 0    aspirin 81 MG EC tablet Take 81 mg by mouth daily      varenicline (CHANTIX) 1 MG tablet TAKE 1/2 TABLET DAILY X3 DAYS, THEN 1/2 TAB TWICE DAILY X4 DAYS, THEN 1 TAB TWICE DAILY THEREAFTER (Patient not taking: Reported on 1/12/2023) 60 tablet 1    furosemide (LASIX) 40 MG tablet Take 1 tablet by mouth daily (Patient not taking: Reported on 1/12/2023) 30 tablet 1     No current facility-administered medications for this visit.        Objective:     Vitals:    01/12/23 1059   BP: 134/60   Site: Right Upper Arm   Position: Sitting   Cuff Size: Medium Adult   Pulse: 68   Resp: 16   Temp: 96.9 °F (36.1 °C)   TempSrc: Infrared   SpO2: 99%   Weight: 138 lb 9.6 oz (62.9 kg)   Height: 5' 1\" (1.549 m)         Physical Exam  Constitutional:       General: She is not in acute distress. Appearance: She is well-developed. She is not diaphoretic. HENT:      Head: Normocephalic and atraumatic. Eyes:      Conjunctiva/sclera: Conjunctivae normal.      Pupils: Pupils are equal, round, and reactive to light. Cardiovascular:      Rate and Rhythm: Normal rate and regular rhythm. Heart sounds: No murmur heard. No friction rub. No gallop. Pulmonary:      Effort: Pulmonary effort is normal. No respiratory distress. Breath sounds: Normal breath sounds. No wheezing or rales. Chest:      Chest wall: No tenderness. Abdominal:      General: There is no distension. Palpations: Abdomen is soft. Tenderness: There is no abdominal tenderness. There is no rebound. Musculoskeletal:         General: No tenderness. Cervical back: Normal range of motion and neck supple. Right lower leg: No edema. Left lower leg: No edema. Lymphadenopathy:      Cervical: No cervical adenopathy. Skin:     General: Skin is warm and dry. Findings: No erythema. Neurological:      Mental Status: She is alert and oriented to person, place, and time. Psychiatric:         Judgment: Judgment normal.       Imaging studies reviewed and interpreted by me PET CT scan December 28 reviewed with patient and her daughter, 9 mm mildly to moderately PET positive nodule in the inferior lingula  Lab results reviewed in chart     Assessment and Plan       Diagnosis Orders   1. Lung nodule  Max Pollard MD, Cardiothoracic Surgery      2. Lung nodules        3.  Smoking          Lung nodule, mildly PET positive, malignancy risk almost 40%,  I discussed finding with Dr. Eladio Perez might be accessible to CT-guided biopsy however difficult  Discussed with the patient and her daughter options to do CT-guided biopsy, monitor, or surgery evaluation for excisional biopsy. Notify genetic testing is still pending  Will refer patient to thoracic surgery for surgical opinion she is more keen to do surgical resection, she understands this could be benign  She also has small groundglass nodule in the right upper lobe which will need follow-up. Smoking cessation strongly recommended      Orders Placed This Encounter   Procedures    Gladis Onofre MD, Cardiothoracic Surgery     Referral Priority:   Routine     Referral Type:   Eval and Treat     Referral Reason:   Specialty Services Required     Referred to Provider:   Reg Monsalve MD     Requested Specialty:   Cardiothoracic Surgery     Number of Visits Requested:   1     No orders of the defined types were placed in this encounter. Discussed with patient the importance of exercise and weight control and  overall health and well-being. Reviewed with the patient: current clinical status, medications, activities and diet. Side effects, adverse effects of the medication prescribed today, as well as treatment plan and result expectations have been discussed with the patient who expresses understanding and desires to proceed. Return in about 4 weeks (around 2/9/2023). I spent  40 min with this patient, greater the 50% of this time was spent in counseling and/or coordinating of care.     Janie Hoffman MD

## 2023-01-18 ENCOUNTER — OFFICE VISIT (OUTPATIENT)
Dept: CARDIOTHORACIC SURGERY | Age: 83
End: 2023-01-18
Payer: MEDICARE

## 2023-01-18 ENCOUNTER — PREP FOR PROCEDURE (OUTPATIENT)
Dept: CARDIOTHORACIC SURGERY | Age: 83
End: 2023-01-18

## 2023-01-18 VITALS — OXYGEN SATURATION: 100 % | HEIGHT: 61 IN | WEIGHT: 138 LBS | BODY MASS INDEX: 26.06 KG/M2 | HEART RATE: 74 BPM

## 2023-01-18 DIAGNOSIS — R91.8 LUNG MASS: Primary | ICD-10-CM

## 2023-01-18 PROBLEM — R91.1 PULMONARY NODULE: Status: ACTIVE | Noted: 2023-01-18

## 2023-01-18 PROCEDURE — G8482 FLU IMMUNIZE ORDER/ADMIN: HCPCS | Performed by: THORACIC SURGERY (CARDIOTHORACIC VASCULAR SURGERY)

## 2023-01-18 PROCEDURE — G8427 DOCREV CUR MEDS BY ELIG CLIN: HCPCS | Performed by: THORACIC SURGERY (CARDIOTHORACIC VASCULAR SURGERY)

## 2023-01-18 PROCEDURE — 99204 OFFICE O/P NEW MOD 45 MIN: CPT | Performed by: THORACIC SURGERY (CARDIOTHORACIC VASCULAR SURGERY)

## 2023-01-18 PROCEDURE — 4004F PT TOBACCO SCREEN RCVD TLK: CPT | Performed by: THORACIC SURGERY (CARDIOTHORACIC VASCULAR SURGERY)

## 2023-01-18 PROCEDURE — G8417 CALC BMI ABV UP PARAM F/U: HCPCS | Performed by: THORACIC SURGERY (CARDIOTHORACIC VASCULAR SURGERY)

## 2023-01-18 PROCEDURE — 1090F PRES/ABSN URINE INCON ASSESS: CPT | Performed by: THORACIC SURGERY (CARDIOTHORACIC VASCULAR SURGERY)

## 2023-01-18 PROCEDURE — G8399 PT W/DXA RESULTS DOCUMENT: HCPCS | Performed by: THORACIC SURGERY (CARDIOTHORACIC VASCULAR SURGERY)

## 2023-01-18 PROCEDURE — 1123F ACP DISCUSS/DSCN MKR DOCD: CPT | Performed by: THORACIC SURGERY (CARDIOTHORACIC VASCULAR SURGERY)

## 2023-01-18 ASSESSMENT — ENCOUNTER SYMPTOMS
COUGH: 0
DIARRHEA: 0
TROUBLE SWALLOWING: 0
STRIDOR: 0
WHEEZING: 0
ABDOMINAL PAIN: 0
SHORTNESS OF BREATH: 0
CHOKING: 0
VOICE CHANGE: 0
NAUSEA: 0
VOMITING: 0
CHEST TIGHTNESS: 0
ABDOMINAL DISTENTION: 0
SORE THROAT: 0

## 2023-01-18 NOTE — PROGRESS NOTES
Subjective:      Patient ID: Mile Walsh is a 81 y.o. female who presents today for:  Chief Complaint   Patient presents with    Mass       HPI  Patient is a longtime smoker and has gotten to lung screening CAT scans. A year ago there is a 5 mm nodule in her left upper lobe. On her most recent scan this increased in size to approximately 9 mm. PET scan showed very mild hyperactivity but no other significant disease. Patient was referred for surgical evaluation. Patient is in her usual state of health. She is more limited from arthritis than from shortness of breath. She is not on oxygen at home. She denies any cough hemoptysis or unexpected weight loss. Past Medical History:   Diagnosis Date    Bilateral carotid artery stenosis 11/19/2020    CAD (coronary artery disease)     Carotid bruit     Dyslipidemia 11/19/2020    Essential hypertension 11/19/2020    Hx of CABG 11/19/2020    Hyperlipidemia     Kidney disease     Stage 4 chronic kidney disease ([facility] [address] 11/19/2020    Stenosis of noncoronary bypass graft ([facility] [address]     Tobacco abuse 11/19/2020      Past Surgical History:   Procedure Laterality Date    BACK SURGERY  2003    BRAIN ANEURYSM SURGERY  2012    BREAST LUMPECTOMY Right     CARPAL TUNNEL RELEASE Bilateral     CORONARY ARTERY BYPASS GRAFT      PARTIAL HYSTERECTOMY (CERVIX NOT REMOVED)  1997    TOTAL HIP ARTHROPLASTY Bilateral left side 2017 right side 2004     Social History     Socioeconomic History    Marital status:       Spouse name: Not on file    Number of children: Not on file    Years of education: Not on file    Highest education level: Not on file   Occupational History    Not on file   Tobacco Use    Smoking status: Every Day     Packs/day: 0.50     Years: 60.00     Pack years: 30.00     Types: Cigarettes    Smokeless tobacco: Never   Vaping Use    Vaping Use: Never used   Substance and Sexual Activity    Alcohol use: Never    Drug use: Not on file    Sexual activity: Not Currently   Other Topics Concern    Not on file   Social History Narrative    Not on file     Social Determinants of Health     Financial Resource Strain: Low Risk     Difficulty of Paying Living Expenses: Not hard at all   Food Insecurity: No Food Insecurity    Worried About Running Out of Food in the Last Year: Never true    Ran Out of Food in the Last Year: Never true   Transportation Needs: Not on file   Physical Activity: Not on file   Stress: Not on file   Social Connections: Not on file   Intimate Partner Violence: Not on file   Housing Stability: Not on file     No family history on file. Allergies   Allergen Reactions    Amoxicillin     Nsaids      CRF 4     Current Outpatient Medications on File Prior to Visit   Medication Sig Dispense Refill    losartan-hydroCHLOROthiazide (HYZAAR) 100-12.5 MG per tablet TAKE 1 TABLET BY MOUTH EVERY DAY 90 tablet 1    carvedilol (COREG) 12.5 MG tablet TAKE 1 TABLET BY MOUTH TWICE A DAY 60 tablet 5    atorvastatin (LIPITOR) 40 MG tablet TAKE 1 TABLET BY MOUTH EVERY DAY 90 tablet 3    amLODIPine (NORVASC) 10 MG tablet Take 1 tablet by mouth daily 30 tablet 3    traZODone (DESYREL) 100 MG tablet TAKE 1 TABLET BY MOUTH EVERY DAY AT NIGHT 90 tablet 1    Handicap Placard MISC by Does not apply route Applying from 1/28/2021-1/27/2026. 1 each 0    aspirin 81 MG EC tablet Take 81 mg by mouth daily      varenicline (CHANTIX) 1 MG tablet TAKE 1/2 TABLET DAILY X3 DAYS, THEN 1/2 TAB TWICE DAILY X4 DAYS, THEN 1 TAB TWICE DAILY THEREAFTER (Patient not taking: No sig reported) 60 tablet 1    furosemide (LASIX) 40 MG tablet Take 1 tablet by mouth daily (Patient not taking: No sig reported) 30 tablet 1     No current facility-administered medications on file prior to visit. Review of Systems   Constitutional:  Negative for activity change, appetite change, chills, diaphoresis, fatigue, fever and unexpected weight change.    HENT:  Negative for sore throat, trouble swallowing and voice change. Eyes:  Negative for visual disturbance. Respiratory:  Negative for cough, choking, chest tightness, shortness of breath, wheezing and stridor. Cardiovascular:  Negative for chest pain, palpitations and leg swelling. Gastrointestinal:  Negative for abdominal distention, abdominal pain, diarrhea, nausea and vomiting. Genitourinary:  Negative for difficulty urinating. Musculoskeletal:  Negative for gait problem and joint swelling. Skin:  Negative for rash and wound. Neurological:  Negative for dizziness, seizures and light-headedness. Hematological:  Negative for adenopathy. Does not bruise/bleed easily. Psychiatric/Behavioral:  Negative for behavioral problems and confusion. Objective:   Pulse 74   Ht 5' 1\" (1.549 m)   Wt 138 lb (62.6 kg)   SpO2 100%   BMI 26.07 kg/m²     Physical Exam  Constitutional:       General: She is not in acute distress. Appearance: She is not ill-appearing, toxic-appearing or diaphoretic. HENT:      Head: Normocephalic and atraumatic. Nose: Nose normal.   Eyes:      Extraocular Movements: Extraocular movements intact. Conjunctiva/sclera: Conjunctivae normal.      Pupils: Pupils are equal, round, and reactive to light. Neck:      Vascular: No carotid bruit. Cardiovascular:      Rate and Rhythm: Normal rate and regular rhythm. Heart sounds: Normal heart sounds. No murmur heard. No gallop. Pulmonary:      Effort: Pulmonary effort is normal. No respiratory distress. Breath sounds: Normal breath sounds. No wheezing or rales. Abdominal:      General: Abdomen is flat. Bowel sounds are normal.      Palpations: Abdomen is soft. There is no mass. Tenderness: There is no abdominal tenderness. Musculoskeletal:         General: No swelling. Cervical back: Neck supple. Right lower leg: No edema. Left lower leg: No edema. Lymphadenopathy:      Cervical: No cervical adenopathy.    Skin:     General: Skin is warm and dry. Neurological:      General: No focal deficit present. Mental Status: She is alert and oriented to person, place, and time. Psychiatric:         Mood and Affect: Mood normal.         Behavior: Behavior normal.         Thought Content: Thought content normal.         Judgment: Judgment normal.             Radiographs and Laboratory Studies:     Diagnostic Imaging Studies:    I personally viewed her last 2 CAT scans as well as her PET scan. There is a smooth-walled nodule in the lingula which is increased significantly in 1 years time. There were 2 very small areas of groundglass opacity in the right upper and mid lung fields of unknown significance. There are changes consistent with coronary artery bypass grafting. Assessment/Plan     Increasing left lung mass with mild PET hyperactivity suspicious for primary lung cancer. I have gone over treatment options. We will get cardiac clearance and pulmonary function test.  As long as these are good the patient agrees with surgical resection of a presumed cancer. We discussed a left VATS upper lobe wedge possible lobectomy. The extent of the surgery will depend on the pulmonary function test as well as the possible scar tissue from her open heart surgery. The patient understands the risk benefits potential outcomes and alternative therapies and agrees to proceed. Surgical date is January 31. Diagnosis Orders   1. Lung mass          No orders of the defined types were placed in this encounter. No orders of the defined types were placed in this encounter. No follow-ups on file.       Makeda Siddiqi MD

## 2023-01-19 RX ORDER — SODIUM CHLORIDE 0.9 % (FLUSH) 0.9 %
5-40 SYRINGE (ML) INJECTION PRN
Status: CANCELLED | OUTPATIENT
Start: 2023-01-19

## 2023-01-19 RX ORDER — SODIUM CHLORIDE 0.9 % (FLUSH) 0.9 %
5-40 SYRINGE (ML) INJECTION EVERY 12 HOURS SCHEDULED
Status: CANCELLED | OUTPATIENT
Start: 2023-01-19

## 2023-01-19 RX ORDER — SODIUM CHLORIDE 9 MG/ML
INJECTION, SOLUTION INTRAVENOUS PRN
Status: CANCELLED | OUTPATIENT
Start: 2023-01-19

## 2023-01-20 ENCOUNTER — HOSPITAL ENCOUNTER (OUTPATIENT)
Dept: PULMONOLOGY | Age: 83
Discharge: HOME | End: 2023-01-20
Payer: MEDICARE

## 2023-01-20 DIAGNOSIS — R91.8 LUNG MASS: ICD-10-CM

## 2023-01-20 PROCEDURE — 6360000002 HC RX W HCPCS

## 2023-01-20 PROCEDURE — 94726 PLETHYSMOGRAPHY LUNG VOLUMES: CPT

## 2023-01-20 PROCEDURE — 94729 DIFFUSING CAPACITY: CPT

## 2023-01-20 PROCEDURE — 94060 EVALUATION OF WHEEZING: CPT

## 2023-01-20 RX ORDER — ALBUTEROL SULFATE 2.5 MG/3ML
SOLUTION RESPIRATORY (INHALATION)
Status: COMPLETED
Start: 2023-01-20 | End: 2023-01-20

## 2023-01-20 RX ADMIN — ALBUTEROL SULFATE 2.5 MG: 2.5 SOLUTION RESPIRATORY (INHALATION) at 08:20

## 2023-01-25 ENCOUNTER — HOSPITAL ENCOUNTER (OUTPATIENT)
Dept: PREADMISSION TESTING | Age: 83
Discharge: HOME | End: 2023-01-29
Payer: MEDICARE

## 2023-01-25 VITALS
OXYGEN SATURATION: 98 % | RESPIRATION RATE: 16 BRPM | WEIGHT: 139.6 LBS | HEIGHT: 61 IN | TEMPERATURE: 97.6 F | BODY MASS INDEX: 26.36 KG/M2 | SYSTOLIC BLOOD PRESSURE: 130 MMHG | HEART RATE: 57 BPM | DIASTOLIC BLOOD PRESSURE: 52 MMHG

## 2023-01-25 LAB
ABO/RH: NORMAL
ANION GAP SERPL CALCULATED.3IONS-SCNC: 13 MEQ/L (ref 9–15)
ANTIBODY SCREEN: NORMAL
BUN BLDV-MCNC: 25 MG/DL (ref 8–23)
CALCIUM SERPL-MCNC: 9.9 MG/DL (ref 8.5–9.9)
CHLORIDE BLD-SCNC: 104 MEQ/L (ref 95–107)
CO2: 22 MEQ/L (ref 20–31)
CREAT SERPL-MCNC: 1.42 MG/DL (ref 0.5–0.9)
EKG ATRIAL RATE: 57 BPM
EKG P AXIS: 74 DEGREES
EKG P-R INTERVAL: 158 MS
EKG Q-T INTERVAL: 440 MS
EKG QRS DURATION: 88 MS
EKG QTC CALCULATION (BAZETT): 428 MS
EKG R AXIS: 64 DEGREES
EKG T AXIS: 75 DEGREES
EKG VENTRICULAR RATE: 57 BPM
GFR SERPL CREATININE-BSD FRML MDRD: 37 ML/MIN/{1.73_M2}
GLUCOSE BLD-MCNC: 107 MG/DL (ref 70–99)
HCT VFR BLD CALC: 33.9 % (ref 37–47)
HEMOGLOBIN: 11.4 G/DL (ref 12–16)
MCH RBC QN AUTO: 31 PG (ref 27–31.3)
MCHC RBC AUTO-ENTMCNC: 33.7 % (ref 33–37)
MCV RBC AUTO: 91.9 FL (ref 79.4–94.8)
PDW BLD-RTO: 12.7 % (ref 11.5–14.5)
PLATELET # BLD: 288 K/UL (ref 130–400)
POTASSIUM SERPL-SCNC: 4.6 MEQ/L (ref 3.4–4.9)
RBC # BLD: 3.69 M/UL (ref 4.2–5.4)
SODIUM BLD-SCNC: 139 MEQ/L (ref 135–144)
WBC # BLD: 9.5 K/UL (ref 4.8–10.8)

## 2023-01-25 PROCEDURE — 93010 ELECTROCARDIOGRAM REPORT: CPT | Performed by: INTERNAL MEDICINE

## 2023-01-25 PROCEDURE — 85027 COMPLETE CBC AUTOMATED: CPT

## 2023-01-25 PROCEDURE — 86901 BLOOD TYPING SEROLOGIC RH(D): CPT

## 2023-01-25 PROCEDURE — 86850 RBC ANTIBODY SCREEN: CPT

## 2023-01-25 PROCEDURE — 93005 ELECTROCARDIOGRAM TRACING: CPT | Performed by: THORACIC SURGERY (CARDIOTHORACIC VASCULAR SURGERY)

## 2023-01-25 PROCEDURE — 86900 BLOOD TYPING SEROLOGIC ABO: CPT

## 2023-01-25 PROCEDURE — 80048 BASIC METABOLIC PNL TOTAL CA: CPT

## 2023-01-27 DIAGNOSIS — F17.200 SMOKING: ICD-10-CM

## 2023-01-27 PROBLEM — R91.8 LUNG MASS: Status: ACTIVE | Noted: 2023-01-18

## 2023-01-30 ENCOUNTER — ANESTHESIA EVENT (OUTPATIENT)
Dept: OPERATING ROOM | Age: 83
End: 2023-01-30
Payer: MEDICARE

## 2023-01-30 ASSESSMENT — LIFESTYLE VARIABLES: SMOKING_STATUS: 1

## 2023-01-31 ENCOUNTER — APPOINTMENT (OUTPATIENT)
Dept: GENERAL RADIOLOGY | Age: 83
DRG: 164 | End: 2023-01-31
Attending: THORACIC SURGERY (CARDIOTHORACIC VASCULAR SURGERY)
Payer: MEDICARE

## 2023-01-31 ENCOUNTER — ANESTHESIA (OUTPATIENT)
Dept: OPERATING ROOM | Age: 83
End: 2023-01-31
Payer: MEDICARE

## 2023-01-31 ENCOUNTER — HOSPITAL ENCOUNTER (INPATIENT)
Age: 83
LOS: 2 days | Discharge: HOME | DRG: 164 | End: 2023-02-02
Attending: THORACIC SURGERY (CARDIOTHORACIC VASCULAR SURGERY) | Admitting: THORACIC SURGERY (CARDIOTHORACIC VASCULAR SURGERY)
Payer: MEDICARE

## 2023-01-31 DIAGNOSIS — R91.8 LUNG MASS: Primary | ICD-10-CM

## 2023-01-31 DIAGNOSIS — R91.1 PULMONARY NODULE: ICD-10-CM

## 2023-01-31 PROCEDURE — 6370000000 HC RX 637 (ALT 250 FOR IP): Performed by: THORACIC SURGERY (CARDIOTHORACIC VASCULAR SURGERY)

## 2023-01-31 PROCEDURE — 2500000003 HC RX 250 WO HCPCS

## 2023-01-31 PROCEDURE — 88331 PATH CONSLTJ SURG 1 BLK 1SPC: CPT

## 2023-01-31 PROCEDURE — 3600000003 HC SURGERY LEVEL 3 BASE: Performed by: THORACIC SURGERY (CARDIOTHORACIC VASCULAR SURGERY)

## 2023-01-31 PROCEDURE — 07B74ZZ EXCISION OF THORAX LYMPHATIC, PERCUTANEOUS ENDOSCOPIC APPROACH: ICD-10-PCS | Performed by: THORACIC SURGERY (CARDIOTHORACIC VASCULAR SURGERY)

## 2023-01-31 PROCEDURE — 6370000000 HC RX 637 (ALT 250 FOR IP): Performed by: STUDENT IN AN ORGANIZED HEALTH CARE EDUCATION/TRAINING PROGRAM

## 2023-01-31 PROCEDURE — 6360000002 HC RX W HCPCS: Performed by: STUDENT IN AN ORGANIZED HEALTH CARE EDUCATION/TRAINING PROGRAM

## 2023-01-31 PROCEDURE — 2580000003 HC RX 258: Performed by: THORACIC SURGERY (CARDIOTHORACIC VASCULAR SURGERY)

## 2023-01-31 PROCEDURE — 32668 THORACOSCOPY W/W RESECT DIAG: CPT | Performed by: THORACIC SURGERY (CARDIOTHORACIC VASCULAR SURGERY)

## 2023-01-31 PROCEDURE — 3E0T3BZ INTRODUCTION OF ANESTHETIC AGENT INTO PERIPHERAL NERVES AND PLEXI, PERCUTANEOUS APPROACH: ICD-10-PCS | Performed by: STUDENT IN AN ORGANIZED HEALTH CARE EDUCATION/TRAINING PROGRAM

## 2023-01-31 PROCEDURE — 32663 THORACOSCOPY W/LOBECTOMY: CPT | Performed by: THORACIC SURGERY (CARDIOTHORACIC VASCULAR SURGERY)

## 2023-01-31 PROCEDURE — C1713 ANCHOR/SCREW BN/BN,TIS/BN: HCPCS | Performed by: THORACIC SURGERY (CARDIOTHORACIC VASCULAR SURGERY)

## 2023-01-31 PROCEDURE — 6360000002 HC RX W HCPCS

## 2023-01-31 PROCEDURE — 88341 IMHCHEM/IMCYTCHM EA ADD ANTB: CPT

## 2023-01-31 PROCEDURE — 3700000000 HC ANESTHESIA ATTENDED CARE: Performed by: THORACIC SURGERY (CARDIOTHORACIC VASCULAR SURGERY)

## 2023-01-31 PROCEDURE — 1210000000 HC MED SURG R&B

## 2023-01-31 PROCEDURE — 2720000010 HC SURG SUPPLY STERILE: Performed by: THORACIC SURGERY (CARDIOTHORACIC VASCULAR SURGERY)

## 2023-01-31 PROCEDURE — 88342 IMHCHEM/IMCYTCHM 1ST ANTB: CPT

## 2023-01-31 PROCEDURE — C1729 CATH, DRAINAGE: HCPCS | Performed by: THORACIC SURGERY (CARDIOTHORACIC VASCULAR SURGERY)

## 2023-01-31 PROCEDURE — 0BTG4ZZ RESECTION OF LEFT UPPER LUNG LOBE, PERCUTANEOUS ENDOSCOPIC APPROACH: ICD-10-PCS | Performed by: THORACIC SURGERY (CARDIOTHORACIC VASCULAR SURGERY)

## 2023-01-31 PROCEDURE — 2709999900 HC NON-CHARGEABLE SUPPLY: Performed by: THORACIC SURGERY (CARDIOTHORACIC VASCULAR SURGERY)

## 2023-01-31 PROCEDURE — 6360000002 HC RX W HCPCS: Performed by: THORACIC SURGERY (CARDIOTHORACIC VASCULAR SURGERY)

## 2023-01-31 PROCEDURE — 2700000000 HC OXYGEN THERAPY PER DAY

## 2023-01-31 PROCEDURE — 88307 TISSUE EXAM BY PATHOLOGIST: CPT

## 2023-01-31 PROCEDURE — 32674 THORACOSCOPY LYMPH NODE EXC: CPT | Performed by: THORACIC SURGERY (CARDIOTHORACIC VASCULAR SURGERY)

## 2023-01-31 PROCEDURE — 88309 TISSUE EXAM BY PATHOLOGIST: CPT

## 2023-01-31 PROCEDURE — 7100000001 HC PACU RECOVERY - ADDTL 15 MIN: Performed by: THORACIC SURGERY (CARDIOTHORACIC VASCULAR SURGERY)

## 2023-01-31 PROCEDURE — 3600000013 HC SURGERY LEVEL 3 ADDTL 15MIN: Performed by: THORACIC SURGERY (CARDIOTHORACIC VASCULAR SURGERY)

## 2023-01-31 PROCEDURE — 2060000000 HC ICU INTERMEDIATE R&B

## 2023-01-31 PROCEDURE — 2580000003 HC RX 258: Performed by: STUDENT IN AN ORGANIZED HEALTH CARE EDUCATION/TRAINING PROGRAM

## 2023-01-31 PROCEDURE — 3700000001 HC ADD 15 MINUTES (ANESTHESIA): Performed by: THORACIC SURGERY (CARDIOTHORACIC VASCULAR SURGERY)

## 2023-01-31 PROCEDURE — 7100000000 HC PACU RECOVERY - FIRST 15 MIN: Performed by: THORACIC SURGERY (CARDIOTHORACIC VASCULAR SURGERY)

## 2023-01-31 PROCEDURE — 88305 TISSUE EXAM BY PATHOLOGIST: CPT

## 2023-01-31 PROCEDURE — 94761 N-INVAS EAR/PLS OXIMETRY MLT: CPT

## 2023-01-31 PROCEDURE — 64999 UNLISTED PX NERVOUS SYSTEM: CPT | Performed by: STUDENT IN AN ORGANIZED HEALTH CARE EDUCATION/TRAINING PROGRAM

## 2023-01-31 PROCEDURE — 71045 X-RAY EXAM CHEST 1 VIEW: CPT

## 2023-01-31 RX ORDER — ONDANSETRON 4 MG/1
4 TABLET, ORALLY DISINTEGRATING ORAL EVERY 8 HOURS PRN
Status: DISCONTINUED | OUTPATIENT
Start: 2023-01-31 | End: 2023-02-02 | Stop reason: HOSPADM

## 2023-01-31 RX ORDER — ONDANSETRON 2 MG/ML
4 INJECTION INTRAMUSCULAR; INTRAVENOUS EVERY 6 HOURS PRN
Status: DISCONTINUED | OUTPATIENT
Start: 2023-01-31 | End: 2023-02-02 | Stop reason: HOSPADM

## 2023-01-31 RX ORDER — SODIUM CHLORIDE 0.9 % (FLUSH) 0.9 %
5-40 SYRINGE (ML) INJECTION PRN
Status: DISCONTINUED | OUTPATIENT
Start: 2023-01-31 | End: 2023-01-31 | Stop reason: HOSPADM

## 2023-01-31 RX ORDER — METOCLOPRAMIDE HYDROCHLORIDE 5 MG/ML
10 INJECTION INTRAMUSCULAR; INTRAVENOUS
Status: DISCONTINUED | OUTPATIENT
Start: 2023-01-31 | End: 2023-01-31 | Stop reason: HOSPADM

## 2023-01-31 RX ORDER — SODIUM CHLORIDE 9 MG/ML
INJECTION, SOLUTION INTRAVENOUS PRN
Status: DISCONTINUED | OUTPATIENT
Start: 2023-01-31 | End: 2023-01-31 | Stop reason: HOSPADM

## 2023-01-31 RX ORDER — CARVEDILOL 12.5 MG/1
12.5 TABLET ORAL 2 TIMES DAILY WITH MEALS
Status: DISCONTINUED | OUTPATIENT
Start: 2023-01-31 | End: 2023-02-02 | Stop reason: HOSPADM

## 2023-01-31 RX ORDER — LIDOCAINE HYDROCHLORIDE 20 MG/ML
INJECTION, SOLUTION INTRAVENOUS PRN
Status: DISCONTINUED | OUTPATIENT
Start: 2023-01-31 | End: 2023-01-31 | Stop reason: SDUPTHER

## 2023-01-31 RX ORDER — HYDROCHLOROTHIAZIDE 12.5 MG/1
12.5 TABLET ORAL DAILY
Status: DISCONTINUED | OUTPATIENT
Start: 2023-01-31 | End: 2023-02-02 | Stop reason: HOSPADM

## 2023-01-31 RX ORDER — FENTANYL CITRATE 50 UG/ML
INJECTION, SOLUTION INTRAMUSCULAR; INTRAVENOUS PRN
Status: DISCONTINUED | OUTPATIENT
Start: 2023-01-31 | End: 2023-01-31 | Stop reason: SDUPTHER

## 2023-01-31 RX ORDER — SODIUM CHLORIDE 9 MG/ML
INJECTION, SOLUTION INTRAVENOUS
Status: COMPLETED
Start: 2023-01-31 | End: 2023-01-31

## 2023-01-31 RX ORDER — ASPIRIN 81 MG/1
81 TABLET ORAL DAILY
Status: DISCONTINUED | OUTPATIENT
Start: 2023-01-31 | End: 2023-02-02 | Stop reason: HOSPADM

## 2023-01-31 RX ORDER — ONDANSETRON 2 MG/ML
4 INJECTION INTRAMUSCULAR; INTRAVENOUS
Status: DISCONTINUED | OUTPATIENT
Start: 2023-01-31 | End: 2023-01-31 | Stop reason: HOSPADM

## 2023-01-31 RX ORDER — DEXTROSE, SODIUM CHLORIDE, SODIUM LACTATE, POTASSIUM CHLORIDE, AND CALCIUM CHLORIDE 5; .6; .31; .03; .02 G/100ML; G/100ML; G/100ML; G/100ML; G/100ML
INJECTION, SOLUTION INTRAVENOUS CONTINUOUS
Status: DISCONTINUED | OUTPATIENT
Start: 2023-01-31 | End: 2023-02-01

## 2023-01-31 RX ORDER — SODIUM CHLORIDE 0.9 % (FLUSH) 0.9 %
5-40 SYRINGE (ML) INJECTION EVERY 12 HOURS SCHEDULED
Status: DISCONTINUED | OUTPATIENT
Start: 2023-01-31 | End: 2023-01-31 | Stop reason: HOSPADM

## 2023-01-31 RX ORDER — DEXAMETHASONE SODIUM PHOSPHATE 4 MG/ML
INJECTION, SOLUTION INTRA-ARTICULAR; INTRALESIONAL; INTRAMUSCULAR; INTRAVENOUS; SOFT TISSUE PRN
Status: DISCONTINUED | OUTPATIENT
Start: 2023-01-31 | End: 2023-01-31 | Stop reason: SDUPTHER

## 2023-01-31 RX ORDER — PROPOFOL 10 MG/ML
INJECTION, EMULSION INTRAVENOUS PRN
Status: DISCONTINUED | OUTPATIENT
Start: 2023-01-31 | End: 2023-01-31 | Stop reason: SDUPTHER

## 2023-01-31 RX ORDER — TRAZODONE HYDROCHLORIDE 50 MG/1
100 TABLET ORAL NIGHTLY
Status: DISCONTINUED | OUTPATIENT
Start: 2023-01-31 | End: 2023-02-02 | Stop reason: HOSPADM

## 2023-01-31 RX ORDER — POLYETHYLENE GLYCOL 3350 17 G/17G
17 POWDER, FOR SOLUTION ORAL DAILY PRN
Status: DISCONTINUED | OUTPATIENT
Start: 2023-01-31 | End: 2023-02-02 | Stop reason: HOSPADM

## 2023-01-31 RX ORDER — EPHEDRINE SULFATE/0.9% NACL/PF 50 MG/5 ML
SYRINGE (ML) INTRAVENOUS PRN
Status: DISCONTINUED | OUTPATIENT
Start: 2023-01-31 | End: 2023-01-31 | Stop reason: SDUPTHER

## 2023-01-31 RX ORDER — SODIUM CHLORIDE 9 MG/ML
INJECTION, SOLUTION INTRAVENOUS PRN
Status: DISCONTINUED | OUTPATIENT
Start: 2023-01-31 | End: 2023-02-02 | Stop reason: HOSPADM

## 2023-01-31 RX ORDER — MAGNESIUM SULFATE 1 G/100ML
1000 INJECTION INTRAVENOUS PRN
Status: DISCONTINUED | OUTPATIENT
Start: 2023-01-31 | End: 2023-02-02 | Stop reason: HOSPADM

## 2023-01-31 RX ORDER — ROCURONIUM BROMIDE 10 MG/ML
INJECTION, SOLUTION INTRAVENOUS PRN
Status: DISCONTINUED | OUTPATIENT
Start: 2023-01-31 | End: 2023-01-31 | Stop reason: SDUPTHER

## 2023-01-31 RX ORDER — CEFAZOLIN SODIUM IN 0.9 % NACL 2 G/100 ML
2000 PLASTIC BAG, INJECTION (ML) INTRAVENOUS
Status: COMPLETED | OUTPATIENT
Start: 2023-01-31 | End: 2023-01-31

## 2023-01-31 RX ORDER — OXYCODONE HYDROCHLORIDE 5 MG/1
5 TABLET ORAL
Status: DISCONTINUED | OUTPATIENT
Start: 2023-01-31 | End: 2023-01-31 | Stop reason: HOSPADM

## 2023-01-31 RX ORDER — SODIUM CHLORIDE 9 MG/ML
25 INJECTION, SOLUTION INTRAVENOUS PRN
Status: DISCONTINUED | OUTPATIENT
Start: 2023-01-31 | End: 2023-01-31 | Stop reason: HOSPADM

## 2023-01-31 RX ORDER — ROPIVACAINE HYDROCHLORIDE 5 MG/ML
INJECTION, SOLUTION EPIDURAL; INFILTRATION; PERINEURAL
Status: COMPLETED | OUTPATIENT
Start: 2023-01-31 | End: 2023-01-31

## 2023-01-31 RX ORDER — POTASSIUM CHLORIDE 7.45 MG/ML
10 INJECTION INTRAVENOUS PRN
Status: DISCONTINUED | OUTPATIENT
Start: 2023-01-31 | End: 2023-02-02 | Stop reason: HOSPADM

## 2023-01-31 RX ORDER — ATORVASTATIN CALCIUM 40 MG/1
40 TABLET, FILM COATED ORAL DAILY
Status: DISCONTINUED | OUTPATIENT
Start: 2023-01-31 | End: 2023-02-02 | Stop reason: HOSPADM

## 2023-01-31 RX ORDER — MAGNESIUM HYDROXIDE 1200 MG/15ML
LIQUID ORAL PRN
Status: DISCONTINUED | OUTPATIENT
Start: 2023-01-31 | End: 2023-01-31 | Stop reason: ALTCHOICE

## 2023-01-31 RX ORDER — SODIUM CHLORIDE 9 MG/ML
INJECTION, SOLUTION INTRAVENOUS
Status: DISPENSED
Start: 2023-01-31 | End: 2023-01-31

## 2023-01-31 RX ORDER — DIPHENHYDRAMINE HYDROCHLORIDE 50 MG/ML
12.5 INJECTION INTRAMUSCULAR; INTRAVENOUS
Status: DISCONTINUED | OUTPATIENT
Start: 2023-01-31 | End: 2023-01-31 | Stop reason: HOSPADM

## 2023-01-31 RX ORDER — NALOXONE HYDROCHLORIDE 0.4 MG/ML
INJECTION, SOLUTION INTRAMUSCULAR; INTRAVENOUS; SUBCUTANEOUS PRN
Status: DISCONTINUED | OUTPATIENT
Start: 2023-01-31 | End: 2023-02-02 | Stop reason: HOSPADM

## 2023-01-31 RX ORDER — FENTANYL CITRATE 0.05 MG/ML
50 INJECTION, SOLUTION INTRAMUSCULAR; INTRAVENOUS EVERY 10 MIN PRN
Status: DISCONTINUED | OUTPATIENT
Start: 2023-01-31 | End: 2023-01-31 | Stop reason: HOSPADM

## 2023-01-31 RX ORDER — SODIUM CHLORIDE 0.9 % (FLUSH) 0.9 %
5-40 SYRINGE (ML) INJECTION PRN
Status: DISCONTINUED | OUTPATIENT
Start: 2023-01-31 | End: 2023-02-02 | Stop reason: HOSPADM

## 2023-01-31 RX ORDER — AMLODIPINE BESYLATE 10 MG/1
10 TABLET ORAL DAILY
Status: DISCONTINUED | OUTPATIENT
Start: 2023-01-31 | End: 2023-02-02 | Stop reason: HOSPADM

## 2023-01-31 RX ORDER — ONDANSETRON 2 MG/ML
INJECTION INTRAMUSCULAR; INTRAVENOUS PRN
Status: DISCONTINUED | OUTPATIENT
Start: 2023-01-31 | End: 2023-01-31 | Stop reason: SDUPTHER

## 2023-01-31 RX ORDER — LOSARTAN POTASSIUM AND HYDROCHLOROTHIAZIDE 12.5; 1 MG/1; MG/1
1 TABLET ORAL DAILY
Status: DISCONTINUED | OUTPATIENT
Start: 2023-01-31 | End: 2023-01-31 | Stop reason: CLARIF

## 2023-01-31 RX ORDER — ENOXAPARIN SODIUM 100 MG/ML
30 INJECTION SUBCUTANEOUS DAILY
Status: DISCONTINUED | OUTPATIENT
Start: 2023-01-31 | End: 2023-02-02

## 2023-01-31 RX ORDER — LIDOCAINE 4 G/G
1 PATCH TOPICAL DAILY
Status: DISCONTINUED | OUTPATIENT
Start: 2023-02-01 | End: 2023-02-02 | Stop reason: HOSPADM

## 2023-01-31 RX ORDER — SODIUM CHLORIDE 0.9 % (FLUSH) 0.9 %
5-40 SYRINGE (ML) INJECTION EVERY 12 HOURS SCHEDULED
Status: DISCONTINUED | OUTPATIENT
Start: 2023-01-31 | End: 2023-02-02 | Stop reason: HOSPADM

## 2023-01-31 RX ORDER — LOSARTAN POTASSIUM 100 MG/1
100 TABLET ORAL DAILY
Status: DISCONTINUED | OUTPATIENT
Start: 2023-01-31 | End: 2023-02-02 | Stop reason: HOSPADM

## 2023-01-31 RX ORDER — SCOLOPAMINE TRANSDERMAL SYSTEM 1 MG/1
1 PATCH, EXTENDED RELEASE TRANSDERMAL
Status: DISCONTINUED | OUTPATIENT
Start: 2023-01-31 | End: 2023-02-02 | Stop reason: HOSPADM

## 2023-01-31 RX ORDER — ACETAMINOPHEN 325 MG/1
650 TABLET ORAL EVERY 4 HOURS PRN
Status: DISCONTINUED | OUTPATIENT
Start: 2023-01-31 | End: 2023-02-02 | Stop reason: HOSPADM

## 2023-01-31 RX ORDER — FAMOTIDINE 20 MG/1
20 TABLET, FILM COATED ORAL 2 TIMES DAILY
Status: DISCONTINUED | OUTPATIENT
Start: 2023-01-31 | End: 2023-02-02 | Stop reason: HOSPADM

## 2023-01-31 RX ORDER — MIDAZOLAM HYDROCHLORIDE 1 MG/ML
INJECTION INTRAMUSCULAR; INTRAVENOUS PRN
Status: DISCONTINUED | OUTPATIENT
Start: 2023-01-31 | End: 2023-01-31 | Stop reason: SDUPTHER

## 2023-01-31 RX ORDER — LIDOCAINE HYDROCHLORIDE 10 MG/ML
1 INJECTION, SOLUTION EPIDURAL; INFILTRATION; INTRACAUDAL; PERINEURAL
Status: DISCONTINUED | OUTPATIENT
Start: 2023-01-31 | End: 2023-01-31 | Stop reason: HOSPADM

## 2023-01-31 RX ADMIN — PHENYLEPHRINE HYDROCHLORIDE 200 MCG: 10 INJECTION INTRAVENOUS at 09:45

## 2023-01-31 RX ADMIN — CARVEDILOL 12.5 MG: 12.5 TABLET, FILM COATED ORAL at 15:15

## 2023-01-31 RX ADMIN — PHENYLEPHRINE HYDROCHLORIDE 100 MCG: 10 INJECTION INTRAVENOUS at 09:14

## 2023-01-31 RX ADMIN — SODIUM CHLORIDE: 9 INJECTION, SOLUTION INTRAVENOUS at 07:00

## 2023-01-31 RX ADMIN — AMLODIPINE BESYLATE 10 MG: 10 TABLET ORAL at 15:15

## 2023-01-31 RX ADMIN — Medication 10 MG: at 08:22

## 2023-01-31 RX ADMIN — PROPOFOL 150 MG: 10 INJECTION, EMULSION INTRAVENOUS at 07:40

## 2023-01-31 RX ADMIN — PHENYLEPHRINE HYDROCHLORIDE 200 MCG: 10 INJECTION INTRAVENOUS at 09:30

## 2023-01-31 RX ADMIN — Medication 10 MG: at 08:12

## 2023-01-31 RX ADMIN — Medication 10 MG: at 09:07

## 2023-01-31 RX ADMIN — ATORVASTATIN CALCIUM 40 MG: 40 TABLET, FILM COATED ORAL at 15:15

## 2023-01-31 RX ADMIN — Medication 10 MG: at 08:39

## 2023-01-31 RX ADMIN — Medication 2000 MG: at 07:49

## 2023-01-31 RX ADMIN — ONDANSETRON 4 MG: 2 INJECTION INTRAMUSCULAR; INTRAVENOUS at 09:55

## 2023-01-31 RX ADMIN — FENTANYL CITRATE 12.5 MCG: 50 INJECTION, SOLUTION INTRAMUSCULAR; INTRAVENOUS at 08:05

## 2023-01-31 RX ADMIN — FENTANYL CITRATE 50 MCG: 0.05 INJECTION, SOLUTION INTRAMUSCULAR; INTRAVENOUS at 12:58

## 2023-01-31 RX ADMIN — DEXAMETHASONE SODIUM PHOSPHATE 8 MG: 4 INJECTION, SOLUTION INTRAMUSCULAR; INTRAVENOUS at 07:52

## 2023-01-31 RX ADMIN — ROCURONIUM BROMIDE 50 MG: 10 INJECTION, SOLUTION INTRAVENOUS at 07:40

## 2023-01-31 RX ADMIN — ROPIVACAINE HYDROCHLORIDE 30 ML: 5 INJECTION, SOLUTION EPIDURAL; INFILTRATION; PERINEURAL at 07:12

## 2023-01-31 RX ADMIN — LIDOCAINE HYDROCHLORIDE 40 MG: 20 INJECTION, SOLUTION INTRAVENOUS at 07:40

## 2023-01-31 RX ADMIN — PHENYLEPHRINE HYDROCHLORIDE 200 MCG: 10 INJECTION INTRAVENOUS at 09:35

## 2023-01-31 RX ADMIN — HYDROCHLOROTHIAZIDE 12.5 MG: 12.5 TABLET ORAL at 15:15

## 2023-01-31 RX ADMIN — TRAZODONE HYDROCHLORIDE 100 MG: 50 TABLET ORAL at 22:15

## 2023-01-31 RX ADMIN — PHENYLEPHRINE HYDROCHLORIDE 100 MCG: 10 INJECTION INTRAVENOUS at 09:25

## 2023-01-31 RX ADMIN — ROCURONIUM BROMIDE 10 MG: 10 INJECTION, SOLUTION INTRAVENOUS at 09:26

## 2023-01-31 RX ADMIN — MIDAZOLAM HYDROCHLORIDE 2 MG: 1 INJECTION, SOLUTION INTRAMUSCULAR; INTRAVENOUS at 07:12

## 2023-01-31 RX ADMIN — Medication 10 MG: at 09:12

## 2023-01-31 RX ADMIN — FENTANYL CITRATE 12.5 MCG: 50 INJECTION, SOLUTION INTRAMUSCULAR; INTRAVENOUS at 10:12

## 2023-01-31 RX ADMIN — Medication: at 11:21

## 2023-01-31 RX ADMIN — FENTANYL CITRATE 50 MCG: 50 INJECTION, SOLUTION INTRAMUSCULAR; INTRAVENOUS at 07:40

## 2023-01-31 RX ADMIN — SODIUM CHLORIDE, SODIUM LACTATE, POTASSIUM CHLORIDE, CALCIUM CHLORIDE AND DEXTROSE MONOHYDRATE: 5; 600; 310; 30; 20 INJECTION, SOLUTION INTRAVENOUS at 11:24

## 2023-01-31 RX ADMIN — PHENYLEPHRINE HYDROCHLORIDE 100 MCG: 10 INJECTION INTRAVENOUS at 10:00

## 2023-01-31 RX ADMIN — LOSARTAN POTASSIUM 100 MG: 100 TABLET, FILM COATED ORAL at 15:15

## 2023-01-31 RX ADMIN — FAMOTIDINE 20 MG: 20 TABLET, FILM COATED ORAL at 22:16

## 2023-01-31 RX ADMIN — Medication 10 ML: at 22:16

## 2023-01-31 RX ADMIN — SUGAMMADEX 200 MG: 100 INJECTION, SOLUTION INTRAVENOUS at 10:07

## 2023-01-31 ASSESSMENT — PAIN SCALES - GENERAL
PAINLEVEL_OUTOF10: 9
PAINLEVEL_OUTOF10: 8
PAINLEVEL_OUTOF10: 5
PAINLEVEL_OUTOF10: 9
PAINLEVEL_OUTOF10: 8
PAINLEVEL_OUTOF10: 9

## 2023-01-31 ASSESSMENT — LIFESTYLE VARIABLES
HOW OFTEN DO YOU HAVE A DRINK CONTAINING ALCOHOL: NEVER
HOW MANY STANDARD DRINKS CONTAINING ALCOHOL DO YOU HAVE ON A TYPICAL DAY: PATIENT DOES NOT DRINK

## 2023-01-31 ASSESSMENT — PAIN - FUNCTIONAL ASSESSMENT: PAIN_FUNCTIONAL_ASSESSMENT: 0-10

## 2023-01-31 NOTE — PROGRESS NOTES
[address] [place] [address] with Dr. Van Nassar regarding patients pain 9/10 - stated to give patient Fentanyl.

## 2023-01-31 NOTE — ANESTHESIA PRE PROCEDURE
Department of Anesthesiology  Preprocedure Note       Name:  Dao Simpson   Age:  81 y.o.  :  1940                                          MRN:  [id number]         Date:  2023      Surgeon: Carolina Garner):  Tyler Trejo MD    Procedure: Procedure(s):  left thoracoscopic wedge, possible lobectomy    Medications prior to admission:   Prior to Admission medications    Medication Sig Start Date End Date Taking? Authorizing Provider   varenicline (CHANTIX) 1 MG tablet TAKE 1/2 TABLET DAILY X3 DAYS, THEN 1/2 TAB TWICE DAILY X4 DAYS, THEN 1 TAB TWICE DAILY THEREAFTER  Patient not taking: No sig reported 23   Morales Bearden MD   losartan-hydroCHLOROthiazide (HYZAAR) 100-12.5 MG per tablet TAKE 1 TABLET BY MOUTH EVERY DAY 22   Evelin Day MD   carvedilol (COREG) 12.5 MG tablet TAKE 1 TABLET BY MOUTH TWICE A DAY 22   Evelin Day MD   atorvastatin (LIPITOR) 40 MG tablet TAKE 1 TABLET BY MOUTH EVERY DAY 10/24/22   [facility]   amLODIPine (NORVASC) 10 MG tablet Take 1 tablet by mouth daily 10/14/22   Marlene Mai DO   traZODone (DESYREL) 100 MG tablet TAKE 1 TABLET BY MOUTH EVERY DAY AT NIGHT 10/10/22   Evelin Day MD   Handicap Placard [facility] by Does not apply route Applying from 2021-2026. 21   Magda Braden MD   aspirin 81 MG EC tablet Take 81 mg by mouth daily    Historical Provider, MD       Current medications:    Current Facility-Administered Medications   Medication Dose Route Frequency Provider Last Rate Last Admin    sodium chloride flush 0.9 % injection 5-40 mL  5-40 mL IntraVENous 2 times per day Tyler Trejo MD        sodium chloride flush 0.9 % injection 5-40 mL  5-40 mL IntraVENous PRN Tyler Trejo MD        0.9 % sodium chloride infusion   IntraVENous PRN Tyler Trejo MD        sodium chloride 0.9 % infusion                Allergies:     Allergies   Allergen Reactions    Amoxicillin     Nsaids      CRF 4       Problem List: Patient Active Problem List   Diagnosis Code    Carotid bruit R09.89    Renal impairment N28.9    Coronary atherosclerosis I25.10    Bilateral carotid artery stenosis I65.23    Hx of CABGx5 Z95.1    Tobacco abuse Z72.0    Essential hypertension I10    Dyslipidemia E78.5    Chronic renal disease, stage III ([facility] [address] [183295] N18.30    Cervical radiculopathy M54.12    Hypervolemia E87.70    Onychomycosis B35.1    Peripheral vascular disease of foot ([facility] [address] I73.9    Hypertensive urgency I16.0    Dizziness R42    Lung mass R91.8       Past Medical History:        Diagnosis Date    Bilateral carotid artery stenosis 11/19/2020    CAD (coronary artery disease)     Carotid bruit     Dyslipidemia 11/19/2020    Essential hypertension 11/19/2020    Hx of CABG 11/19/2020    Hyperlipidemia     Kidney disease     PONV (postoperative nausea and vomiting)     Stage 4 chronic kidney disease ([address] 11/19/2020    Stenosis of noncoronary bypass graft ([facility] [address]     Tobacco abuse 11/19/2020       Past Surgical History:        Procedure Laterality Date    BACK SURGERY  2003    BRAIN ANEURYSM SURGERY  2012    BREAST LUMPECTOMY Right     CARPAL TUNNEL RELEASE Bilateral     CORONARY ARTERY BYPASS GRAFT      PARTIAL HYSTERECTOMY (CERVIX NOT REMOVED)  1997    TOTAL HIP ARTHROPLASTY Bilateral left side 2017 right side 2004       Social History:    Social History     Tobacco Use    Smoking status: Every Day     Packs/day: 0.50     Years: 60.00     Pack years: 30.00     Types: Cigarettes    Smokeless tobacco: Never   Substance Use Topics    Alcohol use: Yes     Comment: rare                                Ready to quit: Not Answered  Counseling given: Not Answered      Vital Signs (Current):   Vitals:    01/31/23 0606   BP: (!) 180/71   Pulse: 66   Resp: 16   Temp: 98.2 °F (36.8 °C)   TempSrc: Temporal   SpO2: 99%   Weight: 139 lb (63 kg)   Height: 5' 1\" (1.549 m)                                              BP Readings from Last 3 Encounters:   01/31/23 (!) 180/71   01/25/23 (!) 130/52   01/12/23 134/60       NPO Status: Time of last liquid consumption: 1900                        Time of last solid consumption: 1900                        Date of last liquid consumption: 01/30/23                        Date of last solid food consumption: 01/30/23    BMI:   Wt Readings from Last 3 Encounters:   01/31/23 139 lb (63 kg)   01/25/23 139 lb 9.6 oz (63.3 kg)   01/18/23 138 lb (62.6 kg)     Body mass index is 26.26 kg/m². CBC:   Lab Results   Component Value Date/Time    WBC 9.5 01/25/2023 10:32 AM    RBC 3.69 01/25/2023 10:32 AM    HGB 11.4 01/25/2023 10:32 AM    HCT 33.9 01/25/2023 10:32 AM    MCV 91.9 01/25/2023 10:32 AM    RDW 12.7 01/25/2023 10:32 AM     01/25/2023 10:32 AM       CMP:   Lab Results   Component Value Date/Time     01/25/2023 10:32 AM    K 4.6 01/25/2023 10:32 AM    K 3.9 10/13/2022 05:32 AM     01/25/2023 10:32 AM    CO2 22 01/25/2023 10:32 AM    BUN 25 01/25/2023 10:32 AM    CREATININE 1.42 01/25/2023 10:32 AM    GFRAA 56.5 10/13/2022 05:32 AM    LABGLOM 37.0 01/25/2023 10:32 AM    GLUCOSE 107 01/25/2023 10:32 AM    PROT 7.5 11/30/2022 11:18 AM    CALCIUM 9.9 01/25/2023 10:32 AM    BILITOT 0.3 11/30/2022 11:18 AM    ALKPHOS 76 11/30/2022 11:18 AM    AST 23 11/30/2022 11:18 AM    ALT 13 11/30/2022 11:18 AM       POC Tests: No results for input(s): POCGLU, POCNA, POCK, POCCL, POCBUN, POCHEMO, POCHCT in the last 72 hours.     Coags:   Lab Results   Component Value Date/Time    PROTIME 13.3 10/12/2022 09:00 AM    INR 1.0 10/12/2022 09:00 AM    APTT 28.1 10/12/2022 09:00 AM       HCG (If Applicable): No results found for: PREGTESTUR, PREGSERUM, HCG, HCGQUANT     ABGs: No results found for: PHART, PO2ART, CPN1TEJ, YKI8BLR, BEART, D9YDIXCQ     Type & Screen (If Applicable):  No results found for: LABABO, LABRH    Drug/Infectious Status (If Applicable):  No results found for: HIV, HEPCAB    COVID-19 Screening (If Applicable): No results found for: COVID19        Anesthesia Evaluation  Patient summary reviewed and Nursing notes reviewed   history of anesthetic complications: PONV. Airway: Mallampati: II  TM distance: >3 FB   Neck ROM: full  Mouth opening: > = 3 FB   Dental: normal exam         Pulmonary:normal exam    (+) current smoker          Patient did not smoke on day of surgery. Cardiovascular:  Exercise tolerance: good (>4 METS),   (+) hypertension:, CAD:, CABG/stent:, hyperlipidemia      ECG reviewed               Beta Blocker:  Dose within 24 Hrs      ROS comment: Sinus bradycardia  Otherwise normal ECG  When compared with ECG of 12-OCT-2022 08:54,  premature atrial complexes are no longer present     Neuro/Psych:   (+) neuromuscular disease:,             GI/Hepatic/Renal:   (+) renal disease: CRI,           Endo/Other:    (+) malignancy/cancer. Pt had PAT visit. Abdominal:             Vascular: negative vascular ROS. Other Findings:           Anesthesia Plan      general and regional     ASA 3     (ETT  PRETTY)  Induction: intravenous. arterial line  MIPS: Postoperative opioids intended, Prophylactic antiemetics administered and One-lung ventilation. Anesthetic plan and risks discussed with patient. Plan discussed with CRNA.     Attending anesthesiologist reviewed and agrees with Pre Eval content      Post-op pain plan if not by surgeon: lianne Underwood DO   1/31/2023

## 2023-01-31 NOTE — PROGRESS NOTES
01/31/23    From:HOME ALONE     Admit:LUNG MASS     PMH: BILAT CAROTID ARTERY STENOSIS, CAD, CAROTID BRUIT, HTN, CABG, ST 4 CKD, TOBACCO USE. Anticipated Discharge Disposition: HOME. DECLINES NEEDS     Patient Mobility or PT/OT ordered:  Consults: HOSPITALIST     Clinical: LUNG MASS S/P L THORACOSCOPIC WEDGE, COMPLETION MATTHEW LOBECTOMY, MEDIASTINAL LYMPH NODE DISSECTION, SURGICAL RESECTION     Barriers to Discharge:CHEST TUBE, O2 NL, IVF, PCA     Assessments: CMI DONE

## 2023-01-31 NOTE — ANESTHESIA PROCEDURE NOTES
Arterial Line:    An arterial line was placed using surface landmarks, in the pre-op for the following indication(s): continuous blood pressure monitoring and blood sampling needed. A 20 gauge (size), 1 and 3/4 inch (length), Arrow (type) catheter was placed, Seldinger technique used, into the left radial artery, secured by tape and Tegaderm. Anesthesia type: Local  Local infiltration: Injection    Events:  patient tolerated procedure well with no complications. 1/31/2023 7:08 AM1/31/2023 7:12 AM  Anesthesiologist: Mayi Ortega DO  Performed: Anesthesiologist   Preanesthetic Checklist  Completed: patient identified, IV checked, site marked, risks and benefits discussed, surgical/procedural consents, equipment checked, pre-op evaluation, timeout performed, anesthesia consent given, oxygen available and monitors applied/VS acknowledged

## 2023-01-31 NOTE — CARE COORDINATION
[facility] Case Management Initial Discharge Assessment    If patient is discharged prior to next notation, then this note serves as note for discharge by case management. Met with Patient to discuss discharge plan. PCP: Julianne Rashid MD                                Date of Last Visit: [facility] Patient: No        [facility] Notified: no    If no PCP, list provided? N/A    Discharge Planning    Living Arrangements: independently at home    Who do you live with? ALONE     Who helps you with your care:  self. WELL SUPPORTED WITH FAMILY AND FRIENDS     If lives at home:     Do you have any barriers navigating in your home? no    Patient can perform ADL? Yes    Current Services (outpatient and in home) :  None    Dialysis: No    Is transportation available to get to your appointments? Yes    DME Equipment:  yes - HAS CANE AVAILABLE AS NEEDED     Respiratory equipment: None    Respiratory provider:  no     Ph[facility]:  [facility]     Consult with Medication Assistance Program?  No      Patient agreeable to [facility] ? Declined    Patient agreeable to SNF/Rehab? Declined    Other discharge needs identified? N/A    Does Patient Have a High-Risk for Readmission Diagnosis (CHF, PN, MI, COPD)? No      Initial Discharge Plan? MET W/PT AND FAMILY TO ASSESS NEEDS AND DISCUSS DISCHARGE PLAN. PT DENIES NEEDS AT THIS TIME. PT IS WELL SUPPORTED WITH FAMILY AND FRIENDS. CM/LSW TO ASSESS HOME O2 NEEDS. (Note: please see concurrent daily documentation for any updates after initial note).         Readmission Risk              Risk of Unplanned Readmission:  11         Electronically signed by Isabell More RN on 1/31/2023 at 4:39 PM

## 2023-01-31 NOTE — H&P
Update History & Physical    I examined the patient and reviewed the History and Physical and there were no significant changes. Vitals:    01/31/23 0606   BP: (!) 180/71   Pulse: 66   Resp: 16   Temp: 98.2 °F (36.8 °C)   SpO2: 99%     Principal Problem:    Lung mass  Resolved Problems:    * No resolved hospital problems. *        Plan: The risk, benefits, expected outcome, and alternative to the recommended procedure have been discussed with the patient. Patient understands and wants to proceed with the procedure.     Electronically signed by Ivett Alvarez MD on 1/31/23 at 7:30 AM EST

## 2023-01-31 NOTE — FLOWSHEET NOTE
1022- Pt arrived to PACU via bed from OR, VSS, pt moving around in bed-KGW  1030- Shift assessment completed at this time, lungs diminished, surgical site to left chest wall, chest tube draining bright red bloody drainage, chest tube to continuous suction, pt arousable to voice, pt denies pain at this time, vital signs stable-KGW  1045- X-ray at bedside-KGW  1139- Pt and pt daughter updated with plan of care and awaiting inpatient bed-KGW  1347- Report called to [facility] on [address] [facility]-KGW  1349- Okay to Remove ART line per Dr. Valdo Dodd  Electronically signed by Hao Stone RN on 1/31/2023

## 2023-01-31 NOTE — ANESTHESIA PROCEDURE NOTES
Peripheral Block    Patient location during procedure: pre-op  Reason for block: post-op pain management and at surgeon's request  Start time: 1/31/2023 7:12 AM  End time: 1/31/2023 7:19 AM  Staffing  Performed: anesthesiologist   Anesthesiologist: Janet Arevalo, DO  Preanesthetic Checklist  Completed: patient identified, IV checked, site marked, risks and benefits discussed, surgical/procedural consents, equipment checked, pre-op evaluation, timeout performed, anesthesia consent given, oxygen available and monitors applied/VS acknowledged  Peripheral Block   Patient position: supine  Prep: ChloraPrep  Provider prep: mask and sterile gloves (Sterile probe cover)  Patient monitoring: cardiac monitor, continuous pulse ox, frequent blood pressure checks and IV access  Block type: Erector spinae (T5)  Laterality: left  Injection technique: single-shot  Guidance: ultrasound guided  Local infiltration: ropivacaine  Infiltration strength: 0.5 %  Local infiltration: ropivacaine  Dose: 30 mL    Needle   Needle type: combined needle/nerve stimulator   Needle gauge: 22 G  Needle localization: anatomical landmarks and ultrasound guidance  Needle length: 10 cm  Assessment   Injection assessment: negative aspiration for heme, no paresthesia on injection and local visualized surrounding nerve on ultrasound  Paresthesia pain: immediately resolved  Slow fractionated injection: yes  Hemodynamics: stable  Real-time US image taken/store: yes    Additional Notes  Ultrasound image printed and saved in patient chart.     Sterile probe cover used    Medications Administered  ropivacaine (NAROPIN) injection 0.5% - Perineural   30 mL - 1/31/2023 7:12:00 AM

## 2023-01-31 NOTE — OP NOTE
Operative Note      Patient: Kailyn Magaña  YOB: 1940  MRN: [id number]    Date of Procedure: 1/31/2023    Pre-Op Diagnosis: Pulmonary nodule [R91.1]    Post-Op Diagnosis:  Non-small cell lung cancer       Procedure(s):  left thoracoscopic wedge, completion left upper lobectomy, mediastinal lymph node dissection    Surgeon(s):  Asuncion Dominguez MD    Assistant:   First Assistant: La Nena Centeno  Physician Assistant: Mark Corado PA-C    Anesthesia: General    Estimated Blood Loss (mL): less than 605     Complications: None    Specimens:   ID Type Source Tests Collected by Time Destination   A : left upper lobe wedge Tissue Lung SURGICAL PATHOLOGY Asuncion Dominguez MD 1/31/2023 0804    B : left lung upper lobe Tissue Lung SURGICAL PATHOLOGY Asuncion Dominguez MD 1/31/2023 0834    C : level 5 lymph node Tissue Lymph Node SURGICAL PATHOLOGY Asuncion Dominguez MD 1/31/2023 5540    D : level 6 lymph node Tissue Lymph Node SURGICAL PATHOLOGY Asuncion Dominguez MD 1/31/2023 1162    E : level 10L  lymph node Tissue Lymph Node SURGICAL PATHOLOGY Asuncion Dominguez MD 1/31/2023 9142    F : level 7  lymph node Tissue Lymph Node SURGICAL PATHOLOGY Asuncion Dominguez MD 1/31/2023 1130        Implants:  * No implants in log *      Drains:   Chest Tube Left 1 (Active)       Findings: Non-small cell lung cancer    Detailed Description of Procedure:   Patient was followed for a small nodule in her left upper lobe. This increased in size on follow-up scan. PET scan showed mild hyperactivity. Treatment options were discussed with the patient and she agreed with a surgical resection for diagnosis as well as potential cure. Once patient was intubated a flexible bronchoscopy was performed which showed no endobronchial tumor deposits or obstructions. She is turned in the left-sided position had her chest prepped and draped in sterile fashion.   2 12 mm thorascopic ports were then placed over her lower rib cage and a 4 cm utility incision was made at the fourth interspace. Examination the hemithorax showed diffuse inflammatory adhesions. The patient had prior sternotomy for coronary artery bypass grafting. We then spent about 30 to 45 minutes using blunt and cautery dissection freeing up all of these adhesions. There was a small area on the most medial aspect of the upper lobe that was densely adherent to the mediastinum. I was concerned about aggressive dissection given her bypass grafts. Using 2 firings of a heavy reinforced stapler we did resect the lung is close to the mediastinum as we could. There is a small rim of lung tissue that would be left behind that would eventually simply scar over. This freed up the entire lung. The fissure was then opened using careful blunt and cautery dissection. This nodule was felt within the lower aspect of the fissure. Using a heavy reinforced stapler the mass was wedged off with grossly negative margins. It was sent to pathology for examination found non-small cell lung cancer. A completion lobectomy was then performed. The fissure was opened exposing the pulmonary artery. We dissected proximal on the artery exposing the individual trunks heading to the upper lobe. These were then transected with multiple firings of a vascular stapler. The anterior and posterior pleural flexions were then taken down. The venous drainage to the upper lobe was carefully dissected away from the underlying bronchus and transected with a vascular stapler. The upper lobe bronchus was then transected with a heavy stapler. The upper lobe was then placed in a bag and removed. The hilum was explored and a small level 10 L lymph node was removed. We explored along the aortic arch and removed level 5 lymph nodes. Along the mediastinum 1 level 6 lymph node was removed. The subcarinal space was opened and level 7 lymph nodes were removed. Hemithorax was irrigated with sterile water.   Once all fluid is aspirated out examination found no other significant lymphatic tissue with biopsy. 1 straight chest tube was placed. The lung was reinflated and the remaining incisions were closed in layers.     Electronically signed by Ziggy Tilley MD on 1/31/2023 at 10:11 AM

## 2023-01-31 NOTE — ANESTHESIA POSTPROCEDURE EVALUATION
Department of Anesthesiology  Postprocedure Note    Patient: Rafael Shaver  MRN: [id number]  YOB: 1940  Date of evaluation: 1/31/2023      Procedure Summary     Date: 01/31/23 Room / Location: [address] [place] [address]    Anesthesia Start: 7063 Anesthesia Stop: 8585    Procedure: left thoracoscopic wedge, completion left upper lobectomy (Left: Chest) Diagnosis:       Pulmonary nodule      (Pulmonary nodule [R91.1])    Surgeons: Miranda Marcus MD Responsible Provider: Ebonie Blood DO    Anesthesia Type: general, regional ASA Status: 3          Anesthesia Type: No value filed.     Filiberto Phase I: Filiberto Score: 10    Filiberto Phase II:        Anesthesia Post Evaluation    Patient location during evaluation: bedside  Patient participation: complete - patient participated  Level of consciousness: awake and alert  Airway patency: patent  Nausea & Vomiting: no nausea and no vomiting  Complications: no  Cardiovascular status: blood pressure returned to baseline and hemodynamically stable  Respiratory status: acceptable  Hydration status: euvolemic

## 2023-02-01 LAB
ANION GAP SERPL CALCULATED.3IONS-SCNC: 8 MEQ/L (ref 9–15)
BUN BLDV-MCNC: 16 MG/DL (ref 8–23)
CALCIUM SERPL-MCNC: 9 MG/DL (ref 8.5–9.9)
CHLORIDE BLD-SCNC: 101 MEQ/L (ref 95–107)
CO2: 26 MEQ/L (ref 20–31)
CREAT SERPL-MCNC: 0.89 MG/DL (ref 0.5–0.9)
GFR SERPL CREATININE-BSD FRML MDRD: >60 ML/MIN/{1.73_M2}
GLUCOSE BLD-MCNC: 180 MG/DL (ref 70–99)
POTASSIUM REFLEX MAGNESIUM: 4.1 MEQ/L (ref 3.4–4.9)
SODIUM BLD-SCNC: 135 MEQ/L (ref 135–144)

## 2023-02-01 PROCEDURE — 36415 COLL VENOUS BLD VENIPUNCTURE: CPT

## 2023-02-01 PROCEDURE — 6360000002 HC RX W HCPCS: Performed by: INTERNAL MEDICINE

## 2023-02-01 PROCEDURE — 6370000000 HC RX 637 (ALT 250 FOR IP): Performed by: THORACIC SURGERY (CARDIOTHORACIC VASCULAR SURGERY)

## 2023-02-01 PROCEDURE — 6360000002 HC RX W HCPCS: Performed by: THORACIC SURGERY (CARDIOTHORACIC VASCULAR SURGERY)

## 2023-02-01 PROCEDURE — 94761 N-INVAS EAR/PLS OXIMETRY MLT: CPT

## 2023-02-01 PROCEDURE — 80048 BASIC METABOLIC PNL TOTAL CA: CPT

## 2023-02-01 PROCEDURE — 2060000000 HC ICU INTERMEDIATE R&B

## 2023-02-01 PROCEDURE — 2580000003 HC RX 258: Performed by: THORACIC SURGERY (CARDIOTHORACIC VASCULAR SURGERY)

## 2023-02-01 PROCEDURE — 2700000000 HC OXYGEN THERAPY PER DAY

## 2023-02-01 PROCEDURE — 94150 VITAL CAPACITY TEST: CPT

## 2023-02-01 RX ORDER — HYDRALAZINE HYDROCHLORIDE 20 MG/ML
10 INJECTION INTRAMUSCULAR; INTRAVENOUS EVERY 4 HOURS PRN
Status: DISCONTINUED | OUTPATIENT
Start: 2023-02-01 | End: 2023-02-02 | Stop reason: HOSPADM

## 2023-02-01 RX ORDER — OXYCODONE HYDROCHLORIDE 5 MG/1
5 TABLET ORAL EVERY 4 HOURS PRN
Status: DISCONTINUED | OUTPATIENT
Start: 2023-02-01 | End: 2023-02-02 | Stop reason: HOSPADM

## 2023-02-01 RX ORDER — VARENICLINE TARTRATE 1 MG/1
TABLET, FILM COATED ORAL
Qty: 60 TABLET | Refills: 1 | OUTPATIENT
Start: 2023-02-01

## 2023-02-01 RX ADMIN — CARVEDILOL 12.5 MG: 12.5 TABLET, FILM COATED ORAL at 18:45

## 2023-02-01 RX ADMIN — Medication 10 ML: at 09:00

## 2023-02-01 RX ADMIN — LOSARTAN POTASSIUM 100 MG: 100 TABLET, FILM COATED ORAL at 09:38

## 2023-02-01 RX ADMIN — ATORVASTATIN CALCIUM 40 MG: 40 TABLET, FILM COATED ORAL at 09:38

## 2023-02-01 RX ADMIN — ONDANSETRON 4 MG: 2 INJECTION INTRAMUSCULAR; INTRAVENOUS at 07:27

## 2023-02-01 RX ADMIN — CARVEDILOL 12.5 MG: 12.5 TABLET, FILM COATED ORAL at 09:39

## 2023-02-01 RX ADMIN — HYDROCHLOROTHIAZIDE 12.5 MG: 12.5 TABLET ORAL at 09:38

## 2023-02-01 RX ADMIN — OXYCODONE HYDROCHLORIDE 5 MG: 5 TABLET ORAL at 12:54

## 2023-02-01 RX ADMIN — TRIMETHOBENZAMIDE HYDROCHLORIDE 200 MG: 100 INJECTION INTRAMUSCULAR at 12:54

## 2023-02-01 RX ADMIN — TRAZODONE HYDROCHLORIDE 100 MG: 50 TABLET ORAL at 20:49

## 2023-02-01 RX ADMIN — FAMOTIDINE 20 MG: 20 TABLET, FILM COATED ORAL at 09:38

## 2023-02-01 RX ADMIN — ASPIRIN 81 MG: 81 TABLET, COATED ORAL at 09:38

## 2023-02-01 RX ADMIN — Medication 10 ML: at 20:50

## 2023-02-01 RX ADMIN — FAMOTIDINE 20 MG: 20 TABLET, FILM COATED ORAL at 20:49

## 2023-02-01 RX ADMIN — ENOXAPARIN SODIUM 30 MG: 100 INJECTION SUBCUTANEOUS at 09:39

## 2023-02-01 RX ADMIN — AMLODIPINE BESYLATE 10 MG: 10 TABLET ORAL at 09:39

## 2023-02-01 ASSESSMENT — PAIN DESCRIPTION - LOCATION
LOCATION: CHEST
LOCATION: CHEST

## 2023-02-01 ASSESSMENT — PAIN SCALES - GENERAL
PAINLEVEL_OUTOF10: 0
PAINLEVEL_OUTOF10: 8
PAINLEVEL_OUTOF10: 8
PAINLEVEL_OUTOF10: 6

## 2023-02-01 NOTE — TELEPHONE ENCOUNTER
Rx requested:  Requested Prescriptions     Pending Prescriptions Disp Refills    varenicline (CHANTIX) 1 MG tablet [Pharmacy Med Name: VARENICLINE 1 MG TABLET] 60 tablet 1     Sig: TAKE 1 TABLET BY MOUTH TWICE A DAY       Last Office Visit:   1/12/2023      Next Visit Date:  Future Appointments   Date Time Provider Aiden Guzman   2/9/2023 11:30 AM Natalie Westfall MD  [address]   5/3/2023 10:45 AM Yue Jackson MD [facility]   6/8/2023 12:00 PM Phill Garcia, DO [address]

## 2023-02-01 NOTE — CARE COORDINATION
MET W/PT AND FAMILY TO ASSESS NEEDS AND DISCUSS DISCHARGE PLAN. PT WOULD LIKE TO RETURN HOME WITH FAMILY SUPPORT. PT STATES SHE REMAINS IN PAIN AND IS NAUSEOUS. PT AND FAMILY EXPRESSES DOUBT THAT SHE WILL BE WELL ENOUGH TO DISCHARGE BEFORE Friday. SPOKE W/NURSE WITH PT'S CONCERNS. WILL FOLLOW.

## 2023-02-01 NOTE — PROGRESS NOTES
Pt assessed at start of shift. Assessment documented. Tolerated medications whole with thin liquids. PCA d/c'd. 22ml of dilaudid wasted with Oneita Presume. Pt medicated with oxy prn for pain during shift.  Chest tube continues to be intact

## 2023-02-01 NOTE — CONSULTS
[facility] physician    Patient:  Gavin Aguirre  MRN: [id number]    CHIEF COMPLAINT: Consult for medical management    History Obtained From:  patient, electronic medical record  Primary Care Physician: Kelly Tejada MD    HISTORY OF PRESENT ILLNESS:   The patient is a 81 y.o. female who presents with resection of left lung mass. Patient is seen evaluated postoperative following left thorascopic wedge resection On postoperative day 0. Patient is a chest tube in place in the left to atrium. Patient has rather significant pain but otherwise has no new complaints. Patient has remote history of coronary disease status post CABG she has a history of CKD 4 she has a history of hypertension hyperlipidemia and tobacco abuse. .  Patient is currently on Dilaudid PCA for pain control. We are consulted for medical management. Patient still continues to smoke no drugs no alcohol. Awaiting final path results    Past Medical History:      Diagnosis Date    Bilateral carotid artery stenosis 11/19/2020    CAD (coronary artery disease)     Carotid bruit     Dyslipidemia 11/19/2020    Essential hypertension 11/19/2020    Hx of CABG 11/19/2020    Hyperlipidemia     Kidney disease     PONV (postoperative nausea and vomiting)     Stage 4 chronic kidney disease ([address] 11/19/2020    Stenosis of noncoronary bypass graft ([facility] [address]     Tobacco abuse 11/19/2020       Past Surgical History:      Procedure Laterality Date    BACK SURGERY  2003    BRAIN ANEURYSM SURGERY  2012    BREAST LUMPECTOMY Right     CARPAL TUNNEL RELEASE Bilateral     CORONARY ARTERY BYPASS GRAFT      PARTIAL HYSTERECTOMY (CERVIX NOT REMOVED)  1997    TOTAL HIP ARTHROPLASTY Bilateral left side 2017 right side 2004       Medications Prior to Admission:    Prior to Admission medications    Medication Sig Start Date End Date Taking?  Authorizing Provider   varenicline (CHANTIX) 1 MG tablet TAKE 1/2 TABLET DAILY X3 DAYS, THEN 1/2 TAB TWICE DAILY X4 DAYS, THEN 1 TAB TWICE DAILY THEREAFTER  Patient not taking: No sig reported 1/4/23   Nadira Kerr MD   losartan-hydroCHLOROthiazide (HYZAAR) 100-12.5 MG per tablet TAKE 1 TABLET BY MOUTH EVERY DAY 11/23/22   Mino Romo MD   carvedilol (COREG) 12.5 MG tablet TAKE 1 TABLET BY MOUTH TWICE A DAY 11/9/22   Mino Romo MD   atorvastatin (LIPITOR) 40 MG tablet TAKE 1 TABLET BY MOUTH EVERY DAY 10/24/22   Tahira Jamieberry, APRN - CNP   amLODIPine (NORVASC) 10 MG tablet Take 1 tablet by mouth daily 10/14/22   Vimal Mccarthy DO   traZODone (DESYREL) 100 MG tablet TAKE 1 TABLET BY MOUTH EVERY DAY AT NIGHT 10/10/22   Mino Romo MD   Handicap Placard MISC by Does not apply route Applying from 1/28/2021-1/27/2026. 1/28/21   Lidia Larson MD   aspirin 81 MG EC tablet Take 81 mg by mouth daily    Historical Provider, MD       Allergies:  Amoxicillin and Nsaids    Social History:   TOBACCO:   reports that she has been smoking cigarettes. She has a 30.00 pack-year smoking history. She has never used smokeless tobacco.  ETOH:   reports current alcohol use. OCCUPATION: Retired    Family History:   History reviewed. No pertinent family history. REVIEW OF SYSTEMS:  Ten systems reviewed and negative except for as above. Physical Exam:    Vitals: /88   Pulse 76   Temp 97.5 °F (36.4 °C)   Resp 26   Ht 5' 1\" (1.549 m)   Wt 139 lb (63 kg)   SpO2 100%   BMI 26.26 kg/m²   Constitutional: alert, appears stated age and cooperative  Skin: Skin color, texture, turgor normal. No rashes or lesions  Eyes:Eye: Normal external eye, conjunctiva, ANGIE. ENT: Head: Normocephalic, no lesions, without obvious abnormality. Neck: no adenopathy, no carotid bruit, no JVD, supple, symmetrical, trachea midline and thyroid not enlarged, symmetric, no tenderness/mass/nodules  Respiratory: clear to auscultation bilaterally  Cardiovascular: Rhonchi on the left good air movement bilaterally.   Left-sided chest tube in place dressing clean dry and intact  Gastrointestinal: soft, non-tender; bowel sounds normal; no masses,  no organomegaly  Genitourinary: Deferred  Musculoskeletal:extremities normal, atraumatic, no cyanosis or edema  Neurologic: Mental status AAOx3 No facial asymmetry or droop. Normal muscle strength b/l. Psychiatric: Appropriate mood and affect. Good insight and judgement  Hematologic: No obvious bruising or bleeding    Assessment and Plan   Thoracic surgery postop day 0: Pain control weightbearing status and chest tube management per cardiothoracic surgery. Coronary disease status post CABG patient is already resumed on antiplatelet, statin. Hypertension. Patient already on resumed on Coreg and losartan. GERD: Pepcid  DVT prophylaxis per primary surgeon. Appreciate consultation call with any questions issues or concerns.     Patient Active Problem List   Diagnosis Code    Carotid bruit R09.89    Renal impairment N28.9    Coronary atherosclerosis I25.10    Bilateral carotid artery stenosis I65.23    Hx of CABGx5 Z95.1    Tobacco abuse Z72.0    Essential hypertension I10    Dyslipidemia E78.5    Chronic renal disease, stage III ([facility] [address] [288880] N18.30    Cervical radiculopathy M54.12    Hypervolemia E87.70    Onychomycosis B35.1    Peripheral vascular disease of foot (HCC) I73.9    Hypertensive urgency I16.0    Dizziness R42    Lung mass R91.8       Alexa DO Alice, consulting physician    Emergency Contact:

## 2023-02-01 NOTE — PROGRESS NOTES
Mild nausea, which she says always happens after anesthesia. Min air leak with cough. Keep chest tube. Potential discharge tomorrow if no air leak.

## 2023-02-01 NOTE — PLAN OF CARE
Problem: Pain  Goal: Verbalizes/displays adequate comfort level or baseline comfort level  Flowsheets (Taken 2/1/2023 0215)  Verbalizes/displays adequate comfort level or baseline comfort level:   Encourage patient to monitor pain and request assistance   Assess pain using appropriate pain scale   Consider cultural and social influences on pain and pain management   Implement non-pharmacological measures as appropriate and evaluate response

## 2023-02-01 NOTE — PROGRESS NOTES
Shift assessments completed. A&OX 4. Pt educated on th PCA pump. Medications given per MAR. Call light within reach. No other needs stated by pt at this time.

## 2023-02-02 ENCOUNTER — APPOINTMENT (OUTPATIENT)
Dept: GENERAL RADIOLOGY | Age: 83
DRG: 164 | End: 2023-02-02
Attending: THORACIC SURGERY (CARDIOTHORACIC VASCULAR SURGERY)
Payer: MEDICARE

## 2023-02-02 VITALS
HEIGHT: 61 IN | SYSTOLIC BLOOD PRESSURE: 150 MMHG | DIASTOLIC BLOOD PRESSURE: 76 MMHG | RESPIRATION RATE: 16 BRPM | WEIGHT: 139 LBS | OXYGEN SATURATION: 95 % | BODY MASS INDEX: 26.24 KG/M2 | TEMPERATURE: 98.5 F | HEART RATE: 108 BPM

## 2023-02-02 LAB
ANION GAP SERPL CALCULATED.3IONS-SCNC: 11 MEQ/L (ref 9–15)
BASOPHILS ABSOLUTE: 0 K/UL (ref 0–0.2)
BASOPHILS RELATIVE PERCENT: 0.1 %
BUN BLDV-MCNC: 15 MG/DL (ref 8–23)
CALCIUM SERPL-MCNC: 8.9 MG/DL (ref 8.5–9.9)
CHLORIDE BLD-SCNC: 100 MEQ/L (ref 95–107)
CO2: 24 MEQ/L (ref 20–31)
CREAT SERPL-MCNC: 0.91 MG/DL (ref 0.5–0.9)
EOSINOPHILS ABSOLUTE: 0 K/UL (ref 0–0.7)
EOSINOPHILS RELATIVE PERCENT: 0.1 %
GFR SERPL CREATININE-BSD FRML MDRD: >60 ML/MIN/{1.73_M2}
GLUCOSE BLD-MCNC: 152 MG/DL (ref 70–99)
HCT VFR BLD CALC: 28.8 % (ref 37–47)
HEMOGLOBIN: 9.9 G/DL (ref 12–16)
LYMPHOCYTES ABSOLUTE: 1.4 K/UL (ref 1–4.8)
LYMPHOCYTES RELATIVE PERCENT: 10.2 %
MCH RBC QN AUTO: 31 PG (ref 27–31.3)
MCHC RBC AUTO-ENTMCNC: 34.5 % (ref 33–37)
MCV RBC AUTO: 89.9 FL (ref 79.4–94.8)
MONOCYTES ABSOLUTE: 0.9 K/UL (ref 0.2–0.8)
MONOCYTES RELATIVE PERCENT: 6.6 %
NEUTROPHILS ABSOLUTE: 11.1 K/UL (ref 1.4–6.5)
NEUTROPHILS RELATIVE PERCENT: 83 %
PDW BLD-RTO: 12.4 % (ref 11.5–14.5)
PLATELET # BLD: 262 K/UL (ref 130–400)
POTASSIUM SERPL-SCNC: 3.6 MEQ/L (ref 3.4–4.9)
RBC # BLD: 3.21 M/UL (ref 4.2–5.4)
SODIUM BLD-SCNC: 135 MEQ/L (ref 135–144)
WBC # BLD: 13.4 K/UL (ref 4.8–10.8)

## 2023-02-02 PROCEDURE — 36415 COLL VENOUS BLD VENIPUNCTURE: CPT

## 2023-02-02 PROCEDURE — 2700000000 HC OXYGEN THERAPY PER DAY

## 2023-02-02 PROCEDURE — 6360000002 HC RX W HCPCS: Performed by: INTERNAL MEDICINE

## 2023-02-02 PROCEDURE — 6370000000 HC RX 637 (ALT 250 FOR IP): Performed by: THORACIC SURGERY (CARDIOTHORACIC VASCULAR SURGERY)

## 2023-02-02 PROCEDURE — 71045 X-RAY EXAM CHEST 1 VIEW: CPT

## 2023-02-02 PROCEDURE — 85025 COMPLETE CBC W/AUTO DIFF WBC: CPT

## 2023-02-02 PROCEDURE — 80048 BASIC METABOLIC PNL TOTAL CA: CPT

## 2023-02-02 PROCEDURE — 6360000002 HC RX W HCPCS: Performed by: THORACIC SURGERY (CARDIOTHORACIC VASCULAR SURGERY)

## 2023-02-02 PROCEDURE — 2580000003 HC RX 258: Performed by: THORACIC SURGERY (CARDIOTHORACIC VASCULAR SURGERY)

## 2023-02-02 PROCEDURE — 6370000000 HC RX 637 (ALT 250 FOR IP): Performed by: INTERNAL MEDICINE

## 2023-02-02 RX ORDER — ONDANSETRON 4 MG/1
4 TABLET, ORALLY DISINTEGRATING ORAL EVERY 8 HOURS PRN
Qty: 21 TABLET | Refills: 0 | Status: SHIPPED | OUTPATIENT
Start: 2023-02-02

## 2023-02-02 RX ORDER — OXYCODONE HYDROCHLORIDE 5 MG/1
5 TABLET ORAL EVERY 6 HOURS PRN
Qty: 25 TABLET | Refills: 0 | Status: SHIPPED | OUTPATIENT
Start: 2023-02-02 | End: 2023-02-09

## 2023-02-02 RX ORDER — ENOXAPARIN SODIUM 100 MG/ML
40 INJECTION SUBCUTANEOUS DAILY
Status: DISCONTINUED | OUTPATIENT
Start: 2023-02-03 | End: 2023-02-02 | Stop reason: HOSPADM

## 2023-02-02 RX ADMIN — OXYCODONE HYDROCHLORIDE 5 MG: 5 TABLET ORAL at 09:05

## 2023-02-02 RX ADMIN — Medication 10 ML: at 09:16

## 2023-02-02 RX ADMIN — HYDRALAZINE HYDROCHLORIDE 10 MG: 20 INJECTION INTRAMUSCULAR; INTRAVENOUS at 01:31

## 2023-02-02 RX ADMIN — LOSARTAN POTASSIUM 100 MG: 100 TABLET, FILM COATED ORAL at 09:06

## 2023-02-02 RX ADMIN — ONDANSETRON 4 MG: 4 TABLET, ORALLY DISINTEGRATING ORAL at 09:11

## 2023-02-02 RX ADMIN — CARVEDILOL 12.5 MG: 12.5 TABLET, FILM COATED ORAL at 09:06

## 2023-02-02 RX ADMIN — AMLODIPINE BESYLATE 10 MG: 10 TABLET ORAL at 09:06

## 2023-02-02 RX ADMIN — ASPIRIN 81 MG: 81 TABLET, COATED ORAL at 09:06

## 2023-02-02 RX ADMIN — FAMOTIDINE 20 MG: 20 TABLET, FILM COATED ORAL at 09:06

## 2023-02-02 RX ADMIN — ENOXAPARIN SODIUM 30 MG: 100 INJECTION SUBCUTANEOUS at 09:07

## 2023-02-02 RX ADMIN — ATORVASTATIN CALCIUM 40 MG: 40 TABLET, FILM COATED ORAL at 09:05

## 2023-02-02 RX ADMIN — HYDROCHLOROTHIAZIDE 12.5 MG: 12.5 TABLET ORAL at 09:06

## 2023-02-02 ASSESSMENT — PAIN SCALES - GENERAL: PAINLEVEL_OUTOF10: 8

## 2023-02-02 NOTE — PROGRESS NOTES
Hospitalist Daily Progress Note  Name: Dao Simpson  Age: 81 y.o. Gender: female  CodeStatus: Full Code  Allergies: Amoxicillin  Nsaids    Chief Complaint:No chief complaint on file. Primary Care Provider: Evelin Day MD  InpatientTreatment Team: Treatment Team: Attending Provider: Tyler Trejo MD; Surgeon: Tyler Trejo MD; Utilization Reviewer: Fidelina Knight, RN; Consulting Physician: Hayde Garcia DO; : Willy Molina, RN; Registered Nurse: Glory Escobar, RN; Registered Nurse: Jackie Reddy, RN; Patient Care Tech: [facility]  Admission Date: 1/31/2023      Subjective: Patient seen evaluated bedside. Tubes removed today. Repeat chest x-ray clear hypertensive this morning but resolved after home medications were given in the morning. No new concerns stable for discharge    Physical Exam  Musculoskeletal:      Right lower leg: No edema. Constitutional: alert, appears stated age and cooperative  Skin: Skin color, texture, turgor normal. No rashes or lesions  Eyes:Eye: Normal external eye, conjunctiva, ANGIE. ENT: Head: Normocephalic, no lesions, without obvious abnormality. Neck: no adenopathy, no carotid bruit, no JVD, supple, symmetrical, trachea midline and thyroid not enlarged, symmetric, no tenderness/mass/nodules  Respiratory: clear to auscultation bilaterally  Cardiovascular: Rhonchi on the left good air movement bilaterally. Left-sided chest tube in place dressing clean dry and intact MARÍA. Gastrointestinal: soft, non-tender; bowel sounds normal; no masses,  no organomegaly  Genitourinary: Deferred  Musculoskeletal:extremities normal, atraumatic, no cyanosis or edema  Neurologic: Mental status AAOx3 No facial asymmetry or droop. Normal muscle strength b/l. Psychiatric: Appropriate mood and affect.  Good insight and judgement  Hematologic: No obvious bruising or bleeding  Review of Systems  14 point ROS reviewed negative except for as above  Medications: Reviewed    Infusion Medications:    sodium chloride       Scheduled Medications:    [START ON 2/3/2023] enoxaparin  40 mg SubCUTAneous Daily    scopolamine  1 patch TransDERmal Q72H    amLODIPine  10 mg Oral Daily    aspirin  81 mg Oral Daily    atorvastatin  40 mg Oral Daily    carvedilol  12.5 mg Oral BID WC    traZODone  100 mg Oral Nightly    sodium chloride flush  5-40 mL IntraVENous 2 times per day    famotidine  20 mg Oral BID    losartan  100 mg Oral Daily    hydroCHLOROthiazide  12.5 mg Oral Daily    lidocaine  1 patch TransDERmal Daily     PRN Meds: sodium chloride, oxyCODONE, trimethobenzamide, hydrALAZINE, sodium chloride flush, sodium chloride, ondansetron **OR** ondansetron, naloxone, polyethylene glycol, magnesium sulfate, potassium chloride, acetaminophen, Benzocaine-Menthol    Labs:   Recent Labs     02/02/23  0551   WBC 13.4*   HGB 9.9*   HCT 28.8*        Recent Labs     02/01/23  0437 02/02/23  0551    135   K 4.1 3.6    100   CO2 26 24   BUN 16 15   CREATININE 0.89 0.91*   CALCIUM 9.0 8.9       No results for input(s): AST, ALT, BILIDIR, BILITOT, ALKPHOS in the last 72 hours. No results for input(s): INR in the last 72 hours. No results for input(s): Abelino Beets in the last 72 hours. Urinalysis:   Lab Results   Component Value Date/Time    NITRU Negative 10/12/2022 09:00 AM    WBCUA 3-5 10/12/2022 09:00 AM    BACTERIA Negative 10/12/2022 09:00 AM    RBCUA 0-2 10/12/2022 09:00 AM    BLOODU Negative 10/12/2022 09:00 AM    SPECGRAV 1.018 10/12/2022 09:00 AM    GLUCOSEU Negative 10/12/2022 09:00 AM       Radiology:   Most recent    Chest CT      WITH CONTRAST:No results found for this or any previous visit.        WITHOUT CONTRAST: Results for orders placed during the hospital encounter of 12/01/22    CT CHEST WO CONTRAST    Narrative  EXAMINATION:  CT OF THE CHEST WITHOUT CONTRAST 12/1/2022 10:08 am    TECHNIQUE:  CT of the chest was performed without the administration of intravenous  contrast. Multiplanar reformatted images are provided for review. Automated  exposure control, iterative reconstruction, and/or weight based adjustment of  the mA/kV was utilized to reduce the radiation dose to as low as reasonably  achievable. COMPARISON:  October 7, 2021    HISTORY:  ORDERING SYSTEM PROVIDED HISTORY: Lung nodule  TECHNOLOGIST PROVIDED HISTORY:  Reason for exam:->lung nodule  What reading provider will be dictating this exam?->CRC    FINDINGS:  Mediastinum: A nodule is seen in the left lobe of the thyroid gland. No  thyroid gland enlargement is seen. There is evidence of median sternotomy. Diffuse atherosclerotic calcifications are seen in the aortic arch, ascending  and descending aorta. Coronary calcifications and/or stents are also seen. No pathologically enlarged lymph nodes are seen. Lungs/pleura: No consolidating pneumonia, pleural effusion or pneumothorax is  seen. Centrilobular emphysematous disease is visualized within upper lobe  predominance. Biapical pleural thickening is seen. A small area of scarring  or ground-glass opacity is seen in the right upper lobe that is similar to  previous study. A less than 5 mm nodule is again seen in the right upper  lobe (series 3, image 24. Also in the peripheral aspect of the lateral right  upper lobe an area of ground-glass opacity is again seen. It measures 7.9 mm  (series 3, image 34). In the lingula an 8.6 mm nodule is seen (series 3, image 58). Atelectasis is  seen in the left base. Also there is atelectasis seen in the medial aspect  the lingula in the cardiophrenic region. 2.7 mm pleural base nodule is again  seen in the lateral aspect of the left upper lobe. Upper Abdomen: Vascular calcifications are seen in both kidneys. Also there  is a tiny nonobstructing nephrolith that measures 2 mm in the left kidney. Diffuse atherosclerotic calcifications are also seen.     Soft Tissues/Bones: Degenerative changes are seen in the thoracic spine at  multiple levels. Intervertebral disc space narrowing and small anterior  osteophytes are seen. Impression  A nodule that is seen in the lingula has increased in size from 5.6 mm to 8.6  mm. Other nodules that  measure less than 5 mm are stable. RECOMMENDATIONS:  Lung rads 4 x    Findings:  Category 3 or 4 nodules with additional features or imaging  findings that increases the suspicion of malignancy. Management: Chest CT with or without contrast, PET/CT and/or tissue sampling  depending on the probability of malignancy and comorbidities. PET/CT may be  used when there is a > 8 mm  solid component. For new large nodules that  develop on an annual repeat screening CT, a 1 month LDCT may be recommended  to address potentially infectious or inflammatory conditions. CXR      2-view: Results for orders placed during the hospital encounter of 10/12/22    XR CHEST (2 VW)    Narrative  EXAMINATION:  TWO XRAY VIEWS OF THE CHEST    10/12/2022 9:58 am    COMPARISON:  None. HISTORY:  ORDERING SYSTEM PROVIDED HISTORY: Egophany, Hepatojugular reflux on exam;  dizzy, extreme HTN; assess for heart failure  TECHNOLOGIST PROVIDED HISTORY:  Reason for exam:->Egophany, Hepatojugular reflux on exam; dizzy, extreme HTN;  assess for heart failure  What reading provider will be dictating this exam?->CRC    FINDINGS:  The lungs are without acute focal process. There is no effusion or  pneumothorax. The cardiomediastinal silhouette is without acute process. The  osseous structures are without acute process. The patient is status post  CABG. Impression  No acute process.        Portable: Results for orders placed during the hospital encounter of 01/31/23    XR CHEST PORTABLE    Narrative  EXAMINATION:  ONE XRAY VIEW OF THE CHEST    1/31/2023 10:51 am    COMPARISON:  October 12, 2022 1006 hours    HISTORY:  ORDERING SYSTEM PROVIDED HISTORY: Status post left upper lobectomy with chest  tube placement  TECHNOLOGIST PROVIDED HISTORY:  Reason for exam:->Status post left upper lobectomy with chest tube placement  What reading provider will be dictating this exam?->CRC    FINDINGS:  There are multiple median sternotomy wires. 9 interval placement left-sided  chest tube. Tip is directed towards the apex. The cardiac silhouette is of normal configuration. Pulmonary vascular 10 UA. Right-sided trachea. Small area of atelectasis left lower lobe. No focal infiltrate    No significant effusion. No pneumothoraces. Impression  Status post left upper lobe lobectomy with interval placement left-sided  chest tube. Echo No results found for this or any previous visit. Assessment/Plan:    Active Hospital Problems    Diagnosis Date Noted    Lung mass [R91.8] 01/18/2023     Thoracic surgery postop day 2: Pain control weightbearing status chest tube removed. Coronary disease status post CABG patient is already resumed on antiplatelet, statin. Hypertension. Patient already on resumed on Coreg and losartan. BP resolved. GERD: Pepcid  DVT prophylaxis per primary surgeon. Additional work up or/and treatment plan may be added today or then after based on clinical progression. I am managing a portion of pt care. Some medical issues are handled byother specialists. Additional work up and treatment should be done in out pt setting by pt PCP and other out pt providers. In addition to examining and evaluating pt, I spent additional time explaining care, normaland abnormal findings, and treatment plan. All of pt questions were answered. Counseling, diet and education were provided. Case will be discussed with nursing staff when appropriate. Family will be updated if and whenappropriate.       Electronically signed by Leana Guillaume DO on 2/2/2023 at 12:05 PM

## 2023-02-02 NOTE — CARE COORDINATION
Met with pt at bedside to discuss discharge planning. Pt plans to return home alone and has family support. Reviewed second IMM and pt gave verbal consent.

## 2023-02-02 NOTE — PROGRESS NOTES
Pharmacy Dose Adjustment Per Protocol    Emanuel Castro is a 81 y.o. female. Recent Labs     02/01/23  0437 02/02/23  0551   BUN 16 15   CREATININE 0.89 0.91*   Estimated Creatinine Clearance: 41 mL/min (A) (based on SCr of 0.91 mg/dL (H)). Kelsey Dickson Height: 5' 1\" (154.9 cm), Weight: 139 lb (63 kg)      Current order:  Enoxaparin 30 mg SUBQ once daily      Plan:  Pharmacologic VTE prophylaxis modified based on patient renal function per [facility]/P&T approved protocol     Patient Weight (kg)      50.9 and below .9 101-150.9 151-174.9 175 or greater   Estimated   CrCl  (ml/min) 30 or greater []   30 mg   SUBQ daily   [x]   40 mg   SUBQ daily []  30 mg SUBQ   BID  []  40 mg   SUBQ   BID []  60mg SUBQ BID    15-29.9 []  UFH 5000   units SUBQ BID []  30 mg   SUBQ daily [] 30 mg SUBQ   daily []  40 mg SUBQ   daily [] 60 mg SUBQ   daily    Less than 15 or dialysis []  UFH 5000   units SUBQ BID [] UFH 5000 units SUBQ TID []  UFH 7500   units   SUBQ TID       Thank you,  MERY Eubanks Ph.  2/2/2023  10:03 AM

## 2023-02-02 NOTE — PROGRESS NOTES
Hospitalist Daily Progress Note  Name: Elie Smart  Age: 81 y.o. Gender: female  CodeStatus: Full Code  Allergies: Amoxicillin  Nsaids    Chief Complaint:No chief complaint on file. Primary Care Provider: Kim Carney MD  InpatientTreatment Team: Treatment Team: Attending Provider: Peter Robbins MD; Surgeon: Peter Robbins MD; Utilization Reviewer: Linda Escobedo, RN; Consulting Physician: Radha Kimble DO; : Brandi Velez, RN; Registered Nurse: Olvin Barksdale, RN; Registered Nurse: Kristin Heath RN  Admission Date: 1/31/2023      Subjective: Patient seen evaluated bedside. She did have some nausea today that was not improved with Zofran but resolved with Tigan. Pain control is improved. 70 mL out via chest tube in the last shift. Small airleak with cough. No new concerns    Physical Exam  Musculoskeletal:      Right lower leg: No edema. Constitutional: alert, appears stated age and cooperative  Skin: Skin color, texture, turgor normal. No rashes or lesions  Eyes:Eye: Normal external eye, conjunctiva, ANGIE. ENT: Head: Normocephalic, no lesions, without obvious abnormality. Neck: no adenopathy, no carotid bruit, no JVD, supple, symmetrical, trachea midline and thyroid not enlarged, symmetric, no tenderness/mass/nodules  Respiratory: clear to auscultation bilaterally  Cardiovascular: Rhonchi on the left good air movement bilaterally. Left-sided chest tube in place dressing clean dry and intact MARÍA. Gastrointestinal: soft, non-tender; bowel sounds normal; no masses,  no organomegaly  Genitourinary: Deferred  Musculoskeletal:extremities normal, atraumatic, no cyanosis or edema  Neurologic: Mental status AAOx3 No facial asymmetry or droop. Normal muscle strength b/l. Psychiatric: Appropriate mood and affect.  Good insight and judgement  Hematologic: No obvious bruising or bleeding  Review of Systems  14 point ROS reviewed negative except for as above  Medications: Reviewed    Infusion Medications:    sodium chloride       Scheduled Medications:    scopolamine  1 patch TransDERmal Q72H    amLODIPine  10 mg Oral Daily    aspirin  81 mg Oral Daily    atorvastatin  40 mg Oral Daily    carvedilol  12.5 mg Oral BID WC    traZODone  100 mg Oral Nightly    sodium chloride flush  5-40 mL IntraVENous 2 times per day    enoxaparin  30 mg SubCUTAneous Daily    famotidine  20 mg Oral BID    losartan  100 mg Oral Daily    hydroCHLOROthiazide  12.5 mg Oral Daily    lidocaine  1 patch TransDERmal Daily     PRN Meds: oxyCODONE, trimethobenzamide, sodium chloride flush, sodium chloride, ondansetron **OR** ondansetron, naloxone, polyethylene glycol, magnesium sulfate, potassium chloride, acetaminophen, Benzocaine-Menthol    Labs:   No results for input(s): WBC, HGB, HCT, PLT in the last 72 hours. Recent Labs     02/01/23  0437      K 4.1      CO2 26   BUN 16   CREATININE 0.89   CALCIUM 9.0     No results for input(s): AST, ALT, BILIDIR, BILITOT, ALKPHOS in the last 72 hours. No results for input(s): INR in the last 72 hours. No results for input(s): [place] Bounds in the last 72 hours. Urinalysis:   Lab Results   Component Value Date/Time    NITRU Negative 10/12/2022 09:00 AM    WBCUA 3-5 10/12/2022 09:00 AM    BACTERIA Negative 10/12/2022 09:00 AM    RBCUA 0-2 10/12/2022 09:00 AM    BLOODU Negative 10/12/2022 09:00 AM    SPECGRAV 1.018 10/12/2022 09:00 AM    GLUCOSEU Negative 10/12/2022 09:00 AM       Radiology:   Most recent    Chest CT      WITH CONTRAST:No results found for this or any previous visit. WITHOUT CONTRAST: Results for orders placed during the hospital encounter of 12/01/22    CT CHEST WO CONTRAST    Narrative  EXAMINATION:  CT OF THE CHEST WITHOUT CONTRAST 12/1/2022 10:08 am    TECHNIQUE:  CT of the chest was performed without the administration of intravenous  contrast. Multiplanar reformatted images are provided for review.  Automated  exposure control, iterative reconstruction, and/or weight based adjustment of  the mA/kV was utilized to reduce the radiation dose to as low as reasonably  achievable. COMPARISON:  October 7, 2021    HISTORY:  ORDERING SYSTEM PROVIDED HISTORY: Lung nodule  TECHNOLOGIST PROVIDED HISTORY:  Reason for exam:->lung nodule  What reading provider will be dictating this exam?->CRC    FINDINGS:  Mediastinum: A nodule is seen in the left lobe of the thyroid gland. No  thyroid gland enlargement is seen. There is evidence of median sternotomy. Diffuse atherosclerotic calcifications are seen in the aortic arch, ascending  and descending aorta. Coronary calcifications and/or stents are also seen. No pathologically enlarged lymph nodes are seen. Lungs/pleura: No consolidating pneumonia, pleural effusion or pneumothorax is  seen. Centrilobular emphysematous disease is visualized within upper lobe  predominance. Biapical pleural thickening is seen. A small area of scarring  or ground-glass opacity is seen in the right upper lobe that is similar to  previous study. A less than 5 mm nodule is again seen in the right upper  lobe (series 3, image 24. Also in the peripheral aspect of the lateral right  upper lobe an area of ground-glass opacity is again seen. It measures 7.9 mm  (series 3, image 34). In the lingula an 8.6 mm nodule is seen (series 3, image 58). Atelectasis is  seen in the left base. Also there is atelectasis seen in the medial aspect  the lingula in the cardiophrenic region. 2.7 mm pleural base nodule is again  seen in the lateral aspect of the left upper lobe. Upper Abdomen: Vascular calcifications are seen in both kidneys. Also there  is a tiny nonobstructing nephrolith that measures 2 mm in the left kidney. Diffuse atherosclerotic calcifications are also seen. Soft Tissues/Bones: Degenerative changes are seen in the thoracic spine at  multiple levels.   Intervertebral disc space narrowing and small anterior  osteophytes are seen. Impression  A nodule that is seen in the lingula has increased in size from 5.6 mm to 8.6  mm. Other nodules that  measure less than 5 mm are stable. RECOMMENDATIONS:  Lung rads 4 x    Findings:  Category 3 or 4 nodules with additional features or imaging  findings that increases the suspicion of malignancy. Management: Chest CT with or without contrast, PET/CT and/or tissue sampling  depending on the probability of malignancy and comorbidities. PET/CT may be  used when there is a > 8 mm  solid component. For new large nodules that  develop on an annual repeat screening CT, a 1 month LDCT may be recommended  to address potentially infectious or inflammatory conditions. CXR      2-view: Results for orders placed during the hospital encounter of 10/12/22    XR CHEST (2 VW)    Narrative  EXAMINATION:  TWO XRAY VIEWS OF THE CHEST    10/12/2022 9:58 am    COMPARISON:  None. HISTORY:  ORDERING SYSTEM PROVIDED HISTORY: Egophany, Hepatojugular reflux on exam;  dizzy, extreme HTN; assess for heart failure  TECHNOLOGIST PROVIDED HISTORY:  Reason for exam:->Egophany, Hepatojugular reflux on exam; dizzy, extreme HTN;  assess for heart failure  What reading provider will be dictating this exam?->CRC    FINDINGS:  The lungs are without acute focal process. There is no effusion or  pneumothorax. The cardiomediastinal silhouette is without acute process. The  osseous structures are without acute process. The patient is status post  CABG. Impression  No acute process.        Portable: Results for orders placed during the hospital encounter of 01/31/23    XR CHEST PORTABLE    Narrative  EXAMINATION:  ONE XRAY VIEW OF THE CHEST    1/31/2023 10:51 am    COMPARISON:  October 12, 2022 1006 hours    HISTORY:  ORDERING SYSTEM PROVIDED HISTORY: Status post left upper lobectomy with chest  tube placement  TECHNOLOGIST PROVIDED HISTORY:  Reason for exam:->Status post left upper lobectomy with chest tube placement  What reading provider will be dictating this exam?->CRC    FINDINGS:  There are multiple median sternotomy wires. 9 interval placement left-sided  chest tube. Tip is directed towards the apex. The cardiac silhouette is of normal configuration. Pulmonary vascular 10 UA. Right-sided trachea. Small area of atelectasis left lower lobe. No focal infiltrate    No significant effusion. No pneumothoraces. Impression  Status post left upper lobe lobectomy with interval placement left-sided  chest tube. Echo No results found for this or any previous visit. Assessment/Plan:    Active Hospital Problems    Diagnosis Date Noted    Lung mass [R91.8] 01/18/2023     Thoracic surgery postop day 1: Pain control weightbearing status and chest tube management per cardiothoracic surgery. Tigan added for nausea    Coronary disease status post CABG patient is already resumed on antiplatelet, statin. Hypertension. Patient already on resumed on Coreg and losartan. Hypertensive this morning but improved with control of nausea. Hydralazine added    GERD: Pepcid  DVT prophylaxis per primary surgeon. Additional work up or/and treatment plan may be added today or then after based on clinical progression. I am managing a portion of pt care. Some medical issues are handled byother specialists. Additional work up and treatment should be done in out pt setting by pt PCP and other out pt providers. In addition to examining and evaluating pt, I spent additional time explaining care, normaland abnormal findings, and treatment plan. All of pt questions were answered. Counseling, diet and education were provided. Case will be discussed with nursing staff when appropriate. Family will be updated if and whenappropriate.       Electronically signed by Jenny Love DO on 2/1/2023 at 7:32 PM

## 2023-02-02 NOTE — DISCHARGE SUMMARY
Physician Discharge Summary     Patient ID:  Sima Snider  [id number]  14 y.o.  1940    Admit date: 1/31/2023    Discharge date and time: No discharge date for patient encounter. 2/2/2023    Admitting Physician: Adonay Valencia MD     Discharge Physician: same    Admission Diagnoses: Pulmonary nodule [R91.1]  Lung mass [R91.8]    Discharge Diagnoses: lung cancer    Admission Condition: good    Discharged Condition: good    Indication for Admission: surgery    Hospital Course: Pt admitted day of surgery for resection of a suspicious left lung mass that turned out to be non small cell cancer. Lobectomy went well and by POD 2 she had no air leak, chest tube was removed, and she was discharged to home.     Consults: hospitalist    Significant Diagnostic Studies: radiology: CXR:  no pneumothorax      Treatments: surgery: LVATS lobectomy    Discharge Exam:  BP (!) 150/76 Comment: Ammy SNOW advised  Pulse (!) 108   Temp 98.5 °F (36.9 °C) (Oral)   Resp 16   Ht 5' 1\" (1.549 m)   Wt 139 lb (63 kg)   SpO2 95%   BMI 26.26 kg/m²     General Appearance:    Alert, cooperative, no distress, appears stated age   Head:    Normocephalic, without obvious abnormality, atraumatic   Eyes:    PERRL, conjunctiva/corneas clear, EOM's intact, fundi     benign, both eyes   Ears:    Normal TM's and external ear canals, both ears   Nose:   Nares normal, septum midline, mucosa normal, no drainage    or sinus tenderness   Throat:   Lips, mucosa, and tongue normal; teeth and gums normal   Neck:   Supple, symmetrical, trachea midline, no adenopathy;     thyroid:  no enlargement/tenderness/nodules; no carotid    bruit or JVD   Back:     Symmetric, no curvature, ROM normal, no CVA tenderness   Lungs:     Clear to auscultation bilaterally, respirations unlabored   Chest Wall:    No tenderness or deformity    Heart:    Regular rate and rhythm, S1 and S2 normal, no murmur, rub   or gallop   Breast Exam:    No tenderness, masses, or nipple abnormality   Abdomen:     Soft, non-tender, bowel sounds active all four quadrants,     no masses, no organomegaly   Genitalia:    Normal female without lesion, discharge or tenderness   Rectal:    Normal tone ;guaiac negative stool   Extremities:   Extremities normal, atraumatic, no cyanosis or edema   Pulses:   2+ and symmetric all extremities   Skin:   Skin color, texture, turgor normal, no rashes or lesions   Lymph nodes:   Cervical, supraclavicular, and axillary nodes normal   Neurologic:   CNII-XII intact, normal strength, sensation and reflexes     throughout       Disposition: home    In process/preliminary results:  Outstanding Order Results       Date and Time Order Name Status Description    2/2/2023 10:31 AM XR CHEST PORTABLE In process     1/31/2023  8:34 AM Surgical Pathology In process             Patient Instructions:   Current Discharge Medication List        START taking these medications    Details   oxyCODONE (ROXICODONE) 5 MG immediate release tablet Take 1 tablet by mouth every 6 hours as needed for Pain for up to 7 days.  Max Daily Amount: 20 mg  Qty: 25 tablet, Refills: 0    Comments: Reduce doses taken as pain becomes manageable  Associated Diagnoses: Lung mass           CONTINUE these medications which have NOT CHANGED    Details   varenicline (CHANTIX) 1 MG tablet TAKE 1/2 TABLET DAILY X3 DAYS, THEN 1/2 TAB TWICE DAILY X4 DAYS, THEN 1 TAB TWICE DAILY THEREAFTER  Qty: 60 tablet, Refills: 1    Associated Diagnoses: Smoking      losartan-hydroCHLOROthiazide (HYZAAR) 100-12.5 MG per tablet TAKE 1 TABLET BY MOUTH EVERY DAY  Qty: 90 tablet, Refills: 1    Associated Diagnoses: Essential hypertension      carvedilol (COREG) 12.5 MG tablet TAKE 1 TABLET BY MOUTH TWICE A DAY  Qty: 60 tablet, Refills: 5    Associated Diagnoses: Essential hypertension      atorvastatin (LIPITOR) 40 MG tablet TAKE 1 TABLET BY MOUTH EVERY DAY  Qty: 90 tablet, Refills: 3    Associated Diagnoses: Dyslipidemia amLODIPine (NORVASC) 10 MG tablet Take 1 tablet by mouth daily  Qty: 30 tablet, Refills: 3      traZODone (DESYREL) 100 MG tablet TAKE 1 TABLET BY MOUTH EVERY DAY AT NIGHT  Qty: 90 tablet, Refills: 1      Handicap Placard MISC by Does not apply route Applying from 1/28/2021-1/27/2026. Qty: 1 each, Refills: 0      aspirin 81 MG EC tablet Take 81 mg by mouth daily           Activity: no heavy lifting for 3 weeks  Diet: regular diet  Wound Care: as directed    Follow-up with Dr. Damien Harrell in 3 weeks.     Signed:  Dr. Damien Harrell  2/2/2023  10:56 AM

## 2023-02-02 NOTE — PROGRESS NOTES
Discharge instructions reviewed with patient and her daughter. All questions answered. IV's removed intact. Patient has all belongings.

## 2023-02-02 NOTE — DISCHARGE INSTRUCTIONS
No driving while taking narcotics. No lifting more than 15 pounds for 3 weeks. May remove dressings on February 4 and shower. Recover incisions with fresh gauze daily until healed.

## 2023-02-02 NOTE — PROGRESS NOTES
Patient resting in bed. Had complaints of pain and discomfort due to nausea. Medicated per MAR. Patient stated that her left chest was hurting around where the chest tube was removed. Writer applied lidocaine patch to give patient some relief. No further needs. Call light in reach.

## 2023-02-02 NOTE — PROGRESS NOTES
Patient is doing well. She has minimal drainage and no air leak with multiple strong coughs. Chest tube was removed. As long as chest x-ray is stable she can be discharged home today.

## 2023-02-03 ENCOUNTER — CLINICAL DOCUMENTATION ONLY (OUTPATIENT)
Facility: CLINIC | Age: 83
End: 2023-02-03

## 2023-02-06 DIAGNOSIS — Z09 STATUS POST LUNG SURGERY, FOLLOW-UP EXAM: Primary | ICD-10-CM

## 2023-02-09 ENCOUNTER — HOSPITAL ENCOUNTER (EMERGENCY)
Age: 83
Discharge: HOME | End: 2023-02-09
Attending: EMERGENCY MEDICINE
Payer: MEDICARE

## 2023-02-09 ENCOUNTER — OFFICE VISIT (OUTPATIENT)
Dept: PULMONOLOGY | Age: 83
End: 2023-02-09
Payer: MEDICARE

## 2023-02-09 ENCOUNTER — TELEPHONE (OUTPATIENT)
Dept: PULMONOLOGY | Age: 83
End: 2023-02-09

## 2023-02-09 VITALS
OXYGEN SATURATION: 96 % | HEIGHT: 61 IN | TEMPERATURE: 97.5 F | RESPIRATION RATE: 14 BRPM | WEIGHT: 136 LBS | HEART RATE: 70 BPM | SYSTOLIC BLOOD PRESSURE: 127 MMHG | DIASTOLIC BLOOD PRESSURE: 48 MMHG | BODY MASS INDEX: 25.68 KG/M2

## 2023-02-09 VITALS
TEMPERATURE: 96.9 F | SYSTOLIC BLOOD PRESSURE: 112 MMHG | HEART RATE: 67 BPM | DIASTOLIC BLOOD PRESSURE: 60 MMHG | WEIGHT: 136.4 LBS | RESPIRATION RATE: 16 BRPM | HEIGHT: 61 IN | BODY MASS INDEX: 25.75 KG/M2 | OXYGEN SATURATION: 96 %

## 2023-02-09 DIAGNOSIS — I51.89 DIASTOLIC DYSFUNCTION: ICD-10-CM

## 2023-02-09 DIAGNOSIS — R53.1 WEAKNESS: Primary | ICD-10-CM

## 2023-02-09 DIAGNOSIS — E86.0 DEHYDRATION: Primary | ICD-10-CM

## 2023-02-09 DIAGNOSIS — C34.92 ADENOCARCINOMA OF LEFT LUNG (HCC): ICD-10-CM

## 2023-02-09 DIAGNOSIS — R06.02 SOB (SHORTNESS OF BREATH): ICD-10-CM

## 2023-02-09 PROCEDURE — 1090F PRES/ABSN URINE INCON ASSESS: CPT | Performed by: INTERNAL MEDICINE

## 2023-02-09 PROCEDURE — 3078F DIAST BP <80 MM HG: CPT | Performed by: INTERNAL MEDICINE

## 2023-02-09 PROCEDURE — 3074F SYST BP LT 130 MM HG: CPT | Performed by: INTERNAL MEDICINE

## 2023-02-09 PROCEDURE — 1123F ACP DISCUSS/DSCN MKR DOCD: CPT | Performed by: INTERNAL MEDICINE

## 2023-02-09 PROCEDURE — G8399 PT W/DXA RESULTS DOCUMENT: HCPCS | Performed by: INTERNAL MEDICINE

## 2023-02-09 PROCEDURE — 99214 OFFICE O/P EST MOD 30 MIN: CPT | Performed by: INTERNAL MEDICINE

## 2023-02-09 PROCEDURE — 1111F DSCHRG MED/CURRENT MED MERGE: CPT | Performed by: INTERNAL MEDICINE

## 2023-02-09 PROCEDURE — G8417 CALC BMI ABV UP PARAM F/U: HCPCS | Performed by: INTERNAL MEDICINE

## 2023-02-09 PROCEDURE — G8482 FLU IMMUNIZE ORDER/ADMIN: HCPCS | Performed by: INTERNAL MEDICINE

## 2023-02-09 PROCEDURE — 2580000003 HC RX 258: Performed by: EMERGENCY MEDICINE

## 2023-02-09 PROCEDURE — 1036F TOBACCO NON-USER: CPT | Performed by: INTERNAL MEDICINE

## 2023-02-09 PROCEDURE — G8427 DOCREV CUR MEDS BY ELIG CLIN: HCPCS | Performed by: INTERNAL MEDICINE

## 2023-02-09 PROCEDURE — 99284 EMERGENCY DEPT VISIT MOD MDM: CPT

## 2023-02-09 RX ORDER — 0.9 % SODIUM CHLORIDE 0.9 %
1000 INTRAVENOUS SOLUTION INTRAVENOUS ONCE
Status: COMPLETED | OUTPATIENT
Start: 2023-02-09 | End: 2023-02-09

## 2023-02-09 RX ADMIN — SODIUM CHLORIDE 1000 ML: 9 INJECTION, SOLUTION INTRAVENOUS at 18:04

## 2023-02-09 ASSESSMENT — ENCOUNTER SYMPTOMS
EYE PAIN: 0
VOMITING: 0
ABDOMINAL PAIN: 0
CHEST TIGHTNESS: 0
NAUSEA: 0
SHORTNESS OF BREATH: 0
SORE THROAT: 0

## 2023-02-09 ASSESSMENT — PAIN - FUNCTIONAL ASSESSMENT
PAIN_FUNCTIONAL_ASSESSMENT: NONE - DENIES PAIN
PAIN_FUNCTIONAL_ASSESSMENT: NONE - DENIES PAIN

## 2023-02-09 NOTE — PROGRESS NOTES
Subjective:     Randy Nicole is a 81 y.o. female who complains today of:     Chief Complaint   Patient presents with    Follow-up     4 Week F/U for Lung Nodule    Results     PFT       HPI  Patient presents for shortness of breath, post lung surgery      She is doing okay, recovering well, however she feels short of breath on exertion which is new for her, no chest pain, no coughing, no hemoptysis, her weight is stable, no worsening lower extremity edema, no fever and no chills. She looks pale, no nasal congestion or postnasal drip and no heartburn. Allergies:  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    Hyperlipidemia     Kidney disease     PONV (postoperative nausea and vomiting)     Stage 4 chronic kidney disease ([facility] [address] 11/19/2020    Stenosis of noncoronary bypass graft ([facility] [address]     Tobacco abuse 11/19/2020     Past Surgical History:   Procedure Laterality Date    BACK SURGERY  2003    BRAIN ANEURYSM SURGERY  2012    BREAST LUMPECTOMY Right     CARPAL TUNNEL RELEASE Bilateral     CORONARY ARTERY BYPASS GRAFT      PARTIAL HYSTERECTOMY (CERVIX NOT REMOVED)  1997    THORACOSCOPY Left 1/31/2023    left thoracoscopic wedge, completion left upper lobectomy performed by Will Bucio MD at [address] [facility] Bilateral left side 2017 right side 2004     History reviewed. No pertinent family history. Social History     Socioeconomic History    Marital status:       Spouse name: Not on file    Number of children: Not on file    Years of education: Not on file    Highest education level: Not on file   Occupational History    Not on file   Tobacco Use    Smoking status: Former     Packs/day: 0.50     Years: 60.00     Pack years: 30.00     Types: Cigarettes     Quit date: 2/9/2023 Smokeless tobacco: Never   Vaping Use    Vaping Use: Never used   Substance and Sexual Activity    Alcohol use: Yes     Comment: rare    Drug use: Never    Sexual activity: Not Currently   Other Topics Concern    Not on file   Social History Narrative    Not on file     Social Determinants of Health     Financial Resource Strain: Low Risk     Difficulty of Paying Living Expenses: Not hard at all   Food Insecurity: No Food Insecurity    Worried About Running Out of Food in the Last Year: Never true    Ran Out of Food in the Last Year: Never true   Transportation Needs: Not on file   Physical Activity: Not on file   Stress: Not on file   Social Connections: Not on file   Intimate Partner Violence: Not on file   Housing Stability: Not on file         Review of Systems      ROS: 10 organs review of system is done including general, psychological, ENT, hematological, endocrine, respiratory, cardiovascular, gastrointestinal,musculoskeletal, neurological,  allergy and Immunology is done and is otherwise negative. Current Outpatient Medications   Medication Sig Dispense Refill    oxyCODONE (ROXICODONE) 5 MG immediate release tablet Take 1 tablet by mouth every 6 hours as needed for Pain for up to 7 days.  Max Daily Amount: 20 mg 25 tablet 0    ondansetron (ZOFRAN-ODT) 4 MG disintegrating tablet Take 1 tablet by mouth every 8 hours as needed for Nausea or Vomiting 21 tablet 0    losartan-hydroCHLOROthiazide (HYZAAR) 100-12.5 MG per tablet TAKE 1 TABLET BY MOUTH EVERY DAY 90 tablet 1    carvedilol (COREG) 12.5 MG tablet TAKE 1 TABLET BY MOUTH TWICE A DAY 60 tablet 5    atorvastatin (LIPITOR) 40 MG tablet TAKE 1 TABLET BY MOUTH EVERY DAY 90 tablet 3    amLODIPine (NORVASC) 10 MG tablet Take 1 tablet by mouth daily 30 tablet 3    traZODone (DESYREL) 100 MG tablet TAKE 1 TABLET BY MOUTH EVERY DAY AT NIGHT 90 tablet 1    Handicap Placard MISC by Does not apply route Applying from 1/28/2021-1/27/2026. 1 each 0    aspirin 81 MG EC tablet Take 81 mg by mouth daily      varenicline (CHANTIX) 1 MG tablet TAKE 1/2 TABLET DAILY X3 DAYS, THEN 1/2 TAB TWICE DAILY X4 DAYS, THEN 1 TAB TWICE DAILY THEREAFTER (Patient not taking: No sig reported) 60 tablet 1     No current facility-administered medications for this visit. Objective:     Vitals:    02/09/23 1141   BP: 112/60   Site: Right Upper Arm   Position: Sitting   Cuff Size: Medium Adult   Pulse: 67   Resp: 16   Temp: 96.9 °F (36.1 °C)   TempSrc: Infrared   SpO2: 96%   Weight: 136 lb 6.4 oz (61.9 kg)   Height: 5' 1\" (1.549 m)         Physical Exam  Constitutional:       General: She is not in acute distress. Appearance: She is well-developed. She is not diaphoretic. HENT:      Head: Normocephalic and atraumatic. Eyes:      Conjunctiva/sclera: Conjunctivae normal.      Pupils: Pupils are equal, round, and reactive to light. Cardiovascular:      Rate and Rhythm: Normal rate and regular rhythm. Heart sounds: No murmur heard. No friction rub. No gallop. Pulmonary:      Effort: Pulmonary effort is normal. No respiratory distress. Breath sounds: Normal breath sounds. No wheezing or rales. Chest:      Chest wall: No tenderness. Abdominal:      General: There is no distension. Palpations: Abdomen is soft. Tenderness: There is no abdominal tenderness. There is no rebound. Musculoskeletal:         General: No tenderness. Cervical back: Normal range of motion and neck supple. Right lower leg: No edema. Left lower leg: No edema. Lymphadenopathy:      Cervical: No cervical adenopathy. Skin:     General: Skin is warm and dry. Findings: No erythema. Neurological:      Mental Status: She is alert and oriented to person, place, and time.    Psychiatric:         Judgment: Judgment normal.       Imaging studies reviewed and interpreted by me CT chest December 2022, inferior lingula nodule     Lab results reviewed in chart  Echo 2020, EF 55%, diastolic dysfunction  Assessment and Plan       Diagnosis Orders   1. Weakness  CBC with Auto Differential    Comprehensive Metabolic Panel      2. SOB (shortness of breath)  Full PFT Study With Bronchodilator      3. Adenocarcinoma of left lung (HCC)  CT CHEST WO CONTRAST      4. Diastolic dysfunction          Weakness, recent surgery, will obtain CBC and CMP, will reevaluate after labs are done  Dyspnea exertion, since recent surgery, will obtain PFT  Left upper lobe nodule, status post lobectomy, adenocarcinoma, stage I A2. We will follow-up CT chest in 4 months  Continue cardioprotective medications and avoid volume overload      Orders Placed This Encounter   Procedures    CT CHEST WO CONTRAST     Standing Status:   Future     Standing Expiration Date:   2/9/2024    CBC with Auto Differential     Standing Status:   Future     Standing Expiration Date:   2/9/2024    Comprehensive Metabolic Panel     Standing Status:   Future     Standing Expiration Date:   2/9/2024    Full PFT Study With Bronchodilator     Standing Status:   Future     Standing Expiration Date:   8/9/2024     No orders of the defined types were placed in this encounter. Discussed with patient the importance of exercise and weight control and  overall health and well-being. Reviewed with the patient: current clinical status, medications, activities and diet. Side effects, adverse effects of the medication prescribed today, as well as treatment plan and result expectations have been discussed with the patient who expresses understanding and desires to proceed. Return in about 4 weeks (around 3/9/2023).       Rasheed Rodrigez MD

## 2023-02-09 NOTE — ED NOTES
Pt presents to ER with irregular lab values (elevated creat).       Namita Perkins, EMT-P  02/09/23 [id number]

## 2023-02-09 NOTE — TELEPHONE ENCOUNTER
Spoke with the patient, labs shows NAZARIO, I recommended she come to emergency room, likely is prerenal will need IV hydration.   She is agreeable she is going to call her daughter

## 2023-02-09 NOTE — ED PROVIDER NOTES
[address] [facility] ED  EMERGENCY DEPARTMENT ENCOUNTER      Pt Name: Bhavna De Guzman  MRN: [id number]  Martíngfradha 1940  Date of evaluation: 2/9/2023  Provider: Veena [address], DO    CHIEF COMPLAINT       Chief Complaint   Patient presents with    Other     Irregular lab values (creat elevated)         HISTORY OF PRESENT ILLNESS   (Location/Symptom, Timing/Onset, Context/Setting, Quality, Duration, Modifying Factors, Severity)  Note limiting factors. Bhavna De Guzman is a 81 y.o. female who presents to the emergency department. .  Patient is 9 days status post upper lobectomy for cancer of the lung. Went to see pulmonary today for follow-up. Joaquin Maria thought patient looked pale and ordered CBC and CMP. Patient was found to be dehydrated with a mild NAZARIO on her chemistry panel. Patient states that she is to eat and drink. Feels okay. Here to get hydrated and then would like to go home. HPI    Nursing Notes were reviewed. REVIEW OF SYSTEMS    (2-9 systems for level 4, 10 or more for level 5)     Review of Systems   Constitutional:  Positive for fatigue. Negative for activity change and appetite change. HENT:  Negative for congestion and sore throat. Eyes:  Negative for pain and visual disturbance. Respiratory:  Negative for chest tightness and shortness of breath. Cardiovascular:  Negative for chest pain. Gastrointestinal:  Negative for abdominal pain, nausea and vomiting. Endocrine: Negative for polydipsia. Genitourinary:  Negative for flank pain and urgency. Musculoskeletal:  Negative for gait problem and neck stiffness. Skin:  Negative for rash. Neurological:  Negative for weakness, light-headedness and headaches. Psychiatric/Behavioral:  Negative for confusion and sleep disturbance. Except as noted above the remainder of the review of systems was reviewed and negative.        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/19/2020    Hyperlipidemia     Kidney disease     PONV (postoperative nausea and vomiting)     Stage 4 chronic kidney disease ([facility] [address] 11/19/2020    Stenosis of noncoronary bypass graft ([facility] [address]     Tobacco abuse 11/19/2020         SURGICAL HISTORY       Past Surgical History:   Procedure Laterality Date    BACK SURGERY  2003    BRAIN ANEURYSM SURGERY  2012    BREAST LUMPECTOMY Right     CARPAL TUNNEL RELEASE Bilateral     CORONARY ARTERY BYPASS GRAFT      PARTIAL HYSTERECTOMY (CERVIX NOT REMOVED)  1997    THORACOSCOPY Left 1/31/2023    left thoracoscopic wedge, completion left upper lobectomy performed by Cyrus Helm MD at [address] [facility] Bilateral left side 2017 right side 2004         CURRENT MEDICATIONS       Previous Medications    AMLODIPINE (NORVASC) 10 MG TABLET    Take 1 tablet by mouth daily    ASPIRIN 81 MG EC TABLET    Take 81 mg by mouth daily    ATORVASTATIN (LIPITOR) 40 MG TABLET    TAKE 1 TABLET BY MOUTH EVERY DAY    CARVEDILOL (COREG) 12.5 MG TABLET    TAKE 1 TABLET BY MOUTH TWICE A DAY    HANDICAP PLACARD MISC    by Does not apply route Applying from 1/28/2021-1/27/2026. LOSARTAN-HYDROCHLOROTHIAZIDE (HYZAAR) 100-12.5 MG PER TABLET    TAKE 1 TABLET BY MOUTH EVERY DAY    ONDANSETRON (ZOFRAN-ODT) 4 MG DISINTEGRATING TABLET    Take 1 tablet by mouth every 8 hours as needed for Nausea or Vomiting    OXYCODONE (ROXICODONE) 5 MG IMMEDIATE RELEASE TABLET    Take 1 tablet by mouth every 6 hours as needed for Pain for up to 7 days. Max Daily Amount: 20 mg    TRAZODONE (DESYREL) 100 MG TABLET    TAKE 1 TABLET BY MOUTH EVERY DAY AT NIGHT       ALLERGIES     Amoxicillin and Nsaids    FAMILY HISTORY     History reviewed. No pertinent family history.        SOCIAL HISTORY       Social History     Socioeconomic History    Marital status:      Spouse name: None    Number of children: None    Years of education: None    Highest education level: None   Tobacco Use    Smoking status: Every Day     Packs/day: 0.10     Years: 60.00     Pack years: 6.00     Types: Cigarettes     Last attempt to quit: 2/9/2023    Smokeless tobacco: Never   Vaping Use    Vaping Use: Never used   Substance and Sexual Activity    Alcohol use: Yes     Comment: occ    Drug use: Never    Sexual activity: Not Currently     Social Determinants of Health     Financial Resource Strain: Low Risk     Difficulty of Paying Living Expenses: Not hard at all   Food Insecurity: No Food Insecurity    Worried About [employer] in the Last Year: Never true    Ran Out of Food in the Last Year: Never true       SCREENINGS                        PHYSICAL EXAM    (up to 7 for level 4, 8 or more for level 5)     ED Triage Vitals [02/09/23 1437]   BP Temp Temp Source Heart Rate Resp SpO2 Height Weight   (!) 109/50 97.5 °F (36.4 °C) Temporal 70 14 95 % 5' 1\" (1.549 m) 136 lb (61.7 kg)       Physical Exam  Vitals and nursing note reviewed. Constitutional:       General: She is not in acute distress. Appearance: She is well-developed. She is not ill-appearing or diaphoretic. HENT:      Head: Normocephalic and atraumatic. Right Ear: External ear normal.      Left Ear: External ear normal.      Nose: Nose normal.      Mouth/Throat:      Mouth: Mucous membranes are moist.      Pharynx: No oropharyngeal exudate. Eyes:      Extraocular Movements: Extraocular movements intact. Conjunctiva/sclera: Conjunctivae normal.      Pupils: Pupils are equal, round, and reactive to light. Neck:      Thyroid: No thyromegaly. Vascular: No JVD. Trachea: No tracheal deviation. Cardiovascular:      Rate and Rhythm: Normal rate. Heart sounds: Normal heart sounds. No murmur heard. Pulmonary:      Effort: Pulmonary effort is normal. No respiratory distress.       Breath sounds: Normal breath sounds. No wheezing. Abdominal:      General: Bowel sounds are normal.      Palpations: Abdomen is soft. Tenderness: There is no abdominal tenderness. There is no guarding. Musculoskeletal:         General: Normal range of motion. Cervical back: Normal range of motion and neck supple. Right lower leg: No edema. Left lower leg: No edema. Skin:     General: Skin is warm and dry. Coloration: Skin is pale. Findings: No rash. Neurological:      General: No focal deficit present. Mental Status: She is alert and oriented to person, place, and time. Cranial Nerves: No cranial nerve deficit. Psychiatric:         Behavior: Behavior normal.       DIAGNOSTIC RESULTS     EKG: All EKG's are interpreted by the Emergency Department Physician who either signs or Co-signs this chart in the absence of a cardiologist.        RADIOLOGY:   Non-plain film images such as CT, Ultrasound and MRI are read by the radiologist. Plain radiographic images are visualized and preliminarily interpreted by the emergency physician with the below findings:        Interpretation per the Radiologist below, if available at the time of this note:    No orders to display         ED BEDSIDE ULTRASOUND:   Performed by ED Physician - none    LABS:  Labs Reviewed - No data to display    All other labs were within normal range or not returned as of this dictation. EMERGENCY DEPARTMENT COURSE and DIFFERENTIAL DIAGNOSIS/MDM:   Vitals:    Vitals:    02/09/23 1437   BP: (!) 109/50   Pulse: 70   Resp: 14   Temp: 97.5 °F (36.4 °C)   TempSrc: Temporal   SpO2: 95%   Weight: 136 lb (61.7 kg)   Height: 5' 1\" (1.549 m)       Patient here for some IV fluids. Patient looks okay. We will give her a liter of fluid and I believe she could go home at that time. There is no reason she cannot eat or drink. Perhaps she got a little behind secondary to her big surgery a week ago.     Medical Decision Making  Risk  Prescription drug management. REASSESSMENT          CRITICAL CARE TIME   Total Critical Care time was 0 minutes, excluding separately reportable procedures. There was a high probability of clinically significant/life threatening deterioration in the patient's condition which required my urgent intervention. CONSULTS:  None    PROCEDURES:  Unless otherwise noted below, none     Procedures        FINAL IMPRESSION      1. Dehydration          DISPOSITION/PLAN   DISPOSITION Discharge - Pending Orders Complete 02/09/2023 06:12:13 PM      PATIENT REFERRED TO:  Karri Up MD  [id number] [phone]      As needed    DISCHARGE MEDICATIONS:  New Prescriptions    No medications on file     Controlled Substances Monitoring:     No flowsheet data found.     (Please note that portions of this note were completed with a voice recognition program.  Efforts were made to edit the dictations but occasionally words are mis-transcribed.)    Trace Joshi DO (electronically signed)  Attending Emergency Physician            Trace Joshi DO  02/09/23 3751

## 2023-02-14 ENCOUNTER — CLINICAL DOCUMENTATION ONLY (OUTPATIENT)
Facility: CLINIC | Age: 83
End: 2023-02-14

## 2023-02-17 ENCOUNTER — TELEPHONE (OUTPATIENT)
Dept: CARDIOTHORACIC SURGERY | Age: 83
End: 2023-02-17

## 2023-02-17 ENCOUNTER — APPOINTMENT (OUTPATIENT)
Dept: GENERAL RADIOLOGY | Age: 83
DRG: 191 | End: 2023-02-17
Payer: MEDICARE

## 2023-02-17 ENCOUNTER — HOSPITAL ENCOUNTER (INPATIENT)
Age: 83
LOS: 2 days | Discharge: HOME | DRG: 191 | End: 2023-02-19
Attending: EMERGENCY MEDICINE | Admitting: THORACIC SURGERY (CARDIOTHORACIC VASCULAR SURGERY)
Payer: MEDICARE

## 2023-02-17 DIAGNOSIS — R06.02 SHORTNESS OF BREATH: Primary | ICD-10-CM

## 2023-02-17 PROBLEM — R06.00 DYSPNEA: Status: ACTIVE | Noted: 2023-02-17

## 2023-02-17 LAB
ALBUMIN SERPL-MCNC: 3.4 G/DL (ref 3.5–4.6)
ALP BLD-CCNC: 92 U/L (ref 40–130)
ALT SERPL-CCNC: 14 U/L (ref 0–33)
ANION GAP SERPL CALCULATED.3IONS-SCNC: 10 MEQ/L (ref 9–15)
AST SERPL-CCNC: 22 U/L (ref 0–35)
BILIRUB SERPL-MCNC: <0.2 MG/DL (ref 0.2–0.7)
BUN BLDV-MCNC: 27 MG/DL (ref 8–23)
CALCIUM SERPL-MCNC: 8.6 MG/DL (ref 8.5–9.9)
CHLORIDE BLD-SCNC: 108 MEQ/L (ref 95–107)
CO2: 26 MEQ/L (ref 20–31)
CREAT SERPL-MCNC: 1.28 MG/DL (ref 0.5–0.9)
GFR SERPL CREATININE-BSD FRML MDRD: 41.9 ML/MIN/{1.73_M2}
GLOBULIN: 2.8 G/DL (ref 2.3–3.5)
GLUCOSE BLD-MCNC: 162 MG/DL (ref 70–99)
HCT VFR BLD CALC: 27.7 % (ref 37–47)
HEMOGLOBIN: 9.5 G/DL (ref 12–16)
MCH RBC QN AUTO: 31.6 PG (ref 27–31.3)
MCHC RBC AUTO-ENTMCNC: 34.3 % (ref 33–37)
MCV RBC AUTO: 92.2 FL (ref 79.4–94.8)
PDW BLD-RTO: 13.4 % (ref 11.5–14.5)
PLATELET # BLD: 312 K/UL (ref 130–400)
POTASSIUM REFLEX MAGNESIUM: 3.9 MEQ/L (ref 3.4–4.9)
RBC # BLD: 3.01 M/UL (ref 4.2–5.4)
SODIUM BLD-SCNC: 144 MEQ/L (ref 135–144)
TOTAL PROTEIN: 6.2 G/DL (ref 6.3–8)
WBC # BLD: 6.5 K/UL (ref 4.8–10.8)

## 2023-02-17 PROCEDURE — 94664 DEMO&/EVAL PT USE INHALER: CPT

## 2023-02-17 PROCEDURE — 80053 COMPREHEN METABOLIC PANEL: CPT

## 2023-02-17 PROCEDURE — 1210000000 HC MED SURG R&B

## 2023-02-17 PROCEDURE — 71045 X-RAY EXAM CHEST 1 VIEW: CPT

## 2023-02-17 PROCEDURE — 6360000002 HC RX W HCPCS: Performed by: THORACIC SURGERY (CARDIOTHORACIC VASCULAR SURGERY)

## 2023-02-17 PROCEDURE — 2580000003 HC RX 258: Performed by: THORACIC SURGERY (CARDIOTHORACIC VASCULAR SURGERY)

## 2023-02-17 PROCEDURE — 93005 ELECTROCARDIOGRAM TRACING: CPT | Performed by: THORACIC SURGERY (CARDIOTHORACIC VASCULAR SURGERY)

## 2023-02-17 PROCEDURE — 94640 AIRWAY INHALATION TREATMENT: CPT

## 2023-02-17 PROCEDURE — 99285 EMERGENCY DEPT VISIT HI MDM: CPT

## 2023-02-17 PROCEDURE — 85027 COMPLETE CBC AUTOMATED: CPT

## 2023-02-17 PROCEDURE — 36415 COLL VENOUS BLD VENIPUNCTURE: CPT

## 2023-02-17 PROCEDURE — 6370000000 HC RX 637 (ALT 250 FOR IP): Performed by: THORACIC SURGERY (CARDIOTHORACIC VASCULAR SURGERY)

## 2023-02-17 RX ORDER — MAGNESIUM SULFATE IN WATER 40 MG/ML
2000 INJECTION, SOLUTION INTRAVENOUS PRN
Status: DISCONTINUED | OUTPATIENT
Start: 2023-02-17 | End: 2023-02-19 | Stop reason: HOSPADM

## 2023-02-17 RX ORDER — ACETAMINOPHEN 325 MG/1
650 TABLET ORAL EVERY 6 HOURS PRN
Status: DISCONTINUED | OUTPATIENT
Start: 2023-02-17 | End: 2023-02-19 | Stop reason: HOSPADM

## 2023-02-17 RX ORDER — ASPIRIN 81 MG/1
81 TABLET ORAL DAILY
Status: DISCONTINUED | OUTPATIENT
Start: 2023-02-17 | End: 2023-02-19 | Stop reason: HOSPADM

## 2023-02-17 RX ORDER — ALBUTEROL SULFATE 2.5 MG/3ML
2.5 SOLUTION RESPIRATORY (INHALATION) EVERY 6 HOURS PRN
Status: DISCONTINUED | OUTPATIENT
Start: 2023-02-17 | End: 2023-02-17

## 2023-02-17 RX ORDER — POTASSIUM CHLORIDE 7.45 MG/ML
10 INJECTION INTRAVENOUS PRN
Status: DISCONTINUED | OUTPATIENT
Start: 2023-02-17 | End: 2023-02-19 | Stop reason: HOSPADM

## 2023-02-17 RX ORDER — ALBUTEROL SULFATE 2.5 MG/3ML
2.5 SOLUTION RESPIRATORY (INHALATION) 3 TIMES DAILY
Status: DISCONTINUED | OUTPATIENT
Start: 2023-02-17 | End: 2023-02-18

## 2023-02-17 RX ORDER — ONDANSETRON 2 MG/ML
4 INJECTION INTRAMUSCULAR; INTRAVENOUS EVERY 6 HOURS PRN
Status: DISCONTINUED | OUTPATIENT
Start: 2023-02-17 | End: 2023-02-19 | Stop reason: HOSPADM

## 2023-02-17 RX ORDER — CARVEDILOL 12.5 MG/1
12.5 TABLET ORAL 2 TIMES DAILY
Status: DISCONTINUED | OUTPATIENT
Start: 2023-02-17 | End: 2023-02-19 | Stop reason: HOSPADM

## 2023-02-17 RX ORDER — OXYCODONE HYDROCHLORIDE 5 MG/1
5 TABLET ORAL EVERY 6 HOURS PRN
Status: DISCONTINUED | OUTPATIENT
Start: 2023-02-17 | End: 2023-02-19 | Stop reason: HOSPADM

## 2023-02-17 RX ORDER — SODIUM CHLORIDE 0.9 % (FLUSH) 0.9 %
5-40 SYRINGE (ML) INJECTION PRN
Status: DISCONTINUED | OUTPATIENT
Start: 2023-02-17 | End: 2023-02-19 | Stop reason: HOSPADM

## 2023-02-17 RX ORDER — ONDANSETRON 4 MG/1
4 TABLET, ORALLY DISINTEGRATING ORAL EVERY 8 HOURS PRN
Status: DISCONTINUED | OUTPATIENT
Start: 2023-02-17 | End: 2023-02-19 | Stop reason: HOSPADM

## 2023-02-17 RX ORDER — POLYETHYLENE GLYCOL 3350 17 G/17G
17 POWDER, FOR SOLUTION ORAL DAILY PRN
Status: DISCONTINUED | OUTPATIENT
Start: 2023-02-17 | End: 2023-02-19 | Stop reason: HOSPADM

## 2023-02-17 RX ORDER — SODIUM CHLORIDE 9 MG/ML
INJECTION, SOLUTION INTRAVENOUS PRN
Status: DISCONTINUED | OUTPATIENT
Start: 2023-02-17 | End: 2023-02-19 | Stop reason: HOSPADM

## 2023-02-17 RX ORDER — ATORVASTATIN CALCIUM 40 MG/1
40 TABLET, FILM COATED ORAL DAILY
Status: DISCONTINUED | OUTPATIENT
Start: 2023-02-17 | End: 2023-02-19 | Stop reason: HOSPADM

## 2023-02-17 RX ORDER — DEXTROSE AND SODIUM CHLORIDE 5; .9 G/100ML; G/100ML
INJECTION, SOLUTION INTRAVENOUS CONTINUOUS
Status: DISCONTINUED | OUTPATIENT
Start: 2023-02-17 | End: 2023-02-17

## 2023-02-17 RX ORDER — SODIUM CHLORIDE 0.9 % (FLUSH) 0.9 %
5-40 SYRINGE (ML) INJECTION EVERY 12 HOURS SCHEDULED
Status: DISCONTINUED | OUTPATIENT
Start: 2023-02-17 | End: 2023-02-19 | Stop reason: HOSPADM

## 2023-02-17 RX ORDER — FAMOTIDINE 20 MG/1
20 TABLET, FILM COATED ORAL DAILY
Status: DISCONTINUED | OUTPATIENT
Start: 2023-02-17 | End: 2023-02-19 | Stop reason: HOSPADM

## 2023-02-17 RX ORDER — ALBUTEROL SULFATE 2.5 MG/3ML
2.5 SOLUTION RESPIRATORY (INHALATION)
Status: DISCONTINUED | OUTPATIENT
Start: 2023-02-17 | End: 2023-02-19 | Stop reason: HOSPADM

## 2023-02-17 RX ORDER — ACETAMINOPHEN 650 MG/1
650 SUPPOSITORY RECTAL EVERY 6 HOURS PRN
Status: DISCONTINUED | OUTPATIENT
Start: 2023-02-17 | End: 2023-02-19 | Stop reason: HOSPADM

## 2023-02-17 RX ORDER — AMLODIPINE BESYLATE 10 MG/1
10 TABLET ORAL DAILY
Status: DISCONTINUED | OUTPATIENT
Start: 2023-02-17 | End: 2023-02-19 | Stop reason: HOSPADM

## 2023-02-17 RX ORDER — ENOXAPARIN SODIUM 100 MG/ML
30 INJECTION SUBCUTANEOUS DAILY
Status: DISCONTINUED | OUTPATIENT
Start: 2023-02-17 | End: 2023-02-19 | Stop reason: HOSPADM

## 2023-02-17 RX ORDER — DIPHENHYDRAMINE HCL 25 MG
25 TABLET ORAL EVERY 6 HOURS PRN
Status: DISCONTINUED | OUTPATIENT
Start: 2023-02-17 | End: 2023-02-19 | Stop reason: HOSPADM

## 2023-02-17 RX ORDER — SODIUM CHLORIDE 9 MG/ML
INJECTION, SOLUTION INTRAVENOUS CONTINUOUS
Status: DISCONTINUED | OUTPATIENT
Start: 2023-02-17 | End: 2023-02-18

## 2023-02-17 RX ORDER — TRAZODONE HYDROCHLORIDE 50 MG/1
50 TABLET ORAL NIGHTLY
Status: DISCONTINUED | OUTPATIENT
Start: 2023-02-17 | End: 2023-02-19 | Stop reason: HOSPADM

## 2023-02-17 RX ORDER — ALBUTEROL SULFATE 2.5 MG/3ML
2.5 SOLUTION RESPIRATORY (INHALATION) EVERY 4 HOURS PRN
Status: DISCONTINUED | OUTPATIENT
Start: 2023-02-17 | End: 2023-02-17

## 2023-02-17 RX ADMIN — ALBUTEROL SULFATE 2.5 MG: 2.5 SOLUTION RESPIRATORY (INHALATION) at 19:35

## 2023-02-17 RX ADMIN — SODIUM CHLORIDE, PRESERVATIVE FREE 10 ML: 5 INJECTION INTRAVENOUS at 22:00

## 2023-02-17 RX ADMIN — TRAZODONE HYDROCHLORIDE 50 MG: 50 TABLET ORAL at 21:59

## 2023-02-17 RX ADMIN — ATORVASTATIN CALCIUM 40 MG: 40 TABLET, FILM COATED ORAL at 21:57

## 2023-02-17 RX ADMIN — CARVEDILOL 12.5 MG: 12.5 TABLET, FILM COATED ORAL at 21:59

## 2023-02-17 RX ADMIN — SODIUM CHLORIDE: 9 INJECTION, SOLUTION INTRAVENOUS at 22:59

## 2023-02-17 RX ADMIN — ENOXAPARIN SODIUM 30 MG: 100 INJECTION SUBCUTANEOUS at 21:58

## 2023-02-17 ASSESSMENT — PAIN - FUNCTIONAL ASSESSMENT: PAIN_FUNCTIONAL_ASSESSMENT: NONE - DENIES PAIN

## 2023-02-17 ASSESSMENT — PAIN SCALES - GENERAL: PAINLEVEL_OUTOF10: 0

## 2023-02-17 ASSESSMENT — ENCOUNTER SYMPTOMS: SHORTNESS OF BREATH: 1

## 2023-02-17 NOTE — ED PROVIDER NOTES
[facility] ED  EMERGENCY DEPARTMENT ENCOUNTER      Pt Name: Zev Glasgow  MRN: [id number]  Martíngfradha 1940  Date of evaluation: 2/17/2023  Provider: Lakshmi Guzman MD    CHIEF COMPLAINT       Chief Complaint   Patient presents with    Shortness of Breath         HISTORY OF PRESENT ILLNESS   (Location/Symptom, Timing/Onset, Context/Setting, Quality, Duration, Modifying Factors, Severity)  Note limiting factors. 71-year-old female who presents as a direct admission to Dr. [facility] service. She did not want to wait at home as she felt she was short of breath. Currently she is not short of breath. She is on room air. Hx of lobectomy. Nursing Notes were reviewed. REVIEW OF SYSTEMS    (2-9 systems for level 4, 10 or more for level 5)     Review of Systems   Respiratory:  Positive for shortness of breath. All other systems reviewed and are negative. Except as noted above the remainder of the review of systems was reviewed and negative.        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/19/2020    Hyperlipidemia     Kidney disease     PONV (postoperative nausea and vomiting)     Stage 4 chronic kidney disease ([address] 11/19/2020    Stenosis of noncoronary bypass graft ([facility]     Tobacco abuse 11/19/2020         SURGICAL HISTORY       Past Surgical History:   Procedure Laterality Date    BACK SURGERY  2003    BRAIN ANEURYSM SURGERY  2012    BREAST LUMPECTOMY Right     CARPAL TUNNEL RELEASE Bilateral     CORONARY ARTERY BYPASS GRAFT      PARTIAL HYSTERECTOMY (CERVIX NOT REMOVED)  1997    THORACOSCOPY Left 1/31/2023    left thoracoscopic wedge, completion left upper lobectomy performed by Terra Fisher MD at [address] Bilateral left side 2017 right side 2004         CURRENT MEDICATIONS       Previous Medications    AMLODIPINE (NORVASC) 10 MG TABLET    Take 1 tablet by mouth daily    ASPIRIN 81 MG EC TABLET    Take 81 mg by mouth daily    ATORVASTATIN (LIPITOR) 40 MG TABLET    TAKE 1 TABLET BY MOUTH EVERY DAY    CARVEDILOL (COREG) 12.5 MG TABLET    TAKE 1 TABLET BY MOUTH TWICE A DAY    HANDICAP PLACARD MISC    by Does not apply route Applying from 2021-2026. LOSARTAN-HYDROCHLOROTHIAZIDE (HYZAAR) 100-12.5 MG PER TABLET    TAKE 1 TABLET BY MOUTH EVERY DAY    ONDANSETRON (ZOFRAN-ODT) 4 MG DISINTEGRATING TABLET    Take 1 tablet by mouth every 8 hours as needed for Nausea or Vomiting    TRAZODONE (DESYREL) 100 MG TABLET    TAKE 1 TABLET BY MOUTH EVERY DAY AT NIGHT       ALLERGIES     Amoxicillin and Nsaids    FAMILY HISTORY     No family history on file. SOCIAL HISTORY       Social History     Socioeconomic History    Marital status:    Tobacco Use    Smoking status: Every Day     Packs/day: 0.10     Years: 60.00     Pack years: 6.00     Types: Cigarettes     Last attempt to quit: 2023     Years since quittin.0    Smokeless tobacco: Never   Vaping Use    Vaping Use: Never used   Substance and Sexual Activity    Alcohol use: Yes     Comment: occ    Drug use: Never    Sexual activity: Not Currently     Social Determinants of Health     Financial Resource Strain: Low Risk     Difficulty of Paying Living Expenses: Not hard at all   Food Insecurity: No Food Insecurity    Worried About [facility] Directions in the Last Year: Never true    Ran Out of Food in the Last Year: Never true       SCREENINGS               PHYSICAL EXAM    (up to 7 for level 4, 8 or more for level 5)     ED Triage Vitals [23 1308]   BP Temp Temp Source Heart Rate Resp SpO2 Height Weight   -- 97.8 °F (36.6 °C) Temporal 83 20 95 % 5' 1\" (1.549 m) 131 lb (59.4 kg)       Physical Exam  Vitals and nursing note reviewed. Constitutional:       General: She is not in acute distress. Appearance: Normal appearance. She is well-developed. She is not ill-appearing. HENT:      Head: Normocephalic and atraumatic. Mouth/Throat:      Mouth: Mucous membranes are moist.      Pharynx: Oropharynx is clear. Eyes:      Extraocular Movements: Extraocular movements intact. Conjunctiva/sclera: Conjunctivae normal.   Cardiovascular:      Rate and Rhythm: Normal rate and regular rhythm. Pulmonary:      Effort: Pulmonary effort is normal.      Breath sounds: Normal breath sounds. Abdominal:      General: Bowel sounds are normal.      Palpations: Abdomen is soft. Tenderness: There is no abdominal tenderness. Musculoskeletal:         General: No deformity. Normal range of motion. Cervical back: Normal range of motion and neck supple. Skin:     General: Skin is warm and dry. Capillary Refill: Capillary refill takes less than 2 seconds. Neurological:      General: No focal deficit present. Mental Status: She is alert and oriented to person, place, and time. Mental status is at baseline. Cranial Nerves: No cranial nerve deficit. Psychiatric:         Thought Content: Thought content normal.       DIAGNOSTIC RESULTS     EKG: All EKG's are interpreted by the Emergency Department Physician who either signs or Co-signs this chart in the absence of a cardiologist.    RADIOLOGY:   Non-plain film images such as CT, Ultrasound and MRI are read by the radiologist. Plain radiographic images are visualized and preliminarily interpreted by the emergency physician with the below findings:    Interpretation per the Radiologist below, if available at the time of this note:    No orders to display       LABS:  Labs Reviewed - No data to display    All other labs were within normal range or not returned as of this dictation.     EMERGENCY DEPARTMENT COURSE and DIFFERENTIAL DIAGNOSIS/MDM:   Vitals:    Vitals:    02/17/23 1308   Pulse: 83   Resp: 20   Temp: 97.8 °F (36.6 °C)   TempSrc: Temporal   SpO2: 95%   Weight: 131 lb (59.4 kg)   Height: 5' 1\" (1.549 m)       MDM  Number of Diagnoses or Management Options  Shortness of breath  Diagnosis management comments: Stable vitals while awaiting bed in hospital.  Admitted to [facility]. Procedures    CRITICAL CARE TIME   Total Critical Care time was 0 minutes, excluding separately reportable procedures. There was a high probability of clinically significant/life threatening deterioration in the patient's condition which required my urgent intervention. FINAL IMPRESSION      1.  Shortness of breath          DISPOSITION/PLAN   DISPOSITION Decision To Admit 02/17/2023 01:40:55 PM      (Please note that portions of this note were completed with a voice recognition program.  Efforts were made to edit the dictations but occasionally words are mis-transcribed.)    Shantelle Naik MD (electronically signed)  Attending Emergency Physician        Shantelle Naik MD  02/17/23 2860

## 2023-02-17 NOTE — CARE COORDINATION
Case Management Assessment  Initial Evaluation    Date/Time of Evaluation: 2/17/2023 5:47 PM  Assessment Completed by: Surendra Condon RN    If patient is discharged prior to next notation, then this note serves as note for discharge by case management. Patient Name: Stephen Bates                   YOB: 1940  Diagnosis: Shortness of breath [R06.02]  Dyspnea [R06.00]                   Date / Time: 2/17/2023  1:17 PM    Patient Admission Status: Inpatient   Readmission Risk (Low < 19, Mod (19-27), High > 27): Readmission Risk Score: 12.6    Current PCP: Fox Moura MD  PCP verified by CM? (P) Yes    Chart Reviewed: Yes      History Provided by: (P) Patient  Patient Orientation: (P) Alert and Oriented, Person, Place, Situation, Self    Patient Cognition: (P) Alert    Hospitalization in the last 30 days (Readmission):  No    If yes, Readmission Assessment in CM Navigator will be completed. Advance Directives:      Code Status: Prior   Patient's Primary Decision Maker is:      Primary Decision Maker: Garret Huitron - Child - [phone]    Discharge Planning:    Patient lives with: Alone Type of Home:    Primary Care Giver: (P) Self  Patient Support Systems include: (P) Children   Current Financial resources: (P) Medicare  Current community resources:    Current services prior to admission:              Current DME:              Type of Home Care services:       ADLS  Prior functional level: (P) Independent in ADLs/IADLs  Current functional level: (P) Independent in ADLs/IADLs    PT AM-PAC:   /24  OT AM-PAC:   /24    Family can provide assistance at DC: (P) Yes  Would you like Case Management to discuss the discharge plan with any other family members/significant others, and if so, who?     Plans to Return to Present Housing: (P) Yes  Other Identified Issues/Barriers to RETURNING to current housing: none  Potential Assistance needed at discharge:              Potential DME:    [facility] at discharge:      Financial    Payor: Jose Jiang / Plan: MEDICARE PART A AND B / Product Type: *No Product type* /     Does insurance require precert for SNF: No    Potential assistance Purchasing Medications: No  Meds-to-Beds request:        [facility] [address] [place] [address]  Phone: [phone] Fax: [phone]      Notes:    Factors facilitating achievement of predicted outcomes: Family support, Motivated, Cooperative, Pleasant, Sense of humor, and Good insight into deficits    Barriers to discharge: none    Additional Case Management Notes: pt lives independently at home. She has a recent dx of lung cancer. She was given information on hhc/rehab/snf services if she would choose to use them. The Plan for Transition of Care is related to the following treatment goals of Shortness of breath [R06.02]  Dyspnea [G43.30]    IF APPLICABLE: The Patient and/or patient representative Landry Echavarria and her family were provided with a choice of provider and agrees with the discharge plan. Freedom of choice list with basic dialogue that supports the patient's individualized plan of care/goals and shares the quality data associated with the providers was provided to:     Patient Representative Name:       The Patient and/or Patient Representative Agree with the Discharge Plan?       Kamala Ding RN  Case Management Department

## 2023-02-17 NOTE — PROGRESS NOTES
Renal Adjustment Per Protocol: Pepcid 20 mg BID changed to Pepcid 20 mg Daily based on Estimated Creatinine Clearance: 20 mL/min (A) (based on SCr of 1.83 mg/dL (H)). Marrero Hidden

## 2023-02-17 NOTE — ACP (ADVANCE CARE PLANNING)
Advance Care Planning     Advance Care Planning Activator (Inpatient)  Conversation Note      Date of ACP Conversation: 2/17/2023     Conversation Conducted with: Patient with Decision Making Capacity    ACP Activator: Crista Blackwell RN    Pt states that she does have a living will and states that it is current with her wishes. Health Care Decision Maker:     Current Designated Health Care Decision Maker:     Primary Decision Maker: Manju Conde Child - [phone]      Care Preferences    Ventilation: \"If you were in your present state of health and suddenly became very ill and were unable to breathe on your own, what would your preference be about the use of a ventilator (breathing machine) if it were available to you? \"      Would the patient desire the use of ventilator (breathing machine)?: yes    \"If your health worsens and it becomes clear that your chance of recovery is unlikely, what would your preference be about the use of a ventilator (breathing machine) if it were available to you? \"     Would the patient desire the use of ventilator (breathing machine)?: No      Resuscitation  \"CPR works best to restart the heart when there is a sudden event, like a heart attack, in someone who is otherwise healthy. Unfortunately, CPR does not typically restart the heart for people who have serious health conditions or who are very sick. \"    \"In the event your heart stopped as a result of an underlying serious health condition, would you want attempts to be made to restart your heart (answer \"yes\" for attempt to resuscitate) or would you prefer a natural death (answer \"no\" for do not attempt to resuscitate)? \" yes       [x] Yes   [] No   Educated Patient / Katalina Leap regarding differences between Advance Directives and portable DNR orders.     Length of ACP Conversation in minutes:  10    Conversation Outcomes:  [x] ACP discussion completed  [] Existing advance directive reviewed with patient; no changes to patient's previously recorded wishes  [] New Advance Directive completed  [] Portable Do Not Rescitate prepared for Provider review and signature  [] POLST/POST/MOLST/MOST prepared for Provider review and signature      Follow-up plan:    [] Schedule follow-up conversation to continue planning  [] Referred individual to Provider for additional questions/concerns   [] Advised patient/agent/surrogate to review completed ACP document and update if needed with changes in condition, patient preferences or care setting    [x] This note routed to one or more involved healthcare providers

## 2023-02-17 NOTE — FLOWSHEET NOTE
Pt to [address] to room and call light. Chart and meds reviewed. Admission in progress. Family member at bedside. Will notify admitting physician pt received to floor. Pt :     awake and resting in bed          Alert and oriented. RESP:   SOB with minimal exertion            Lung sounds:      diminished                           Oxygen: 100 percent on room air  Complaints of: sob, cough  Pain: denies  IV: none  TELE: SR 79  Dressings:   Precautions:              Falls:  50      Layo: 19  Noted Labs: none. Secure message sent to Dr Winstno Garcia for orders. Call light in reach. NOTES:   1909 labs and bedside chest xray. Electronically signed by Cornelius James RN on 2/17/2023 at 7:09 [place] by for rounds.  Electronically signed by Cornelius James RN on 2/17/2023 at 7:30 PM

## 2023-02-17 NOTE — TELEPHONE ENCOUNTER
The patient's daughter called saying she has been having progressive shortness of breath and fatigue. She apparently went to the emergency department several days ago. Her renal function numbers were elevated. She was hydrated and discharged home. The patient still did not recover and is getting worse. She called for advice. My office called to see if we could make her a direct admit for failure to thrive, shortness of breath, dehydration, acute renal injury. Virtually there are no beds available and she may have to wait into the evening to get a bed. Because of the potential to wait many hours for admission, and since the daughter says she is getting worse, I have strongly advised her to bring her to the emergency department for reevaluation and admission to the hospital.  Her daughter says she will make arrangements to bring her in as soon as possible.

## 2023-02-17 NOTE — CARE COORDINATION
Case Management Assessment  Initial Evaluation    Date/Time of Evaluation: 2/17/2023 3:31 PM  Assessment Completed by: Citlalli Grijalva RN    If patient is discharged prior to next notation, then this note serves as note for discharge by case management. Patient Name: Anh Cortez                   YOB: 1940  Diagnosis: Dyspnea [R06.00]                   Date / Time: 2/17/2023  1:17 PM    Patient Admission Status: Inpatient   Readmission Risk (Low < 19, Mod (19-27), High > 27): Readmission Risk Score: 13.3    Current PCP: Catrina Barahona MD  PCP verified by CM? (P) Yes    Chart Reviewed: Yes      History Provided by: (P) Patient, Child/Family  Patient Orientation: (P) Alert and Oriented, Person, Place, Situation, Self    Patient Cognition: (P) Alert    Hospitalization in the last 30 days (Readmission):  No    If yes, Readmission Assessment in CM Navigator will be completed. Advance Directives:      Code Status: Prior   Patient's Primary Decision Maker is:      Primary Decision Maker: Cici Farrar  Child - [phone]    Discharge Planning:    Patient lives with: (P) Alone Type of Home:    Primary Care Giver: (P) Self  Patient Support Systems include: (P) Children   Current Financial resources: (P) Medicare  Current community resources:    Current services prior to admission:              Current DME:              Type of Home Care services:       ADLS  Prior functional level: (P) Independent in ADLs/IADLs  Current functional level: (P) Independent in ADLs/IADLs    PT AM-PAC:   /24  OT AM-PAC:   /24    Family can provide assistance at DC: (P) Yes  Would you like Case Management to discuss the discharge plan with any other family members/significant others, and if so, who?     Plans to Return to Present Housing: (P) Yes  Other Identified Issues/Barriers to RETURNING to current housing: none  Potential Assistance needed at discharge:              Potential DME:    Patient expects to discharge to: (P) [facility] for transportation at discharge:      Financial    Payor: [address] [place] [address] / Plan: MEDICARE PART A AND B / Product Type: *No Product type* /     Does insurance require precert for SNF: No    Potential assistance Purchasing Medications: (P) No  Meds-to-Beds request:        [id number] [place], [place] - [address] [address]  Phone: [phone] Fax: [phone]      Notes:    Factors facilitating achievement of predicted outcomes: Family support, Motivated, Cooperative, Pleasant, Sense of humor, and Good insight into deficits    Barriers to discharge: none    Additional Case Management Notes: pt denied need for hhc/rehab/snf but did take the information if needed. Cm to assess for further d/c needs and referrals. The Plan for Transition of Care is related to the following treatment goals of Dyspnea [Z74.97]    IF APPLICABLE: The Patient and/or patient representative Eleonora Payan and her family were provided with a choice of provider and agrees with the discharge plan. Freedom of choice list with basic dialogue that supports the patient's individualized plan of care/goals and shares the quality data associated with the providers was provided to:     Patient Representative Name:       The Patient and/or Patient Representative Agree with the Discharge Plan?       Teresita Kang RN  Case Management Department

## 2023-02-17 NOTE — ED NOTES
Pt here due to increasing SOB, was told by Dr. Damien Harrell to come to ER due to not being able to direct admit her. Pt states had a lobectomy done on the 31st and doc stated poss blood clot.       Atif Angeles  02/17/23 9713

## 2023-02-18 LAB
ALBUMIN SERPL-MCNC: 3.3 G/DL (ref 3.5–4.6)
ANION GAP SERPL CALCULATED.3IONS-SCNC: 12 MEQ/L (ref 9–15)
BUN BLDV-MCNC: 20 MG/DL (ref 8–23)
CALCIUM SERPL-MCNC: 8.3 MG/DL (ref 8.5–9.9)
CHLORIDE BLD-SCNC: 108 MEQ/L (ref 95–107)
CO2: 24 MEQ/L (ref 20–31)
CREAT SERPL-MCNC: 0.92 MG/DL (ref 0.5–0.9)
GFR SERPL CREATININE-BSD FRML MDRD: >60 ML/MIN/{1.73_M2}
GLUCOSE BLD-MCNC: 110 MG/DL (ref 70–99)
HCT VFR BLD CALC: 27.3 % (ref 37–47)
HEMOGLOBIN: 9.2 G/DL (ref 12–16)
MAGNESIUM: 1.7 MG/DL (ref 1.7–2.4)
MCH RBC QN AUTO: 30.9 PG (ref 27–31.3)
MCHC RBC AUTO-ENTMCNC: 33.8 % (ref 33–37)
MCV RBC AUTO: 91.5 FL (ref 79.4–94.8)
PDW BLD-RTO: 13.4 % (ref 11.5–14.5)
PHOSPHORUS: 2.2 MG/DL (ref 2.3–4.8)
PLATELET # BLD: 299 K/UL (ref 130–400)
POTASSIUM SERPL-SCNC: 3.7 MEQ/L (ref 3.4–4.9)
PRO-BNP: 875 PG/ML
RBC # BLD: 2.99 M/UL (ref 4.2–5.4)
SODIUM BLD-SCNC: 144 MEQ/L (ref 135–144)
WBC # BLD: 6.2 K/UL (ref 4.8–10.8)

## 2023-02-18 PROCEDURE — 83880 ASSAY OF NATRIURETIC PEPTIDE: CPT

## 2023-02-18 PROCEDURE — 36415 COLL VENOUS BLD VENIPUNCTURE: CPT

## 2023-02-18 PROCEDURE — 83540 ASSAY OF IRON: CPT

## 2023-02-18 PROCEDURE — 6360000002 HC RX W HCPCS: Performed by: THORACIC SURGERY (CARDIOTHORACIC VASCULAR SURGERY)

## 2023-02-18 PROCEDURE — 6370000000 HC RX 637 (ALT 250 FOR IP): Performed by: INTERNAL MEDICINE

## 2023-02-18 PROCEDURE — 99223 1ST HOSP IP/OBS HIGH 75: CPT | Performed by: INTERNAL MEDICINE

## 2023-02-18 PROCEDURE — 83550 IRON BINDING TEST: CPT

## 2023-02-18 PROCEDURE — 2580000003 HC RX 258: Performed by: THORACIC SURGERY (CARDIOTHORACIC VASCULAR SURGERY)

## 2023-02-18 PROCEDURE — 6370000000 HC RX 637 (ALT 250 FOR IP): Performed by: THORACIC SURGERY (CARDIOTHORACIC VASCULAR SURGERY)

## 2023-02-18 PROCEDURE — 94761 N-INVAS EAR/PLS OXIMETRY MLT: CPT

## 2023-02-18 PROCEDURE — 83735 ASSAY OF MAGNESIUM: CPT

## 2023-02-18 PROCEDURE — 80069 RENAL FUNCTION PANEL: CPT

## 2023-02-18 PROCEDURE — 97166 OT EVAL MOD COMPLEX 45 MIN: CPT

## 2023-02-18 PROCEDURE — 85027 COMPLETE CBC AUTOMATED: CPT

## 2023-02-18 PROCEDURE — 1210000000 HC MED SURG R&B

## 2023-02-18 PROCEDURE — 6360000002 HC RX W HCPCS: Performed by: INTERNAL MEDICINE

## 2023-02-18 PROCEDURE — 99222 1ST HOSP IP/OBS MODERATE 55: CPT | Performed by: INTERNAL MEDICINE

## 2023-02-18 PROCEDURE — 82728 ASSAY OF FERRITIN: CPT

## 2023-02-18 PROCEDURE — 94640 AIRWAY INHALATION TREATMENT: CPT

## 2023-02-18 PROCEDURE — 2700000000 HC OXYGEN THERAPY PER DAY

## 2023-02-18 PROCEDURE — 99222 1ST HOSP IP/OBS MODERATE 55: CPT | Performed by: THORACIC SURGERY (CARDIOTHORACIC VASCULAR SURGERY)

## 2023-02-18 RX ORDER — IPRATROPIUM BROMIDE AND ALBUTEROL SULFATE 2.5; .5 MG/3ML; MG/3ML
1 SOLUTION RESPIRATORY (INHALATION) EVERY 4 HOURS PRN
Status: DISCONTINUED | OUTPATIENT
Start: 2023-02-18 | End: 2023-02-19 | Stop reason: HOSPADM

## 2023-02-18 RX ORDER — PREDNISONE 10 MG/1
40 TABLET ORAL DAILY
Status: DISCONTINUED | OUTPATIENT
Start: 2023-02-18 | End: 2023-02-19 | Stop reason: HOSPADM

## 2023-02-18 RX ADMIN — FAMOTIDINE 20 MG: 20 TABLET, FILM COATED ORAL at 09:09

## 2023-02-18 RX ADMIN — ASPIRIN 81 MG: 81 TABLET, COATED ORAL at 09:09

## 2023-02-18 RX ADMIN — PREDNISONE 40 MG: 10 TABLET ORAL at 14:50

## 2023-02-18 RX ADMIN — ALBUTEROL SULFATE 2.5 MG: 2.5 SOLUTION RESPIRATORY (INHALATION) at 12:39

## 2023-02-18 RX ADMIN — SODIUM CHLORIDE, PRESERVATIVE FREE 10 ML: 5 INJECTION INTRAVENOUS at 20:55

## 2023-02-18 RX ADMIN — ENOXAPARIN SODIUM 30 MG: 100 INJECTION SUBCUTANEOUS at 20:54

## 2023-02-18 RX ADMIN — SODIUM CHLORIDE: 9 INJECTION, SOLUTION INTRAVENOUS at 09:12

## 2023-02-18 RX ADMIN — ALBUTEROL SULFATE 2.5 MG: 2.5 SOLUTION RESPIRATORY (INHALATION) at 07:36

## 2023-02-18 RX ADMIN — ALBUTEROL SULFATE 2.5 MG: 2.5 SOLUTION RESPIRATORY (INHALATION) at 00:59

## 2023-02-18 RX ADMIN — CARVEDILOL 12.5 MG: 12.5 TABLET, FILM COATED ORAL at 20:54

## 2023-02-18 RX ADMIN — IPRATROPIUM BROMIDE AND ALBUTEROL SULFATE 1 AMPULE: 2.5; .5 SOLUTION RESPIRATORY (INHALATION) at 19:22

## 2023-02-18 RX ADMIN — ATORVASTATIN CALCIUM 40 MG: 40 TABLET, FILM COATED ORAL at 20:54

## 2023-02-18 RX ADMIN — AMLODIPINE BESYLATE 10 MG: 10 TABLET ORAL at 09:09

## 2023-02-18 RX ADMIN — SODIUM CHLORIDE, PRESERVATIVE FREE 10 ML: 5 INJECTION INTRAVENOUS at 09:10

## 2023-02-18 RX ADMIN — TRAZODONE HYDROCHLORIDE 50 MG: 50 TABLET ORAL at 20:54

## 2023-02-18 RX ADMIN — CARVEDILOL 12.5 MG: 12.5 TABLET, FILM COATED ORAL at 09:09

## 2023-02-18 ASSESSMENT — ENCOUNTER SYMPTOMS
ABDOMINAL PAIN: 0
SORE THROAT: 0
WHEEZING: 0
TROUBLE SWALLOWING: 0
SHORTNESS OF BREATH: 0
VOICE CHANGE: 0
STRIDOR: 0
NAUSEA: 0
COUGH: 0
CHOKING: 0
DIARRHEA: 1
CHEST TIGHTNESS: 0
VOMITING: 0
ABDOMINAL DISTENTION: 0

## 2023-02-18 NOTE — PROGRESS NOTES
[facility] AT [place] Respiratory Therapy Evaluation   Current Order:  albuterol  tid  Home Regimen: none      Ordering Physician: rod  Re-evaluation Date:  2/20 Diagnosis: sob   Patient Status: Stable / Unstable + Physician notified    The following MDI Criteria must be met in order to convert aerosol to MDI with spacer. If unable to meet, MDI will be converted to aerosol:  []  Patient able to demonstrate the ability to use MDI effectively  []  Patient alert and cooperative  []  Patient able to take deep breath with 5-10 second hold  []  Medication(s) available in this delivery method   []  Peak flow greater than or equal to 200 ml/min            Current Order Substituted To  (same drug, same frequency)   Aerosol to MDI [] Albuterol Sulfate 0.083% unit dose by aerosol Albuterol Sulfate MDI 2 puffs by inhalation with spacer    [] Levalbuterol 1.25 mg unit dose by aerosol Levalbuterol MDI 2 puffs by inhalation with spacer    [] Levalbuterol 0.63 mg unit dose by aerosol Levalbuterol MDI 2 puffs by inhalation with spacer    [] Ipratropium Bromide 0.02% unit dose by aerosol Ipratropium Bromide MDI 2 puffs by inhalation with spacer    [] Duoneb (Ipratropium + Albuterol) unit dose by aerosol Ipratropium MDI + Albuterol MDI 2 puffs by inhalation w/spacer   MDI to Aerosol [] Albuterol Sulfate MDI Albuterol Sulfate 0.083% unit dose by aerosol    [] Levalbuterol MDI 2 puffs by inhalation Levalbuterol 1.25 mg unit dose by aerosol    [] Ipratropium Bromide MDI by inhalation Ipratropium Bromide 0.02% unit dose by aerosol    [] Combivent (Ipratropium + Albuterol) MDI by inhalation Duoneb (Ipratropium + Albuterol) unit dose by aerosol       Treatment Assessment [Frequency/Schedule]:  Change frequency to: _____t id and albuterol q2prn_____________________________________________per Protocol, P&T, MEC      Points 0 1 2 3 4   Pulmonary Status  Non-Smoker  []   Smoking history   < 20 pack years  []   Smoking history  ?  20 pack years  [x] Pulmonary Disorder  (acute or chronic)  []   Severe or Chronic w/ Exacerbation  []     Surgical Status No []   Surgeries     General []   Surgery Lower []   Abdominal Thoracic or [x]   Upper Abdominal Thoracic with  PulmonaryDisorder  []     Chest X-ray Clear/Not  Ordered     []  Chronic Changes  Results Pending  [x]  Infiltrates, atelectasis, pleural effusion, or edema  []  Infiltrates in more than one lobe []  Infiltrate + Atelectasis, &/or pleural effusion  []    Respiratory Pattern Regular,  RR = 12-20 [x]  Increased,  RR = 21-25 []  ESCOBAR, irregular,  or RR = 26-30 []  Decreased FEV1  or RR = 31-35 []  Severe SOB, use  of accessory muscles, or RR ? 35  []    Mental Status Alert, oriented,  Cooperative [x]  Confused but Follows commands []  Lethargic or unable to follow commands []  Obtunded  []  Comatose  []    Breath Sounds Clear to  auscultation  []  Decreased unilaterally or  in bases only []  Decreased  bilaterally  [x]  Crackles or intermittent wheezes []  Wheezes []    Cough Strong, Spontan., & nonproductive [x]  Strong,  spontaneous, &  productive []  Weak,  Nonproductive []  Weak, productive or  with wheezes []  No spontaneous  cough or may require suctioning []    Level of Activity Ambulatory [x]  Ambulatory w/ Assist  []  Non-ambulatory []  Paraplegic []  Quadriplegic []    Total5    Score:_8__     Triage Score:____4____      Tri       Triage:     1. (>20) Freq: Q3    2. (16-20) Freq: Q4   3. (11-15) Freq: QID & Albuterol Q2 PRN    4. (6-10) Freq: TID & Albuterol Q2 PRN    5. (0-5) Freq Q4prn

## 2023-02-18 NOTE — CONSULTS
Inpatient consult to GI  Consult performed by: Gerardo Kent MD  Consult ordered by: Elva Clarke MD        Patient Name: Isreal Cranker Date: 2023  1:17 PM  MR #: [id number]  : 1940    Attending Physician: Elva Clarke MD  Reason for consult: anemia and melena    History of Presenting Illness:      Neymar Burnett is a 81 y.o. 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 Past surgical history notable for back surgery, brain aneurysm clip, breast lumpectomy, carpal tunnel, CABG, partial hysterectomy, total hip arthroplasty, recent left upper lobe lobectomy. Family history is negative for GI malignancies. Social history patient reports nicotine use & EtOH use, no illicit drug use. History Obtained From:  patient, electronic medical record    GI consult for anemia and melena: Patient was admitted with increased shortness of breath, noted to have recent left upper lobe lobectomy for non-small cell cancer on  with 100 ml estimated blood loss. GI was asked to evaluate anemia and melena, at baseline patient is on ASA, since surgery she has been taking Tylenol arthritis twice daily,  preoperative hemoglobin on 23 was 11.4, on arrival was found to have normocytic anemia with hemoglobin of 9.9, reportedly had 1-2 days of melanotic stool prior to arrival.  No history of GI bleeding, at baseline is not on a PPI. Remote history of colonoscopy, no history of EGD.      History:      Past Medical History:   Diagnosis Date    Bilateral carotid artery stenosis 2020    CAD (coronary artery disease)     Carotid bruit     Dyslipidemia 2020    Essential hypertension 2020    Hx of CABG 2020    Hyperlipidemia     Kidney disease     PONV (postoperative nausea and vomiting)     Stage 4 chronic kidney disease ([facility] [address] 2020    Stenosis of noncoronary bypass graft ([facility] [address]     Tobacco abuse 2020     Past Surgical History:   Procedure Laterality Date    BACK SURGERY      BRAIN ANEURYSM SURGERY  2012    BREAST LUMPECTOMY Right     CARPAL TUNNEL RELEASE Bilateral     CORONARY ARTERY BYPASS GRAFT      PARTIAL HYSTERECTOMY (CERVIX NOT REMOVED)      THORACOSCOPY Left 2023    left thoracoscopic wedge, completion left upper lobectomy performed by Mark Murillo MD at [address] Bilateral left side 2017 right side 2004     Family History  No family history on file. [] Unable to obtain due to ventilated and/ or neurologic status  Social History     Socioeconomic History    Marital status:       Spouse name: Not on file    Number of children: Not on file    Years of education: Not on file    Highest education level: Not on file   Occupational History    Not on file   Tobacco Use    Smoking status: Every Day     Packs/day: 0.10     Years: 60.00     Pack years: 6.00     Types: Cigarettes     Last attempt to quit: 2023     Years since quittin.0    Smokeless tobacco: Never   Vaping Use    Vaping Use: Never used   Substance and Sexual Activity    Alcohol use: Yes     Comment: occ    Drug use: Never    Sexual activity: Not Currently   Other Topics Concern    Not on file   Social History Narrative    Not on file     Social Determinants of Health     Financial Resource Strain: Low Risk     Difficulty of Paying Living Expenses: Not hard at all   Food Insecurity: No Food Insecurity    Worried About Running Out of Food in the Last Year: Never true    Ran Out of Food in the Last Year: Never true   Transportation Needs: Not on file   Physical Activity: Not on file   Stress: Not on file   Social Connections: Not on file   Intimate Partner Violence: Not on file   Housing Stability: Not on file      [] Unable to obtain due to ventilated and/ or neurologic status    Home Medications:      Medications Prior to Admission: ondansetron (ZOFRAN-ODT) 4 MG disintegrating tablet, Take 1 tablet by mouth every 8 hours as needed for Nausea or Vomiting  losartan-hydroCHLOROthiazide (HYZAAR) 100-12.5 MG per tablet, TAKE 1 TABLET BY MOUTH EVERY DAY (Patient not taking: Reported on 2/17/2023)  carvedilol (COREG) 12.5 MG tablet, TAKE 1 TABLET BY MOUTH TWICE A DAY  atorvastatin (LIPITOR) 40 MG tablet, TAKE 1 TABLET BY MOUTH EVERY DAY  amLODIPine (NORVASC) 10 MG tablet, Take 1 tablet by mouth daily  traZODone (DESYREL) 100 MG tablet, TAKE 1 TABLET BY MOUTH EVERY DAY AT NIGHT  Handicap Placard MISC, by Does not apply route Applying from 1/28/2021-1/27/2026. aspirin 81 MG EC tablet, Take 81 mg by mouth daily    Current Hospital Medications:   Scheduled Meds:   amLODIPine  10 mg Oral Daily    aspirin  81 mg Oral Daily    atorvastatin  40 mg Oral Daily    carvedilol  12.5 mg Oral BID    traZODone  50 mg Oral Nightly    sodium chloride flush  5-40 mL IntraVENous 2 times per day    enoxaparin  30 mg SubCUTAneous Daily    famotidine  20 mg Oral Daily    albuterol  2.5 mg Nebulization TID     Continuous Infusions:   sodium chloride       PRN Meds:.sodium chloride flush, sodium chloride, ondansetron **OR** ondansetron, polyethylene glycol, acetaminophen **OR** acetaminophen, magnesium sulfate, potassium chloride, oxyCODONE, diphenhydrAMINE, albuterol   sodium chloride        Allergies: Allergies   Allergen Reactions    Amoxicillin     Nsaids      CRF 4      Review of Systems:       [x] CV, Resp, Neuro, , and all other systems reviewed and negative other than listed in HPI. Objective Findings:     Vitals:   Vitals:    02/18/23 0100 02/18/23 0524 02/18/23 0736 02/18/23 0815   BP:  (!) 149/53  (!) 127/49   Pulse:  67  70   Resp:       Temp:  97.6 °F (36.4 °C)  98.2 °F (36.8 °C)   TempSrc:  Oral  Oral   SpO2: 98% 100% 100% 99%   Weight:       Height:            Physical Examination:  General: alert  HEENT: Normocephalic, no scleral icterus.   Neck: No JVD. Heart: Regular, no murmur, no rub/gallop. No RV heave. Lungs: Clear to ascultation, no rales/wheezing/rhonchi. Good chest wall excursion. Abdomen: Appearance:, no Distension , Soft , no tenderness , Scars , Bowel sounds normal  Extremities: No clubbing/cyanosis, no edema. Skin: Warm, dry, normal turgor, no rash, no bruise, no petichiae. Neuro: No myoclonus or tremor. Psych: Normal affect    Results/ Medications reviewed 2/18/2023, 11:11 AM     Laboratory, Microbiology, Pathology, Radiology, Cardiology, Medications and Transcriptions reviewed  Scheduled Meds:   amLODIPine  10 mg Oral Daily    aspirin  81 mg Oral Daily    atorvastatin  40 mg Oral Daily    carvedilol  12.5 mg Oral BID    traZODone  50 mg Oral Nightly    sodium chloride flush  5-40 mL IntraVENous 2 times per day    enoxaparin  30 mg SubCUTAneous Daily    famotidine  20 mg Oral Daily    albuterol  2.5 mg Nebulization TID     Continuous Infusions:   sodium chloride         Recent Labs     02/17/23 1909 02/18/23 0514   WBC 6.5 6.2   HGB 9.5* 9.2*   HCT 27.7* 27.3*   MCV 92.2 91.5    299     Recent Labs     02/17/23 1909 02/18/23 0514    144   K 3.9 3.7   * 108*   CO2 26 24   PHOS  --  2.2*   BUN 27* 20   CREATININE 1.28* 0.92*     Recent Labs     02/17/23 1909   AST 22   ALT 14   BILITOT <0.2   ALKPHOS 92     No results for input(s): LIPASE, AMYLASE in the last 72 hours. Recent Labs     02/17/23 1909   PROT 6.2*     XR CHEST PORTABLE    Result Date: 2/18/2023  EXAMINATION: ONE XRAY VIEW OF THE CHEST 2/17/2023 7:18 pm COMPARISON: February 2, 2023 HISTORY: ORDERING SYSTEM PROVIDED HISTORY: shortness of breath post left lobectomy TECHNOLOGIST PROVIDED HISTORY: Reason for exam:->shortness of breath post left lobectomy What reading provider will be dictating this exam?->CRC FINDINGS: Normal cardiomediastinal silhouette. Lungs clear.   Unchanged diminished volume of the left hemithorax compatible with left partial pneumonectomy. No pneumothorax or effusion. Osseous thorax intact. Sternal wires. No acute disease. RECOMMENDATION: Careful clinical correlation and follow up recommended. XR CHEST PORTABLE    Result Date: 2/2/2023  EXAMINATION: ONE XRAY VIEW OF THE CHEST 2/2/2023 10:19 am COMPARISON: 01/31/2023 HISTORY: ORDERING SYSTEM PROVIDED HISTORY: chest tube removal TECHNOLOGIST PROVIDED HISTORY: Reason for exam:->chest tube removal What reading provider will be dictating this exam?->CRC FINDINGS: Heart size is normal.  There are no infiltrates or effusions. There is significant elevation left hemidiaphragm. There has been sternotomy. There is a tiny left sided pneumothorax. Chest tube is been removed     Tiny left-sided pneumothorax. There is elevation left hemidiaphragm     XR CHEST PORTABLE    Result Date: 1/31/2023  EXAMINATION: ONE XRAY VIEW OF THE CHEST 1/31/2023 10:51 am COMPARISON: October 12, 2022 1006 hours HISTORY: ORDERING SYSTEM PROVIDED HISTORY: Status post left upper lobectomy with chest tube placement TECHNOLOGIST PROVIDED HISTORY: Reason for exam:->Status post left upper lobectomy with chest tube placement What reading provider will be dictating this exam?->CRC FINDINGS: There are multiple median sternotomy wires. 9 interval placement left-sided chest tube. Tip is directed towards the apex. The cardiac silhouette is of normal configuration. Pulmonary vascular 10 UA. Right-sided trachea. Small area of atelectasis left lower lobe. No focal infiltrate No significant effusion. No pneumothoraces. Status post left upper lobe lobectomy with interval placement left-sided chest tube. Impression:   80 y/o female admitted with increased shortness of breath, noted to have recent left upper lobe lobectomy for non-small cell cancer on 1/31 with 100 ml estimated blood loss. GI was asked to evaluate anemia and melena, at baseline patient is on ASA.   Preoperative hemoglobin on 1/25/23 was 11.4, on arrival was found to have normocytic anemia with hemoglobin of 9.9, reportedly had 1 melanotic stool prior to arrival.  No history of GI bleeding, at baseline is not on a PPI. Remote history of colonoscopy, no history of EGD  Ddx anemia: Likely multifactorial given her recent surgery and comorbid conditions, however patient reportedly had 1 melanotic stools so cannot exclude GI blood loss, patient is currently asymptomatic with no overt bleeding or abdominal pain  Plan:   -continue course of care  -diet as tolerated  - continue serial HH trend and transfuse accordingly  -IV PPI daily  -ongoing evaluation for endoscopy,  timing dependent on clinical course and consent, pt is undecided about proceeding with EGD, she would like to discuss this with her family. Comments: Thank you for allowing us to participate in the care of this patient. Will continue to follow. Please call if questions or concerns arise. Electronically signed by Julienne Taylor MD on 2/18/2023 at 11:11 AM    Please note this report has been partially produced using speech recognition software and may cause contain errors related to that system including grammar, punctuation and spelling as well as words and phrases that may seem inappropriate. If there are questions or concerns please feel free to contact me to clarify.

## 2023-02-18 NOTE — CONSULTS
Hospital Medicine  Consult    Patient:  Brian Lamar  MRN: [id number]    CHIEF COMPLAINT:    Chief Complaint   Patient presents with    Shortness of Breath       History Obtained From:  Patient, EMR  Primary Care Physician: Erickson Thomas MD    HISTORY OF PRESENT ILLNESS:   The patient is a 81 y.o. 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, discharged home on 2/2 who presented with above CC. Patient has been experiencing shortness of breath mainly with exertion since having the surgery, no chest pressure or palpitations, is also experiencing poor oral intake and decreased urination, no nausea or vomiting, no fever or chills, was admitted under thoracic surgery service, our service was consulted for management of dehydration. Past Medical History:      Diagnosis Date    Bilateral carotid artery stenosis 11/19/2020    CAD (coronary artery disease)     Carotid bruit     Dyslipidemia 11/19/2020    Essential hypertension 11/19/2020    Hx of CABG 11/19/2020    Hyperlipidemia     Kidney disease     PONV (postoperative nausea and vomiting)     Stage 4 chronic kidney disease ([address] 11/19/2020    Stenosis of noncoronary bypass graft ([facility] [address]     Tobacco abuse 11/19/2020       Past Surgical History:      Procedure Laterality Date    BACK SURGERY  2003    BRAIN ANEURYSM SURGERY  2012    BREAST LUMPECTOMY Right     CARPAL TUNNEL RELEASE Bilateral     CORONARY ARTERY BYPASS GRAFT      PARTIAL HYSTERECTOMY (CERVIX NOT REMOVED)  1997    THORACOSCOPY Left 1/31/2023    left thoracoscopic wedge, completion left upper lobectomy performed by El Conner MD at [address] Bilateral left side 2017 right side 2004       Medications Prior to Admission:    Prior to Admission medications    Medication Sig Start Date End Date Taking?  Authorizing Provider   ondansetron (ZOFRAN-ODT) 4 MG disintegrating tablet Take 1 tablet by mouth every 8 hours as needed for Nausea or Vomiting 2/2/23   Katarzyna Kulkarni MD   losartan-hydroCHLOROthiazide [facility]) 100-12.5 MG per tablet TAKE 1 [place]  Patient not taking: Reported on 2/17/2023 11/23/22   Jacey Paula MD   carvedilol (COREG) 12.5 MG tablet TAKE 1 TABLET BY MOUTH TWICE A DAY 11/9/22   Jacey Paula MD   atorvastatin (LIPITOR) 40 MG tablet TAKE 1 TABLET BY MOUTH EVERY DAY 10/24/22   LORENZO Bentley - CNP   amLODIPine (NORVASC) 10 MG tablet Take 1 tablet by mouth daily 10/14/22   Óscar Godfrey DO   traZODone (DESYREL) 100 MG tablet TAKE 1 TABLET BY MOUTH EVERY DAY AT NIGHT 10/10/22   Jacey Paula MD   Handicap Placard MISC by Does not apply route Applying from 1/28/2021-1/27/2026. 1/28/21   Augustus Newell MD   aspirin 81 MG EC tablet Take 81 mg by mouth daily    Historical Provider, MD       Allergies:  Amoxicillin and Nsaids    Social History:   TOBACCO:   reports that she has been smoking cigarettes. She has a 6.00 pack-year smoking history. She has never used smokeless tobacco.  ETOH:   reports current alcohol use. Family History:   No family history on file. REVIEW OF SYSTEMS:  Ten systems reviewed and negative except for stated in HPI    Physical Exam:    Vitals: BP (!) 143/63   Pulse 76   Temp 98.2 °F (36.8 °C) (Oral)   Resp 18   Ht 5' 1\" (1.549 m)   Wt 131 lb (59.4 kg)   SpO2 100%   BMI 24.75 kg/m²   General appearance: alert, appears stated age and cooperative, moderate acute distress  Skin: Skin color, texture, turgor normal.   HEENT: Head: Normocephalic, no lesions, without obvious abnormality. Eye: Normal external eye, conjunctiva, lids cornea, ANGIE.   Neck: supple, symmetrical, trachea midline  Lungs: diminished bilaterally with poor air entry, no wheezing  Heart: S1/S2, RRR  Abdomen: soft, active BS  Extremities: no edema or cyanosis  Neurologic: Mental status: Alert, oriented, thought content appropriate     No results for input(s): WBC, HGB, PLT in the last 72 hours. No results for input(s): NA, K, CL, CO2, BUN, CREATININE, GLUCOSE, AST, ALT, ALB, BILITOT, ALKPHOS in the last 72 hours. Troponin T: No results for input(s): TROPONINI in the last 72 hours. ABGs: No results found for: PHART, PO2ART, [id number]  INR: No results for input(s): INR in the last 72 hours. URINALYSIS:No results for input(s): NITRITE, COLORU, PHUR, LABCAST, WBCUA, RBCUA, MUCUS, TRICHOMONAS, YEAST, BACTERIA, CLARITYU, SPECGRAV, LEUKOCYTESUR, UROBILINOGEN, BILIRUBINUR, BLOODU, GLUCOSEU, AMORPHOUS in the last 72 hours. Invalid input(s): Yazmin Asher  -----------------------------------------------------------------   No results found.         Assessment and Plan     81 y.o. 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, discharged home on 2/2 who presented with:    Dyspnea at rest and with exertion  - SPO2 is 98% on RA  - sounds very diminished on physical exam  - CXR is negative for acute findings(official report pending)  - PFTs on 1/20 showed obstructive pattern  - has not smoked tobacco since the surgery  - started scheduled Albuterol nebs  - consult pulmonology for further evaluation    Poor oral intake and decreased urination / CKD3-4  - CMP pending  - continue IVFs for now  - hold HCTZ/Losartan  - monitor renal function closely    CAD s/p CABG   - continue ASA, statin    HTN  - continue Coreg, amlodipine  - hold HCTZ/losartan until labs are back    PreDM2  - monitor glucose levels    Anemia  - follow Sacha Carrillo MD, MD  Hospitalist

## 2023-02-18 NOTE — FLOWSHEET NOTE
Am nursing  assessment completed. Pt :    awake and resting in bed           Alert and oriented. Breakfast: completed  RESP:          even and non labored at rest. SOB with exertion     Lung sounds:   diminished                               Oxygen: 2l NC prn   Complaints of:  Pain: denies  IV:   TELE: SR  Dressings:   Precautions:              Falls:  50      Layo: 19  Chart and meds reviewed. Noted Labs:  na 144 k 3.7 bun 20 cr0.92 mag 1.7 w 6.2 h 9.2  Plan for today:    Call light in reach. NOTES:  [id number] PT and OT by for evaluation. Electronically signed by Alisia Collazo RN on 2/18/2023 at 10:27 AM    [address] Dr Aleida Sands and renal physician by for rounds.  Electronically signed by Alisia Collazo RN on 2/18/2023 at 12:46 PM

## 2023-02-18 NOTE — PLAN OF CARE
Problem: Discharge Planning  Goal: Discharge to home or other facility with appropriate resources  Outcome: Progressing  Flowsheets (Taken 2/18/2023 0247)  Discharge to home or other facility with appropriate resources:   Identify barriers to discharge with patient and caregiver   Arrange for needed discharge resources and transportation as appropriate   Identify discharge learning needs (meds, wound care, etc)     Problem: Safety - Adult  Goal: Free from fall injury  Outcome: Progressing  Flowsheets (Taken 2/18/2023 0247)  Free From Fall Injury: Instruct family/caregiver on patient safety     Problem: Respiratory - Adult  Goal: Achieves optimal ventilation and oxygenation  Outcome: Progressing  Flowsheets (Taken 2/18/2023 0247)  Achieves optimal ventilation and oxygenation:   Assess for changes in respiratory status   Assess for changes in mentation and behavior   Position to facilitate oxygenation and minimize respiratory effort   Oxygen supplementation based on oxygen saturation or arterial blood gases   Assess the need for suctioning and aspirate as needed   Assess and instruct to report shortness of breath or any respiratory difficulty   Respiratory therapy support as indicated

## 2023-02-18 NOTE — H&P
History and Physical  Patient: Natasha Benitez  Unit/Bed:W179/W179-01  YOB: 1940  MRN: [id number]  Acct: [de-identified]   Admitting Diagnosis: Shortness of breath [R06.02]  Dyspnea [R06.00]  Admit Date:  2/17/2023  Hospital Day: 1      Chief Complaint:   Shortness of breath, fatigue    History of Present Illness:  Patient had a left lobectomy 2 and half weeks ago. She did well and was discharged home. Shortly after being discharged home she went from a good recovery to a progressive weakness and shortness of breath. She says this was not a sudden occurrence but it occurred over the course of days. She also had 1 episode of black bowel movement. Her appetite has decreased but she denies any nausea or vomiting. She can get up and walk but her muscles are weak and she gets winded fairly quickly now. Her daughter took her to the emergency department several days ago. She was found to have an elevated BUN and creatinine. They gave her some IV hydration and discharged her home. Unfortunately she quickly returned with her fatigue and shortness of breath and we opted to readmit her.     Allergies   Allergen Reactions    Amoxicillin     Nsaids      CRF 4       Current Facility-Administered Medications   Medication Dose Route Frequency Provider Last Rate Last Admin    amLODIPine (NORVASC) tablet 10 mg  10 mg Oral Daily Reymundo Meyer MD   10 mg at 02/18/23 9997    aspirin EC tablet 81 mg  81 mg Oral Daily Reymundo Meyer MD   81 mg at 02/18/23 0909    atorvastatin (LIPITOR) tablet 40 mg  40 mg Oral Daily Reymundo Meyer MD   40 mg at 02/17/23 2157    carvedilol (COREG) tablet 12.5 mg  12.5 mg Oral BID Reymundo Meyer MD   12.5 mg at 02/18/23 1839    traZODone (DESYREL) tablet 50 mg  50 mg Oral Nightly Reymundo Meyer MD   50 mg at 02/17/23 2159    sodium chloride flush 0.9 % injection 5-40 mL  5-40 mL IntraVENous 2 times per day Reymundo Meyer MD   10 mL at 02/18/23 0910    sodium chloride flush 0.9 % injection 5-40 mL  5-40 mL IntraVENous PRN Tianna Raymond MD        0.9 % sodium chloride infusion   IntraVENous PRN Tianna Raymond MD        enoxaparin Sodium (LOVENOX) injection 30 mg  30 mg SubCUTAneous Daily Tianna Raymond MD   30 mg at 02/17/23 2158    ondansetron (ZOFRAN-ODT) disintegrating tablet 4 mg  4 mg Oral Q8H PRN Tianna Raymond MD        Or    ondansetron [facility]) injection 4 mg  4 mg IntraVENous Q6H PRN Tianna Raymond MD        polyethylene glycol (GLYCOLAX) packet 17 g  17 g Oral Daily PRN Tianna Raymond MD        acetaminophen (TYLENOL) tablet 650 mg  650 mg Oral Q6H PRN Tianna Raymond MD        Or    acetaminophen (TYLENOL) suppository 650 mg  650 mg Rectal Q6H PRN Tianna Raymond MD        magnesium sulfate 2000 mg in 50 mL IVPB premix  2,000 mg IntraVENous PRN Tianna Raymond MD        potassium chloride 10 mEq/100 mL IVPB (Peripheral Line)  10 mEq IntraVENous PRN Tianna Raymond MD        famotidine (PEPCID) tablet 20 mg  20 mg Oral Daily Tianna Raymond MD   20 mg at 02/18/23 2269    oxyCODONE (ROXICODONE) immediate release tablet 5 mg  5 mg Oral Q6H PRN Tianna Raymond MD        diphenhydrAMINE (BENADRYL) tablet 25 mg  25 mg Oral Q6H PRN Tianna Raymond MD        albuterol (PROVENTIL) nebulizer solution 2.5 mg  2.5 mg Nebulization TID Stanislav Suarez MD   2.5 mg at 02/18/23 0736    albuterol (PROVENTIL) nebulizer solution 2.5 mg  2.5 mg Nebulization Q2H PRN Stanislav Suarez MD   2.5 mg at 02/18/23 0059    0.9 % sodium chloride infusion   IntraVENous Continuous Tianna Raymond  mL/hr at 02/18/23 0912 New Bag at 02/18/23 0912       PMHx:  Past Medical History:   Diagnosis Date    Bilateral carotid artery stenosis 11/19/2020    CAD (coronary artery disease)     Carotid bruit     Dyslipidemia 11/19/2020    Essential hypertension 11/19/2020    Hx of CABG 11/19/2020    Hyperlipidemia     Kidney disease     PONV (postoperative nausea and vomiting)     Stage 4 chronic kidney disease ([facility] [address] 11/19/2020 Stenosis of noncoronary bypass graft ([facility] [address]     Tobacco abuse 2020       PSHx:  Past Surgical History:   Procedure Laterality Date    BACK SURGERY      BRAIN ANEURYSM SURGERY      BREAST LUMPECTOMY Right     CARPAL TUNNEL RELEASE Bilateral     CORONARY ARTERY BYPASS GRAFT      PARTIAL HYSTERECTOMY (CERVIX NOT REMOVED)      THORACOSCOPY Left 2023    left thoracoscopic wedge, completion left upper lobectomy performed by Gopi Swanson MD at [address] Bilateral left side 2017 right side        Social Hx:  Social History     Socioeconomic History    Marital status:    Tobacco Use    Smoking status: Every Day     Packs/day: 0.10     Years: 60.00     Pack years: 6.00     Types: Cigarettes     Last attempt to quit: 2023     Years since quittin.0    Smokeless tobacco: Never   Vaping Use    Vaping Use: Never used   Substance and Sexual Activity    Alcohol use: Yes     Comment: occ    Drug use: Never    Sexual activity: Not Currently     Social Determinants of Health     Financial Resource Strain: Low Risk     Difficulty of Paying Living Expenses: Not hard at all   Food Insecurity: No Food Insecurity    Worried About Running Out of Food in the Last Year: Never true    Ran Out of Food in the Last Year: Never true       Family Hx:  No family history on file. Review ofSystems:   Review of Systems   Constitutional:  Positive for activity change and appetite change. Negative for chills, diaphoresis, fatigue, fever and unexpected weight change. HENT:  Negative for sore throat, trouble swallowing and voice change. Eyes:  Negative for visual disturbance. Respiratory:  Negative for cough, choking, chest tightness, shortness of breath, wheezing and stridor. Cardiovascular:  Negative for chest pain, palpitations and leg swelling. Gastrointestinal:  Positive for diarrhea. Negative for abdominal distention, abdominal pain, nausea and vomiting.    Genitourinary: Negative for difficulty urinating. Musculoskeletal:  Negative for gait problem and joint swelling. Skin:  Negative for rash and wound. Neurological:  Negative for dizziness, seizures and light-headedness. Hematological:  Negative for adenopathy. Does not bruise/bleed easily. Psychiatric/Behavioral:  Negative for behavioral problems and confusion. Physical Examination:    BP (!) 127/49   Pulse 70   Temp 98.2 °F (36.8 °C) (Oral)   Resp 18   Ht 5' 1\" (1.549 m)   Wt 131 lb (59.4 kg)   SpO2 99%   BMI 24.75 kg/m²    Physical Exam  Constitutional:       General: She is not in acute distress. Appearance: She is not ill-appearing, toxic-appearing or diaphoretic. HENT:      Head: Normocephalic and atraumatic. Nose: Nose normal.   Eyes:      Extraocular Movements: Extraocular movements intact. Conjunctiva/sclera: Conjunctivae normal.      Pupils: Pupils are equal, round, and reactive to light. Neck:      Vascular: No carotid bruit. Cardiovascular:      Rate and Rhythm: Normal rate and regular rhythm. Heart sounds: Normal heart sounds. No murmur heard. No gallop. Pulmonary:      Effort: Pulmonary effort is normal. No respiratory distress. Breath sounds: Normal breath sounds. No wheezing or rales. Abdominal:      General: Abdomen is flat. Bowel sounds are normal.      Palpations: Abdomen is soft. There is no mass. Tenderness: There is no abdominal tenderness. Musculoskeletal:         General: No swelling. Cervical back: Neck supple. Right lower leg: No edema. Left lower leg: No edema. Lymphadenopathy:      Cervical: No cervical adenopathy. Skin:     General: Skin is warm and dry. Neurological:      General: No focal deficit present. Mental Status: She is alert and oriented to person, place, and time. Psychiatric:         Mood and Affect: Mood normal.         Behavior: Behavior normal.         Thought Content:  Thought content normal. Judgment: Judgment normal.        LABS:  CBC:   Lab Results   Component Value Date/Time    WBC 6.2 02/18/2023 05:14 AM    RBC 2.99 02/18/2023 05:14 AM    HGB 9.2 02/18/2023 05:14 AM    HCT 27.3 02/18/2023 05:14 AM    MCV 91.5 02/18/2023 05:14 AM    MCH 30.9 02/18/2023 05:14 AM    MCHC 33.8 02/18/2023 05:14 AM    RDW 13.4 02/18/2023 05:14 AM     02/18/2023 05:14 AM     CBC with Differential:   Lab Results   Component Value Date/Time    WBC 6.2 02/18/2023 05:14 AM    RBC 2.99 02/18/2023 05:14 AM    HGB 9.2 02/18/2023 05:14 AM    HCT 27.3 02/18/2023 05:14 AM     02/18/2023 05:14 AM    MCV 91.5 02/18/2023 05:14 AM    MCH 30.9 02/18/2023 05:14 AM    MCHC 33.8 02/18/2023 05:14 AM    RDW 13.4 02/18/2023 05:14 AM    LYMPHOPCT 11.5 02/09/2023 12:32 PM    MONOPCT 7.2 02/09/2023 12:32 PM    BASOPCT 0.3 02/09/2023 12:32 PM    MONOSABS 0.8 02/09/2023 12:32 PM    LYMPHSABS 1.3 02/09/2023 12:32 PM    EOSABS 0.0 02/09/2023 12:32 PM    BASOSABS 0.0 02/09/2023 12:32 PM     CMP:    Lab Results   Component Value Date/Time     02/18/2023 05:14 AM    K 3.7 02/18/2023 05:14 AM    K 3.9 02/17/2023 07:09 PM     02/18/2023 05:14 AM    CO2 24 02/18/2023 05:14 AM    BUN 20 02/18/2023 05:14 AM    CREATININE 0.92 02/18/2023 05:14 AM    GFRAA 56.5 10/13/2022 05:32 AM    LABGLOM >60.0 02/18/2023 05:14 AM    GLUCOSE 110 02/18/2023 05:14 AM    PROT 6.2 02/17/2023 07:09 PM    LABALBU 3.3 02/18/2023 05:14 AM    CALCIUM 8.3 02/18/2023 05:14 AM    BILITOT <0.2 02/17/2023 07:09 PM    ALKPHOS 92 02/17/2023 07:09 PM    AST 22 02/17/2023 07:09 PM    ALT 14 02/17/2023 07:09 PM     BMP:    Lab Results   Component Value Date/Time     02/18/2023 05:14 AM    K 3.7 02/18/2023 05:14 AM    K 3.9 02/17/2023 07:09 PM     02/18/2023 05:14 AM    CO2 24 02/18/2023 05:14 AM    BUN 20 02/18/2023 05:14 AM    LABALBU 3.3 02/18/2023 05:14 AM    CREATININE 0.92 02/18/2023 05:14 AM    CALCIUM 8.3 02/18/2023 05:14 AM    GFRAA 56.5 10/13/2022 05:32 AM    LABGLOM >60.0 02/18/2023 05:14 AM    GLUCOSE 110 02/18/2023 05:14 AM     Magnesium:    Lab Results   Component Value Date/Time    MG 1.7 02/18/2023 05:14 AM     Troponin:    Lab Results   Component Value Date/Time    TROPONINI <0.010 10/12/2022 09:00 AM     Recent Labs     02/18/23  0514   PROBNP 875     No results for input(s): INR in the last 72 hours. RADIOLOGY:  XR CHEST PORTABLE    Result Date: 2/18/2023  EXAMINATION: ONE XRAY VIEW OF THE CHEST 2/17/2023 7:18 pm COMPARISON: February 2, 2023 HISTORY: ORDERING SYSTEM PROVIDED HISTORY: shortness of breath post left lobectomy TECHNOLOGIST PROVIDED HISTORY: Reason for exam:->shortness of breath post left lobectomy What reading provider will be dictating this exam?->CRC FINDINGS: Normal cardiomediastinal silhouette. Lungs clear. Unchanged diminished volume of the left hemithorax compatible with left partial pneumonectomy. No pneumothorax or effusion. Osseous thorax intact. Sternal wires. No acute disease. RECOMMENDATION: Careful clinical correlation and follow up recommended. EKG:       Assessment:    Active Hospital Problems    Diagnosis Date Noted    Dyspnea [R06.00] 02/17/2023     Priority: Medium     Progressive fatigue and shortness of breath after lobectomy after an initial good recovery. Melenic stools with a slow decrease in hemoglobin. Elevated BUN and creatinine    Plan:  Her chest x-ray does not show any significant pneumothorax or effusion so I do not believe her reversal of recovery has a surgical cure. She did have a black bowel movement with a slow decrease in her hemoglobin. I will consult GI medicine to consider endoscopies during this admission. Because the elevated BUN and creatinine and her history of seeing nephrology I will consult them as well. I do not have an obvious source for her current condition. I appreciate the hospitalist help in a medical work-up.         Electronically signed by Humble Tubbs Tobias Colindres MD on 2/18/2023 at 9:39 AM

## 2023-02-18 NOTE — PROGRESS NOTES
[facility]CC[facility] EVALUATION - ACUTE     NAME: Tiffani Cardenas  : 1940 (14 y.o.)  MRN: [id number]  CODE STATUS: Full Code  Room: D661/Z670-75    Date of Service: 2023    Patient Diagnosis(es): Shortness of breath [R06.02]  Dyspnea [R06.00]   Patient Active Problem List    Diagnosis Date Noted    Dyspnea 2023    Dizziness 10/13/2022    Hypertensive urgency 10/12/2022    Hypervolemia 2022    Chronic renal disease, stage III [facility]) [613335] 06/10/2022    Cervical radiculopathy 06/10/2022    Onychomycosis 2022    Peripheral vascular disease of foot ([facility] [address] 2022    Lung mass 2023    Bilateral carotid artery stenosis 2020    Hx of CABGx5 2020    Tobacco abuse 2020    Essential hypertension 2020    Dyslipidemia 2020    Carotid bruit 2020    Renal impairment 2020    Coronary atherosclerosis 2020        Past Medical History:   Diagnosis Date    Bilateral carotid artery stenosis 2020    CAD (coronary artery disease)     Carotid bruit     Dyslipidemia 2020    Essential hypertension 2020    Hx of CABG 2020    Hyperlipidemia     Kidney disease     PONV (postoperative nausea and vomiting)     Stage 4 chronic kidney disease ([address] 2020    Stenosis of noncoronary bypass graft ([facility] [address]     Tobacco abuse 2020     Past Surgical History:   Procedure Laterality Date    BACK SURGERY      BRAIN ANEURYSM SURGERY      BREAST LUMPECTOMY Right     CARPAL TUNNEL RELEASE Bilateral     CORONARY ARTERY BYPASS GRAFT      PARTIAL HYSTERECTOMY (CERVIX NOT REMOVED)      THORACOSCOPY Left 2023    left thoracoscopic wedge, completion left upper lobectomy performed by Isabela Mortensen MD at [address] Bilateral left side 2017 right side         Restrictions  Restrictions/Precautions: Fall Risk              Safety Devices: Safety Devices  Type of Devices:  All fall risk precautions in place;Call light within reach; Left in bed     Patient's date of birth confirmed: Yes    General:  Chart Reviewed: Yes  Patient assessed for rehabilitation services?: Yes    Subjective  \"I want to go back home. \"      Pain at start of treatment: No    Pain at end of treatment: No      Prior Level of Function:  Social/Functional History  Lives With: Alone  Type of Home: Condo  Home Layout: One level  Home Access: Level entry  Bathroom Shower/Tub: Walk-in shower  Bathroom Toilet: Standard  Bathroom Equipment: Grab bars in shower  Bathroom Accessibility: [place]: kelsi Aviles  Has the patient had two or more falls in the past year or any fall with injury in the past year?: No  Receives Help From: Family  ADL Assistance: Independent  Homemaking Assistance: Independent  Ambulation Assistance: Independent  Transfer Assistance: Independent  Active : Yes  Mode of Transportation: Car  Occupation: Retired    OBJECTIVE:     Orientation Status:  Orientation  Overall Orientation Status: Within Functional Limits  Orientation Level: Oriented to time;Oriented to situation;Oriented to person    Observation:  Observation/Palpation  Posture: Fair  Observation: Supine in bed with HOB raised with daughter at bedside, RA with mild SOB reported following functional mobility tasks    Cognition Status:  Cognition  Overall Cognitive Status: WFL    Perception Status:  Perception  Overall Perceptual Status: WFL    Vision and Hearing Status:  Hearing  Hearing: Within functional limits        GROSS ASSESSMENT AROM/PROM:  AROM: Within functional limits  PROM: Within functional limits    ROM: BUE AROM WFL       UE STRENGTH:  Strength:  Within functional limits    UE COORDINATION:  Coordination: Within functional limits    UE TONE:  Tone: Normal    UE SENSATION:  Sensation: Intact    Hand Dominance:  Hand Dominance  Hand Dominance: Right    ADL Status:  ADL  Feeding: Independent  Grooming: Setup  UE Bathing: Supervision  LE Bathing: Supervision  UE Dressing: Supervision  LE Dressing: Supervision  Toileting: Supervision  Toilet Transfers  Toilet - Technique: Stand step  Equipment Used: Standard toilet  Toilet Transfer: Supervision  Toilet Transfers Comments: Fatigues/SOB with ambulatory transfer    Functional Mobility:    Patient ambulated to/from bathroom with No device at Supervision level. IV pole in tow by therapist    Bed Mobility  Bed mobility  Rolling to Left: Independent  Rolling to Right: Independent  Supine to Sit: Independent  Sit to Supine: Independent  Bed Mobility Comments: HOB slightly raised    Seated and Standing Balance:  Balance  Sitting: Intact  Standing: With support    Functional Endurance:  Activity Tolerance  Activity Tolerance: Patient limited by fatigue    D/C Recommendations:  OT D/C RECOMMENDATIONS  REQUIRES OT FOLLOW-UP: Yes  Type: Outpatient    Equipment Recommendations:  NA    OT Education:   Patient Education  Education Given To: Patient; Family (daughter present during OT evaluation)  Education Provided: Precautions; Energy Conservation  Education Provided Comments: adding seating in kitchen and bathroom for resting location  Education Method: Verbal  Barriers to Learning: Hearing  Education Outcome: Verbalized understanding;Continued education needed    OT Follow Up:   OT D/C RECOMMENDATIONS  REQUIRES OT FOLLOW-UP: Yes  Type: Outpatient       Assessment/Discharge Disposition:  Assessment: Pt is a 81 y.o. female with recent medical complications that result in an extended hopital stay. Pt will benfit from OT to address the above stated issues to increase independence.   Performance deficits / Impairments: Decreased ADL status, Decreased endurance, Decreased high-level IADLs, Decreased functional mobility   Prognosis: Good  Discharge Recommendations: Continue to assess pending progress  Decision Making: Medium Complexity  History: multiple comorbidities  Exam: 4 performance deficits  Assistance / Modification: supervision    [facility]) Self care Score   How much help is needed for putting on and taking off regular lower body clothing?: A Little  How much help is needed for bathing (which includes washing, rinsing, drying)?: A Little  How much help is needed for toileting (which includes using toilet, bedpan, or urinal)?: A Little  How much help is needed for putting on and taking off regular upper body clothing?: A Little  How much help is needed for taking care of personal grooming?: None  How much help for eating meals?: None  AM-[facility] Inpatient Daily Activity Raw Score: 20  AM-[facility] Inpatient ADL T-Scale Score : 42.03  ADL Inpatient CMS 0-100% Score: 38.32    Therapy key for assistance levels -   Independent/Mod I = Pt. is able to perform task with no assistance but may require a device   Stand by assistance = Pt. does not perform task at an independent level but does not need physical assistance, requires verbal cues  Minimal, Moderate, Maximal Assistance = Pt. requires physical assistance (25%, 50%, 75% assist from helper) for task but is able to actively participate in task   Dependent = Pt. requires total assistance with task and is not able to actively participate with task completion     Plan:  Occupational Therapy Plan  Times Per Week: 1-4  Therapy Duration:  (acute length of stay)  Current Treatment Recommendations: Strengthening, Functional mobility training, Endurance training, Safety education & training, Self-Care / ADL, Home management training    Goals:   Patient will:    - Improve functional endurance to tolerate/complete x15-30  mins of ADL's  - Be setup in UB ADLs   - Be setup in LB ADLs  - Be setup in ADL transfers without LOB  - Be set up in toileting tasks    Patient Goal: Return home  Discussed and agreed upon: Yes Comments:       Therapy Time:   Individual   Time In 1012   Time Out 1027   Minutes 15          Eval: 15 minutes     Electronically signed by: Phillip Heard [place],   2/18/2023, 1:54 PM

## 2023-02-18 NOTE — PLAN OF CARE
Therapy evaluation completed. Please see daily notes and/or progress notes for details related to planned treatment interventions, goals and functional performance.      Electronically signed by Giuseppe Gomez PT on 2/18/2023 at 12:31 PM

## 2023-02-18 NOTE — PROGRESS NOTES
Hospitalist Progress Note      PCP: Forest Loomis MD    Date of Admission: 2/17/2023    Chief Complaint:  no acute events, afebrile, stable HD, on RA, breathing is better with Albuterol nebs    Medications:  Reviewed    Infusion Medications    sodium chloride       Scheduled Medications    amLODIPine  10 mg Oral Daily    aspirin  81 mg Oral Daily    atorvastatin  40 mg Oral Daily    carvedilol  12.5 mg Oral BID    traZODone  50 mg Oral Nightly    sodium chloride flush  5-40 mL IntraVENous 2 times per day    enoxaparin  30 mg SubCUTAneous Daily    famotidine  20 mg Oral Daily    albuterol  2.5 mg Nebulization TID     PRN Meds: sodium chloride flush, sodium chloride, ondansetron **OR** ondansetron, polyethylene glycol, acetaminophen **OR** acetaminophen, magnesium sulfate, potassium chloride, oxyCODONE, diphenhydrAMINE, albuterol    No intake or output data in the 24 hours ending 02/18/23 1211    Exam:    BP (!) 123/52   Pulse 65   Temp 97.9 °F (36.6 °C) (Oral)   Resp 16   Ht 5' 1\" (1.549 m)   Wt 131 lb (59.4 kg)   SpO2 98%   BMI 24.75 kg/m²     General appearance: alert, cooperative  Lungs: diminished bilaterally with poor air entry, no wheezing  Heart: S1/S2, RRR  Abdomen: soft, active BS  Extremities: no edema       Labs:   Recent Labs     02/17/23 1909 02/18/23  0514   WBC 6.5 6.2   HGB 9.5* 9.2*   HCT 27.7* 27.3*    299     Recent Labs     02/17/23 1909 02/18/23  0514    144   K 3.9 3.7   * 108*   CO2 26 24   BUN 27* 20   CREATININE 1.28* 0.92*   CALCIUM 8.6 8.3*   PHOS  --  2.2*     Recent Labs     02/17/23 1909   AST 22   ALT 14   BILITOT <0.2   ALKPHOS 92     No results for input(s): INR in the last 72 hours. No results for input(s): [place] in the last 72 hours.     Urinalysis:      Lab Results   Component Value Date/Time    NITRU Negative 10/12/2022 09:00 AM    WBCUA 3-5 10/12/2022 09:00 AM    BACTERIA Negative 10/12/2022 09:00 AM    RBCUA 0-2 10/12/2022 09:00 AM BLOODU Negative 10/12/2022 09:00 AM    SPECGRAV 1.018 10/12/2022 09:00 AM    GLUCOSEU Negative 10/12/2022 09:00 AM       Radiology:  XR CHEST PORTABLE   Final Result   No acute disease. RECOMMENDATION:   Careful clinical correlation and follow up recommended. Assessment/Plan:    81 y.o. 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, discharged home on 2/2 who presented with:     Dyspnea at rest and with exertion  - likely related to underlying COPD  - on Prednisone, Stiolto, Duonebs  - consulted pulmonology for further evaluation     Poor oral intake and decreased urination / CKD3-4  - renal function improved  - stopped IVFs      CAD s/p CABG   - continue ASA, statin     HTN  - continue home meds     PreDM2  - monitor glucose levels     Anemia  - baseline Hb is around 10-11  - no overt bleeding  - iron studies pending  - follow H/H        Diet: ADULT DIET;  Regular    Code Status: Full Code              Electronically signed by Fox Saldana MD on 2/18/2023 at 12:11 PM

## 2023-02-18 NOTE — CONSULTS
[facility]  Consult Note           Reason for Consult:  elevated BUN and Cr  Requesting Physician:  Dr. Alicia Araya    Chief Complaint:    History Obtained From:  patient, electronic medical record    History of Present Ilness:    81 y.o. 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 In addition to poor PO intake and decreased urination. Nephrology consulted for NAZARIO. Scr 1.2 on presentation, got fluids now down to 0.9, Notably pt Scr tends to be elevated however has also been down to 0.8-0.9 recently. NS stopped this AM. On losartan-HCTZ as outpatient . Not hypotensive. No recent contrast administered . Past Medical History:        Diagnosis Date    Bilateral carotid artery stenosis 11/19/2020    CAD (coronary artery disease)     Carotid bruit     Dyslipidemia 11/19/2020    Essential hypertension 11/19/2020    Hx of CABG 11/19/2020    Hyperlipidemia     Kidney disease     PONV (postoperative nausea and vomiting)     Stage 4 chronic kidney disease ([address] 11/19/2020    Stenosis of noncoronary bypass graft ([facility]     Tobacco abuse 11/19/2020       Past Surgical History:        Procedure Laterality Date    BACK SURGERY  2003    BRAIN ANEURYSM SURGERY  2012    BREAST LUMPECTOMY Right     CARPAL TUNNEL RELEASE Bilateral     CORONARY ARTERY BYPASS GRAFT      PARTIAL HYSTERECTOMY (CERVIX NOT REMOVED)  1997    THORACOSCOPY Left 1/31/2023    left thoracoscopic wedge, completion left upper lobectomy performed by Gopi Swanson MD at [address] [facility] [address] Bilateral left side 2017 right side 2004       Home Medications:    No current facility-administered medications on file prior to encounter.      Current Outpatient Medications on File Prior to Encounter   Medication Sig Dispense Refill    ondansetron (ZOFRAN-ODT) 4 MG disintegrating tablet Take 1 tablet by mouth every 8 hours as needed for Nausea or Vomiting 21 tablet 0    losartan-hydroCHLOROthiazide (HYZAAR) 100-12.5 MG per tablet TAKE 1 TABLET BY MOUTH EVERY DAY (Patient not taking: Reported on 2023) 90 tablet 1    carvedilol (COREG) 12.5 MG tablet TAKE 1 TABLET BY MOUTH TWICE A DAY 60 tablet 5    atorvastatin (LIPITOR) 40 MG tablet TAKE 1 TABLET BY MOUTH EVERY DAY 90 tablet 3    amLODIPine (NORVASC) 10 MG tablet Take 1 tablet by mouth daily 30 tablet 3    traZODone (DESYREL) 100 MG tablet TAKE 1 TABLET BY MOUTH EVERY DAY AT NIGHT 90 tablet 1    Handicap Placard MISC by Does not apply route Applying from 2021-2026. 1 each 0    aspirin 81 MG EC tablet Take 81 mg by mouth daily         Allergies:  Amoxicillin and Nsaids    Social History:    Social History     Socioeconomic History    Marital status:       Spouse name: Not on file    Number of children: Not on file    Years of education: Not on file    Highest education level: Not on file   Occupational History    Not on file   Tobacco Use    Smoking status: Every Day     Packs/day: 0.10     Years: 60.00     Pack years: 6.00     Types: Cigarettes     Last attempt to quit: 2023     Years since quittin.0    Smokeless tobacco: Never   Vaping Use    Vaping Use: Never used   Substance and Sexual Activity    Alcohol use: Yes     Comment: occ    Drug use: Never    Sexual activity: Not Currently   Other Topics Concern    Not on file   Social History Narrative    Not on file     Social Determinants of Health     Financial Resource Strain: Low Risk     Difficulty of Paying Living Expenses: Not hard at all   Food Insecurity: No Food Insecurity    Worried About Running Out of Food in the Last Year: Never true    Ran Out of Food in the Last Year: Never true   Transportation Needs: Not on file   Physical Activity: Not on file   Stress: Not on file   Social Connections: Not on file   Intimate Partner Violence: Not on file Housing Stability: Not on file       Family History:   No family history on file.     Review of Systems:   Positives in bold  Constitutional: fever, chills, fatigue, malaise, decreased PO  HENT:  rhinorrhea, sinus pain, sore throat, epistaxis    Eyes:  photophobia, visual disturbance, eye redness  Respiratory: shortness of breath, cough, hemoptysis    Cardiovascular: chest pain, palpitations, orthopnea, leg swelling   Gastrointestinal: abdominal pain, nausea, vomiting, diarrhea, constipation   Endocrine: polydipsia, polyphagia, cold intolerance, heat intolerance  Genitourinary: dysuria, flank pain, frequency, urgency   Hematologic: easy bruising, easy bleeding  Musculoskeletal: back pain, neck pain, myalgias, arthalgias  Neurological: syncope, lightheadedness, dizziness, seizures, tremors, weakness  Psychiatric/Behavioral: anxiety, depression, hallucinations  Skin: rash, itching    Physical exam:   Constitutional:  VITALS:  BP (!) 123/52   Pulse 65   Temp 97.9 °F (36.6 °C) (Oral)   Resp 16   Ht 5' 1\" (1.549 m)   Wt 131 lb (59.4 kg)   SpO2 98%   BMI 24.75 kg/m²     General: alert, in no apparent distress  HEENT: normocephalic, atraumatic, anicteric  Neck: supple, no mass  Lungs: non-labored respirations, clear to auscultation bilaterally  Heart: regular rate and rhythm, no murmurs or rubs  Abdomen: soft, non-tender, non-distended  MSK: no joint swelling or tenderness  Ext: no cyanosis, no peripheral edema  Neuro: alert and oriented, no gross abnormalities  Psych: normal mood and affect    Data/  CBC:   Lab Results   Component Value Date/Time    WBC 6.2 02/18/2023 05:14 AM    RBC 2.99 02/18/2023 05:14 AM    HGB 9.2 02/18/2023 05:14 AM    HCT 27.3 02/18/2023 05:14 AM    MCV 91.5 02/18/2023 05:14 AM    MCH 30.9 02/18/2023 05:14 AM    MCHC 33.8 02/18/2023 05:14 AM    RDW 13.4 02/18/2023 05:14 AM     02/18/2023 05:14 AM     BMP:    Lab Results   Component Value Date/Time     02/18/2023 05:14 AM    K 3.7 02/18/2023 05:14 AM    K 3.9 02/17/2023 07:09 PM     02/18/2023 05:14 AM    CO2 24 02/18/2023 05:14 AM    BUN 20 02/18/2023 05:14 AM    LABALBU 3.3 02/18/2023 05:14 AM    CREATININE 0.92 02/18/2023 05:14 AM    CALCIUM 8.3 02/18/2023 05:14 AM    GFRAA 56.5 10/13/2022 05:32 AM    LABGLOM >60.0 02/18/2023 05:14 AM    GLUCOSE 110 02/18/2023 05:14 AM     XR CHEST PORTABLE    Result Date: 2/18/2023  EXAMINATION: ONE XRAY VIEW OF THE CHEST 2/17/2023 7:18 pm COMPARISON: February 2, 2023 HISTORY: ORDERING SYSTEM PROVIDED HISTORY: shortness of breath post left lobectomy TECHNOLOGIST PROVIDED HISTORY: Reason for exam:->shortness of breath post left lobectomy What reading provider will be dictating this exam?->CRC FINDINGS: Normal cardiomediastinal silhouette. Lungs clear. Unchanged diminished volume of the left hemithorax compatible with left partial pneumonectomy. No pneumothorax or effusion. Osseous thorax intact. Sternal wires. No acute disease. RECOMMENDATION: Careful clinical correlation and follow up recommended. XR CHEST PORTABLE    Result Date: 2/2/2023  EXAMINATION: ONE XRAY VIEW OF THE CHEST 2/2/2023 10:19 am COMPARISON: 01/31/2023 HISTORY: ORDERING SYSTEM PROVIDED HISTORY: chest tube removal TECHNOLOGIST PROVIDED HISTORY: Reason for exam:->chest tube removal What reading provider will be dictating this exam?->CRC FINDINGS: Heart size is normal.  There are no infiltrates or effusions. There is significant elevation left hemidiaphragm. There has been sternotomy. There is a tiny left sided pneumothorax. Chest tube is been removed     Tiny left-sided pneumothorax.   There is elevation left hemidiaphragm     XR CHEST PORTABLE    Result Date: 1/31/2023  EXAMINATION: ONE XRAY VIEW OF THE CHEST 1/31/2023 10:51 am COMPARISON: October 12, 2022 1006 hours HISTORY: ORDERING SYSTEM PROVIDED HISTORY: Status post left upper lobectomy with chest tube placement TECHNOLOGIST PROVIDED HISTORY: Reason for exam:->Status post left upper lobectomy with chest tube placement What reading provider will be dictating this exam?->CRC FINDINGS: There are multiple median sternotomy wires. 9 interval placement left-sided chest tube. Tip is directed towards the apex. The cardiac silhouette is of normal configuration. Pulmonary vascular 10 UA. Right-sided trachea. Small area of atelectasis left lower lobe. No focal infiltrate No significant effusion. No pneumothoraces. Status post left upper lobe lobectomy with interval placement left-sided chest tube. Assessment:  81 y.o. 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 NAZARIO: most likely 2/2 volume depletion from poor PO intake, on losartan-HCTZ as outpatient, ?baseline Scr ~0.8-0.9, Scr usually tends to be higher however, pt of Dr. Eugenia Madrigal,   Dyspnea: s/p lobectomy in setting of AMANDEEP adenocarcinoma (1/31)  HTN:  Hypophosphatemia  Hypoalbuminemia  anemia    Plan:  - ok to keep off fluids for now  - encourage PO  - agree w/ keeping losartan-HCTZ on hold for today    Thank you for the consultation. Will continue to follow  Please do not hesitate to call with questions.     Leonela Funez MD, MD

## 2023-02-18 NOTE — CONSULTS
Pulmonary and Critical Care Medicine  Consult Note  Encounter Date: 2023 12:35 PM    Ms. Dao Simpson is a 81 y.o. female  : 1940  Requesting Provider: Tyler Trejo MD    Reason for request: Worsening shortness of breath            HISTORY OF PRESENT ILLNESS:    Patient is 81 y.o. presents with with worsening shortness of breath, has been going on for few weeks, I saw her recently in my office, basic labs showed acute kidney injury, she was sent to emergency room she received hydration and discharged home. She came back few days later with increased shortness of breath, no chest pain, no coughing, no fever no chills and no worsening lower extremity edema. She recently underwent left lobectomy 2-1/2 weeks ago showing stage I A2 adenocarcinoma. Preprocedure PFT showed mild COPD, FEV1 90% FEV1/FVC 0.66 with air trapping and hyperinflation. Past Medical History:        Diagnosis Date    Bilateral carotid artery stenosis 2020    CAD (coronary artery disease)     Carotid bruit     Dyslipidemia 2020    Essential hypertension 2020    Hx of CABG 2020    Hyperlipidemia     Kidney disease     PONV (postoperative nausea and vomiting)     Stage 4 chronic kidney disease ([address] 2020    Stenosis of noncoronary bypass graft ([address]     Tobacco abuse 2020       Past Surgical History:        Procedure Laterality Date    BACK SURGERY      BRAIN ANEURYSM SURGERY      BREAST LUMPECTOMY Right     CARPAL TUNNEL RELEASE Bilateral     CORONARY ARTERY BYPASS GRAFT      PARTIAL HYSTERECTOMY (CERVIX NOT REMOVED)      THORACOSCOPY Left 2023    left thoracoscopic wedge, completion left upper lobectomy performed by Tyler Trejo MD at [address] [facility] Bilateral left side 2017 right side        Social History:     reports that she has been smoking cigarettes. She has a 6.00 pack-year smoking history.  She has never used smokeless tobacco. She reports current alcohol use. She reports that she does not use drugs. Family History:   No family history on file. Allergies:  Amoxicillin and Nsaids        MEDICATIONS during current hospitalization:    Continuous Infusions:   sodium chloride         Scheduled Meds:   amLODIPine  10 mg Oral Daily    aspirin  81 mg Oral Daily    atorvastatin  40 mg Oral Daily    carvedilol  12.5 mg Oral BID    traZODone  50 mg Oral Nightly    sodium chloride flush  5-40 mL IntraVENous 2 times per day    enoxaparin  30 mg SubCUTAneous Daily    famotidine  20 mg Oral Daily    albuterol  2.5 mg Nebulization TID       PRN Meds:sodium chloride flush, sodium chloride, ondansetron **OR** ondansetron, polyethylene glycol, acetaminophen **OR** acetaminophen, magnesium sulfate, potassium chloride, oxyCODONE, diphenhydrAMINE, albuterol        REVIEW OF SYSTEMS:  ROS: 10 organs review of system is done including general, psychological, ENT, hematological, endocrine, respiratory, cardiovascular, gastrointestinal, musculoskeletal, neurological,  allergy and Immunology is done and is otherwise negative. PHYSICAL EXAM:    Vitals:  BP (!) 123/52   Pulse 65   Temp 97.9 °F (36.6 °C) (Oral)   Resp 16   Ht 5' 1\" (1.549 m)   Wt 131 lb (59.4 kg)   SpO2 98%   BMI 24.75 kg/m²     General: alert, cooperative, no distress  Head: normocephalic, atraumatic  Eyes:No gross abnormalities. ENT:  MMM no lesions  Neck:  supple and no masses  Chest : Diminished air movement, no wheezing, no rales, nontender, tympanic  Heart[de-identified] Heart sounds are normal.  Regular rate and rhythm without murmur, gallop or rub. ABD:  symmetric, soft, non-tender  Musculoskeletal : no cyanosis, no clubbing, and no edema  Neuro:  Grossly normal  Skin: No rashes or nodules noted.   Lymph node:  no cervical nodes  Urology: No Mckeon   Psychiatric: appropriate        Data Review  Recent Labs     02/17/23  1909 02/18/23  0514   WBC 6.5 6.2   HGB 9.5* 9.2*   HCT 27.7* 27.3*    299 Recent Labs     02/17/23  1909 02/18/23  0514    144   K 3.9 3.7   * 108*   CO2 26 24   BUN 27* 20   CREATININE 1.28* 0.92*   GLUCOSE 162* 110*       MV Settings: ABGs: No results for input(s): PHART, TMZ2REK, PO2ART, FHT7RLW, BEART, D6BXQHBU, NVP2UYO in the last 72 hours.   O2 Device: None (Room air)  O2 Flow Rate (L/min): 0.5 L/min (PLACED PT ON ROOM AIR)  No results found for: [address]    Radiology  I personally reviewed imaging studies and no acute disease        Assessment, plan:   Patient is at risk due to    Worsening shortness of breath, could be due to worsening COPD most recent lobectomy  NAZARIO, improved  Stage I A2 adenocarcinoma, status post left upper lobectomy    Recommendation  Prednisone 40 mg daily for 5 days  Stiolto  DuoNebs as needed  Monitor renal function and urine output  Watch volume status and avoid overload  Repeat PFT as outpatient  We will consider CT angio if not improved tomorrow and if kidney function is better    DW Dr Oneil Weber     Thank you for consultation    Electronically signed by Patrick Blackwood MD, [facility],  on 2/18/2023 at 12:35 PM

## 2023-02-18 NOTE — PROGRESS NOTES
Physical Therapy Med Surg Initial Assessment  Facility/Department: [address] [facility]  Room: [place] [address]       NAME: Bhumika Albarado  : 1940 (28 y.o.)  MRN: [id number]  CODE STATUS: Full Code    Date of Service: 2023    Patient Diagnosis(es): Shortness of breath [R06.02]  Dyspnea [R06.00]   Chief Complaint   Patient presents with    Shortness of Breath     Patient Active Problem List    Diagnosis Date Noted    Dyspnea 2023    Dizziness 10/13/2022    Hypertensive urgency 10/12/2022    Hypervolemia 2022    Chronic renal disease, stage III [facility]) [024649] 06/10/2022    Cervical radiculopathy 06/10/2022    Onychomycosis 2022    Peripheral vascular disease of foot ([facility] 2022    Lung mass 2023    Bilateral carotid artery stenosis 2020    Hx of CABGx5 2020    Tobacco abuse 2020    Essential hypertension 2020    Dyslipidemia 2020    Carotid bruit 2020    Renal impairment 2020    Coronary atherosclerosis 2020        Past Medical History:   Diagnosis Date    Bilateral carotid artery stenosis 2020    CAD (coronary artery disease)     Carotid bruit     Dyslipidemia 2020    Essential hypertension 2020    Hx of CABG 2020    Hyperlipidemia     Kidney disease     PONV (postoperative nausea and vomiting)     Stage 4 chronic kidney disease ([address] 2020    Stenosis of noncoronary bypass graft ([facility]     Tobacco abuse 2020     Past Surgical History:   Procedure Laterality Date    BACK SURGERY      BRAIN ANEURYSM SURGERY  2012    BREAST LUMPECTOMY Right     CARPAL TUNNEL RELEASE Bilateral     CORONARY ARTERY BYPASS GRAFT      PARTIAL HYSTERECTOMY (CERVIX NOT REMOVED)      THORACOSCOPY Left 2023    left thoracoscopic wedge, completion left upper lobectomy performed by Ana Tovar MD at [facility] Bilateral left side 2017 right side        Chart Reviewed: Yes  Patient assessed for rehabilitation services?: Yes       SUBJECTIVE: Subjective: Patient's RN cleared patient to be seen for PT. Patient agreeable to PT evaluation. Pain   0/10    Post Treatment Pain Screenin/10    Prior Level of Function:  Social/Functional History  Lives With: Alone  Type of Home: Condo  Home Layout: One level  Home Access: Level entry  Home Equipment: Alyssa , rolling  Has the patient had two or more falls in the past year or any fall with injury in the past year?: No  Receives Help From: Family  ADL Assistance: Independent  Homemaking Assistance: Independent  Ambulation Assistance: Independent  Transfer Assistance: Independent  Active : Yes  Mode of Transportation: Car  Occupation: Retired    OBJECTIVE:       Observation/Palpation  Observation: Supine in bed with [facility] raised with daughter at bedside, RA with mild SOB reported following functional mobility tasks    ROM:  RLE AROM: WFL  LLE AROM : WFL    Strength:  Strength RLE  Strength RLE: WNL  Strength LLE  Strength LLE: WNL    Bed mobility  Rolling to Left: Independent  Rolling to Right: Independent  Supine to Sit: Independent  Sit to Supine: Independent  Bed Mobility Comments: HOB slightly raised    Transfers  Sit to Stand: Supervision  Stand to Sit: Supervision  Comment: no assistive device    Ambulation  Surface: Level tile  Device: No Device  Assistance: Supervision  Quality of Gait: no LOB  Gait Deviations: Slow Jania; Increased SUDHEER  Distance: 15-20' x 2      Activity Tolerance  Activity Tolerance: Patient tolerated evaluation without incident      ASSESSMENT:   Decision Making: Low Complexity  History: medium  Exam: medium  Clinical Presentation: low      DISCHARGE RECOMMENDATIONS:  Discharge Recommendations: No therapy recommended at discharge    Assessment: Patient is an 81year old female who is currently hospitalized.   Patient is currently functioning at/near reported functional mobility baseline of independent with functional mobility tasks & ADLs. Patient educated on energy conservation techniques to utilize at home upon discharge. Requires PT Follow-Up: No      PLAN OF CARE:  Physcial Therapy Plan  General Plan: Discharge with evaluation only  Safety Devices  Type of Devices: All fall risk precautions in place, Call light within reach, Left in bed    AMPAC (6 CLICK) BASIC MOBILITY  AM-PAC Inpatient Mobility Raw Score : 20     Therapy Time:   Individual   Time In 1012   Time Out 1027   Minutes 15   Timed Code Treatment Minutes: 0 Minutes       Gloria Duran PT, 02/18/23 at 12:31 PM         Definitions for assistance levels  Independent = pt does not require any physical supervision or assistance from another person for activity completion. Device may be needed.   Stand by assistance = pt requires verbal cues or instructions from another person, close to but not touching, to perform the activity  Minimal assistance= pt performs 75% or more of the activity; assistance is required to complete the activity  Moderate assistance= pt performs 50% of the activity; assistance is required to complete the activity  Maximal assistance = pt performs 25% of the activity; assistance is required to complete the activity  Dependent = pt requires total physical assistance to accomplish the task

## 2023-02-18 NOTE — PROGRESS NOTES
See OT evaluation for all goals and OT POC.  Electronically signed by Donnell Sanon OT on 2/18/2023 at 1:55 PM

## 2023-02-19 VITALS
HEART RATE: 87 BPM | SYSTOLIC BLOOD PRESSURE: 140 MMHG | RESPIRATION RATE: 16 BRPM | WEIGHT: 131 LBS | DIASTOLIC BLOOD PRESSURE: 83 MMHG | BODY MASS INDEX: 24.73 KG/M2 | TEMPERATURE: 97.4 F | HEIGHT: 61 IN | OXYGEN SATURATION: 98 %

## 2023-02-19 LAB
ALBUMIN SERPL-MCNC: 3.2 G/DL (ref 3.5–4.6)
ANION GAP SERPL CALCULATED.3IONS-SCNC: 12 MEQ/L (ref 9–15)
BUN BLDV-MCNC: 16 MG/DL (ref 8–23)
CALCIUM SERPL-MCNC: 8.5 MG/DL (ref 8.5–9.9)
CHLORIDE BLD-SCNC: 109 MEQ/L (ref 95–107)
CO2: 22 MEQ/L (ref 20–31)
CREAT SERPL-MCNC: 0.92 MG/DL (ref 0.5–0.9)
EKG ATRIAL RATE: 67 BPM
EKG P AXIS: 54 DEGREES
EKG P-R INTERVAL: 152 MS
EKG Q-T INTERVAL: 420 MS
EKG QRS DURATION: 84 MS
EKG QTC CALCULATION (BAZETT): 443 MS
EKG R AXIS: 42 DEGREES
EKG T AXIS: 93 DEGREES
EKG VENTRICULAR RATE: 67 BPM
FERRITIN: 252 NG/ML (ref 13–150)
GFR SERPL CREATININE-BSD FRML MDRD: >60 ML/MIN/{1.73_M2}
GLUCOSE BLD-MCNC: 161 MG/DL (ref 70–99)
HCT VFR BLD CALC: 28.3 % (ref 37–47)
HEMOGLOBIN: 9.5 G/DL (ref 12–16)
IRON SATURATION: 31 % (ref 20–55)
IRON: 64 UG/DL (ref 37–145)
MAGNESIUM: 1.7 MG/DL (ref 1.7–2.4)
MCH RBC QN AUTO: 30.6 PG (ref 27–31.3)
MCHC RBC AUTO-ENTMCNC: 33.6 % (ref 33–37)
MCV RBC AUTO: 90.9 FL (ref 79.4–94.8)
PDW BLD-RTO: 13.4 % (ref 11.5–14.5)
PHOSPHORUS: 2.9 MG/DL (ref 2.3–4.8)
PLATELET # BLD: 343 K/UL (ref 130–400)
POTASSIUM SERPL-SCNC: 3.9 MEQ/L (ref 3.4–4.9)
RBC # BLD: 3.11 M/UL (ref 4.2–5.4)
SODIUM BLD-SCNC: 143 MEQ/L (ref 135–144)
TOTAL IRON BINDING CAPACITY: 208 UG/DL (ref 250–450)
UNSATURATED IRON BINDING CAPACITY: 144 UG/DL (ref 112–347)
WBC # BLD: 5.4 K/UL (ref 4.8–10.8)

## 2023-02-19 PROCEDURE — 6370000000 HC RX 637 (ALT 250 FOR IP): Performed by: INTERNAL MEDICINE

## 2023-02-19 PROCEDURE — 2700000000 HC OXYGEN THERAPY PER DAY

## 2023-02-19 PROCEDURE — 99231 SBSQ HOSP IP/OBS SF/LOW 25: CPT | Performed by: NURSE PRACTITIONER

## 2023-02-19 PROCEDURE — 93010 ELECTROCARDIOGRAM REPORT: CPT | Performed by: INTERNAL MEDICINE

## 2023-02-19 PROCEDURE — 85027 COMPLETE CBC AUTOMATED: CPT

## 2023-02-19 PROCEDURE — 83735 ASSAY OF MAGNESIUM: CPT

## 2023-02-19 PROCEDURE — 80069 RENAL FUNCTION PANEL: CPT

## 2023-02-19 PROCEDURE — 36415 COLL VENOUS BLD VENIPUNCTURE: CPT

## 2023-02-19 PROCEDURE — 2580000003 HC RX 258: Performed by: THORACIC SURGERY (CARDIOTHORACIC VASCULAR SURGERY)

## 2023-02-19 PROCEDURE — 6360000002 HC RX W HCPCS: Performed by: THORACIC SURGERY (CARDIOTHORACIC VASCULAR SURGERY)

## 2023-02-19 PROCEDURE — 99232 SBSQ HOSP IP/OBS MODERATE 35: CPT | Performed by: INTERNAL MEDICINE

## 2023-02-19 PROCEDURE — 6370000000 HC RX 637 (ALT 250 FOR IP): Performed by: THORACIC SURGERY (CARDIOTHORACIC VASCULAR SURGERY)

## 2023-02-19 PROCEDURE — 82306 VITAMIN D 25 HYDROXY: CPT

## 2023-02-19 RX ORDER — PREDNISONE 20 MG/1
40 TABLET ORAL DAILY
Qty: 6 TABLET | Refills: 0 | Status: SHIPPED | OUTPATIENT
Start: 2023-02-20 | End: 2023-02-23

## 2023-02-19 RX ADMIN — CARVEDILOL 12.5 MG: 12.5 TABLET, FILM COATED ORAL at 08:16

## 2023-02-19 RX ADMIN — PREDNISONE 40 MG: 10 TABLET ORAL at 08:16

## 2023-02-19 RX ADMIN — AMLODIPINE BESYLATE 10 MG: 10 TABLET ORAL at 08:16

## 2023-02-19 RX ADMIN — ENOXAPARIN SODIUM 30 MG: 100 INJECTION SUBCUTANEOUS at 08:15

## 2023-02-19 RX ADMIN — FAMOTIDINE 20 MG: 20 TABLET, FILM COATED ORAL at 08:16

## 2023-02-19 RX ADMIN — SODIUM CHLORIDE, PRESERVATIVE FREE 10 ML: 5 INJECTION INTRAVENOUS at 08:19

## 2023-02-19 RX ADMIN — ASPIRIN 81 MG: 81 TABLET, COATED ORAL at 08:16

## 2023-02-19 RX ADMIN — ATORVASTATIN CALCIUM 40 MG: 40 TABLET, FILM COATED ORAL at 08:16

## 2023-02-19 ASSESSMENT — PAIN SCALES - GENERAL: PAINLEVEL_OUTOF10: 0

## 2023-02-19 NOTE — PROGRESS NOTES
Nephrology Progress Note    Assessment:  81 y.o. 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       NAZARIO: most likely 2/2 volume depletion from poor PO intake, on losartan-HCTZ as outpatient, ?baseline Scr ~0.8-0.9, Scr usually tends to be higher however, pt of Dr. Parker Torres,   Dyspnea: s/p lobectomy in setting of AMANDEEP adenocarcinoma (1/31)  HTN:  Hypophosphatemia  Hypoalbuminemia  anemia     Plan:  - based on current BP, ok to keep off losartan-HCTZ on hold on discharge  - if resuming, will opt for losartan due to pt having CAD, no current need for HCTZ  - ok to discharge from renal standpoint, follow up w/ Dr. Parker Torres      - encourage PO  - agree w/ keeping losartan-HCTZ on hold for today    Patient Active Problem List:     Carotid bruit     Renal impairment     Coronary atherosclerosis     Bilateral carotid artery stenosis     Hx of CABGx5     Tobacco abuse     Essential hypertension     Dyslipidemia     Chronic renal disease, stage III ([facility] [[id number]]     Cervical radiculopathy     Hypervolemia     Onychomycosis     Peripheral vascular disease of foot (HCC)     Hypertensive urgency     Dizziness     Lung mass     Dyspnea      Subjective:  Admit Date: 2/17/2023    Interval History: function stable, BP stable as well, no complaints     Medications:  Scheduled Meds:   predniSONE  40 mg Oral Daily    tiotropium-olodaterol  2 puff Inhalation Daily    amLODIPine  10 mg Oral Daily    aspirin  81 mg Oral Daily    atorvastatin  40 mg Oral Daily    carvedilol  12.5 mg Oral BID    traZODone  50 mg Oral Nightly    sodium chloride flush  5-40 mL IntraVENous 2 times per day    enoxaparin  30 mg SubCUTAneous Daily    famotidine  20 mg Oral Daily     Continuous Infusions:   sodium chloride         CBC:   Recent Labs     02/18/23  0514 02/19/23  0513   WBC 6.2 5. 4   HGB 9.2* 9.5*    343     CMP:    Recent Labs     02/17/23 1909 02/18/23  0514 02/19/23  0513    144 143   K 3.9 3.7 3.9   * 108* 109*   CO2 26 24 22   BUN 27* 20 16   CREATININE 1.28* 0.92* 0.92*   GLUCOSE 162* 110* 161*   CALCIUM 8.6 8.3* 8.5   LABGLOM 41.9* >60.0 >60.0     Troponin: No results for input(s): TROPONINI in the last 72 hours. BNP: No results for input(s): BNP in the last 72 hours. INR: No results for input(s): INR in the last 72 hours. Lipids: No results for input(s): CHOL, LDLDIRECT, TRIG, HDL, AMYLASE, LIPASE in the last 72 hours. Liver:   Recent Labs     02/17/23 1909 02/18/23 0514 02/19/23  0513   AST 22  --   --    ALT 14  --   --    ALKPHOS 92  --   --    PROT 6.2*  --   --    LABALBU 3.4*   < > 3.2*   BILITOT <0.2  --   --     < > = values in this interval not displayed. Iron:    Recent Labs     02/18/23  0514   FERRITIN 252*     Urinalysis: No results for input(s): UA in the last 72 hours.     Objective:  Vitals: BP (!) 140/83   Pulse 87   Temp 97.4 °F (36.3 °C) (Oral)   Resp 16   Ht 5' 1\" (1.549 m)   Wt 131 lb (59.4 kg)   SpO2 98%   BMI 24.75 kg/m²    Wt Readings from Last 3 Encounters:   02/17/23 131 lb (59.4 kg)   02/09/23 136 lb (61.7 kg)   02/09/23 136 lb 6.4 oz (61.9 kg)      24HR INTAKE/OUTPUT:  No intake or output data in the 24 hours ending 02/19/23 1123    General: alert, in no apparent distress  HEENT: normocephalic, atraumatic, anicteric  Neck: supple, no mass  Lungs: non-labored respirations, clear to auscultation bilaterally  Heart: regular rate and rhythm, no murmurs or rubs  Abdomen: soft, non-tender, non-distended  Ext: no cyanosis, no peripheral edema  Neuro: alert and oriented, no gross abnormalities      Electronically signed by Wily Tavarez MD, MD

## 2023-02-19 NOTE — PROGRESS NOTES
Hospitalist Progress Note      PCP: Forest Loomis MD    Date of Admission: 2/17/2023    Chief Complaint:  no acute events, afebrile, stable HD, on RA,     Medications:  Reviewed    Infusion Medications    sodium chloride       Scheduled Medications    predniSONE  40 mg Oral Daily    tiotropium-olodaterol  2 puff Inhalation Daily    amLODIPine  10 mg Oral Daily    aspirin  81 mg Oral Daily    atorvastatin  40 mg Oral Daily    carvedilol  12.5 mg Oral BID    traZODone  50 mg Oral Nightly    sodium chloride flush  5-40 mL IntraVENous 2 times per day    enoxaparin  30 mg SubCUTAneous Daily    famotidine  20 mg Oral Daily     PRN Meds: ipratropium-albuterol, sodium chloride flush, sodium chloride, ondansetron **OR** ondansetron, polyethylene glycol, acetaminophen **OR** acetaminophen, magnesium sulfate, potassium chloride, oxyCODONE, diphenhydrAMINE, albuterol    No intake or output data in the 24 hours ending 02/19/23 1234    Exam:    BP (!) 140/83   Pulse 87   Temp 97.4 °F (36.3 °C) (Oral)   Resp 16   Ht 5' 1\" (1.549 m)   Wt 131 lb (59.4 kg)   SpO2 98%   BMI 24.75 kg/m²     General appearance: alert, cooperative  Lungs: diminished bilaterally with poor air entry, no wheezing  Heart: S1/S2, RRR  Abdomen: soft, active BS  Extremities: no edema       Labs:   Recent Labs     02/17/23 1909 02/18/23  0514 02/19/23  0513   WBC 6.5 6.2 5.4   HGB 9.5* 9.2* 9.5*   HCT 27.7* 27.3* 28.3*    299 343       Recent Labs     02/17/23 1909 02/18/23  0514 02/19/23  0513    144 143   K 3.9 3.7 3.9   * 108* 109*   CO2 26 24 22   BUN 27* 20 16   CREATININE 1.28* 0.92* 0.92*   CALCIUM 8.6 8.3* 8.5   PHOS  --  2.2* 2.9       Recent Labs     02/17/23 1909   AST 22   ALT 14   BILITOT <0.2   ALKPHOS 92       No results for input(s): INR in the last 72 hours. No results for input(s): [place]an [place] in the last 72 hours.     Urinalysis:      Lab Results   Component Value Date/Time    NITRU Negative 10/12/2022 09:00 AM    WBCUA 3-5 10/12/2022 09:00 AM    BACTERIA Negative 10/12/2022 09:00 AM    RBCUA 0-2 10/12/2022 09:00 AM    BLOODU Negative 10/12/2022 09:00 AM    SPECGRAV 1.018 10/12/2022 09:00 AM    GLUCOSEU Negative 10/12/2022 09:00 AM       Radiology:  XR CHEST PORTABLE   Final Result   No acute disease. RECOMMENDATION:   Careful clinical correlation and follow up recommended. Assessment/Plan:    81 y.o. 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, discharged home on 2/2 who presented with:     Dyspnea at rest and with exertion  - due to COPD exacerbation  - improved with Prednisone and duonebs  - started on Stiolto  - pulmonology following     Hx of CKD3-4  - renal function improved with IVFs   - keep off Losartan/HCTZ on d/c per nephrology     CAD s/p CABG   - continue ASA, statin     HTN  - continue Coreg, Amlodipine     PreDM2  - monitor glucose levels     Anemia  - baseline Hb is around 10-11  - no overt bleeding  - iron studies showed inflammatory pattern  - follow H/H        Diet: ADULT DIET;  Regular    Code Status: Full Code              Electronically signed by Renard Corbett MD on 2/19/2023 at 12:34 PM

## 2023-02-19 NOTE — PROGRESS NOTES
Gastroenterology Progress Note    Timoteo Carlos is a 81 y.o. female patient. Hospitalization Day:2    Chief C/O: melena, GIB    SUBJECTIVE: Seen and examined, tolerating  diet, no overt bleeding, hemoglobin 9.5, no abdominal pain. ROS:  Gastrointest by itinal ROS: no abdominal pain, change in bowel habits, or black or bloody stools    Physical    VITALS:  BP (!) 140/83   Pulse 87   Temp 97.4 °F (36.3 °C) (Oral)   Resp 16   Ht 5' 1\" (1.549 m)   Wt 131 lb (59.4 kg)   SpO2 98%   BMI 24.75 kg/m²   TEMPERATURE:  Current - Temp: 97.4 °F (36.3 °C); Max - Temp  Av.8 °F (36.6 °C)  Min: 97.4 °F (36.3 °C)  Max: 98.4 °F (36.9 °C)    General:  Alert and oriented,  No apparent distress  Skin- without jaundice  Eyes: anicteric sclera  Cardiac: RRR, Nl s1s2, without murmurs  Lungs CTA Bilaterally, normal effort  Abdomen soft, ND, NT, no HSM, Bowel sounds normal  Ext: without edema  Neuro: no asterixis     Data    Data Review:    Recent Labs     23  0513   WBC 6.5 6.2 5.4   HGB 9.5* 9.2* 9.5*   HCT 27.7* 27.3* 28.3*   MCV 92.2 91.5 90.9    299 343     Recent Labs     2314 23  0513    144 143   K 3.9 3.7 3.9   * 108* 109*   CO2 26 24 22   PHOS  --  2.2* 2.9   BUN 27* 20 16   CREATININE 1.28* 0.92* 0.92*     Recent Labs     23   AST 22   ALT 14   BILITOT <0.2   ALKPHOS 92     No results for input(s): LIPASE, AMYLASE in the last 72 hours. No results for input(s): PROTIME, INR in the last 72 hours. I    ASSESSMENT:  80 y/o female admitted with increased shortness of breath, noted to have recent left upper lobe lobectomy for non-small cell cancer on  with 100 ml estimated blood loss. GI was asked to evaluate anemia and melena, at baseline patient is on ASA.   Preoperative hemoglobin on 23 was 11.4, on arrival was found to have normocytic anemia with hemoglobin of 9.9, reportedly had 1 melanotic stool prior to arrival.  No history of GI bleeding, at baseline is not on a PPI. Remote history of colonoscopy, no history of EGD  Ddx anemia: Likely multifactorial given her recent surgery and comorbid conditions, however patient reportedly had 1 melanotic stools so cannot exclude GI blood loss, patient is currently asymptomatic with no overt bleeding or abdominal pain  2/19/23 hemodynamically stable, no overt bleeding, hemoglobin 9.5, tolerating regular diet. PLAN :  -continue course of care  -diet as tolerated  -continue serial HH trend and transfuse accordingly  -IV PPI daily  -pt declined to proceed with EGD, GI will s/o please call if needed    Thank you for allowing me to participate in the care of your patient. Please feel free to contact me with any concerns.     LORENZO Truong - CNP

## 2023-02-19 NOTE — PROGRESS NOTES
INPATIENT PROGRESS NOTES    PATIENT NAME: Timoteo Carlos  MRN: [id number]  SERVICE DATE:  2023   SERVICE TIME:  11:03 AM      PRIMARY SERVICE: Pulmonary Disease    CHIEF COMPLAINTS: COPD exacerbation    INTERVAL HPI: Patient seen and examined at bedside, Interval Notes, orders reviewed. Nursing notes noted        New information updated in the note today, rest of the examination did not change compared to yesterday. Patient report doing better, shortness of breath improved, no coughing, no chest pain, she has nosebleed of unclear trigger, no lower extremity edema,    Review of system:     GI Abdominal pain No  Skin Rash No    Social History     Tobacco Use    Smoking status: Every Day     Packs/day: 0.10     Years: 60.00     Pack years: 6.00     Types: Cigarettes     Last attempt to quit: 2023     Years since quittin.0    Smokeless tobacco: Never   Substance Use Topics    Alcohol use: Yes     Comment: occ     No family history on file. OBJECTIVE    Body mass index is 24.75 kg/m². PHYSICAL EXAM:  Vitals:  BP (!) 140/83   Pulse 87   Temp 97.4 °F (36.3 °C) (Oral)   Resp 16   Ht 5' 1\" (1.549 m)   Wt 131 lb (59.4 kg)   SpO2 98%   BMI 24.75 kg/m²     General: alert, cooperative, no distress  Head: normocephalic, atraumatic  Eyes:No gross abnormalities. ENT:  MMM no lesions  Neck:  supple and no masses  Chest : Improved air movement, minimal basal rales, no wheezing, nontender, tympanic  Heart[de-identified] Heart sounds are normal.  Regular rate and rhythm without murmur, gallop or rub. ABD:  symmetric, soft, non-tender  Musculoskeletal : no cyanosis, no clubbing, and no edema  Neuro:  Grossly normal  Skin: No rashes or nodules noted.   Lymph node:  no cervical nodes  Urology: No Mckeon   Psychiatric: appropriate    DATA:   Recent Labs     23  0514 23  0513   WBC 6.2 5.4   HGB 9.2* 9.5*   HCT 27.3* 28.3*   MCV 91.5 90.9    343     Recent Labs     23  1909 23 02/19/23  0513    144 143   K 3.9 3.7 3.9   * 108* 109*   CO2 26 24 22   BUN 27* 20 16   CREATININE 1.28* 0.92* 0.92*   GLUCOSE 162* 110* 161*   CALCIUM 8.6 8.3* 8.5   PROT 6.2*  --   --    LABALBU 3.4* 3.3* 3.2*   BILITOT <0.2  --   --    ALKPHOS 92  --   --    AST 22  --   --    ALT 14  --   --    LABGLOM 41.9* >60.0 >60.0   GLOB 2.8  --   --        MV Settings:          No results for input(s): PHART, ITX6WUV, PO2ART, SDT9JMQ, BEART, Q0ARFRMX in the last 72 hours. O2 Device: None (Room air)  O2 Flow Rate (L/min): 1 L/min    ADULT DIET; Regular     MEDICATIONS during current hospitalization:    Continuous Infusions:   sodium chloride         Scheduled Meds:   predniSONE  40 mg Oral Daily    tiotropium-olodaterol  2 puff Inhalation Daily    amLODIPine  10 mg Oral Daily    aspirin  81 mg Oral Daily    atorvastatin  40 mg Oral Daily    carvedilol  12.5 mg Oral BID    traZODone  50 mg Oral Nightly    sodium chloride flush  5-40 mL IntraVENous 2 times per day    enoxaparin  30 mg SubCUTAneous Daily    famotidine  20 mg Oral Daily       PRN Meds:ipratropium-albuterol, sodium chloride flush, sodium chloride, ondansetron **OR** ondansetron, polyethylene glycol, acetaminophen **OR** acetaminophen, magnesium sulfate, potassium chloride, oxyCODONE, diphenhydrAMINE, albuterol    Radiology  XR CHEST PORTABLE    Result Date: 2/18/2023  EXAMINATION: ONE XRAY VIEW OF THE CHEST 2/17/2023 7:18 pm COMPARISON: February 2, 2023 HISTORY: ORDERING SYSTEM PROVIDED HISTORY: shortness of breath post left lobectomy TECHNOLOGIST PROVIDED HISTORY: Reason for exam:->shortness of breath post left lobectomy What reading provider will be dictating this exam?->CRC FINDINGS: Normal cardiomediastinal silhouette. Lungs clear. Unchanged diminished volume of the left hemithorax compatible with left partial pneumonectomy. No pneumothorax or effusion. Osseous thorax intact. Sternal wires. No acute disease.  RECOMMENDATION: Careful clinical correlation and follow up recommended. XR CHEST PORTABLE    Result Date: 2/2/2023  EXAMINATION: ONE XRAY VIEW OF THE CHEST 2/2/2023 10:19 am COMPARISON: 01/31/2023 HISTORY: ORDERING SYSTEM PROVIDED HISTORY: chest tube removal TECHNOLOGIST PROVIDED HISTORY: Reason for exam:->chest tube removal What reading provider will be dictating this exam?->CRC FINDINGS: Heart size is normal.  There are no infiltrates or effusions. There is significant elevation left hemidiaphragm. There has been sternotomy. There is a tiny left sided pneumothorax. Chest tube is been removed     Tiny left-sided pneumothorax. There is elevation left hemidiaphragm     XR CHEST PORTABLE    Result Date: 1/31/2023  EXAMINATION: ONE XRAY VIEW OF THE CHEST 1/31/2023 10:51 am COMPARISON: October 12, 2022 1006 hours HISTORY: ORDERING SYSTEM PROVIDED HISTORY: Status post left upper lobectomy with chest tube placement TECHNOLOGIST PROVIDED HISTORY: Reason for exam:->Status post left upper lobectomy with chest tube placement What reading provider will be dictating this exam?->CRC FINDINGS: There are multiple median sternotomy wires. 9 interval placement left-sided chest tube. Tip is directed towards the apex. The cardiac silhouette is of normal configuration. Pulmonary vascular 10 UA. Right-sided trachea. Small area of atelectasis left lower lobe. No focal infiltrate No significant effusion. No pneumothoraces. Status post left upper lobe lobectomy with interval placement left-sided chest tube.              IMPRESSION AND SUGGESTION:  Patient is at risk due to   COPD exacerbation   NAZARIO, improved  Epistaxis  Stage I A2 adenocarcinoma, status post left upper lobectomy     Recommendation  Prednisone 40 mg daily for 5 days  Continue Stiolto  DuoNebs as needed  Monitor renal function and urine output  Watch volume status and avoid overload  Repeat PFT as outpatient  If epistaxis does not improve we will consider Afrin nasal spray or Rhino packing              Electronically signed by Nadira Kerr MD,  [facility]   on 2/19/2023 at 11:03 AM

## 2023-02-20 LAB — VITAMIN D 25-HYDROXY: 39.9 NG/ML

## 2023-02-20 NOTE — DISCHARGE SUMMARY
Physician Discharge Summary     Patient ID:  Barbara Soriano  [id number]  16 y.o.  1940    Admit date: 2/17/2023    Discharge date and time: 2/19/2023  4:26 PM     Admitting Physician: Coty Davis MD     Discharge Physician: Same    Admission Diagnoses: Shortness of breath [R06.02]  Dyspnea [R06.00]    Discharge Diagnoses: Same    Admission Condition: fair    Discharged Condition: good    Indication for Admission: Hydration    Hospital Course: Patient is recovering from a lobectomy. She was initially doing well at home but then she developed weakness and dehydration. She was seen in the emergency department recently where she was hydrated and discharged home. She continued to have shortness of breath and fatigue. I recommended admission to the hospital for inpatient evaluation. Upon admission she had elevated BUN and creatinine. She was hydrated and started on respiratory therapies. After 2 days she had completely recovered and felt adequate for discharge home.     Consults: Hospitalist, pulmonary, nephrology    Significant Diagnostic Studies: radiology: CXR:  No pneumothorax or effusion    Treatments: IV hydration    Discharge Exam:  BP (!) 140/83   Pulse 87   Temp 97.4 °F (36.3 °C) (Oral)   Resp 16   Ht 5' 1\" (1.549 m)   Wt 131 lb (59.4 kg)   SpO2 98%   BMI 24.75 kg/m²     General Appearance:    Alert, cooperative, no distress, appears stated age   Head:    Normocephalic, without obvious abnormality, atraumatic   Eyes:    PERRL, conjunctiva/corneas clear, EOM's intact, fundi     benign, both eyes   Ears:    Normal TM's and external ear canals, both ears   Nose:   Nares normal, septum midline, mucosa normal, no drainage    or sinus tenderness   Throat:   Lips, mucosa, and tongue normal; teeth and gums normal   Neck:   Supple, symmetrical, trachea midline, no adenopathy;     thyroid:  no enlargement/tenderness/nodules; no carotid    bruit or JVD   Back:     Symmetric, no curvature, ROM normal, no CVA tenderness   Lungs:     Clear to auscultation bilaterally, respirations unlabored   Chest Wall:    No tenderness or deformity    Heart:    Regular rate and rhythm, S1 and S2 normal, no murmur, rub   or gallop   Breast Exam:    No tenderness, masses, or nipple abnormality   Abdomen:     Soft, non-tender, bowel sounds active all four quadrants,     no masses, no organomegaly   Genitalia:    Normal female without lesion, discharge or tenderness   Rectal:    Normal tone ;guaiac negative stool   Extremities:   Extremities normal, atraumatic, no cyanosis or edema   Pulses:   2+ and symmetric all extremities   Skin:   Skin color, texture, turgor normal, no rashes or lesions   Lymph nodes:   Cervical, supraclavicular, and axillary nodes normal   Neurologic:   CNII-XII intact, normal strength, sensation and reflexes     throughout       Disposition: home    In process/preliminary results:  Outstanding Order Results       No orders found from 1/19/2023 to 2/18/2023.             Patient Instructions:   Discharge Medication List as of 2/19/2023  3:02 PM        START taking these medications    Details   predniSONE (DELTASONE) 20 MG tablet Take 2 tablets by mouth daily for 3 doses, Disp-6 tablet, R-0Normal      tiotropium-olodaterol (STIOLTO) 2.5-2.5 MCG/ACT AERS Inhale 2 puffs into the lungs daily, Disp-1 each, R-1Normal           CONTINUE these medications which have NOT CHANGED    Details   ondansetron (ZOFRAN-ODT) 4 MG disintegrating tablet Take 1 tablet by mouth every 8 hours as needed for Nausea or Vomiting, Disp-21 tablet, R-0Normal      carvedilol (COREG) 12.5 MG tablet TAKE 1 TABLET BY MOUTH TWICE A DAY, Disp-60 tablet, R-5Normal      atorvastatin (LIPITOR) 40 MG tablet TAKE 1 TABLET BY MOUTH EVERY DAY, Disp-90 tablet, R-3Normal      amLODIPine (NORVASC) 10 MG tablet Take 1 tablet by mouth daily, Disp-30 tablet, R-3Normal      traZODone (DESYREL) 100 MG tablet TAKE 1 TABLET BY MOUTH EVERY DAY AT NIGHT, Disp-90 tablet, R-1Normal      Handicap Placard MISC Starting Thu 1/28/2021, Disp-1 each, R-0, PrintApplying from 1/28/2021-1/27/2026. aspirin 81 MG EC tablet Take 81 mg by mouth dailyHistorical Med           STOP taking these medications       losartan-hydroCHLOROthiazide (HYZAAR) 100-12.5 MG per tablet Comments:   Reason for Stopping:             Activity: activity as tolerated  Diet: regular diet  Wound Care: none needed    Follow-up with primary care doctor as needed.     Signed:  Dr. Lizandro Albright  2/20/2023  10:27 AM

## 2023-02-23 ENCOUNTER — TELEPHONE (OUTPATIENT)
Dept: PULMONOLOGY | Age: 83
End: 2023-02-23

## 2023-02-23 NOTE — TELEPHONE ENCOUNTER
PATIENT IS HAVING A HARD TIME WORKING THE STIOLTO. SHE IS WONDERING IF YOU CAN PRESCRIBE SOMETHING ELSE. PLEASE ADVISE.

## 2023-02-24 ENCOUNTER — OFFICE VISIT (OUTPATIENT)
Dept: FAMILY MEDICINE CLINIC | Age: 83
End: 2023-02-24

## 2023-02-24 VITALS
HEIGHT: 61 IN | BODY MASS INDEX: 25.11 KG/M2 | OXYGEN SATURATION: 97 % | DIASTOLIC BLOOD PRESSURE: 60 MMHG | WEIGHT: 133 LBS | SYSTOLIC BLOOD PRESSURE: 110 MMHG | HEART RATE: 73 BPM | RESPIRATION RATE: 14 BRPM

## 2023-02-24 DIAGNOSIS — J44.9 CHRONIC OBSTRUCTIVE PULMONARY DISEASE, UNSPECIFIED COPD TYPE (HCC): ICD-10-CM

## 2023-02-24 DIAGNOSIS — I10 ESSENTIAL HYPERTENSION: ICD-10-CM

## 2023-02-24 DIAGNOSIS — F17.200 SMOKING: ICD-10-CM

## 2023-02-24 DIAGNOSIS — I73.9 PERIPHERAL VASCULAR DISEASE OF FOOT (HCC): ICD-10-CM

## 2023-02-24 DIAGNOSIS — Z09 HOSPITAL DISCHARGE FOLLOW-UP: Primary | ICD-10-CM

## 2023-02-24 DIAGNOSIS — R73.9 HYPERGLYCEMIA: ICD-10-CM

## 2023-02-24 RX ORDER — IPRATROPIUM BROMIDE AND ALBUTEROL SULFATE 2.5; .5 MG/3ML; MG/3ML
1 SOLUTION RESPIRATORY (INHALATION) EVERY 4 HOURS
Qty: 360 ML | Refills: 5 | Status: SHIPPED | OUTPATIENT
Start: 2023-02-24

## 2023-02-24 RX ORDER — AMLODIPINE BESYLATE 10 MG/1
10 TABLET ORAL DAILY
Qty: 90 TABLET | Refills: 3 | Status: SHIPPED | OUTPATIENT
Start: 2023-02-24 | End: 2023-05-25

## 2023-02-24 SDOH — ECONOMIC STABILITY: INCOME INSECURITY: HOW HARD IS IT FOR YOU TO PAY FOR THE VERY BASICS LIKE FOOD, HOUSING, MEDICAL CARE, AND HEATING?: NOT HARD AT ALL

## 2023-02-24 SDOH — ECONOMIC STABILITY: FOOD INSECURITY: WITHIN THE PAST 12 MONTHS, THE FOOD YOU BOUGHT JUST DIDN'T LAST AND YOU DIDN'T HAVE MONEY TO GET MORE.: NEVER TRUE

## 2023-02-24 SDOH — ECONOMIC STABILITY: FOOD INSECURITY: WITHIN THE PAST 12 MONTHS, YOU WORRIED THAT YOUR FOOD WOULD RUN OUT BEFORE YOU GOT MONEY TO BUY MORE.: NEVER TRUE

## 2023-02-24 SDOH — ECONOMIC STABILITY: HOUSING INSECURITY
IN THE LAST 12 MONTHS, WAS THERE A TIME WHEN YOU DID NOT HAVE A STEADY PLACE TO SLEEP OR SLEPT IN A SHELTER (INCLUDING NOW)?: NO

## 2023-02-24 ASSESSMENT — PATIENT HEALTH QUESTIONNAIRE - PHQ9
SUM OF ALL RESPONSES TO PHQ QUESTIONS 1-9: 0
1. LITTLE INTEREST OR PLEASURE IN DOING THINGS: 0
SUM OF ALL RESPONSES TO PHQ QUESTIONS 1-9: 0
2. FEELING DOWN, DEPRESSED OR HOPELESS: 0
SUM OF ALL RESPONSES TO PHQ9 QUESTIONS 1 & 2: 0
SUM OF ALL RESPONSES TO PHQ QUESTIONS 1-9: 0
SUM OF ALL RESPONSES TO PHQ QUESTIONS 1-9: 0

## 2023-02-27 ENCOUNTER — TELEMEDICINE (OUTPATIENT)
Dept: FAMILY MEDICINE CLINIC | Age: 83
End: 2023-02-27
Payer: MEDICARE

## 2023-02-27 DIAGNOSIS — Z00.00 MEDICARE ANNUAL WELLNESS VISIT, SUBSEQUENT: Primary | ICD-10-CM

## 2023-02-27 PROCEDURE — G8482 FLU IMMUNIZE ORDER/ADMIN: HCPCS | Performed by: INTERNAL MEDICINE

## 2023-02-27 PROCEDURE — 1123F ACP DISCUSS/DSCN MKR DOCD: CPT | Performed by: INTERNAL MEDICINE

## 2023-02-27 PROCEDURE — G0439 PPPS, SUBSEQ VISIT: HCPCS | Performed by: INTERNAL MEDICINE

## 2023-02-27 ASSESSMENT — PATIENT HEALTH QUESTIONNAIRE - PHQ9
SUM OF ALL RESPONSES TO PHQ QUESTIONS 1-9: 0
SUM OF ALL RESPONSES TO PHQ QUESTIONS 1-9: 0
2. FEELING DOWN, DEPRESSED OR HOPELESS: 0
SUM OF ALL RESPONSES TO PHQ QUESTIONS 1-9: 0
1. LITTLE INTEREST OR PLEASURE IN DOING THINGS: 0
SUM OF ALL RESPONSES TO PHQ9 QUESTIONS 1 & 2: 0
SUM OF ALL RESPONSES TO PHQ QUESTIONS 1-9: 0

## 2023-02-27 NOTE — PATIENT INSTRUCTIONS
Personalized Preventive Plan for Suzie Pittman - 2/27/2023  Medicare offers a range of preventive health benefits. Some of the tests and screenings are paid in full while other may be subject to a deductible, co-insurance, and/or copay. Some of these benefits include a comprehensive review of your medical history including lifestyle, illnesses that may run in your family, and various assessments and screenings as appropriate. After reviewing your medical record and screening and assessments performed today your provider may have ordered immunizations, labs, imaging, and/or referrals for you. A list of these orders (if applicable) as well as your Preventive Care list are included within your After Visit Summary for your review. Other Preventive Recommendations:    A preventive eye exam performed by an eye specialist is recommended every 1-2 years to screen for glaucoma; cataracts, macular degeneration, and other eye disorders. A preventive dental visit is recommended every 6 months. Try to get at least 150 minutes of exercise per week or 10,000 steps per day on a pedometer . Order or download the FREE \"Exercise & Physical Activity: Your Everyday Guide\" from [employer]. Call 3-467.809.2106 or search [employer] online. You need 0942-8881 mg of calcium and 2785-5371 IU of vitamin D per day. It is possible to meet your calcium requirement with diet alone, but a vitamin D supplement is usually necessary to meet this goal.  When exposed to the sun, use a sunscreen that protects against both UVA and UVB radiation with an SPF of 30 or greater. Reapply every 2 to 3 hours or after sweating, drying off with a towel, or swimming. Always wear a seat belt when traveling in a car. Always wear a helmet when riding a bicycle or motorcycle. Heart-Healthy Diet   Sodium, Fat, and Cholesterol Controlled Diet       What Is a Heart Healthy Diet?    A heart-healthy diet is one that limits sodium , certain types of fat , and cholesterol . This type of diet is recommended for:   People with any form of cardiovascular disease (eg, coronary heart disease , peripheral vascular disease , previous heart attack , previous stroke )   People with risk factors for cardiovascular disease, such as high blood pressure , high cholesterol , or diabetes   Anyone who wants to lower their risk of developing cardiovascular disease   Sodium    Sodium is a mineral found in many foods. In general, most people consume much more sodium than they need. Diets high in sodium can increase blood pressure and lead to edema (water retention). On a heart-healthy diet, you should consume no more than 2,300 mg (milligrams) of sodium per dayabout the amount in one teaspoon of table salt. The foods highest in sodium include table salt (about 50% sodium), processed foods, convenience foods, and preserved foods. Cholesterol    Cholesterol is a fat-like, waxy substance in your blood. Our bodies make some cholesterol. It is also found in animal products, with the highest amounts in fatty meat, egg yolks, whole milk, cheese, shellfish, and organ meats. On a heart-healthy diet, you should limit your cholesterol intake to less than 200 mg per day. It is normal and important to have some cholesterol in your bloodstream. But too much cholesterol can cause plaque to build up within your arteries, which can eventually lead to a heart attack or stroke. The two types of cholesterol that are most commonly referred to are:   Low-density lipoprotein (LDL) cholesterol  Also known as bad cholesterol, this is the cholesterol that tends to build up along your arteries. Bad cholesterol levels are increased by eating fats that are saturated or hydrogenated. Optimal level of this cholesterol is less than 100. Over 130 starts to get risky for heart disease.    High-density lipoprotein (HDL) cholesterol  Also known as good cholesterol, this type of cholesterol actually carries cholesterol away from your arteries and may, therefore, help lower your risk of having a heart attack. You want this level to be high (ideally greater than 60). It is a risk to have a level less than 40. You can raise this good cholesterol by eating olive oil, canola oil, avocados, or nuts. Exercise raises this level, too. Fat    Fat is calorie dense and packs a lot of calories into a small amount of food. Even though fats should be limited due to their high calorie content, not all fats are bad. In fact, some fats are quite healthful. Fat can be broken down into four main types. The good-for-you fats are:   Monounsaturated fat  found in oils such as olive and canola, avocados, and nuts and natural nut butters; can decrease cholesterol levels, while keeping levels of HDL cholesterol high   Polyunsaturated fat  found in oils such as safflower, sunflower, soybean, corn, and sesame; can decrease total cholesterol and LDL cholesterol   Omega-3 fatty acids  particularly those found in fatty fish (such as salmon, trout, tuna, mackerel, herring, and sardines); can decrease risk of arrhythmias, decrease triglyceride levels, and slightly lower blood pressure   The fats that you want to limit are:   Saturated fat  found in animal products, many fast foods, and a few vegetables; increases total blood cholesterol, including LDL levels   Animal fats that are saturated include: butter, lard, whole-milk dairy products, meat fat, and poultry skin   Vegetable fats that are saturated include: hydrogenated shortening, palm oil, coconut oil, cocoa butter   Hydrogenated or trans fat  found in margarine and vegetable shortening, most shelf stable snack foods, and fried foods; increases LDL and decreases HDL     It is generally recommended that you limit your total fat for the day to less than 30% of your total calories.  If you follow an 1800-calorie heart healthy diet, for example, this would mean 60 grams of fat or less per day. Saturated fat and trans fat in your diet raises your blood cholesterol the most, much more than dietary cholesterol does. For this reason, on a heart-healthy diet, less than 7% of your calories should come from saturated fat and ideally 0% from trans fat. On an 1800-calorie diet, this translates into less than 14 grams of saturated fat per day, leaving 46 grams of fat to come from mono- and polyunsaturated fats.    Food Choices on a Heart Healthy Diet   Food Category   Foods Recommended   Foods to Avoid   Grains   Breads and rolls without salted tops Most dry and cooked cereals Unsalted crackers and breadsticks Low-sodium or homemade breadcrumbs or stuffing All rice and pastas   Breads, rolls, and crackers with salted tops High-fat baked goods (eg, muffins, donuts, pastries) Quick breads, self-rising flour, and biscuit mixes Regular bread crumbs Instant hot cereals Commercially prepared rice, pasta, or stuffing mixes   Vegetables   Most fresh, frozen, and low-sodium canned vegetables Low-sodium and salt-free vegetable juices Canned vegetables if unsalted or rinsed   Regular canned vegetables and juices, including sauerkraut and pickled vegetables Frozen vegetables with sauces Commercially prepared potato and vegetable mixes   Fruits   Most fresh, frozen, and canned fruits All fruit juices   Fruits processed with salt or sodium   Milk   Nonfat or low-fat (1%) milk Nonfat or low-fat yogurt Cottage cheese, low-fat ricotta, cheeses labeled as low-fat and low-sodium   Whole milk Reduced-fat (2%) milk Malted and chocolate milk Full fat yogurt Most cheeses (unless low-fat and low salt) Buttermilk (no more than 1 cup per week)   Meats and Beans   Lean cuts of fresh or frozen beef, veal, lamb, or pork (look for the word loin) Fresh or frozen poultry without the skin Fresh or frozen fish and some shellfish Egg whites and egg substitutes (Limit whole eggs to three per week) Tofu Nuts or seeds (unsalted, dry-roasted), low-sodium peanut butter Dried peas, beans, and lentils   Any smoked, cured, salted, or canned meat, fish, or poultry (including shields, chipped beef, cold cuts, hot dogs, sausages, sardines, and anchovies) Poultry skins Breaded and/or fried fish or meats Canned peas, beans, and lentils Salted nuts   Fats and Oils   Olive oil and canola oil Low-sodium, low-fat salad dressings and mayonnaise   Butter, margarine, coconut and palm oils, shields fat   Snacks, Sweets, and Condiments   Low-sodium or unsalted versions of broths, soups, soy sauce, and condiments Pepper, herbs, and spices; vinegar, lemon, or lime juice Low-fat frozen desserts (yogurt, sherbet, fruit bars) Sugar, cocoa powder, honey, syrup, jam, and preserves Low-fat, trans-fat free cookies, cakes, and pies [place] and animal crackers, fig bars, caroline snaps   High-fat desserts Broth, soups, gravies, and sauces, made from instant mixes or other high-sodium ingredients Salted snack foods Canned olives Meat tenderizers, seasoning salt, and most flavored vinegars   Beverages   Low-sodium carbonated beverages Tea and coffee in moderation Soy milk   Commercially softened water   Suggestions   Make whole grains, fruits, and vegetables the base of your diet. Choose heart-healthy fats such as canola, olive, and flaxseed oil, and foods high in heart-healthy fats, such as nuts, seeds, soybeans, tofu, and fish. Eat fish at least twice per week; the fish highest in omega-3 fatty acids and lowest in mercury include salmon, herring, mackerel, sardines, and canned chunk light tuna. If you eat fish less than twice per week or have high triglycerides, talk to your doctor about taking fish oil supplements. Read food labels. For products low in fat and cholesterol, look for fat free, low-fat, cholesterol free, saturated fat free, and trans fat freeAlso scan the Nutrition Facts Label, which lists saturated fat, trans fat, and cholesterol amounts. For products low in sodium, look for sodium free, very low sodium, low sodium, no added salt, and unsalted   Skip the salt when cooking or at the table; if food needs more flavor, get creative and try out different herbs and spices. Garlic and onion also add substantial flavor to foods. Trim any visible fat off meat and poultry before cooking, and drain the fat off after mccann. Use cooking methods that require little or no added fat, such as grilling, boiling, baking, poaching, broiling, roasting, steaming, stir-frying, and sauting. Avoid fast food and convenience food. They tend to be high in saturated and trans fat and have a lot of added salt. Talk to a registered dietitian for individualized diet advice. Last Reviewed: March 2011 Prakash Islas MS, MPH, RD   Updated: 3/29/2011     Keep Your Memory [place] [facility]       Many factors can affect your ability to remembera hectic lifestyle, aging, stress, chronic disease, and certain medicines. But, there are steps you can take to sharpen your mind and help preserve your memory. Challenge Your Brain   Regularly challenging your mind may help keeps it in top shape. Good mental exercises include:   Crossword puzzlesUse a dictionary if you need it; you will learn more that way. Brainteasers Try some! Crafts, such as wood working and sewing   Hobbies, such as gardening and building model airplanes   SocializingVisit old friends or join groups to meet new ones. Reading   Learning a new language   Taking a class, whether it be art history or donna chi   TravelingExperience the food, history, and culture of your destination   Learning to use a computer   Going to museums, the theater, or thought-provoking movies   Changing things in your daily life, such as reversing your pattern in the grocery store or brushing your teeth using your nondominant hand   Use Memory Aids   There is no need to remember every detail on your own.  These memory aids can help: Calendars and day planners   Electronic organizers to store all sorts of helpful informationThese devices can \"beep\" to remind you of appointments. A book of days to record birthdays, anniversaries, and other occasions that occur on the same date every year   Detailed \"to-do\" lists and strategically placed sticky notes   Quick \"study\" sessionsBefore a gathering, review who will be there so their names will be fresh in your mind. Establish routinesFor example, keep your keys, wallet, and umbrella in the same place all the time or take medicine with your 8:00 AM glass of juice   Live a Healthy Life   Many actions that will keep your body strong will do the same for your mind. For example:   Talk to Your Doctor About Herbs and Supplements    Malnutrition and vitamin deficiencies can impair your mental function. For example, vitamin B12 deficiency can cause a range of symptoms, including confusion. But, what if your nutritional needs are being met? Can herbs and supplements still offer a benefit? Researchers have investigated a range of natural remedies, such as ginkgo , ginseng , and the supplement phosphatidylserine (PS). So far, though, the evidence is inconsistent as to whether these products can improve memory or thinking. If you are interested in taking herbs and supplements, talk to your doctor first because they may interact with other medicines that you are taking. Exercise Regularly    Among the many benefits of regular exercise are increased blood flow to the brain and decreased risk of certain diseases that can interfere with memory function. One study found that even moderate exercise has a beneficial effect. Examples of \"moderate\" exercise include:   Playing 18 holes of golf once a week, without a cart   Playing tennis twice a week   Walking one mile per day   Manage Stress    It can be tough to remember what is important when your mind is cluttered. Make time for relaxation.  Choose activities that calm you down, and make it routine. Manage Chronic Conditions    Side effects of high blood pressure , diabetes, and heart disease can interfere with mental function. Many of the lifestyle steps discussed here can help manage these conditions. Strive to eat a healthy diet, exercise regularly, get stress under control, and follow your doctor's advice for your condition. Minimize Medications    Talk to your doctor about the medicines that you take. Some may be unnecessary. Also, healthy lifestyle habits may lower the need for certain drugs. Last Reviewed: April 2010 Angeles Moraes MD   Updated: 4/13/2010     Keeping Home [facility]       As we get older, changes in balance, gait, strength, vision, hearing, and cognition make even the most youthful senior more prone to accidents. Falls are one of the leading health risks for older people. This increased risk of falling is related to:   Aging process (eg, decreased muscle strength, slowed reflexes)   Higher incidence of chronic health problems (eg, arthritis, diabetes) that may limit mobility, agility or sensory awareness   Side effects of medicine (eg, dizziness, blurred vision)especially medicines like prescription pain medicines and drugs used to treat mental health conditions   Depending on the brittleness of your bones, the consequences of a fall can be serious and long lasting. Home Life   Research by the Association of [employer]ging [facility]) shows that some home accidents among older adults can be prevented by making simple lifestyle changes and basic modifications and repairs to the home environment. Here are some lifestyle changes that experts recommend:   Have your hearing and vision checked regularly. Be sure to wear prescription glasses that are right for you. Speak to your doctor or pharmacist about the possible side effects of your medicines. A number of medicines can cause dizziness. If you have problems with sleep, talk to your doctor.    Limit your intake of alcohol. If necessary, use a cane or walker to help maintain your balance. Wear supportive, rubber-soled shoes, even at home. If you live in a region that gets wintry weather, you may want to put special cleats on your shoes to prevent you from slipping on the snow and ice. Exercise regularly to help maintain muscle tone, agility, and balance. Always hold the banister when going up or down stairs. Also, use  bars when getting in or out of the bath or shower, or using the toilet. To avoid dizziness, get up slowly from a lying down position. Sit up first, dangling your legs for a minute or two before rising to a standing position. Overall Home Safety Check   According to the [employer] Safety [employer]'s \"Older Consumer Home Safety Checklist,\" it is important to check for potential hazards in each room. And remember, proper lighting is an essential factor in home safety. If you cannot see clearly, you are more likely to fall. Important questions to ask yourself include:   Are lamp, electric, extension, and telephone cords placed out of the flow of traffic and maintained in good condition? Have frayed cords been replaced? Are all small rugs and runners slip resistant? If not, you can secure them to the floor with a special double-sided carpet tape. Are smoke detectors properly locatedone on every floor of your home and one outside of every sleeping area? Are they in good working order? Are batteries replaced at least once a year? Do you have a well-maintained carbon monoxide detector outside every sleeping are in your home? Does your furniture layout leave plenty of space to maneuver between and around chairs, tables, beds, and sofas? Are hallways, stairs and passages between rooms well lit? Can you reach a lamp without getting out of bed? Are floor surfaces well maintained?  Shag rugs, high-pile carpeting, tile floors, and polished wood floors can be particularly slippery. Stairs should always have handrails and be carpeted or fitted with a non-skid tread. Is your telephone easily reachable. Is the cord safely tucked away? Room by Room   According to the Association of Aging, bathrooms and [place] are the two most potentially hazardous rooms in your home. In the Kitchen    Be sure your stove is in proper working order and always make sure burners and the oven are off before you go out or go to sleep. Keep pots on the back burners, turn handles away from the front of the stove, and keep stove clean and free of grease build-up. Kitchen ventilation systems and range exhausts should be working properly. Keep flammable objects such as towels and pot holders away from the cooking area except when in use. Make sure kitchen curtains are tied back. Move cords and appliances away from the sink and hot surfaces. If extension cords are needed, install wiring guides so they do not hang over the sink, range, or working areas. Look for coffee pots, kettles and toaster ovens with automatic shut-offs. Keep a mop handy in the kitchen so you can wipe up spills instantly. You should also have a small fire extinguisher. Arrange your kitchen with frequently used items on lower shelves to avoid the need to stand on a stepstool to reach them. Make sure countertops are well-lit to avoid injuries while cutting and preparing food. In the Bathroom    Use a non-slip mat or decals in the tub and shower, since wet, soapy tile or porcelain surfaces are extremely slippery. Make sure bathroom rugs are non-skid or tape them firmly to the floor. Bathtubs should have at least one, preferably two, grab bars, firmly attached to structural supports in the wall. (Do not use built-in soap holders or glass shower doors as grab bars.)    Tub seats fitted with non-slip material on the legs allow you to wash sitting down.  For people with limited mobility, bathtub transfer benches allow you to slide safely into the tub. Raised toilet seats and toilet safety rails are helpful for those with knee or hip problems. In the [facility]    Make sure you use a nightlight and that the area around your bed is clear of potential obstacles. Be careful with electric blankets and never go to sleep with a heating pad, which can cause serious burns even if on a low setting. Use fire-resistant mattress covers and pillows, and NEVER smoke in bed. Keep a phone next to the bed that is programmed to dial 911 at the push of a button. If you have a chronic condition, you may want to sign on with an automatic call-in service. Typically the system includes a small pendant that connects directly to an emergency medical voice-response system. You should also make arrangements to stay in contact with someonefriend, neighbor, family memberon a regular schedule. Fire Prevention   According to the [employer] (Smoke Alarms for Every) [address] [place] [address], senior citizens are one of the two highest risk groups for death and serious injuries due to residential fires. When cooking, wear short-sleeved items, never a bulky long-sleeved robe. The [facility]'s Safety Checklist for Older Consumers emphasizes the importance of checking basements, garages, workshops and storage areas for fire hazards, such as volatile liquids, piles of old rags or clothing and overloaded circuits. Never smoke in bed or when lying down on a couch or recliner chair. Small portable electric or kerosene heaters are responsible for many home fires and should be used cautiously if at all. If you do use one, be sure to keep them away from flammable materials. In case of fire, make sure you have a pre-established emergency exit plan. Have a professional check your fireplace and other fuel-burning appliances yearly.     Helping Hands   Baby boomers entering the goodwin years will continue to see the development of new products to help older adults live safely and independently in spite of age-related changes. Making Life More Livable  , by [employer], lists over 1,000 products for \"living well in the mature years,\" such as bathing and mobility aids, household security devices, ergonomically designed knives and peelers, and faucet valves and knobs for temperature control. Medical supply stores and organizations are good sources of information about products that improve your quality of life and insure your safety.      Last Reviewed: November 2009 Gisela Varela MD   Updated: 3/7/2011

## 2023-02-27 NOTE — PROGRESS NOTES
Medicare Annual Wellness Visit    Van Wilks is here for Medicare AWV (Telephone Medicare AWV)    Assessment & Plan    Recommendations for Preventive Services Due: see orders and patient instructions/AVS.  Recommended screening schedule for the next 5-10 years is provided to the patient in written form: see Patient Instructions/AVS.     No follow-ups on file. Subjective       Patient's complete Health Risk Assessment and screening values have been reviewed and are found in Flowsheets. The following problems were reviewed today and where indicated follow up appointments were made and/or referrals ordered.     Positive Risk Factor Screenings with Interventions:    Fall Risk:  Do you feel unsteady or are you worried about falling? : (!) yes (uses a walker and a cane for stability)  2 or more falls in past year?: no  Fall with injury in past year?: no     Interventions:    See AVS for additional education material              Weight and Activity:  Physical Activity: Inactive    Days of Exercise per Week: 0 days    Minutes of Exercise per Session: 0 min     On average, how many days per week do you engage in moderate to strenuous exercise (like a brisk walk)?: 0 days  Have you lost any weight without trying in the past 3 months?: No         Inactivity Interventions:  See AVS for additional education material          ADL's:   Patient reports needing help with:  Select all that apply: (!) Walking/Balance (uses a walker and cane for stability)    Interventions:  See AVS for additional education material     Tobacco Use:  Tobacco Use: High Risk    Smoking Tobacco Use: Every Day    Smokeless Tobacco Use: Never    Passive Exposure: Not on file     E-cigarette/Vaping       Questions Responses    E-cigarette/Vaping Use Never User    Start Date     Passive Exposure     Quit Date     Counseling Given     Comments           Interventions:  See AVS for additional education material                        Objective Patient-Reported Vitals  No data recorded          Allergies   Allergen Reactions    Amoxicillin     Nsaids      CRF 4     Prior to Visit Medications    Medication Sig Taking? Authorizing Provider   amLODIPine (NORVASC) 10 MG tablet Take 1 tablet by mouth daily Yes Chavez Cruz MD   carvedilol (COREG) 12.5 MG tablet TAKE 1 TABLET BY MOUTH TWICE A DAY Yes Chavez Cruz MD   atorvastatin (LIPITOR) 40 MG tablet TAKE 1 TABLET BY MOUTH EVERY DAY Yes Sangeetha Craig, APRN - CNP   aspirin 81 MG EC tablet Take 81 mg by mouth daily Yes Historical Provider, MD   ipratropium-albuterol (DUONEB) 0.5-2.5 (3) MG/3ML SOLN nebulizer solution Inhale 3 mLs into the lungs every 4 hours  Chavez Cruz MD   tiotropium-olodaterol (STIOLTO) 2.5-2.5 MCG/ACT AERS Inhale 2 puffs into the lungs daily  Mo Davis MD   ondansetron (ZOFRAN-ODT) 4 MG disintegrating tablet Take 1 tablet by mouth every 8 hours as needed for Nausea or Vomiting  Chioma Vasquez MD   traZODone (DESYREL) 100 MG tablet TAKE 1 TABLET BY MOUTH EVERY DAY AT [address] [place] [address] MD   Handicap Placard MISC by Does not apply route Applying from 1/28/2021-1/27/2026. Wilber Sandoval MD       [facility] (Including outside providers/suppliers regularly involved in providing care):   Patient Care Team:  Chavez Cruz MD as PCP - General (Internal Medicine)  Chavez Cruz MD as PCP - Empaneled Provider  Chavez Cruz MD as Consulting Physician (Internal Medicine)  Colonel Cole MD as Consulting Physician (Neurology)     Reviewed and updated this visit:  Allergies  Meds     AVS mailed to pt    TREVIN, Sarai Mortensen LPN, [birth year], performed the documented evaluation under the direct supervision of the attending physician. Lupis Bellamy, was evaluated through a synchronous (real-time) audio-video encounter. The patient (or guardian if applicable) is aware that this is a billable service, which includes applicable co-pays.  This Virtual Visit was conducted with patient's (and/or legal guardian's) consent. The visit was conducted pursuant to the emergency declaration under the [facility] [address] authority and the Kyrie Tel[employer] and [employer] General Act. Patient identification was verified, and a caregiver was present when appropriate. The patient was located at Home: [address] [facility] [address] [facility]  Provider was located at [facility] ([address] [place] [address]): [address] [facility],  [address] [facility]    This encounter was performed under Ghislaine gurrola MDs, direct supervision, 2/27/2023.       Norma Maki LPN

## 2023-02-28 RX ORDER — VARENICLINE TARTRATE 1 MG/1
TABLET, FILM COATED ORAL
Qty: 60 TABLET | Refills: 1 | OUTPATIENT
Start: 2023-02-28

## 2023-03-09 ENCOUNTER — OFFICE VISIT (OUTPATIENT)
Dept: PULMONOLOGY | Age: 83
End: 2023-03-09
Payer: MEDICARE

## 2023-03-09 VITALS
HEIGHT: 61 IN | HEART RATE: 74 BPM | WEIGHT: 132.8 LBS | OXYGEN SATURATION: 99 % | SYSTOLIC BLOOD PRESSURE: 128 MMHG | RESPIRATION RATE: 16 BRPM | TEMPERATURE: 96.8 F | BODY MASS INDEX: 25.07 KG/M2 | DIASTOLIC BLOOD PRESSURE: 66 MMHG

## 2023-03-09 DIAGNOSIS — J44.9 CHRONIC OBSTRUCTIVE PULMONARY DISEASE, UNSPECIFIED COPD TYPE (HCC): ICD-10-CM

## 2023-03-09 DIAGNOSIS — I51.89 DIASTOLIC DYSFUNCTION: ICD-10-CM

## 2023-03-09 DIAGNOSIS — F17.200 SMOKING: Primary | ICD-10-CM

## 2023-03-09 DIAGNOSIS — C34.92 ADENOCARCINOMA OF LEFT LUNG (HCC): ICD-10-CM

## 2023-03-09 PROCEDURE — 1111F DSCHRG MED/CURRENT MED MERGE: CPT | Performed by: INTERNAL MEDICINE

## 2023-03-09 PROCEDURE — 3023F SPIROM DOC REV: CPT | Performed by: INTERNAL MEDICINE

## 2023-03-09 PROCEDURE — 1090F PRES/ABSN URINE INCON ASSESS: CPT | Performed by: INTERNAL MEDICINE

## 2023-03-09 PROCEDURE — 3078F DIAST BP <80 MM HG: CPT | Performed by: INTERNAL MEDICINE

## 2023-03-09 PROCEDURE — 4004F PT TOBACCO SCREEN RCVD TLK: CPT | Performed by: INTERNAL MEDICINE

## 2023-03-09 PROCEDURE — 3074F SYST BP LT 130 MM HG: CPT | Performed by: INTERNAL MEDICINE

## 2023-03-09 PROCEDURE — G8482 FLU IMMUNIZE ORDER/ADMIN: HCPCS | Performed by: INTERNAL MEDICINE

## 2023-03-09 PROCEDURE — G8427 DOCREV CUR MEDS BY ELIG CLIN: HCPCS | Performed by: INTERNAL MEDICINE

## 2023-03-09 PROCEDURE — 1123F ACP DISCUSS/DSCN MKR DOCD: CPT | Performed by: INTERNAL MEDICINE

## 2023-03-09 PROCEDURE — G8399 PT W/DXA RESULTS DOCUMENT: HCPCS | Performed by: INTERNAL MEDICINE

## 2023-03-09 PROCEDURE — G8417 CALC BMI ABV UP PARAM F/U: HCPCS | Performed by: INTERNAL MEDICINE

## 2023-03-09 PROCEDURE — 99213 OFFICE O/P EST LOW 20 MIN: CPT | Performed by: INTERNAL MEDICINE

## 2023-03-09 RX ORDER — UMECLIDINIUM BROMIDE AND VILANTEROL TRIFENATATE 62.5; 25 UG/1; UG/1
1 POWDER RESPIRATORY (INHALATION) DAILY
Qty: 1 EACH | Refills: 4 | Status: SHIPPED | OUTPATIENT
Start: 2023-03-09

## 2023-03-09 NOTE — PROGRESS NOTES
Subjective:     Juan C Bar is a 81 y.o. female who complains today of:     Chief Complaint   Patient presents with    Follow-up     4 Week F/U for Shortness of Breath    Results     Labs       HPI  Patient presents for COPD    Patient presents for follow-up, she is doing better, currently on Stiolto but she does not like this particular inhaler mainly because she does not feel she is getting any of the medication, otherwise no shortness of breath, no chest pain, minimal coughing, no lower extremity edema, no fever no chills, no nasal congestion or postnasal drip and no heartburn. Weight is stable        Allergies:  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    Hyperlipidemia     Kidney disease     PONV (postoperative nausea and vomiting)     Stage 4 chronic kidney disease ([address] 11/19/2020    Stenosis of noncoronary bypass graft ([facility]     Tobacco abuse 11/19/2020     Past Surgical History:   Procedure Laterality Date    BACK SURGERY  2003    BRAIN ANEURYSM SURGERY  2012    BREAST LUMPECTOMY Right     CARPAL TUNNEL RELEASE Bilateral     CORONARY ARTERY BYPASS GRAFT      PARTIAL HYSTERECTOMY (CERVIX NOT REMOVED)  1997    THORACOSCOPY Left 1/31/2023    left thoracoscopic wedge, completion left upper lobectomy performed by Jenn Funes MD at [address] [facility] Bilateral left side 2017 right side 2004     History reviewed. No pertinent family history. Social History     Socioeconomic History    Marital status:       Spouse name: Not on file    Number of children: Not on file    Years of education: Not on file    Highest education level: Not on file   Occupational History    Not on file   Tobacco Use    Smoking status: Every Day     Packs/day: 0.10     Years: 60.00     Pack years: 6.00 Types: Cigarettes     Start date: 1959     Last attempt to quit: 2023     Years since quittin.0    Smokeless tobacco: Never   Vaping Use    Vaping Use: Never used   Substance and Sexual Activity    Alcohol use: Yes     Comment: occ    Drug use: Never    Sexual activity: Not Currently   Other Topics Concern    Not on file   Social History Narrative    Not on file     Social Determinants of Health     Financial Resource Strain: Low Risk     Difficulty of Paying Living Expenses: Not hard at all   Food Insecurity: No Food Insecurity    Worried About [address] in the Last Year: Never true    [address] in the Last Year: Never true   Transportation Needs: Unknown    Lack of Transportation (Medical): Not on file    Lack of Transportation (Non-Medical): No   Physical Activity: Inactive    Days of Exercise per Week: 0 days    Minutes of Exercise per Session: 0 min   Stress: Not on file   Social Connections: Not on file   Intimate Partner Violence: Not on file   Housing Stability: Unknown    Unable to Pay for Housing in the Last Year: Not on file    Number of Places Lived in the Last Year: Not on file    Unstable Housing in the Last Year: No         Review of Systems      ROS: 10 organs review of system is done including general, psychological, ENT, hematological, endocrine, respiratory, cardiovascular, gastrointestinal,musculoskeletal, neurological,  allergy and Immunology is done and is otherwise negative.     Current Outpatient Medications   Medication Sig Dispense Refill    umeclidinium-vilanterol (ANORO ELLIPTA) 62.5-25 MCG/ACT inhaler Inhale 1 puff into the lungs daily 1 each 4    amLODIPine (NORVASC) 10 MG tablet Take 1 tablet by mouth daily 90 tablet 3    ipratropium-albuterol (DUONEB) 0.5-2.5 (3) MG/3ML SOLN nebulizer solution Inhale 3 mLs into the lungs every 4 hours 360 mL 5    ondansetron (ZOFRAN-ODT) 4 MG disintegrating tablet Take 1 tablet by mouth every 8 hours as needed for Nausea or Vomiting 21 tablet 0    carvedilol (COREG) 12.5 MG tablet TAKE 1 TABLET BY MOUTH TWICE A DAY 60 tablet 5    atorvastatin (LIPITOR) 40 MG tablet TAKE 1 TABLET BY MOUTH EVERY DAY 90 tablet 3    traZODone (DESYREL) 100 MG tablet TAKE 1 TABLET BY MOUTH EVERY DAY AT NIGHT 90 tablet 1    aspirin 81 MG EC tablet Take 81 mg by mouth daily      Handicap Placard MISC by Does not apply route Applying from 1/28/2021-1/27/2026. (Patient not taking: Reported on 3/9/2023) 1 each 0     No current facility-administered medications for this visit. Objective:     Vitals:    03/09/23 1144   BP: 128/66   Site: Right Upper Arm   Position: Sitting   Cuff Size: Medium Adult   Pulse: 74   Resp: 16   Temp: 96.8 °F (36 °C)   TempSrc: Infrared   SpO2: 99%   Weight: 132 lb 12.8 oz (60.2 kg)   Height: 5' 1\" (1.549 m)         Physical Exam  Constitutional:       General: She is not in acute distress. Appearance: She is well-developed. She is not diaphoretic. HENT:      Head: Normocephalic and atraumatic. Eyes:      Conjunctiva/sclera: Conjunctivae normal.      Pupils: Pupils are equal, round, and reactive to light. Cardiovascular:      Rate and Rhythm: Normal rate and regular rhythm. Heart sounds: No murmur heard. No friction rub. No gallop. Pulmonary:      Effort: Pulmonary effort is normal. No respiratory distress. Breath sounds: Normal breath sounds. No wheezing or rales. Chest:      Chest wall: No tenderness. Abdominal:      General: There is no distension. Palpations: Abdomen is soft. Tenderness: There is no abdominal tenderness. There is no rebound. Musculoskeletal:         General: No tenderness. Cervical back: Normal range of motion and neck supple. Right lower leg: No edema. Left lower leg: No edema. Lymphadenopathy:      Cervical: No cervical adenopathy. Skin:     General: Skin is warm and dry. Findings: No erythema.    Neurological:      Mental Status: She is alert and oriented to person, place, and time. Psychiatric:         Judgment: Judgment normal.       Imaging studies reviewed and interpreted by me CT chest December 2022, inferior lingula nodule     Lab results reviewed in chart  PFT January 2023 FEV1 90% FEV1/FVC 0.66  Echo 5910, EF 46%, diastolic dysfunction  Assessment and Plan       Diagnosis Orders   1. Smoking        2. Chronic obstructive pulmonary disease, unspecified COPD type ([facility]        3. Adenocarcinoma of left lung ([facility]        4. Diastolic dysfunction          Smoking cessation counseling done again, she has Chantix at home she did not start using it. 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 Orders Placed This Encounter   Medications    umeclidinium-vilanterol (ANORO ELLIPTA) 62.5-25 MCG/ACT inhaler     Sig: Inhale 1 puff into the lungs daily     Dispense:  1 each     Refill:  4            Discussed with patient the importance of exercise and weight control and  overall health and well-being. Reviewed with the patient: current clinical status, medications, activities and diet. Side effects, adverse effects of the medication prescribed today, as well as treatment plan and result expectations have been discussed with the patient who expresses understanding and desires to proceed. Return in about 3 months (around 6/7/2023).       Billy Fitzgerald MD

## 2023-03-13 RX ORDER — UMECLIDINIUM BROMIDE AND VILANTEROL TRIFENATATE 62.5; 25 UG/1; UG/1
1 POWDER RESPIRATORY (INHALATION) DAILY
Qty: 1 EACH | Refills: 4 | COMMUNITY
Start: 2023-03-13

## 2023-03-23 ENCOUNTER — CLINICAL DOCUMENTATION ONLY (OUTPATIENT)
Facility: CLINIC | Age: 83
End: 2023-03-23

## 2023-04-03 RX ORDER — TRAZODONE HYDROCHLORIDE 100 MG/1
TABLET ORAL
Qty: 90 TABLET | Refills: 1 | Status: SHIPPED | OUTPATIENT
Start: 2023-04-03

## 2023-04-21 ENCOUNTER — HOSPITAL ENCOUNTER (OUTPATIENT)
Dept: LAB | Age: 83
Discharge: HOME | End: 2023-04-21
Payer: MEDICARE

## 2023-04-21 LAB
ANION GAP SERPL CALCULATED.3IONS-SCNC: 12 MEQ/L (ref 9–15)
BUN SERPL-MCNC: 24 MG/DL (ref 8–23)
CALCIUM SERPL-MCNC: 9.6 MG/DL (ref 8.5–9.9)
CHLORIDE SERPL-SCNC: 105 MEQ/L (ref 95–107)
CO2 SERPL-SCNC: 25 MEQ/L (ref 20–31)
CREAT SERPL-MCNC: 1.21 MG/DL (ref 0.5–0.9)
GLUCOSE SERPL-MCNC: 104 MG/DL (ref 70–99)
HBA1C MFR BLD: 5.8 % (ref 4.8–5.9)
POTASSIUM SERPL-SCNC: 4.7 MEQ/L (ref 3.4–4.9)
SODIUM SERPL-SCNC: 142 MEQ/L (ref 135–144)

## 2023-04-21 PROCEDURE — 83036 HEMOGLOBIN GLYCOSYLATED A1C: CPT

## 2023-04-21 PROCEDURE — 80048 BASIC METABOLIC PNL TOTAL CA: CPT

## 2023-04-27 ENCOUNTER — OFFICE VISIT (OUTPATIENT)
Dept: FAMILY MEDICINE CLINIC | Age: 83
End: 2023-04-27

## 2023-04-27 VITALS
HEIGHT: 61 IN | SYSTOLIC BLOOD PRESSURE: 132 MMHG | RESPIRATION RATE: 14 BRPM | WEIGHT: 133 LBS | BODY MASS INDEX: 25.11 KG/M2 | HEART RATE: 74 BPM | OXYGEN SATURATION: 97 % | DIASTOLIC BLOOD PRESSURE: 60 MMHG

## 2023-04-27 DIAGNOSIS — F17.200 NICOTINE DEPENDENCE, UNCOMPLICATED, UNSPECIFIED NICOTINE PRODUCT TYPE: ICD-10-CM

## 2023-04-27 DIAGNOSIS — E78.5 DYSLIPIDEMIA: ICD-10-CM

## 2023-04-27 DIAGNOSIS — J44.9 CHRONIC OBSTRUCTIVE PULMONARY DISEASE, UNSPECIFIED COPD TYPE (HCC): ICD-10-CM

## 2023-04-27 DIAGNOSIS — I10 ESSENTIAL HYPERTENSION: Primary | ICD-10-CM

## 2023-04-27 DIAGNOSIS — N18.31 STAGE 3A CHRONIC KIDNEY DISEASE (HCC): ICD-10-CM

## 2023-04-27 ASSESSMENT — ENCOUNTER SYMPTOMS
ABDOMINAL DISTENTION: 0
BLOOD IN STOOL: 0
VOICE CHANGE: 0
EYE ITCHING: 0
RHINORRHEA: 0
EYE PAIN: 0
EYE DISCHARGE: 0
APNEA: 0
TROUBLE SWALLOWING: 0
SINUS PAIN: 0
RECTAL PAIN: 0
COLOR CHANGE: 0
FACIAL SWELLING: 0
NAUSEA: 0
EYE REDNESS: 0
CHEST TIGHTNESS: 0
SORE THROAT: 0
DIARRHEA: 0
ABDOMINAL PAIN: 0
SHORTNESS OF BREATH: 0
COUGH: 0
BACK PAIN: 0
VOMITING: 0
PHOTOPHOBIA: 0
WHEEZING: 0
SINUS PRESSURE: 0
CONSTIPATION: 0

## 2023-04-27 NOTE — PROGRESS NOTES
Ct Subjective:      Patient ID: Gavin Aguirre is a 81 y.o. female Established patient, here for evaluation of the following chief complaint(s):  Chief Complaint   Patient presents with    Hypertension       HPI     [de-identified] female with history of hypertension, chronic kidney disease stage III, dyslipidemia, hyperlipidemia, nicotine dependence presents for follow-up visit of an acutely elevated creatinine and CT scan of the lung which revealed a lung nodule          Previous labs had demonstrated acute kidney injury on chronic kidney disease stage III: The patient's most recent labs are noted Below:    Component      Latest Ref Rng & Units 4/21/2023 4/13/2023 2/19/2023          10:20 AM 12:12 PM  5:13 AM   Creatinine      0.50 - 0.90 mg/dL 1.21 (H) 1.12 (H) 0.92 (H)       She has a history of chronic kidney d, isease stage III however as noted her creatinine is acutely elevated. Lung nodule: CT scan of the lung revealed nodules as noted below:        A nodule that is seen in the lingula has increased in size from 5.6 mm to 8.6   mm. Other nodules that  measure less than 5 mm are stable. RECOMMENDATIONS:   Lung rads 4 x       Findings:  Category 3 or 4 nodules with additional features or imaging   findings that increases the suspicion of malignancy. Management: Chest CT with or without contrast, PET/CT and/or tissue sampling   depending on the probability of malignancy and comorbidities. PET/CT may be   used when there is a > 8 mm  solid component. For new large nodules that   develop on an annual repeat screening CT, a 1 month LDCT may be recommended   to address potentially infectious or inflammatory conditions. The patient previously reported bilateral shoulder pain that is characterizes achy, nonradiating, associated with reduced ability to abduct her arms.   Her discomfort is mildly relieved by over-the-counter analgesics    Essential hypertension-compliant with

## 2023-05-06 DIAGNOSIS — I10 ESSENTIAL HYPERTENSION: ICD-10-CM

## 2023-05-08 RX ORDER — CARVEDILOL 12.5 MG/1
TABLET ORAL
Qty: 180 TABLET | Refills: 1 | Status: SHIPPED | OUTPATIENT
Start: 2023-05-08

## 2023-05-24 ENCOUNTER — OFFICE VISIT (OUTPATIENT)
Dept: FAMILY MEDICINE CLINIC | Age: 83
End: 2023-05-24
Payer: MEDICARE

## 2023-05-24 VITALS
HEART RATE: 92 BPM | WEIGHT: 133 LBS | SYSTOLIC BLOOD PRESSURE: 120 MMHG | HEIGHT: 61 IN | OXYGEN SATURATION: 97 % | BODY MASS INDEX: 25.11 KG/M2 | DIASTOLIC BLOOD PRESSURE: 80 MMHG

## 2023-05-24 DIAGNOSIS — H61.23 BILATERAL IMPACTED CERUMEN: Primary | ICD-10-CM

## 2023-05-24 PROCEDURE — 1090F PRES/ABSN URINE INCON ASSESS: CPT | Performed by: NURSE PRACTITIONER

## 2023-05-24 PROCEDURE — G8427 DOCREV CUR MEDS BY ELIG CLIN: HCPCS | Performed by: NURSE PRACTITIONER

## 2023-05-24 PROCEDURE — 3074F SYST BP LT 130 MM HG: CPT | Performed by: NURSE PRACTITIONER

## 2023-05-24 PROCEDURE — 4004F PT TOBACCO SCREEN RCVD TLK: CPT | Performed by: NURSE PRACTITIONER

## 2023-05-24 PROCEDURE — G8399 PT W/DXA RESULTS DOCUMENT: HCPCS | Performed by: NURSE PRACTITIONER

## 2023-05-24 PROCEDURE — G8417 CALC BMI ABV UP PARAM F/U: HCPCS | Performed by: NURSE PRACTITIONER

## 2023-05-24 PROCEDURE — 1123F ACP DISCUSS/DSCN MKR DOCD: CPT | Performed by: NURSE PRACTITIONER

## 2023-05-24 PROCEDURE — 3079F DIAST BP 80-89 MM HG: CPT | Performed by: NURSE PRACTITIONER

## 2023-05-24 PROCEDURE — 99212 OFFICE O/P EST SF 10 MIN: CPT | Performed by: NURSE PRACTITIONER

## 2023-05-24 RX ORDER — LOSARTAN POTASSIUM AND HYDROCHLOROTHIAZIDE 12.5; 1 MG/1; MG/1
1 TABLET ORAL DAILY
COMMUNITY
Start: 2023-04-19

## 2023-05-24 ASSESSMENT — ENCOUNTER SYMPTOMS
SHORTNESS OF BREATH: 0
WHEEZING: 0
DIARRHEA: 0
SORE THROAT: 0
COUGH: 0
VOMITING: 0
RHINORRHEA: 0
NAUSEA: 0

## 2023-05-24 NOTE — PROGRESS NOTES
Subjective:      Patient ID: Van Wilks is a 81 y.o. female who presents today for:  Chief Complaint   Patient presents with    Ear Fullness     Both ears feel blocked sx started a month ago       HPI      End of  had one ear done in the hospital   Used to get them cleaned out every 4 months   Feeling blocked for 1 month   They itch   She thinks the right is worse then the left   Feels off balance   No pain       Past Medical History:   Diagnosis Date    Bilateral carotid artery stenosis 2020    CAD (coronary artery disease)     Carotid bruit     Dyslipidemia 2020    Essential hypertension 2020    Hx of CABG 2020    Hyperlipidemia     Kidney disease     PONV (postoperative nausea and vomiting)     Stage 4 chronic kidney disease ([facility] 2020    Stenosis of noncoronary bypass graft ([facility]     Tobacco abuse 2020     Past Surgical History:   Procedure Laterality Date    BACK SURGERY      BRAIN ANEURYSM SURGERY      BREAST LUMPECTOMY Right     CARPAL TUNNEL RELEASE Bilateral     CORONARY ARTERY BYPASS GRAFT      PARTIAL HYSTERECTOMY (CERVIX NOT REMOVED)      THORACOSCOPY Left 2023    left thoracoscopic wedge, completion left upper lobectomy performed by Kathy Adams MD at [address] [facility] Bilateral left side 2017 right side      Social History     Socioeconomic History    Marital status:       Spouse name: Not on file    Number of children: Not on file    Years of education: Not on file    Highest education level: Not on file   Occupational History    Not on file   Tobacco Use    Smoking status: Every Day     Packs/day: 0.10     Years: 60.00     Pack years: 6.00     Types: Cigarettes     Start date: 1958     Last attempt to quit: 2023     Years since quittin.2    Smokeless tobacco: Never   Vaping Use    Vaping Use: Never used   Substance and Sexual Activity    Alcohol use: Yes     Comment: occ    Drug use: Never    Sexual

## 2023-06-05 DIAGNOSIS — J44.9 CHRONIC OBSTRUCTIVE PULMONARY DISEASE, UNSPECIFIED COPD TYPE (HCC): Primary | ICD-10-CM

## 2023-06-05 NOTE — TELEPHONE ENCOUNTER
Rx requested:  Requested Prescriptions     Pending Prescriptions Disp Refills    ANORO ELLIPTA 62.5-25 MCG/ACT inhaler [Pharmacy Med Name: Eusebia Gaonao 62.5-25 MCG INH] 180 each 3     Sig: INHALE 1 PUFF BY MOUTH INTO THE LUNGS ONCE DAILY AS DIRECTED       Last Office Visit:   3/9/2023      Next Visit Date:  Future Appointments   Date Time Provider Aiden Guzman   6/8/2023 12:00 PM DO [facility] [place]   6/14/2023  9:30 AM [place] CT ROOM 1 [facility] CT [facility] Fac RAD   6/14/2023 10:00 AM [place] PFT ROOM 1 [address]   7/13/2023 11:00 AM MD Kamla Mckeonain [address] [place] [address] [place]   11/1/2023 10:45 AM Samy Pierre MD [address] [facility]

## 2023-06-06 RX ORDER — UMECLIDINIUM BROMIDE AND VILANTEROL TRIFENATATE 62.5; 25 UG/1; UG/1
POWDER RESPIRATORY (INHALATION)
Qty: 180 EACH | Refills: 3 | Status: SHIPPED | OUTPATIENT
Start: 2023-06-06

## 2023-06-08 ENCOUNTER — OFFICE VISIT (OUTPATIENT)
Dept: CARDIOLOGY CLINIC | Age: 83
End: 2023-06-08
Payer: MEDICARE

## 2023-06-08 VITALS
HEART RATE: 62 BPM | OXYGEN SATURATION: 99 % | SYSTOLIC BLOOD PRESSURE: 140 MMHG | BODY MASS INDEX: 24.87 KG/M2 | WEIGHT: 131.6 LBS | DIASTOLIC BLOOD PRESSURE: 66 MMHG

## 2023-06-08 DIAGNOSIS — I35.1 NONRHEUMATIC AORTIC VALVE INSUFFICIENCY: Primary | ICD-10-CM

## 2023-06-08 DIAGNOSIS — R93.89 ABNORMAL CAROTID ULTRASOUND: ICD-10-CM

## 2023-06-08 DIAGNOSIS — I65.23 BILATERAL CAROTID ARTERY STENOSIS: ICD-10-CM

## 2023-06-08 PROCEDURE — 1123F ACP DISCUSS/DSCN MKR DOCD: CPT | Performed by: INTERNAL MEDICINE

## 2023-06-08 PROCEDURE — 3077F SYST BP >= 140 MM HG: CPT | Performed by: INTERNAL MEDICINE

## 2023-06-08 PROCEDURE — 3078F DIAST BP <80 MM HG: CPT | Performed by: INTERNAL MEDICINE

## 2023-06-08 PROCEDURE — 99213 OFFICE O/P EST LOW 20 MIN: CPT | Performed by: INTERNAL MEDICINE

## 2023-06-08 PROCEDURE — 1090F PRES/ABSN URINE INCON ASSESS: CPT | Performed by: INTERNAL MEDICINE

## 2023-06-08 PROCEDURE — G8427 DOCREV CUR MEDS BY ELIG CLIN: HCPCS | Performed by: INTERNAL MEDICINE

## 2023-06-08 PROCEDURE — G8399 PT W/DXA RESULTS DOCUMENT: HCPCS | Performed by: INTERNAL MEDICINE

## 2023-06-08 PROCEDURE — G8420 CALC BMI NORM PARAMETERS: HCPCS | Performed by: INTERNAL MEDICINE

## 2023-06-08 PROCEDURE — 4004F PT TOBACCO SCREEN RCVD TLK: CPT | Performed by: INTERNAL MEDICINE

## 2023-06-08 NOTE — PROGRESS NOTES
Chief Complaint   Patient presents with    Coronary Artery Disease    Hypertension    Hyperlipidemia       11/19/2020: Patient presents for initial medical evaluation. Patient is followed on a regular basis by Dr. Kelly Tejada MD recently moved from [facility]. Hx of CAD s/p CABGx5 in 2010. Status post negative nuclear stress test in 2017. Pt denies chest pain, dyspnea, dyspnea on exertion, change in exercise capacity, fatigue,  nausea, vomiting, diarrhea, constipation, motor weakness, insomnia, weight loss, syncope, dizziness, lightheadedness, palpitations, PND, orthopnea, or claudication. No hx of PAD . No hx of CHF of arrhythmia. He continues to smoke approximate 10 cigarettes a day. Status post carotid ultrasound on April 2020 with 60% stenosis in the left and right internal carotid arteries. 1/7/2021: Status post echocardiogram ejection fraction of 55%, 1-2+ aortic regurgitation, 1+ mitral and tricuspid regurgitation, RVSP of 27 mmHg, grade 2 diastolic dysfunction. Status post abdominal aorta ultrasound with no evidence of any aneurysm. She will follow up with primary care Dr. Saige Sorto as well as nephrology. Pt denies chest pain, dyspnea, dyspnea on exertion, change in exercise capacity, fatigue,  nausea, vomiting, diarrhea, constipation, motor weakness, insomnia, weight loss, syncope, dizziness, lightheadedness, palpitations, PND, orthopnea, or claudication. she continues to smoke. Has CKD. Blood pressure is 136/71 heart rate of 56 bpm.  With history of coronary disease status post CABG x5 in 2010    10-14-21: Pt denies chest pain, dyspnea, dyspnea on exertion, change in exercise capacity, fatigue,  nausea, vomiting, diarrhea, constipation, motor weakness, insomnia, weight loss, syncope, dizziness, lightheadedness, palpitations, PND, orthopnea, or claudication. No nitro use. BP and hr are good. CAD is stable. No LE discoloration or ulcers. No LE edema. No CHF type symptoms.  Lipid profile is

## 2023-06-19 PROBLEM — R06.02 SOB (SHORTNESS OF BREATH): Status: ACTIVE | Noted: 2023-02-17

## 2023-07-13 ENCOUNTER — OFFICE VISIT (OUTPATIENT)
Dept: PULMONOLOGY | Age: 83
End: 2023-07-13
Payer: MEDICARE

## 2023-07-13 VITALS
RESPIRATION RATE: 16 BRPM | HEART RATE: 70 BPM | BODY MASS INDEX: 24.81 KG/M2 | TEMPERATURE: 97.5 F | DIASTOLIC BLOOD PRESSURE: 62 MMHG | OXYGEN SATURATION: 97 % | SYSTOLIC BLOOD PRESSURE: 130 MMHG | WEIGHT: 131.4 LBS | HEIGHT: 61 IN

## 2023-07-13 DIAGNOSIS — I51.89 DIASTOLIC DYSFUNCTION: ICD-10-CM

## 2023-07-13 DIAGNOSIS — C34.92 ADENOCARCINOMA OF LEFT LUNG (HCC): ICD-10-CM

## 2023-07-13 DIAGNOSIS — R91.1 LUNG NODULE: Primary | ICD-10-CM

## 2023-07-13 DIAGNOSIS — J44.9 CHRONIC OBSTRUCTIVE PULMONARY DISEASE, UNSPECIFIED COPD TYPE (HCC): ICD-10-CM

## 2023-07-13 DIAGNOSIS — F17.200 SMOKING: ICD-10-CM

## 2023-07-13 PROCEDURE — 3078F DIAST BP <80 MM HG: CPT | Performed by: INTERNAL MEDICINE

## 2023-07-13 PROCEDURE — 3023F SPIROM DOC REV: CPT | Performed by: INTERNAL MEDICINE

## 2023-07-13 PROCEDURE — G8427 DOCREV CUR MEDS BY ELIG CLIN: HCPCS | Performed by: INTERNAL MEDICINE

## 2023-07-13 PROCEDURE — 1123F ACP DISCUSS/DSCN MKR DOCD: CPT | Performed by: INTERNAL MEDICINE

## 2023-07-13 PROCEDURE — 99214 OFFICE O/P EST MOD 30 MIN: CPT | Performed by: INTERNAL MEDICINE

## 2023-07-13 PROCEDURE — G8399 PT W/DXA RESULTS DOCUMENT: HCPCS | Performed by: INTERNAL MEDICINE

## 2023-07-13 PROCEDURE — 3075F SYST BP GE 130 - 139MM HG: CPT | Performed by: INTERNAL MEDICINE

## 2023-07-13 PROCEDURE — 4004F PT TOBACCO SCREEN RCVD TLK: CPT | Performed by: INTERNAL MEDICINE

## 2023-07-13 PROCEDURE — G8420 CALC BMI NORM PARAMETERS: HCPCS | Performed by: INTERNAL MEDICINE

## 2023-07-13 PROCEDURE — 1090F PRES/ABSN URINE INCON ASSESS: CPT | Performed by: INTERNAL MEDICINE

## 2023-07-13 NOTE — PROGRESS NOTES
Subjective:     Olivia Zimmer is a 81 y.o. female who complains today of:     Chief Complaint   Patient presents with    Follow-up     6 Month F/U for COPD    Results     CT Chest and PFT       HPI  Patient presents for COPD  7/13/2023  Doing good, symptoms controlled, no coughing, no chest pain, she tolerates Anoro well, no lower extremity edema, no nasal congestion or postnasal drip and no heartburn. Weight is stable, unfortunately continues to smoke. 3/9/2023  Patient presents for follow-up, she is doing better, currently on Stiolto but she does not like this particular inhaler mainly because she does not feel she is getting any of the medication, otherwise no shortness of breath, no chest pain, minimal coughing, no lower extremity edema, no fever no chills, no nasal congestion or postnasal drip and no heartburn. Weight is stable        Allergies:  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    Hyperlipidemia     Kidney disease     PONV (postoperative nausea and vomiting)     Stage 4 chronic kidney disease ([address]) 11/19/2020    Stenosis of noncoronary bypass graft ([address])     Tobacco abuse 11/19/2020     Past Surgical History:   Procedure Laterality Date    BACK SURGERY  2003    BRAIN ANEURYSM SURGERY  2012    BREAST LUMPECTOMY Right     CARPAL TUNNEL RELEASE Bilateral     CORONARY ARTERY BYPASS GRAFT      PARTIAL HYSTERECTOMY (CERVIX NOT REMOVED)  1997    THORACOSCOPY Left 1/31/2023    left thoracoscopic wedge, completion left upper lobectomy performed by Sujata Holly MD at [address] [facility] Bilateral left side 2017 right side 2004     History reviewed. No pertinent family history. Social History     Socioeconomic History    Marital status:       Spouse name: Not on file

## 2023-07-19 ENCOUNTER — HOSPITAL ENCOUNTER (OUTPATIENT)
Dept: LAB | Age: 83
Discharge: HOME | End: 2023-07-19
Payer: MEDICARE

## 2023-07-19 LAB
ALBUMIN SERPL-MCNC: 4.2 G/DL (ref 3.5–4.6)
ANION GAP SERPL CALCULATED.3IONS-SCNC: 12 MEQ/L (ref 9–15)
BILIRUB UR QL STRIP: NEGATIVE
BUN SERPL-MCNC: 35 MG/DL (ref 8–23)
CALCIUM SERPL-MCNC: 9.7 MG/DL (ref 8.5–9.9)
CHLORIDE SERPL-SCNC: 107 MEQ/L (ref 95–107)
CLARITY UR: CLEAR
CO2 SERPL-SCNC: 22 MEQ/L (ref 20–31)
COLOR UR: YELLOW
CREAT SERPL-MCNC: 1.08 MG/DL (ref 0.5–0.9)
ERYTHROCYTE [DISTWIDTH] IN BLOOD BY AUTOMATED COUNT: 13.8 % (ref 11.5–14.5)
GLUCOSE SERPL-MCNC: 116 MG/DL (ref 70–99)
GLUCOSE UR STRIP-MCNC: NEGATIVE MG/DL
HCT VFR BLD AUTO: 36 % (ref 37–47)
HGB BLD-MCNC: 12.4 G/DL (ref 12–16)
HGB UR QL STRIP: NEGATIVE
KETONES UR STRIP-MCNC: NEGATIVE MG/DL
LEUKOCYTE ESTERASE UR QL STRIP: NEGATIVE
MCH RBC QN AUTO: 30.9 PG (ref 27–31.3)
MCHC RBC AUTO-ENTMCNC: 34.4 % (ref 33–37)
MCV RBC AUTO: 89.9 FL (ref 79.4–94.8)
NITRITE UR QL STRIP: NEGATIVE
PH UR STRIP: 5.5 [PH] (ref 5–9)
PHOSPHATE SERPL-MCNC: 3.9 MG/DL (ref 2.3–4.8)
PLATELET # BLD AUTO: 241 K/UL (ref 130–400)
POTASSIUM SERPL-SCNC: 4.5 MEQ/L (ref 3.4–4.9)
PROT UR STRIP-MCNC: NEGATIVE MG/DL
RBC # BLD AUTO: 4.01 M/UL (ref 4.2–5.4)
SODIUM SERPL-SCNC: 141 MEQ/L (ref 135–144)
SP GR UR STRIP: 1.01 (ref 1–1.03)
UROBILINOGEN UR STRIP-ACNC: 0.2 E.U./DL
WBC # BLD AUTO: 6.6 K/UL (ref 4.8–10.8)

## 2023-07-19 PROCEDURE — 36415 COLL VENOUS BLD VENIPUNCTURE: CPT

## 2023-07-20 LAB
COMMENT: NORMAL
MISCELLANEOUS LAB TEST RESULT: NORMAL

## 2023-07-21 ENCOUNTER — HOSPITAL ENCOUNTER (OUTPATIENT)
Dept: NON INVASIVE DIAGNOSTICS | Age: 83
Discharge: HOME | End: 2023-07-21
Attending: INTERNAL MEDICINE
Payer: MEDICARE

## 2023-07-21 ENCOUNTER — HOSPITAL ENCOUNTER (OUTPATIENT)
Dept: ULTRASOUND IMAGING | Age: 83
Discharge: HOME | End: 2023-07-23
Attending: INTERNAL MEDICINE
Payer: MEDICARE

## 2023-07-21 DIAGNOSIS — I35.1 NONRHEUMATIC AORTIC VALVE INSUFFICIENCY: ICD-10-CM

## 2023-07-21 DIAGNOSIS — I65.23 BILATERAL CAROTID ARTERY STENOSIS: ICD-10-CM

## 2023-07-21 LAB
LV EF: 55 %
LVEF MODALITY: NORMAL

## 2023-07-21 PROCEDURE — 93306 TTE W/DOPPLER COMPLETE: CPT

## 2023-07-21 PROCEDURE — 93880 EXTRACRANIAL BILAT STUDY: CPT

## 2023-07-21 PROCEDURE — 93880 EXTRACRANIAL BILAT STUDY: CPT | Performed by: INTERNAL MEDICINE

## 2023-09-21 ENCOUNTER — OFFICE VISIT (OUTPATIENT)
Dept: FAMILY MEDICINE CLINIC | Age: 83
End: 2023-09-21
Payer: MEDICARE

## 2023-09-21 VITALS
SYSTOLIC BLOOD PRESSURE: 124 MMHG | OXYGEN SATURATION: 97 % | HEIGHT: 61 IN | DIASTOLIC BLOOD PRESSURE: 68 MMHG | HEART RATE: 75 BPM | BODY MASS INDEX: 25.6 KG/M2 | WEIGHT: 135.6 LBS | TEMPERATURE: 97 F

## 2023-09-21 DIAGNOSIS — M43.10 RETROLISTHESIS OF VERTEBRAE: ICD-10-CM

## 2023-09-21 DIAGNOSIS — M54.16 LUMBAR BACK PAIN WITH RADICULOPATHY AFFECTING LEFT LOWER EXTREMITY: Primary | ICD-10-CM

## 2023-09-21 PROCEDURE — 3074F SYST BP LT 130 MM HG: CPT | Performed by: FAMILY MEDICINE

## 2023-09-21 PROCEDURE — 3078F DIAST BP <80 MM HG: CPT | Performed by: FAMILY MEDICINE

## 2023-09-21 PROCEDURE — 1123F ACP DISCUSS/DSCN MKR DOCD: CPT | Performed by: FAMILY MEDICINE

## 2023-09-21 PROCEDURE — 99214 OFFICE O/P EST MOD 30 MIN: CPT | Performed by: FAMILY MEDICINE

## 2023-09-21 RX ORDER — PREDNISONE 10 MG/1
TABLET ORAL
Qty: 55 TABLET | Refills: 0 | Status: SHIPPED | OUTPATIENT
Start: 2023-09-21

## 2023-09-21 RX ORDER — LIDOCAINE 50 MG/G
1 PATCH TOPICAL DAILY
Qty: 30 PATCH | Refills: 0 | Status: SHIPPED | OUTPATIENT
Start: 2023-09-21 | End: 2023-10-21

## 2023-09-21 ASSESSMENT — ENCOUNTER SYMPTOMS: BACK PAIN: 1

## 2023-09-21 NOTE — PROGRESS NOTES
Sabrina Bermudez 1940 is a 81 y.o. female who presents today with:  Chief Complaint   Patient presents with    Back Pain     C/O lower back pain. This started over labor day weekend. Denies injury. Hx previous spinal surgery and bilateral hip replacements. Pain radiates through her left buttock and down the back and outside of her leg into her foot at times. She has numbness in her legs only when she crosses them while sitting. Denies incontinence of urine or bowels. Denies worsening pain at night, but she cannot lay on her left side. She has been taking tylenol arthritis with minimal effectiveness. I have reviewed HPI and agree with above. She has a history of laminectomy of L4-5 and S1. She had an x-ray done in 2021 which shows retrolisthesis of all of the levels above. These are of varying degrees. This pain really seem to start around Labor Day. There is no injury. This is a new pain. 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  She does have point tenderness over the spine. Review of Systems   Musculoskeletal:  Positive for back pain.        Past Medical History:   Diagnosis Date    Bilateral carotid artery stenosis 11/19/2020    CAD (coronary artery disease)     Carotid bruit     Dyslipidemia 11/19/2020    Essential hypertension 11/19/2020    Hx of CABG 11/19/2020    Hyperlipidemia     Kidney disease     PONV (postoperative nausea and vomiting)     Stage 4 chronic kidney disease ([address]) 11/19/2020    Stenosis of noncoronary bypass graft ([address])     Tobacco abuse 11/19/2020     Past Surgical History:   Procedure Laterality Date    BACK SURGERY  2003    BRAIN ANEURYSM SURGERY  2012    BREAST LUMPECTOMY Right     CARPAL TUNNEL RELEASE Bilateral     CORONARY ARTERY BYPASS GRAFT      PARTIAL HYSTERECTOMY (CERVIX NOT REMOVED)  1997    THORACOSCOPY Left 1/31/2023    left thoracoscopic wedge, completion left upper lobectomy performed by Adolph Rowe MD at [address] [place] [address]

## 2023-09-28 RX ORDER — TRAZODONE HYDROCHLORIDE 100 MG/1
TABLET ORAL
Qty: 90 TABLET | Refills: 1 | Status: SHIPPED | OUTPATIENT
Start: 2023-09-28

## 2023-10-05 ENCOUNTER — OFFICE VISIT (OUTPATIENT)
Dept: FAMILY MEDICINE CLINIC | Age: 83
End: 2023-10-05
Payer: MEDICARE

## 2023-10-05 VITALS
DIASTOLIC BLOOD PRESSURE: 74 MMHG | BODY MASS INDEX: 25.57 KG/M2 | OXYGEN SATURATION: 97 % | HEART RATE: 75 BPM | HEIGHT: 61 IN | WEIGHT: 135.4 LBS | TEMPERATURE: 97.2 F | SYSTOLIC BLOOD PRESSURE: 122 MMHG

## 2023-10-05 DIAGNOSIS — T50.905D ADVERSE EFFECT OF DRUG, SUBSEQUENT ENCOUNTER: ICD-10-CM

## 2023-10-05 DIAGNOSIS — M54.16 LUMBAR BACK PAIN WITH RADICULOPATHY AFFECTING LEFT LOWER EXTREMITY: Primary | ICD-10-CM

## 2023-10-05 DIAGNOSIS — M43.10 RETROLISTHESIS OF VERTEBRAE: ICD-10-CM

## 2023-10-05 PROCEDURE — 1123F ACP DISCUSS/DSCN MKR DOCD: CPT | Performed by: FAMILY MEDICINE

## 2023-10-05 PROCEDURE — 3078F DIAST BP <80 MM HG: CPT | Performed by: FAMILY MEDICINE

## 2023-10-05 PROCEDURE — 99213 OFFICE O/P EST LOW 20 MIN: CPT | Performed by: FAMILY MEDICINE

## 2023-10-05 PROCEDURE — 3074F SYST BP LT 130 MM HG: CPT | Performed by: FAMILY MEDICINE

## 2023-10-05 RX ORDER — LIDOCAINE 50 MG/G
1 PATCH TOPICAL DAILY
Qty: 30 PATCH | Refills: 5 | Status: SHIPPED | OUTPATIENT
Start: 2023-10-05 | End: 2024-04-02

## 2023-10-16 DIAGNOSIS — E78.5 DYSLIPIDEMIA: ICD-10-CM

## 2023-10-16 RX ORDER — ATORVASTATIN CALCIUM 40 MG/1
TABLET, FILM COATED ORAL
Qty: 90 TABLET | Refills: 3 | Status: SHIPPED | OUTPATIENT
Start: 2023-10-16

## 2023-10-16 NOTE — TELEPHONE ENCOUNTER
Requesting medication refill. Rx requested:  Requested Prescriptions     Pending Prescriptions Disp Refills    atorvastatin (LIPITOR) 40 MG tablet [Pharmacy Med Name: ATORVASTATIN 40 MG TABLET] 90 tablet 3     Sig: TAKE 1 TABLET BY MOUTH EVERY DAY         Last Office Visit:   06/08/2023      Next Visit Date:  Future Appointments   Date Time Provider [facility] [address]   11/1/2023 10:45 AM Matt Neal MD [address]   1/11/2024 11:30 AM Harley Carrel, MD [address] [place] [address]   6/20/2024 12:00 PM Holiday, [place] [address] [place] [address]               Last refill 10/24/2022.

## 2023-10-31 DIAGNOSIS — I10 ESSENTIAL HYPERTENSION: ICD-10-CM

## 2023-11-01 ENCOUNTER — TELEPHONE (OUTPATIENT)
Dept: PULMONOLOGY | Age: 83
End: 2023-11-01

## 2023-11-01 ENCOUNTER — OFFICE VISIT (OUTPATIENT)
Dept: FAMILY MEDICINE CLINIC | Age: 83
End: 2023-11-01
Payer: MEDICARE

## 2023-11-01 VITALS
DIASTOLIC BLOOD PRESSURE: 60 MMHG | SYSTOLIC BLOOD PRESSURE: 130 MMHG | OXYGEN SATURATION: 97 % | HEIGHT: 61 IN | HEART RATE: 75 BPM | RESPIRATION RATE: 14 BRPM | BODY MASS INDEX: 25.86 KG/M2 | WEIGHT: 137 LBS

## 2023-11-01 DIAGNOSIS — J44.9 CHRONIC OBSTRUCTIVE PULMONARY DISEASE, UNSPECIFIED COPD TYPE (HCC): Primary | ICD-10-CM

## 2023-11-01 DIAGNOSIS — L98.9 SKIN LESION: Primary | ICD-10-CM

## 2023-11-01 PROCEDURE — 3075F SYST BP GE 130 - 139MM HG: CPT | Performed by: INTERNAL MEDICINE

## 2023-11-01 PROCEDURE — 3078F DIAST BP <80 MM HG: CPT | Performed by: INTERNAL MEDICINE

## 2023-11-01 PROCEDURE — 99214 OFFICE O/P EST MOD 30 MIN: CPT | Performed by: INTERNAL MEDICINE

## 2023-11-01 PROCEDURE — 4004F PT TOBACCO SCREEN RCVD TLK: CPT | Performed by: INTERNAL MEDICINE

## 2023-11-01 PROCEDURE — G8484 FLU IMMUNIZE NO ADMIN: HCPCS | Performed by: INTERNAL MEDICINE

## 2023-11-01 PROCEDURE — 1123F ACP DISCUSS/DSCN MKR DOCD: CPT | Performed by: INTERNAL MEDICINE

## 2023-11-01 PROCEDURE — G8417 CALC BMI ABV UP PARAM F/U: HCPCS | Performed by: INTERNAL MEDICINE

## 2023-11-01 PROCEDURE — G8399 PT W/DXA RESULTS DOCUMENT: HCPCS | Performed by: INTERNAL MEDICINE

## 2023-11-01 PROCEDURE — 1090F PRES/ABSN URINE INCON ASSESS: CPT | Performed by: INTERNAL MEDICINE

## 2023-11-01 PROCEDURE — G8427 DOCREV CUR MEDS BY ELIG CLIN: HCPCS | Performed by: INTERNAL MEDICINE

## 2023-11-01 RX ORDER — CARVEDILOL 12.5 MG/1
TABLET ORAL
Qty: 180 TABLET | Refills: 1 | Status: SHIPPED | OUTPATIENT
Start: 2023-11-01

## 2023-11-01 ASSESSMENT — ENCOUNTER SYMPTOMS
BLOOD IN STOOL: 0
SORE THROAT: 0
SINUS PRESSURE: 0
EYE ITCHING: 0
ABDOMINAL PAIN: 0
CONSTIPATION: 0
COUGH: 0
EYE DISCHARGE: 0
FACIAL SWELLING: 0
VOMITING: 0
SINUS PAIN: 0
PHOTOPHOBIA: 0
ABDOMINAL DISTENTION: 0
WHEEZING: 0
DIARRHEA: 0
EYE REDNESS: 0
NAUSEA: 0
RECTAL PAIN: 0
CHEST TIGHTNESS: 0
APNEA: 0
COLOR CHANGE: 0
SHORTNESS OF BREATH: 0
EYE PAIN: 0
VOICE CHANGE: 0
RHINORRHEA: 0
BACK PAIN: 0
TROUBLE SWALLOWING: 0

## 2023-11-01 NOTE — TELEPHONE ENCOUNTER
PATIENT RECEIVED A LETTER FROM HER INSURANCE THAT THEY ARE NO LONGER COVERING HER ARONO. . THEY WILL COVER [address] [place] [address]. CAN YOU PLEASE SEND ONE OF THESE TO HER PHARMACY.

## 2023-11-01 NOTE — PROGRESS NOTES
CSubjective:      Patient ID: Pamella Rahman is a 81 y.o. female Established patient, here for evaluation of the following chief complaint(s):  Chief Complaint   Patient presents with    Hypertension       HPI     81-year-old female with history of hypertension, chronic kidney disease stage III, dyslipidemia, hyperlipidemia, nicotine dependence presents for follow-up visit of an acutely elevated creatinine and CT scan of the lung which revealed a lung nodule    Skin lesion: present for months. Localized to the hernia anterior surface of the patient's     Previous labs had demonstrated acute kidney injury on chronic kidney disease stage III: The patient's most recent labs are noted Below:    Component      Latest Ref Rng & Units 4/21/2023 4/13/2023 2/19/2023          10:20 AM 12:12 PM  5:13 AM   Creatinine      0.50 - 0.90 mg/dL 1.21 (H) 1.12 (H) 0.92 (H)       She has a history of chronic kidney d, isease stage III however as noted her creatinine is acutely elevated. Lung nodule: CT scan of the lung revealed nodules as noted below:        A nodule that is seen in the lingula has increased in size from 5.6 mm to 8.6   mm. Other nodules that  measure less than 5 mm are stable. RECOMMENDATIONS:   Lung rads 4 x       Findings:  Category 3 or 4 nodules with additional features or imaging   findings that increases the suspicion of malignancy. Management: Chest CT with or without contrast, PET/CT and/or tissue sampling   depending on the probability of malignancy and comorbidities. PET/CT may be   used when there is a > 8 mm  solid component. For new large nodules that   develop on an annual repeat screening CT, a 1 month LDCT may be recommended   to address potentially infectious or inflammatory conditions. The patient previously reported bilateral shoulder pain that is characterizes achy, nonradiating, associated with reduced ability to abduct her arms.   Her discomfort is

## 2023-11-06 ENCOUNTER — TELEPHONE (OUTPATIENT)
Dept: PULMONOLOGY | Age: 83
End: 2023-11-06

## 2023-11-06 RX ORDER — IPRATROPIUM BROMIDE AND ALBUTEROL SULFATE 2.5; .5 MG/3ML; MG/3ML
1 SOLUTION RESPIRATORY (INHALATION) EVERY 4 HOURS
Qty: 360 ML | Refills: 5 | Status: SHIPPED | OUTPATIENT
Start: 2023-11-06

## 2023-11-06 NOTE — TELEPHONE ENCOUNTER
PATIENT HAS HAD TROUBLE WITH WORKING THE STIOLTO IN THE PAST CAN YOU PLEASE PRESCRIBE THE BEVESPI INSTEAD.

## 2023-11-06 NOTE — TELEPHONE ENCOUNTER
I did the change to [facility], this might need prior authorization please let Mayi know

## 2023-11-07 NOTE — TELEPHONE ENCOUNTER
Insurance won't cover bevespi but will cover stiolto.  Please send a script to her pharmacy    Rx requested:  Requested Prescriptions     Pending Prescriptions Disp Refills    tiotropium-olodaterol ([id number] [facility]) 2.5-2.5 MCG/ACT AERS 1 each 3     Sig: Inhale 2 puffs into the lungs daily       Last Office Visit:   7/13/2023      Next Visit Date:  Future Appointments   Date Time Provider [address] [place] [address]   1/11/2024 11:30 AM Hailey Rossi MD [address] [place] [address]   5/1/2024 11:00 AM Lion Galan MD [address] [place] [address]   6/20/2024 12:00 PM [place]

## 2023-12-06 NOTE — TELEPHONE ENCOUNTER
Patient states that she is waiting until January 1st for her new prescription plan to go into effect due to they will cover her Anoro. She has enough medication to hold her until then.

## 2023-12-18 DIAGNOSIS — J44.9 CHRONIC OBSTRUCTIVE PULMONARY DISEASE, UNSPECIFIED COPD TYPE (HCC): ICD-10-CM

## 2023-12-18 RX ORDER — UMECLIDINIUM BROMIDE AND VILANTEROL TRIFENATATE 62.5; 25 UG/1; UG/1
POWDER RESPIRATORY (INHALATION)
Qty: 180 EACH | Refills: 0 | OUTPATIENT
Start: 2023-12-18

## 2023-12-18 NOTE — TELEPHONE ENCOUNTER
Please approve or deny this request.    Rx requested:  Requested Prescriptions     Pending Prescriptions Disp Refills    umeclidinium-vilanterol (ANORO ELLIPTA) 62.5-25 MCG/ACT inhaler 180 each 0     Sig: INHALE 1 PUFF BY MOUTH INTO THE LUNGS ONCE DAILY AS DIRECTED         Last Office Visit:   7/13/2023      Next Visit Date:  Future Appointments   Date Time Provider [address]   1/11/2024 11:30 AM Kelly Rodriguez MD [facility]   5/1/2024 11:00 AM Susie Hall MD [address] [place] [address]   6/20/2024 12:00 PM Holiday, Tamika Silva DO [address] [facility]

## 2024-01-03 DIAGNOSIS — J44.9 CHRONIC OBSTRUCTIVE PULMONARY DISEASE, UNSPECIFIED COPD TYPE (HCC): Primary | ICD-10-CM

## 2024-01-03 RX ORDER — UMECLIDINIUM BROMIDE AND VILANTEROL TRIFENATATE 62.5; 25 UG/1; UG/1
POWDER RESPIRATORY (INHALATION)
Qty: 180 EACH | Refills: 3 | OUTPATIENT
Start: 2024-01-03

## 2024-01-03 RX ORDER — UMECLIDINIUM BROMIDE AND VILANTEROL TRIFENATATE 62.5; 25 UG/1; UG/1
1 POWDER RESPIRATORY (INHALATION) DAILY
Qty: 3 EACH | Refills: 2 | Status: SHIPPED | OUTPATIENT
Start: 2024-01-03

## 2024-01-03 NOTE — TELEPHONE ENCOUNTER
Patient has a new insurance plan and will now pay for anoro. She never took the stiolto due to insurance wouldn't cover it but wanted to stay with Anoro

## 2024-01-03 NOTE — TELEPHONE ENCOUNTER
PT IS NO LONGER TAKING STIOLTO OR BEVESPI.       SHE SWITCHED HER INSURANCE AND ANORO IS COVERED SHE WOULD LIKE TO RESTART THIS,         Rx requested:  Requested Prescriptions     Pending Prescriptions Disp Refills    umeclidinium-vilanterol (ANORO ELLIPTA) 62.5-25 MCG/ACT inhaler 180 each 3     Sig: INHALE 1 PUFF BY MOUTH INTO THE LUNGS ONCE DAILY AS DIRECTED       Last Office Visit:   7/13/2023      Next Visit Date:  Future Appointments   Date Time Provider Department Center   1/11/2024 11:30 AM Beverley Chacon MD [facility]   5/1/2024 11:00 AM Fabio Baugh MD [facility]   6/20/2024 12:00 PM Phill Coombs DO [facility]

## 2024-01-23 ENCOUNTER — HOSPITAL ENCOUNTER (OUTPATIENT)
Dept: CT IMAGING | Age: 84
Discharge: HOME | End: 2024-01-25
Attending: INTERNAL MEDICINE
Payer: MEDICARE

## 2024-01-23 DIAGNOSIS — C34.92 ADENOCARCINOMA OF LEFT LUNG (HCC): ICD-10-CM

## 2024-01-23 DIAGNOSIS — R91.1 LUNG NODULE: ICD-10-CM

## 2024-01-23 PROCEDURE — 71250 CT THORAX DX C-: CPT

## 2024-01-24 ENCOUNTER — OFFICE VISIT (OUTPATIENT)
Dept: PULMONOLOGY | Age: 84
End: 2024-01-24
Payer: MEDICARE

## 2024-01-24 VITALS
RESPIRATION RATE: 16 BRPM | HEIGHT: 61 IN | DIASTOLIC BLOOD PRESSURE: 64 MMHG | BODY MASS INDEX: 25.86 KG/M2 | SYSTOLIC BLOOD PRESSURE: 138 MMHG | TEMPERATURE: 96.9 F | HEART RATE: 65 BPM | WEIGHT: 137 LBS | OXYGEN SATURATION: 98 %

## 2024-01-24 DIAGNOSIS — J44.9 CHRONIC OBSTRUCTIVE PULMONARY DISEASE, UNSPECIFIED COPD TYPE (HCC): Primary | ICD-10-CM

## 2024-01-24 DIAGNOSIS — I51.89 DIASTOLIC DYSFUNCTION: ICD-10-CM

## 2024-01-24 DIAGNOSIS — F17.200 SMOKING: ICD-10-CM

## 2024-01-24 DIAGNOSIS — R91.1 LUNG NODULE: ICD-10-CM

## 2024-01-24 DIAGNOSIS — C34.92 ADENOCARCINOMA OF LEFT LUNG (HCC): ICD-10-CM

## 2024-01-24 PROCEDURE — G8417 CALC BMI ABV UP PARAM F/U: HCPCS | Performed by: INTERNAL MEDICINE

## 2024-01-24 PROCEDURE — G8399 PT W/DXA RESULTS DOCUMENT: HCPCS | Performed by: INTERNAL MEDICINE

## 2024-01-24 PROCEDURE — 4004F PT TOBACCO SCREEN RCVD TLK: CPT | Performed by: INTERNAL MEDICINE

## 2024-01-24 PROCEDURE — 3023F SPIROM DOC REV: CPT | Performed by: INTERNAL MEDICINE

## 2024-01-24 PROCEDURE — 3078F DIAST BP <80 MM HG: CPT | Performed by: INTERNAL MEDICINE

## 2024-01-24 PROCEDURE — 1090F PRES/ABSN URINE INCON ASSESS: CPT | Performed by: INTERNAL MEDICINE

## 2024-01-24 PROCEDURE — G8484 FLU IMMUNIZE NO ADMIN: HCPCS | Performed by: INTERNAL MEDICINE

## 2024-01-24 PROCEDURE — 3075F SYST BP GE 130 - 139MM HG: CPT | Performed by: INTERNAL MEDICINE

## 2024-01-24 PROCEDURE — G8427 DOCREV CUR MEDS BY ELIG CLIN: HCPCS | Performed by: INTERNAL MEDICINE

## 2024-01-24 PROCEDURE — 1123F ACP DISCUSS/DSCN MKR DOCD: CPT | Performed by: INTERNAL MEDICINE

## 2024-01-24 PROCEDURE — 99214 OFFICE O/P EST MOD 30 MIN: CPT | Performed by: INTERNAL MEDICINE

## 2024-03-15 DIAGNOSIS — M43.10 RETROLISTHESIS OF VERTEBRAE: ICD-10-CM

## 2024-03-15 DIAGNOSIS — M54.16 LUMBAR BACK PAIN WITH RADICULOPATHY AFFECTING LEFT LOWER EXTREMITY: ICD-10-CM

## 2024-03-15 RX ORDER — TRAZODONE HYDROCHLORIDE 100 MG/1
TABLET ORAL
Qty: 90 TABLET | Refills: 1 | Status: SHIPPED | OUTPATIENT
Start: 2024-03-15

## 2024-03-15 RX ORDER — PREDNISONE 10 MG/1
TABLET ORAL
Qty: 55 TABLET | Refills: 0 | Status: SHIPPED | OUTPATIENT
Start: 2024-03-15

## 2024-03-29 ENCOUNTER — TELEPHONE (OUTPATIENT)
Dept: PULMONOLOGY | Age: 84
End: 2024-03-29

## 2024-03-29 RX ORDER — AZITHROMYCIN 250 MG/1
250 TABLET, FILM COATED ORAL SEE ADMIN INSTRUCTIONS
Qty: 6 TABLET | Refills: 1 | Status: SHIPPED | OUTPATIENT
Start: 2024-03-29 | End: 2024-04-03

## 2024-04-17 NOTE — PROGRESS NOTES
CSubjective:      Patient ID: Carina Kebede is a 83 y.o. female Established patient, here for evaluation of the following chief complaint(s):  Chief Complaint   Patient presents with    Hypertension       HPI     81-year-old female with history of hypertension, chronic kidney disease stage III, dyslipidemia, hyperlipidemia, nicotine dependence presents for follow-up visit of an acutely elevated creatinine and CT scan of the lung which revealed a lung nodule        With Anika Moe.       CKD:She has a history of chronic kidney d, isease stage III however as noted her creatinine is acutely elevated.        Creatinine  0.50 - 0.90 mg/dL 1.08 High  1.21 High  1.12 High  0.92 High  0.92 High  1.28 High  1.83 High    Est, Glom Filt Rate  >60 51.3 Low  44.8 Low  CM 49.2 Low  CM >60.0 CM >60.0 CM 41.9 Low  CM 27.3 Low              Lung nodule: CT scan of the lung revealed nodules as noted below:        A nodule that is seen in the lingula has increased in size from 5.6 mm to 8.6   mm.  Other nodules that  measure less than 5 mm are stable.       RECOMMENDATIONS:   Lung rads 4 x       Findings:  Category 3 or 4 nodules with additional features or imaging   findings that increases the suspicion of malignancy.       Management: Chest CT with or without contrast, PET/CT and/or tissue sampling   depending on the probability of malignancy and comorbidities. PET/CT may be   used when there is a > 8 mm  solid component. For new large nodules that   develop on an annual repeat screening CT, a 1 month LDCT may be recommended   to address potentially infectious or inflammatory conditions.               The patient previously reported bilateral shoulder pain that is characterizes achy, nonradiating, associated with reduced ability to abduct her arms.  Her discomfort is mildly relieved by over-the-counter analgesics    Essential hypertension-compliant with Cozaar 100 mg orally daily, Cardura 4 mg orally daily and Lopressor 25

## 2024-05-01 ENCOUNTER — OFFICE VISIT (OUTPATIENT)
Dept: FAMILY MEDICINE CLINIC | Age: 84
End: 2024-05-01

## 2024-05-01 VITALS
BODY MASS INDEX: 26.62 KG/M2 | HEIGHT: 61 IN | HEART RATE: 66 BPM | DIASTOLIC BLOOD PRESSURE: 64 MMHG | RESPIRATION RATE: 14 BRPM | SYSTOLIC BLOOD PRESSURE: 130 MMHG | OXYGEN SATURATION: 97 % | WEIGHT: 141 LBS

## 2024-05-01 DIAGNOSIS — R73.9 HYPERGLYCEMIA: ICD-10-CM

## 2024-05-01 DIAGNOSIS — N18.31 STAGE 3A CHRONIC KIDNEY DISEASE (HCC): ICD-10-CM

## 2024-05-01 DIAGNOSIS — I10 ESSENTIAL HYPERTENSION: Primary | ICD-10-CM

## 2024-05-01 PROBLEM — I73.9 PERIPHERAL VASCULAR DISEASE OF FOOT (HCC): Status: RESOLVED | Noted: 2022-04-20 | Resolved: 2024-05-01

## 2024-05-01 SDOH — ECONOMIC STABILITY: FOOD INSECURITY: WITHIN THE PAST 12 MONTHS, YOU WORRIED THAT YOUR FOOD WOULD RUN OUT BEFORE YOU GOT MONEY TO BUY MORE.: NEVER TRUE

## 2024-05-01 SDOH — ECONOMIC STABILITY: FOOD INSECURITY: WITHIN THE PAST 12 MONTHS, THE FOOD YOU BOUGHT JUST DIDN'T LAST AND YOU DIDN'T HAVE MONEY TO GET MORE.: NEVER TRUE

## 2024-05-01 SDOH — ECONOMIC STABILITY: INCOME INSECURITY: HOW HARD IS IT FOR YOU TO PAY FOR THE VERY BASICS LIKE FOOD, HOUSING, MEDICAL CARE, AND HEATING?: NOT HARD AT ALL

## 2024-05-01 ASSESSMENT — PATIENT HEALTH QUESTIONNAIRE - PHQ9
SUM OF ALL RESPONSES TO PHQ9 QUESTIONS 1 & 2: 0
SUM OF ALL RESPONSES TO PHQ QUESTIONS 1-9: 0
2. FEELING DOWN, DEPRESSED OR HOPELESS: NOT AT ALL
SUM OF ALL RESPONSES TO PHQ QUESTIONS 1-9: 0
SUM OF ALL RESPONSES TO PHQ QUESTIONS 1-9: 0
1. LITTLE INTEREST OR PLEASURE IN DOING THINGS: NOT AT ALL
SUM OF ALL RESPONSES TO PHQ QUESTIONS 1-9: 0

## 2024-05-31 ENCOUNTER — TELEMEDICINE (OUTPATIENT)
Dept: FAMILY MEDICINE CLINIC | Age: 84
End: 2024-05-31

## 2024-05-31 DIAGNOSIS — Z00.00 MEDICARE ANNUAL WELLNESS VISIT, SUBSEQUENT: Primary | ICD-10-CM

## 2024-05-31 ASSESSMENT — PATIENT HEALTH QUESTIONNAIRE - PHQ9
SUM OF ALL RESPONSES TO PHQ QUESTIONS 1-9: 0
SUM OF ALL RESPONSES TO PHQ QUESTIONS 1-9: 0
2. FEELING DOWN, DEPRESSED OR HOPELESS: NOT AT ALL
1. LITTLE INTEREST OR PLEASURE IN DOING THINGS: NOT AT ALL
SUM OF ALL RESPONSES TO PHQ9 QUESTIONS 1 & 2: 0
SUM OF ALL RESPONSES TO PHQ QUESTIONS 1-9: 0
SUM OF ALL RESPONSES TO PHQ QUESTIONS 1-9: 0

## 2024-05-31 NOTE — PROGRESS NOTES
Medicare Annual Wellness Visit    Carina Kebede is here for Medicare AWV    Assessment & Plan   Medicare annual wellness visit, subsequent    Recommendations for Preventive Services Due: see orders and patient instructions/AVS.  Recommended screening schedule for the next 5-10 years is provided to the patient in written form: see Patient Instructions/AVS.     Return in 1 year (on 5/31/2025).     Subjective       Patient's complete Health Risk Assessment and screening values have been reviewed and are found in Flowsheets. The following problems were reviewed today and where indicated follow up appointments were made and/or referrals ordered.    Positive Risk Factor Screenings with Interventions:                Activity, Diet, and Weight:  On average, how many days per week do you engage in moderate to strenuous exercise (like a brisk walk)?: 0 days  On average, how many minutes do you engage in exercise at this level?: 0 min    Do you eat balanced/healthy meals regularly?: (!) No    There is no height or weight on file to calculate BMI.      Inactivity Interventions:  Patient declined any further interventions or treatment  Do you eat balanced/healthy meals regularly Interventions:  Patient declines any further evaluation or treatment        Dentist Screen:  Have you seen the dentist within the past year?: (!) No    Intervention:  Advised to schedule with their dentist     Vision Screen:  Do you have difficulty driving, watching TV, or doing any of your daily activities because of your eyesight?: No  Have you had an eye exam within the past year?: (!) No  No results found.    Interventions:   Patient encouraged to make appointment with their eye specialist       Tobacco Use:  Tobacco Use: High Risk (1/24/2024)    Patient History     Smoking Tobacco Use: Every Day     Smokeless Tobacco Use: Never     Passive Exposure: Not on file     E-cigarette/Vaping       Questions Responses    E-cigarette/Vaping Use Never User

## 2024-05-31 NOTE — PATIENT INSTRUCTIONS
history including lifestyle, illnesses that may run in your family, and various assessments and screenings as appropriate.    After reviewing your medical record and screening and assessments performed today your provider may have ordered immunizations, labs, imaging, and/or referrals for you.  A list of these orders (if applicable) as well as your Preventive Care list are included within your After Visit Summary for your review.    Other Preventive Recommendations:    A preventive eye exam performed by an eye specialist is recommended every 1-2 years to screen for glaucoma; cataracts, macular degeneration, and other eye disorders.  A preventive dental visit is recommended every 6 months.  Try to get at least 150 minutes of exercise per week or 10,000 steps per day on a pedometer .  Order or download the FREE \"Exercise & Physical Activity: Your Everyday Guide\" from [employer]. Call 1-929.941.8021 or search [employer] online.  You need 8451-6982 mg of calcium and 7304-2087 IU of vitamin D per day. It is possible to meet your calcium requirement with diet alone, but a vitamin D supplement is usually necessary to meet this goal.  When exposed to the sun, use a sunscreen that protects against both UVA and UVB radiation with an SPF of 30 or greater. Reapply every 2 to 3 hours or after sweating, drying off with a towel, or swimming.  Always wear a seat belt when traveling in a car. Always wear a helmet when riding a bicycle or motorcycle.

## 2024-06-09 DIAGNOSIS — I10 ESSENTIAL HYPERTENSION: ICD-10-CM

## 2024-06-10 RX ORDER — CARVEDILOL 12.5 MG/1
TABLET ORAL
Qty: 180 TABLET | Refills: 1 | Status: SHIPPED | OUTPATIENT
Start: 2024-06-10

## 2024-06-20 ENCOUNTER — OFFICE VISIT (OUTPATIENT)
Dept: CARDIOLOGY CLINIC | Age: 84
End: 2024-06-20
Payer: MEDICARE

## 2024-06-20 VITALS
WEIGHT: 139 LBS | BODY MASS INDEX: 26.26 KG/M2 | DIASTOLIC BLOOD PRESSURE: 70 MMHG | HEART RATE: 65 BPM | SYSTOLIC BLOOD PRESSURE: 120 MMHG

## 2024-06-20 DIAGNOSIS — I65.23 BILATERAL CAROTID ARTERY STENOSIS: ICD-10-CM

## 2024-06-20 DIAGNOSIS — I10 HYPERTENSION, UNSPECIFIED TYPE: ICD-10-CM

## 2024-06-20 DIAGNOSIS — I35.1 NONRHEUMATIC AORTIC VALVE INSUFFICIENCY: ICD-10-CM

## 2024-06-20 DIAGNOSIS — Z95.1 HX OF CABG: ICD-10-CM

## 2024-06-20 DIAGNOSIS — I10 ESSENTIAL HYPERTENSION: Primary | ICD-10-CM

## 2024-06-20 DIAGNOSIS — Z72.0 TOBACCO ABUSE: ICD-10-CM

## 2024-06-20 DIAGNOSIS — E78.5 DYSLIPIDEMIA: ICD-10-CM

## 2024-06-20 PROCEDURE — G8417 CALC BMI ABV UP PARAM F/U: HCPCS | Performed by: INTERNAL MEDICINE

## 2024-06-20 PROCEDURE — 93000 ELECTROCARDIOGRAM COMPLETE: CPT | Performed by: INTERNAL MEDICINE

## 2024-06-20 PROCEDURE — 3078F DIAST BP <80 MM HG: CPT | Performed by: INTERNAL MEDICINE

## 2024-06-20 PROCEDURE — 4004F PT TOBACCO SCREEN RCVD TLK: CPT | Performed by: INTERNAL MEDICINE

## 2024-06-20 PROCEDURE — 1123F ACP DISCUSS/DSCN MKR DOCD: CPT | Performed by: INTERNAL MEDICINE

## 2024-06-20 PROCEDURE — 1090F PRES/ABSN URINE INCON ASSESS: CPT | Performed by: INTERNAL MEDICINE

## 2024-06-20 PROCEDURE — 99214 OFFICE O/P EST MOD 30 MIN: CPT | Performed by: INTERNAL MEDICINE

## 2024-06-20 PROCEDURE — G8427 DOCREV CUR MEDS BY ELIG CLIN: HCPCS | Performed by: INTERNAL MEDICINE

## 2024-06-20 PROCEDURE — 3074F SYST BP LT 130 MM HG: CPT | Performed by: INTERNAL MEDICINE

## 2024-06-20 PROCEDURE — G8399 PT W/DXA RESULTS DOCUMENT: HCPCS | Performed by: INTERNAL MEDICINE

## 2024-06-20 NOTE — PROGRESS NOTES
Chief Complaint   Patient presents with    Hypertension    1 Year Follow Up       11/19/2020: Patient presents for initial medical evaluation. Patient is followed on a regular basis by Fabio Valdez MD recently moved from [place].   Hx of CAD s/p CABGx5 in 2010.  Status post negative nuclear stress test in 2017.  Pt denies chest pain, dyspnea, dyspnea on exertion, change in exercise capacity, fatigue,  nausea, vomiting, diarrhea, constipation, motor weakness, insomnia, weight loss, syncope, dizziness, lightheadedness, palpitations, PND, orthopnea, or claudication. No hx of PAD . No hx of CHF of arrhythmia.   He continues to smoke approximate 10 cigarettes a day.   Status post carotid ultrasound on April 2020 with 60% stenosis in the left and right internal carotid arteries.    1/7/2021: Status post echocardiogram ejection fraction of 55%, 1-2+ aortic regurgitation, 1+ mitral and tricuspid regurgitation, RVSP of 27 mmHg, grade 2 diastolic dysfunction.  Status post abdominal aorta ultrasound with no evidence of any aneurysm.  She will follow up with primary care Dr. Smallwood as well as nephrology. Pt denies chest pain, dyspnea, dyspnea on exertion, change in exercise capacity, fatigue,  nausea, vomiting, diarrhea, constipation, motor weakness, insomnia, weight loss, syncope, dizziness, lightheadedness, palpitations, PND, orthopnea, or claudication.  she continues to smoke. Has CKD.  Blood pressure is 136/71 heart rate of 56 bpm.  With history of coronary disease status post CABG x5 in 2010    10-14-21: Pt denies chest pain, dyspnea, dyspnea on exertion, change in exercise capacity, fatigue,  nausea, vomiting, diarrhea, constipation, motor weakness, insomnia, weight loss, syncope, dizziness, lightheadedness, palpitations, PND, orthopnea, or claudication. No nitro use. BP and hr are good. CAD is stable. No LE discoloration or ulcers. No LE edema. No CHF type symptoms. Lipid profile is normal. No recent

## 2024-06-26 ENCOUNTER — HOSPITAL ENCOUNTER (OUTPATIENT)
Dept: ULTRASOUND IMAGING | Age: 84
Discharge: HOME | End: 2024-06-28
Payer: MEDICARE

## 2024-06-26 DIAGNOSIS — I65.23 BILATERAL CAROTID ARTERY STENOSIS: ICD-10-CM

## 2024-06-26 PROCEDURE — 93880 EXTRACRANIAL BILAT STUDY: CPT

## 2024-06-27 LAB
VAS LEFT CCA DIST EDV: 23.6 CM/S
VAS LEFT CCA DIST PSV: 109 CM/S
VAS LEFT CCA MID EDV: 16.2 CM/S
VAS LEFT CCA MID PSV: 93.2 CM/S
VAS LEFT CCA PROX EDV: 17.2 CM/S
VAS LEFT CCA PROX PSV: 115 CM/S
VAS LEFT ECA EDV: 6.27 CM/S
VAS LEFT ECA PSV: 128 CM/S
VAS LEFT ICA DIST EDV: 28.2 CM/S
VAS LEFT ICA DIST PSV: 115 CM/S
VAS LEFT ICA MID EDV: 22.7 CM/S
VAS LEFT ICA MID PSV: 135 CM/S
VAS LEFT ICA PROX EDV: 32.9 CM/S
VAS LEFT ICA PROX PSV: 212 CM/S
VAS LEFT ICA/CCA PSV: 2.27
VAS LEFT VERTEBRAL PSV: -31.9 CM/S
VAS RIGHT CCA DIST EDV: 14.8 CM/S
VAS RIGHT CCA DIST PSV: 97.1 CM/S
VAS RIGHT CCA MID EDV: 22.4 CM/S
VAS RIGHT CCA MID PSV: 117 CM/S
VAS RIGHT CCA PROX EDV: 22 CM/S
VAS RIGHT CCA PROX PSV: 125 CM/S
VAS RIGHT ECA EDV: 5.49 CM/S
VAS RIGHT ECA PSV: 86.2 CM/S
VAS RIGHT ICA DIST EDV: 19.6 CM/S
VAS RIGHT ICA DIST PSV: 116 CM/S
VAS RIGHT ICA MID EDV: 24.3 CM/S
VAS RIGHT ICA MID PSV: 115 CM/S
VAS RIGHT ICA PROX EDV: 16.5 CM/S
VAS RIGHT ICA PROX PSV: 140 CM/S
VAS RIGHT ICA/CCA PSV: 1.2
VAS RIGHT VERTEBRAL EDV: 14.1 CM/S
VAS RIGHT VERTEBRAL PSV: 82.3 CM/S

## 2024-07-15 ENCOUNTER — HOSPITAL ENCOUNTER (OUTPATIENT)
Dept: CT IMAGING | Age: 84
Discharge: HOME | End: 2024-07-17
Attending: INTERNAL MEDICINE
Payer: MEDICARE

## 2024-07-15 DIAGNOSIS — R91.1 LUNG NODULE: ICD-10-CM

## 2024-07-15 PROCEDURE — 71250 CT THORAX DX C-: CPT

## 2024-07-25 ENCOUNTER — TELEPHONE (OUTPATIENT)
Dept: PULMONOLOGY | Age: 84
End: 2024-07-25

## 2024-07-25 ENCOUNTER — OFFICE VISIT (OUTPATIENT)
Dept: PULMONOLOGY | Age: 84
End: 2024-07-25
Payer: MEDICARE

## 2024-07-25 VITALS
SYSTOLIC BLOOD PRESSURE: 150 MMHG | TEMPERATURE: 97.1 F | HEART RATE: 55 BPM | RESPIRATION RATE: 16 BRPM | BODY MASS INDEX: 26.77 KG/M2 | HEIGHT: 61 IN | DIASTOLIC BLOOD PRESSURE: 64 MMHG | WEIGHT: 141.8 LBS | OXYGEN SATURATION: 97 %

## 2024-07-25 DIAGNOSIS — F17.200 SMOKING: ICD-10-CM

## 2024-07-25 DIAGNOSIS — R91.1 LUNG NODULE: Primary | ICD-10-CM

## 2024-07-25 DIAGNOSIS — J44.9 CHRONIC OBSTRUCTIVE PULMONARY DISEASE, UNSPECIFIED COPD TYPE (HCC): ICD-10-CM

## 2024-07-25 DIAGNOSIS — I51.89 DIASTOLIC DYSFUNCTION: ICD-10-CM

## 2024-07-25 DIAGNOSIS — C34.92 ADENOCARCINOMA OF LEFT LUNG (HCC): ICD-10-CM

## 2024-07-25 PROCEDURE — 3078F DIAST BP <80 MM HG: CPT | Performed by: INTERNAL MEDICINE

## 2024-07-25 PROCEDURE — 1123F ACP DISCUSS/DSCN MKR DOCD: CPT | Performed by: INTERNAL MEDICINE

## 2024-07-25 PROCEDURE — 99214 OFFICE O/P EST MOD 30 MIN: CPT | Performed by: INTERNAL MEDICINE

## 2024-07-25 PROCEDURE — 3023F SPIROM DOC REV: CPT | Performed by: INTERNAL MEDICINE

## 2024-07-25 PROCEDURE — 4004F PT TOBACCO SCREEN RCVD TLK: CPT | Performed by: INTERNAL MEDICINE

## 2024-07-25 PROCEDURE — 3077F SYST BP >= 140 MM HG: CPT | Performed by: INTERNAL MEDICINE

## 2024-07-25 PROCEDURE — G8427 DOCREV CUR MEDS BY ELIG CLIN: HCPCS | Performed by: INTERNAL MEDICINE

## 2024-07-25 PROCEDURE — 1090F PRES/ABSN URINE INCON ASSESS: CPT | Performed by: INTERNAL MEDICINE

## 2024-07-25 PROCEDURE — G8399 PT W/DXA RESULTS DOCUMENT: HCPCS | Performed by: INTERNAL MEDICINE

## 2024-07-25 PROCEDURE — G8417 CALC BMI ABV UP PARAM F/U: HCPCS | Performed by: INTERNAL MEDICINE

## 2024-07-25 RX ORDER — UMECLIDINIUM BROMIDE AND VILANTEROL TRIFENATATE 62.5; 25 UG/1; UG/1
1 POWDER RESPIRATORY (INHALATION) DAILY
Qty: 3 EACH | Refills: 2 | Status: SHIPPED | OUTPATIENT
Start: 2024-07-25

## 2024-07-25 RX ORDER — ALBUTEROL SULFATE 90 UG/1
2 AEROSOL, METERED RESPIRATORY (INHALATION) 4 TIMES DAILY PRN
Qty: 18 G | Refills: 0 | Status: SHIPPED | OUTPATIENT
Start: 2024-07-25

## 2024-07-25 RX ORDER — CIPROFLOXACIN HYDROCHLORIDE 250 MG/1
TABLET, FILM COATED ORAL
COMMUNITY
Start: 2024-07-24

## 2024-07-25 NOTE — PROGRESS NOTES
Subjective:     Carina Kebede is a 84 y.o. female who complains today of:     Chief Complaint   Patient presents with    Results     CT Chest    Follow-up     6 Month F/U for COPD and Lung Nodule       HPI   Patient presents for COPD and lung nodule    7/25/2024  Presents for follow-up, doing good, no coughing, no chest pain, weight is stable, no nasal congestion or postnasal drip, no heartburn.  No lower extremity edema.      1/24/2024  Presents for follow-up, doing good, symptoms controlled currently on Anoro, no chest pain, no shortness of breath, no coughing, weight is stable, no nasal congestion or post nasal drip and no heartburn.  Continues to smoke unfortunately  7/13/2023  Doing good, symptoms controlled, no coughing, no chest pain, she tolerates Anoro well, no lower extremity edema, no nasal congestion or postnasal drip and no heartburn.  Weight is stable, unfortunately continues to smoke.    3/9/2023  Patient presents for follow-up, she is doing better, currently on Stiolto but she does not like this particular inhaler mainly because she does not feel she is getting any of the medication, otherwise no shortness of breath, no chest pain, minimal coughing, no lower extremity edema, no fever no chills, no nasal congestion or postnasal drip and no heartburn.  Weight is stable        Allergies:  Amoxicillin, Nsaids, and Prednisone  Past Medical History:   Diagnosis Date    Bilateral carotid artery stenosis 11/19/2020    CAD (coronary artery disease)     Carotid bruit     Dyslipidemia 11/19/2020    Essential hypertension 11/19/2020    Hx of CABG 11/19/2020    Hyperlipidemia     Kidney disease     PONV (postoperative nausea and vomiting)     Stage 4 chronic kidney disease (HCC) 11/19/2020    Stenosis of noncoronary bypass graft (HCC)     Tobacco abuse 11/19/2020     Past Surgical History:   Procedure Laterality Date    BACK SURGERY  2003    BRAIN ANEURYSM SURGERY  2012    BREAST LUMPECTOMY Right     CARPAL

## 2024-07-25 NOTE — TELEPHONE ENCOUNTER
PHARMACY CALLED IN TO THE OFFICE BECAUSE THE PT IS ON BETA BLOCKER AND YOU SENT ALBUTEROL SHE SAID USING THESE TOGETHER CAN DECREASE THE EFFECTIVENESS OF BOTH MEDICATIONS.      IS IT OK TO PROCEED WITH FILLING THIS MEDICATION?

## 2024-07-29 NOTE — PROGRESS NOTES
Outpatient Diabetes Medical Nutrition Therapy Note    The patient and wife Leslye were seen for Type 2 diabetes in an outpatient setting. The instruction was provided to the patient via Zoom but no camera encounter.  Consent for virtual visit, to bill insurance, and to share information with other providers was received.    07/09/24  Ashley Coto DO      E11.65, Z79.4 (ICD-10-CM) - Type 2 diabetes mellitus with hyperglycemia, with long-term current use of insulin  (CMD)          Readiness to Learning:  Readiness to learn: The patient demonstrates the ability to understand and asks questions.    Assessment:  7/29/24: Email request to share Dexcom data.   7/30/24: Initial RD session.   Diet recall reveals: Consuming sugar throughout the day. High intake of processed food and restaurant foods. Minimal intake of whole grain, non starchy veg. Avg glucose: 141 with decent time in range, however this is a reflection of high and low glucose swings 24/7 vs majority of tie between  mg/dl.     Patient Active Problem List   Diagnosis    Type 2 diabetes mellitus with hyperglycemia, with long-term current use of insulin  (CMD)    Acquired hypothyroidism    Essential hypertension, checking BP at home     Impaired renal function, reports not currently monitored by nephrologist     Hypertriglyceridemia    Primary osteoarthritis of right hip   Marijuana usage     Current Outpatient Medications   Medication Sig    levothyroxine 137 MCG tablet TAKE 1 TABLET BY MOUTH DAILY    insulin glargine (Lantus SoloStar) 100 UNIT/ML pen-injector  30 U   Per pt taking 20-25 U     HumaLOG KwikPen 100 UNIT/ML pen-injector  45U with the meals   Per pt taking 15-18 U with meals      metformin (GLUCOPHAGE) 1000 MG tablet  BID  TAKE 1 TABLET BY MOUTH EVERY 12 HOURS    omeprazole (PrilOSEC) 20 MG capsule TAKE 1 CAPSULE BY MOUTH DAILY    Continuous Blood Gluc Sensor (Dexcom G7 Sensor) Misc Change Sensor every 10 days.    Please provide 90 day supply.  [address]  PHYSICAL THERAPY EVALUATION      Physical Therapy: Initial Evaluation    Patient: Ananya Lagunas (52 y.o.     female)   Examination Date:   Plan of Care Certification Period: 2022 to 22  Progress Note Counter: - (30 day PN due 22)   :  1940 ;    Confirmed: Yes MRN: [id number]  CSN: [id number]   Insurance: Payor: MEDICARE / Plan: MEDICARE PART A AND B / Product Type: *No Product type* /   Insurance ID: [id number] - (Medicare) Secondary Insurance (if applicable):  C[employer]   Referring Physician: Ras Colindres MD     PCP: Zeyad Concepcion MD Visits to Date/Visits Approved:     No Show/Cancelled Appts: 0  0     Medical Diagnosis: Radiculopathy, cervical region [M54.12] Cervical radiculopathy  Treatment Diagnosis: increased neck & bilateral UE pain, decreased pain free cervical & bilateral UE ROM & strength, decreased ability to perform ADLs without pain, & decreased functional endurance & postural awareness     [facility]   Patient Assessed for Rehabilitation Services: Yes  Self reported health status[de-identified] Good    Medical History: Chart Reviewed: Yes   Past Medical History:   Diagnosis Date    Bilateral carotid artery stenosis 2020    CAD (coronary artery disease)     Carotid bruit     Dyslipidemia 2020    Essential hypertension 2020    Hx of CABG 2020    Hyperlipidemia     Kidney disease     Stage 4 chronic kidney disease ([address] 2020    Stenosis of noncoronary bypass graft [address] [address]     Tobacco abuse 2020     Surgical History:   Past Surgical History:   Procedure Laterality Date    BACK SURGERY  2003    BRAIN ANEURYSM SURGERY  2012    BREAST LUMPECTOMY Right     CARPAL TUNNEL RELEASE Bilateral     CORONARY ARTERY BYPASS GRAFT      PARTIAL HYSTERECTOMY (CERVIX NOT REMOVED)  1997    TOTAL HIP ARTHROPLASTY Bilateral left side 2017 right side     ondansetron (Zofran) 4 MG tablet Take 1 tablet by mouth every 8 hours as needed for Nausea. (Patient not taking: Reported on 3/22/2024)    HYDROcodone-acetaminophen (NORCO) 5-325 MG per tablet Take 1 tablet by mouth every 4 hours as needed for Pain.    pregabalin (LYRICA) 75 MG capsule Take 1 capsule by mouth every 12 hours.    aspirin (aspirin EC) 81 MG EC tablet Take 1 tablet by mouth every 12 hours.    valsartan (DIOVAN) 160 MG tablet Take 1 tablet by mouth daily.    atorvastatin (LIPITOR) 40 MG tablet Take 1 tablet by mouth daily.    fenofibrate (TRICOR) 54 MG tablet Take 1 tablet by mouth daily.    Omega-3 Fatty Acids (Fish Oil) 435 MG Cap Take 4 capsules by mouth in the morning and 4 capsules in the evening. 4 caps BID    sildenafil (REVATIO) 20 MG tablet Take 2-3 tablets by mouth 1 hour before needed.   Per pt, did not tolerate GlP1 long term      Recent Labs:  Hemoglobin A1C (%)   Date Value   03/22/2024 7.7 (H)   12/28/2023 8.4 (H)     Glucose (mg/dL)   Date Value   03/22/2024 150 (H)     Cholesterol (mg/dL)   Date Value   12/28/2023 134   03/06/2023 127     HDL (mg/dL)   Date Value   12/28/2023 36 (L)   03/06/2023 30 (L)     LDL (mg/dL)   Date Value   12/28/2023 72   03/06/2023 70     Triglycerides (mg/dL)   Date Value   12/28/2023 129   03/06/2023 135     No results found for: \"LDLDIR\"  BUN (mg/dL)   Date Value   03/22/2024 17   12/28/2023 30 (H)     Creatinine (mg/dL)   Date Value   03/22/2024 1.75 (H)   12/28/2023 1.65 (H)     Glomerular Filtration Rate (no units)   Date Value   03/22/2024 42 (L)   12/28/2023 45 (L)     No results found for: \"VITD25\"  No results found for: \"IRON\"    3/22/24   Potassium 5.5 >NL     New diabetes diagnosis? No    ALLERGIES:   Allergen    Liraglutide     Saxenda : took appetite away       Prior nutrition education?  No  Recent diet pattern: eating and drinking sugar throughout the day reactively to the Dexcom data   Eating out habits: 50% from restaurants   Grocery  Medications:   Current Outpatient Medications:     methylPREDNISolone (MEDROL DOSEPACK) 4 MG tablet, On days 1-6 take as directed on package for first 6-day course. On days 7-12 take as directed on (second) package for (second) 6-day course., Disp: 42 tablet, Rfl: 0    traZODone (DESYREL) 100 MG tablet, TAKE 1 TABLET BY MOUTH EVERY DAY AT NIGHT, Disp: 90 tablet, Rfl: 1    metoprolol tartrate (LOPRESSOR) 25 MG tablet, TAKE 1 TABLET BY MOUTH TWICE A DAY, Disp: 180 tablet, Rfl: 3    losartan (COZAAR) 100 MG tablet, TAKE 1 TABLET BY MOUTH EVERY DAY, Disp: 90 tablet, Rfl: 3    atorvastatin (LIPITOR) 40 MG tablet, TAKE 1 TABLET BY MOUTH EVERY DAY, Disp: 90 tablet, Rfl: 3    furosemide (LASIX) 40 MG tablet, TAKE 1 TABLET BY MOUTH TWICE A DAY, Disp: 180 tablet, Rfl: 3    doxazosin (CARDURA) 4 MG tablet, TAKE 1 TABLET AT BEDTIME, Disp: 90 tablet, Rfl: 3    Handicap Placard MISC, by Does not apply route Applying from 1/28/2021-1/27/2026., Disp: 1 each, Rfl: 0    aspirin 81 MG EC tablet, Take 81 mg by mouth daily, Disp: , Rfl:   Allergies: Amoxicillin and Nsaids      SUBJECTIVE EXAMINATION     History obtained from[de-identified] Patient,Chart Review,      Family/Caregiver Present: Yes (daughter)    Subjective History: Onset Date:  (~months ago)  Subjective: Patient reports on-going bilateral shoulder/UE pain for a few months with no known FAMILIA. Patient reports intermittently using heat for pain relief. Patient denies falling in the past 6 months. Patient given steroids which decreased pain while on them, however, pain returned. Patient denies any prior head injuries/neck injuries. Patient denies any prior PT for this issue. Patient denies any prior neck or bilateral shoulder surgeries. Patient takes Tylenol arthritis regularly.   Additional Pertinent Hx (if applicable): metal implants, arthritis, chronic pain, heart disease, kidney disease, heart attack, high blood pressure (controlled), bilateral hip replacements, low back surgery, open heart surgery   Prior diagnostic testing[de-identified] X-ray  Comments: RTD = TBD; XRAY 5/2022: bilateral scapulas negative; PLOF = 100% & CLOF = <100%      Learning/Language: Learning  Does the patient/guardian have any barriers to learning?: No barriers  Will there be a co-learner?: No  What is the preferred language of the patient/guardian?: English  Is an  required?: No  How does the patient/guardian prefer to learn new concepts?: Listening,Reading,Demonstration     Pain Screening    Pain Screening  Patient Currently in Pain: Yes  Pain Assessment: 0-10  Pain Level: 6  Best Pain Level: 5  Worst Pain Level: 9  Date pain first started:  (~months ago)  Patient's Stated Pain Goal: 4  Pain Type: Acute pain,Chronic pain  Pain Location: Shoulder,Neck  Pain Orientation: Left,Right,Posterior,Anterior,Upper  Pain Radiating Towards: bilateral hands/fingers  Pain Descriptors: Burning  Pain Frequency: Continuous  Pain Onset: On-going  Functional Pain Assessment: Prevents or interferes with many active not passive activities  Pain Management/Relieving Factors: Rest,Medications    Functional Status    Social History:    Social History  Lives With: Alone  Type of Home: House  Home Layout: One level    Occupation/Interests:   Occupation: Retired    Prior Level of Function:     Independent      Current Level of Function:   Independent      ADL Assistance: Independent  Homemaking Assistance: Independent  Homemaking Responsibilities: Yes  Transfer Assistance: Independent  Active : Yes  Mode of Transportation: Car    Dominant Hand: : Right    OBJECTIVE EXAMINATION     Review of Systems:  Vision: Impaired  Visual Deficits: Wears glasses  Hearing: Exceptions to [facility]  Hearing Exceptions: Hard of hearing/hearing concerns  Overall Orientation Status: Within Normal Limits  Patient affect[de-identified] Normal  Follows Commands: Within Functional Limits    Observations:   General Observations  General Observations: shopping/cooking: pt and wife.   People in the home: pt and wife, and 8 adult/children, one dania is a nurse at [employer]    Food Insecurity: Low Risk  (7/30/2024)    Food Insecurity     Worried about Food: Never true     Food is Gone: Never true        Awake time: 5 am   Typical breakfast time: 10 -11 am usual appetite   Typical breakfast intake: coffee 1 pot, w powdered cream and cereal honey nut cheerios or shredded wheat w 2% milk,  or toast or eggs and guo     Typical snack times: mid morning   Typical snack intake: sometimes an apple or a peach     Typical lunch time: 11:30-12:30   Typical lunch intake: PB&J or a turkey sand on white + chips and 1/2 glass 2%     Typical snack times: mid afternoon   Typical snack intake: fruit or a carb snack or sugar     Typical dinner time: 4-6 pm   Typical dinner intake: pizza 1 x a week, chinese food, roast beef and fries, milk or soda or juice     Typical snack times: after dinner   Typical snack intake: tries to eat dessert can not go to bed if glucose is under 150   Asleep time: 8 pm   Restorative sleep? Often drinks juice several times overnight     Types of beverages: see above   Alcohol consumption: none x 15 years     Physical activity: walks dogs, has a big yard, sit in between chores     Anthropometric Measurements:  Ht Readings from Last 1 Encounters:   06/13/24 5' 11\" (1.803 m)     Wt Readings from Last 1 Encounters:   06/13/24 117 kg (258 lb)     BMI Readings from Last 1 Encounters:   06/13/24 35.98 kg/m²     BMI classification: severely obese (BMI 35 - 44.9)    Wt Readings from recent Encounters:   07/30/24 260#    06/13/24 117 kg (258 lb)   03/22/24 108 kg (238 lb)   12/28/23 115.2 kg (254 lb)   09/06/23 117.5 kg (259 lb 0.7 oz)   08/19/23 107.5 kg (237 lb)   05/25/23 110.5 kg (243 lb 11.2 oz)   03/06/23 110.7 kg (244 lb)   11/08/22 103.4 kg (228 lb)   07/18/22 110.5 kg (243 lb 9.6 oz)   03/10/22 113.4 kg (250 lb)   11/24/21 112.5 kg (248 lb)   08/05/21  111.1 kg (245 lb)        Weight loss desired?: Yes  Goal weight: 190-200#   5-20% for metabolic protection   Lost 100# Victoza over 6 months, but followed by period on intolerance     Weight maintenance estimated daily calorie needs (Marika Sorensen): 1960 x 1.2 = 2350   Weight loss estimated daily calorie needs: 1900   NOTE: weight loss estimated needs may be lower based on dieting history.     117  Kg  x   0.8  = 95 gm lean protein, 20% GFR noted     117  Kg  x  25   = 2935 ml fluid, 98 oz        Self-Monitoring BG Data, Dexcom                 Nutrition Diagnosis:  Limited adherence to nutrition-related recommendations related to poor understanding  as evidenced by hemoglobin A1C of > 7.0%    Nutrition Intervention:  Effect of carbohydrate, protein, and fat on blood sugars, understanding nutrition label reading for meal planning, understanding healthy food preparations, effect of modest weight loss, if overweight, on blood sugar control, dietary modifications to lower blood pressure, introduction of Plate method , and first steps for meal planning      Macronutrient Recommendations:      190-235  gm Complex Carbohydrate per day, 40-50% total calories            Nutrition Counseling:  Motivational interviewing, Goal setting, Self-monitoring, and Problem solving.      Print/Written Resources Provided:   Planning Healthy Meals - DMII Advanced   Protein lists, renal   K+ lists   Mediterranean, DASH cookbook info   Sugar free beverages   Pro + carb snack ideas   [employer]ace[employer]Monitor My Meds     Goal Setting:  Patient selected goal of:   Aim for 1900 kcals, 130 gm complex carbs if sedentary, up to 200 gm if active, 30 gm fiber, 98 oz sugar free beverage daily.   3 meal pattern targets:        600-650 Kcals       65 Gm complex carb if active        10 Gm fiber        30 Gm lean protein         =/< 20 Gm cardioprotective fats        32 Oz sugar free beverage   Do not skip meals. Ok to save some kcals/carbs for snacks in/b  Yes  Description: forward head, rounded shoulders, tenderness/tightness to palpation of cervical paraspinals & SCMs & upper traps, slight tenderness to palpation of C7 with decreased motion noted    Left AROM  Right AROM         AROM LUE (degrees)  LUE AROM :  (pain)  LUE General AROM: shoulder flexion = 88*; shoulder abduction = 83*; shoulder IR = T12; shoulder ER = top of shoulder; shoulder extension = WNL    AROM RUE (degrees)  RUE AROM :  (pain)  RUE General AROM: shoulder flexion = 108*; shoulder abduction = 83*; shoulder IR = T12; shoulder ER = C7        Left PROM  Right PROM         PROM LUE (degrees)  LUE PROM:  (pain)  LUE General PROM: shoulder flexion = 128*; shoulder abduction = 102*; shoulder IR = full; shoulder ER = 45*    PROM RUE (degrees)  RUE PROM:  (pain)  RUE General PROM: shoulder flexion = 130*; shoulder abduction = 101*; shoulder IR = 45*; shoulder ER = full        Left Strength  Right Strength         Strength LUE  Comment: shoulder flexion = 3-/5; shoulder abduction = 3-/5; shoulder IR = 3-/5; shoulder ER = 3-/5; shoulder extension = 3/5    Strength RUE  Comment: shoulder flexion = 3-/5; shoulder abduction = 3-/5; shoulder IR = 3-/5; shoulder ER = 3-/5; shoulder extension = 3/5     Cervical Assessment        Cervical: flexion = 25*; extension = 35*; left side bend = 22*; right side bend = 22*; left rotation = 60*; right rotation = 79* (minimal pain)        Outcomes Score:  Exam: UEFI = 34/80; NDI = 15/50    Treatment:    Exercises:   Exercises  Exercise 1: table slides, 1 set x 5 reps x 5 second hold each direction each UE, flexion & scaption  Exercise 2: pulleys*  Exercise 3: scapular retraction, 1 set x 10 reps x 5 second hold  Exercise 4: wall slides*  Exercise 5: cervical retraction*  Exercise 6: SCM/upper trap stretches*  Exercise 7: supine cervical retraction*  Exercise 8: bilateral shoulder PROM*  Exercise 9: shoulder AAROM*  Exercise 10: shoudler AROM*  Exercise 20: HEP: table meals.   - Aim for 12 hr overnight fasting window   - Limiting eating or drinking sugar overnight   Patient is meeting goal 0 % (None of the Time)    Patient selected goal of:   [place] each meal with lean protein, favor meat free options.   [place] inclusion of non-starchy vegetables, favor low K+ options.   Majority of carbs from whole grains, beans/legumes, vegetables, fruit and low fat dairy foods.   Limit refined starches, added sugars [including juice and soda] and processed meats to 0-1 serving per day.   Include cardio protective fats within calorie targets including olives, avocado, nuts and seeds, egg yolks, fatty fish and dark [ > 80%] chocolate.   Patient is meeting goal 0 % (None of the Time)     Patient selected goal of:   - CARDIO-RESPIRATORY HEALTH     Reduce Sedentary time throughout the day.   Move body for 10-15 mins after each meal.   Aim for 6000 step equivalents daily   30 minutes, 3x per week of aerobic activity like walking, cycling, water exercise, dancing or jogging at a level that increases your breathing rate and heart rate.  - SARCOPENIA REPLETION/BONE HEALTH exercises that use and strengthen the muscles of your whole body at least 2x per week, 2 sets/8-15 reps  Patient is meeting goal 0 % (None of the Time)    Recommended Follow-up:  Follow-up appointment: Tues Aug 27th @ 8:30 am Zoom   9/3/24 DM RN Initial session, perform Inbody scan   [facility]: [phone]   The patient was encouraged to e-mail if any questions or concerns.    Patient was seen for 60 minutes    Thank you for your referral.  Please contact me with any question/concerns.    Miracle Cochran MS, RD, , LDN   Educator Dietitian, [facility]  [facility]  [address], [place] [address]    slides, scapular retraction     Modalities:  Modalities:  (CP/HP bilateral shoulders & neck*)     Manual:  Manual Therapy  Joint Mobilization: cervical region* bilateral shoulders*  Soft Tissue Mobilizaton: cervical region* bilateral shoulders*     *Indicates exercise,modality, or manual techniques to be initiated when appropriate       ASSESSMENT     Impression: Assessment: Patient is an [de-identified]year old female who presents with acute/chronic neck & bilateral UE pain that began ~few months ago. Patient demonstrates increased neck & bilateral UE pain, decreased pain free cervical & bilateral UE ROM & strength, decreased ability to perform ADLs without pain, & decreased functional endurance & postural awareness. Patient would benefit from outpatient PT services in order to address these impairments as well as improve patient's QOL & ease with ADLs. Body Structures, Functions, Activity Limitations Requiring Skilled Therapeutic Intervention: Decreased ADL status,Decreased ROM,Decreased strength,Decreased endurance,Increased pain,Decreased posture    Statement of Medical Necessity: Physical Therapy is both indicated and medically necessary as outlined in the POC to increase the likelihood of meeting the functionally related goals stated below.      Patient's Activity Tolerance: Patient tolerated evaluation without incident,Patient tolerated treatment well      Patient's rehabilitation potential/prognosis is considered to be: Good    Factors which may impact rehabilitation potential include:       Patient Education: PT [place] [facility],Evaluative findings,Insurance,Home Exercise Program      GOALS   Patient Goal(s): Patient goals : \"decrease pain\"    Short Term Goals Completed by 2-4 weeks Goal Status   The patient will demonstrate improved postural awareness requiring <25% verbal cueing during functional mobility tasks & exercises New   The patient will have a decrease in NDI score >/=6 points in order to increase functional activity tolerance New   The patient will have an increase in UEFI score >/=9 points in order to increase functional activity tolerance New     Long Term Goals Completed by 4-6 weeks Goal Status   The patient will improve pain free cervical & bilateral UE AROM >/= 10-15* in order to increase ability to perform functional mobility tasks efficiently. New   The patient will demonstrate competency with HEP to progress towards self management of symptoms upon D/C New   The patient will report decreased neck & bilateral shoulder pain </=4/10 consistently in order to improve ability to perform functional mobility tasks New   The patient will demonstrate improved cervical & B UE strength >/=4/5 in order to lift/carry with decreased pain New          TREATMENT PLAN       Requires PT Follow-Up: Yes    Treatment may include any combination of the following: Strengthening,ROM,Functional mobility training,Modalities,Positioning,Therapeutic activities,Patient/Caregiver education & training,Safety education & training,Home exercise program,Pain management,Manual Therapy - Soft Tissue Mobilization,Manual Therapy - Joint Manipulation,Endurance training,ADL/Self-care training,Neuromuscular re-education,Aquatics     Frequency / Duration:  Patient to be seen 2xs/wk times per week for 4-6 weeks weeks  Plan Comment:               Eval Complexity:   Decision Making: Medium Complexity  History: Personal Factors and/or Comorbidities Impacting POC: Medium  Examination of body system(s) including body structures and functions, activity limitations, and/or participation restrictions: Medium  Exam: UEFI = 34/80; NDI = 15/50  Clinical Presentation: Medium    POST-PAIN     Pain Rating (0-10 pain scale):   \"same\"/10  Location and pain description same as pre-treatment unless indicated.    Action: [] NA  [] Call Physician  [x] Perform HEP  [] Meds as prescribed    Evaluation and patient rights have been reviewed and patient agrees with plan of care. Yes  [x]  No  []   Explain:     Sow Fall Risk Assessment  Risk Factor Scale  Score   History of Falls [] Yes  [x] No 25  0    Secondary Diagnosis [] Yes  [x] No 15  0    Ambulatory Aid [] Furniture  [] Crutches/cane/walker  [x] None/bedrest/wheelchair/nurse 30  15  0    IV/Heparin Lock [] Yes  [x] No 20  0    Gait/Transferring [] Impaired  [] Weak  [x] Normal/bedrest/immobile 20  10  0    Mental Status [] Forgets limitations  [x] Oriented to own ability 15  0       Total:0     Based on the Assessment score: check the appropriate box.   [x]  No intervention needed   Low =   Score of 0-24  []  Use standard prevention interventions Moderate =  Score of 24-44   [] Discuss fall prevention strategies   [] Indicate moderate falls risk on eval  []  Use high risk prevention interventions High = Score of 45 and higher   [] Discuss fall prevention strategies   [] Provide supervision during treatment time      Minutes:  PT Individual Minutes  Time In: 1055  Time Out: 1145  Minutes: 50  Timed Code Treatment Minutes: 15 Minutes  Procedure Minutes: 35 minutes evaluation     Timed Activity Minutes Units   Ther Ex 15 1       Electronically signed by Felipe Reynolds PT on 6/29/22 at 11:54 AM EDT

## 2024-08-01 ENCOUNTER — OFFICE VISIT (OUTPATIENT)
Dept: CARDIOTHORACIC SURGERY | Age: 84
End: 2024-08-01

## 2024-08-01 VITALS
TEMPERATURE: 98.1 F | HEIGHT: 61 IN | HEART RATE: 66 BPM | OXYGEN SATURATION: 98 % | BODY MASS INDEX: 26.62 KG/M2 | WEIGHT: 141 LBS

## 2024-08-01 DIAGNOSIS — R91.8 GROUND GLASS OPACITY PRESENT ON IMAGING OF LUNG: Primary | ICD-10-CM

## 2024-08-01 DIAGNOSIS — R91.1 LUNG NODULE: Primary | ICD-10-CM

## 2024-08-01 DIAGNOSIS — E04.1 LEFT THYROID NODULE: Primary | ICD-10-CM

## 2024-08-01 ASSESSMENT — ENCOUNTER SYMPTOMS
ABDOMINAL DISTENTION: 0
CHEST TIGHTNESS: 0
WHEEZING: 0
DIARRHEA: 0
STRIDOR: 0
NAUSEA: 0
VOMITING: 0
TROUBLE SWALLOWING: 0
SORE THROAT: 0
CHOKING: 0
COUGH: 0
SHORTNESS OF BREATH: 1
VOICE CHANGE: 0
ABDOMINAL PAIN: 0

## 2024-08-01 NOTE — PROGRESS NOTES
Subjective:      Patient ID: Carina Kebede is a 84 y.o. female who presents today for:  Chief Complaint   Patient presents with    Pulmonary Nodule       HPI  Patient was treated a year and a half ago with a left lobectomy for a [id number] adenocarcinoma.  She did well with that surgery and has been followed by pulmonary medicine for her surveillance CAT scans.  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  Patient was referred for surgical consideration.    Patient's health has been stable.  She gets winded trying to climb a flight of stairs.  She does get short of breath walking distances on level ground.  She denies any cough, hemoptysis, unexpected weight loss.    Past Medical History:   Diagnosis Date    Bilateral carotid artery stenosis 11/19/2020    CAD (coronary artery disease)     Carotid bruit     Dyslipidemia 11/19/2020    Essential hypertension 11/19/2020    Hx of CABG 11/19/2020    Hyperlipidemia     Kidney disease     PONV (postoperative nausea and vomiting)     Stage 4 chronic kidney disease (HCC) 11/19/2020    Stenosis of noncoronary bypass graft (HCC)     Tobacco abuse 11/19/2020      Past Surgical History:   Procedure Laterality Date    BACK SURGERY  2003    BRAIN ANEURYSM SURGERY  2012    BREAST LUMPECTOMY Right     CARPAL TUNNEL RELEASE Bilateral     CORONARY ARTERY BYPASS GRAFT      PARTIAL HYSTERECTOMY (CERVIX NOT REMOVED)  1997    THORACOSCOPY Left 1/31/2023    left thoracoscopic wedge, completion left upper lobectomy performed by Bhanu Doran MD at [facility]    TOTAL HIP ARTHROPLASTY Bilateral left side 2017 right side 2004     Social History     Socioeconomic History    Marital status:      Spouse name: Not on file    Number of children: Not on file    Years of education: Not on file    Highest education level: Not on file   Occupational History    Not on file   Tobacco

## 2024-08-06 ENCOUNTER — HOSPITAL ENCOUNTER (OUTPATIENT)
Dept: ULTRASOUND IMAGING | Age: 84
Discharge: HOME | End: 2024-08-08
Payer: MEDICARE

## 2024-08-06 DIAGNOSIS — N18.32 STAGE 3B CHRONIC KIDNEY DISEASE (HCC): ICD-10-CM

## 2024-08-06 PROCEDURE — 51798 US URINE CAPACITY MEASURE: CPT

## 2024-08-06 PROCEDURE — 76770 US EXAM ABDO BACK WALL COMP: CPT

## 2024-08-09 ENCOUNTER — HOSPITAL ENCOUNTER (OUTPATIENT)
Dept: RADIATION ONCOLOGY | Age: 84
Discharge: HOME | End: 2024-08-09
Payer: MEDICARE

## 2024-08-09 ENCOUNTER — PREP FOR PROCEDURE (OUTPATIENT)
Dept: RADIATION ONCOLOGY | Age: 84
End: 2024-08-09

## 2024-08-09 VITALS
WEIGHT: 141 LBS | TEMPERATURE: 97.7 F | SYSTOLIC BLOOD PRESSURE: 132 MMHG | RESPIRATION RATE: 16 BRPM | HEART RATE: 65 BPM | BODY MASS INDEX: 26.64 KG/M2 | OXYGEN SATURATION: 98 % | DIASTOLIC BLOOD PRESSURE: 68 MMHG

## 2024-08-09 DIAGNOSIS — R91.1 LUNG NODULE: Primary | ICD-10-CM

## 2024-08-09 PROCEDURE — 99212 OFFICE O/P EST SF 10 MIN: CPT | Performed by: RADIOLOGY

## 2024-08-09 PROCEDURE — 99497 ADVNCD CARE PLAN 30 MIN: CPT | Performed by: RADIOLOGY

## 2024-08-09 PROCEDURE — 99406 BEHAV CHNG SMOKING 3-10 MIN: CPT | Performed by: RADIOLOGY

## 2024-08-09 PROCEDURE — 99205 OFFICE O/P NEW HI 60 MIN: CPT | Performed by: RADIOLOGY

## 2024-08-09 NOTE — PROGRESS NOTES
SW met with pt and her daughter, Payal, as she was seen for consultation in radiation oncology for treatment of lung cancer. She presents as pleasant and engaging, citing a distress of 2/10. She reports it had been higher, but she is feeling more at ease since her meeting with Dr. Syed yesterday.    Pt reports that she lives at home alone, and Payal lives about 4 minutes away. Pt has another son in [place] and a step-son in [place]. All are described as supportive and helpful. Pt calls Payal \"my right hand.\" Pt also reports that she drives and feels confident that she may transport herself to treatment as advisable.    Pt states she has completed Advance Directives, and she was asked to bring them in for records. Supportive services at the cancer center were discussed. SW business card was provided and pt was invited to contact SW should she have further question or concern, to which she was agreeable. SW will continue to follow should pt have radiation therapy.

## 2024-08-09 NOTE — PROGRESS NOTES
NURSING ASSESSMENT     Date: 2024        Patient Name: Carina Kebede     YOB: 1940      Age:  84 y.o.      MRN: [id number]       Chaperone [x] Yes   [] No  Daughter Payal    Advance Directives:   Do you currently have completed advance directives (living will)? [x] Yes   [] No         *If yes, please bring us a copy for your records.  *If no, would you like info or assistance in completing advance directives (living will)?   [] Yes   [x] No    Pain Score: denies any pain  Pain Score (1-10): n/a   Pain Location: n/a   Pain Duration: n/a   Pain Management/Control: n/a      Is pain affecting your ability to take care of yourself or move throughout your home?                        [] Yes   [x] No    General: No Problems  Patient has gained weight [] Yes   [x] No  Patient has lost weight [] Yes   [x] No  How much weight in pounds and over what length of time:     Eyes (Ophthalmic): No Change     Skin (Dermatological): ongoing eczema      ENT: baseline sinus drainage in the morning and Dentures     Respiratory: Exertional shortness of breath ongoing since left upper lobectomy 2023. Baseline occasional dry cough     Cardiovascular: Open heart surgery . Follows annually with Dr Valentin. Denies any current symptoms.       Device   [] Yes   [x] No   Copy of Card Obtained [] Yes   [x] No    Gastrointestinal: Ongoing constipation. Reports weekly bowel movements. Taking OTC stool softner, does not recall the name.     Genito-Urinary: reports ongoing urgency. Recently treated for UTI.      Breast: No Problems     Musculoskeletal: history of bilateral hip replacements.     Neurological: No Problems      Hematological and Lymphatic: Had consult with medical/oncology yesterday.      Endocrine: recent thyroid nodule is going to be seeing Dr Rand. Waiting for call from [facility] to schedule appointment.     Gyn History:   /Para: 2/2  Age of Menarche: 13  Age of Menopause 51  Vaginal Bleeding:   none  First Pregnancy: 22  Breast Feeding:no  unknown  Last Menstrual period: 51 years  Oral Contraceptives: none     Number of years: n/a  Hormone Replacement therapy none     Number of Years: n/a  Last Pap Smear: >20 years ago  Last Mammogram >20 years ago    A 10-point review of systems  has been conducted and pertinent positives have been   recorded. All other review of systems are negative    Was the patient admitted during the course of treatment OR within 30 days of treatment? none    If yes: n/a  Date of Admission: n/a  Hospital: n/a    Additional Comments:     PALLIATIVE CARE SCREENING TOOL    Patient Scenarios               (Score 1 Point Each)  The Patient has  A chronic illness with uncontrolled symptoms (e.g. pain, nausea, vomiting, shortness of breath, anorexia)   []  Unresolved psychosocial or spiritual issues                                                                                                     []  Frequent visits to the Emergency Department  (>1 x per month or >2 in last 3 months)                                 []  More than one hospital admission in past 90 days                  []   Complex care requirements (e.g. functional dependency, complex home support for ventilator/antibiotics/feedings, patient in SNF/LTAC/nursing home) []  No advance directives in place                                                                                                                         []  TOTAL FOR SECTION #1- 0      Basic Disease Processes             (Score 3 Points Overall)  Locally advanced or metastatic cancer           []  Non-cancer life-limiting illness (COPD, CHF, advanced dementia, stroke)      []  Total score for section # 2-   0       Concomitant Disease Processes            (Score 1 Point Overall)  COPD               []   Renal Disease              []  CHF               []  Liver Disease              []  Other significant comorbidities (e.g. failure to thrive, feeding

## 2024-08-17 DIAGNOSIS — J44.9 CHRONIC OBSTRUCTIVE PULMONARY DISEASE, UNSPECIFIED COPD TYPE (HCC): ICD-10-CM

## 2024-08-19 RX ORDER — ALBUTEROL SULFATE 90 UG/1
2 AEROSOL, METERED RESPIRATORY (INHALATION) 4 TIMES DAILY PRN
Qty: 18 EACH | Refills: 3 | Status: SHIPPED | OUTPATIENT
Start: 2024-08-19

## 2024-08-19 NOTE — TELEPHONE ENCOUNTER
Rx requested:  Requested Prescriptions     Pending Prescriptions Disp Refills    albuterol sulfate HFA (PROVENTIL;VENTOLIN;PROAIR) 108 (90 Base) MCG/ACT inhaler [Pharmacy Med Name: ALBUTEROL HFA (VENTOLIN) INH] 18 each 3     Sig: INHALE 2 PUFFS INTO THE LUNGS 4 TIMES DAILY AS NEEDED FOR WHEEZING.       Last Office Visit:   7/25/2024      Next Visit Date:  Future Appointments   Date Time Provider Department Center   10/3/2024 11:00 AM Fabio Baugh MD [facility] DEP   11/13/2024  8:30 AM SCHEDULE, [facility] NURSE [facility]   11/14/2024 11:00 AM Beverley Chacon MD [facility]   6/20/2025 11:00 AM Holiday, DO [facility]

## 2024-08-20 ENCOUNTER — APPOINTMENT (OUTPATIENT)
Dept: RADIOLOGY | Facility: HOSPITAL | Age: 84
End: 2024-08-20
Payer: MEDICARE

## 2024-08-20 ENCOUNTER — APPOINTMENT (OUTPATIENT)
Dept: CARDIOLOGY | Facility: HOSPITAL | Age: 84
End: 2024-08-20
Payer: MEDICARE

## 2024-08-20 ENCOUNTER — HOSPITAL ENCOUNTER (INPATIENT)
Facility: HOSPITAL | Age: 84
LOS: 4 days | Discharge: NURSING, REHAB OR LONG-TERM CARE | End: 2024-08-24
Attending: EMERGENCY MEDICINE | Admitting: INTERNAL MEDICINE
Payer: MEDICARE

## 2024-08-20 DIAGNOSIS — I10 ESSENTIAL HYPERTENSION: ICD-10-CM

## 2024-08-20 DIAGNOSIS — I63.9 ACUTE CVA (CEREBROVASCULAR ACCIDENT) (MULTI): ICD-10-CM

## 2024-08-20 DIAGNOSIS — R41.0 DISORIENTATION: ICD-10-CM

## 2024-08-20 DIAGNOSIS — F17.200 TOBACCO USE DISORDER: ICD-10-CM

## 2024-08-20 DIAGNOSIS — I67.89 OTHER CEREBROVASCULAR DISEASE: ICD-10-CM

## 2024-08-20 DIAGNOSIS — R29.90 STROKE-LIKE SYMPTOMS: ICD-10-CM

## 2024-08-20 DIAGNOSIS — R53.1 ACUTE RIGHT-SIDED WEAKNESS: Primary | ICD-10-CM

## 2024-08-20 PROBLEM — E78.5 HYPERLIPIDEMIA: Status: ACTIVE | Noted: 2018-08-22

## 2024-08-20 PROBLEM — F51.01 PRIMARY INSOMNIA: Status: ACTIVE | Noted: 2018-08-22

## 2024-08-20 PROBLEM — N17.9 ACUTE RENAL FAILURE SYNDROME (CMS-HCC): Status: ACTIVE | Noted: 2017-06-05

## 2024-08-20 PROBLEM — D63.1 ANEMIA OF CHRONIC RENAL FAILURE: Status: ACTIVE | Noted: 2019-04-17

## 2024-08-20 PROBLEM — N18.9 ANEMIA OF CHRONIC RENAL FAILURE: Status: ACTIVE | Noted: 2019-04-17

## 2024-08-20 PROBLEM — I21.9 MYOCARDIAL INFARCTION (MULTI): Status: ACTIVE | Noted: 2018-08-22

## 2024-08-20 LAB
ALBUMIN SERPL BCP-MCNC: 4 G/DL (ref 3.4–5)
ALP SERPL-CCNC: 45 U/L (ref 33–136)
ALT SERPL W P-5'-P-CCNC: 11 U/L (ref 7–45)
ANION GAP SERPL CALC-SCNC: 13 MMOL/L (ref 10–20)
APTT PPP: 27 SECONDS (ref 27–38)
AST SERPL W P-5'-P-CCNC: 16 U/L (ref 9–39)
BASOPHILS # BLD AUTO: 0.02 X10*3/UL (ref 0–0.1)
BASOPHILS NFR BLD AUTO: 0.2 %
BILIRUB SERPL-MCNC: 0.3 MG/DL (ref 0–1.2)
BUN SERPL-MCNC: 44 MG/DL (ref 6–23)
CALCIUM SERPL-MCNC: 9.1 MG/DL (ref 8.6–10.3)
CARDIAC TROPONIN I PNL SERPL HS: 7 NG/L (ref 0–13)
CHLORIDE SERPL-SCNC: 109 MMOL/L (ref 98–107)
CO2 SERPL-SCNC: 22 MMOL/L (ref 21–32)
CREAT SERPL-MCNC: 1.21 MG/DL (ref 0.5–1.05)
EGFRCR SERPLBLD CKD-EPI 2021: 45 ML/MIN/1.73M*2
EOSINOPHIL # BLD AUTO: 0.22 X10*3/UL (ref 0–0.4)
EOSINOPHIL NFR BLD AUTO: 2 %
ERYTHROCYTE [DISTWIDTH] IN BLOOD BY AUTOMATED COUNT: 12 % (ref 11.5–14.5)
GLUCOSE BLD MANUAL STRIP-MCNC: 193 MG/DL (ref 74–99)
GLUCOSE SERPL-MCNC: 188 MG/DL (ref 74–99)
HCT VFR BLD AUTO: 33.1 % (ref 36–46)
HGB BLD-MCNC: 11 G/DL (ref 12–16)
HOLD SPECIMEN: NORMAL
IMM GRANULOCYTES # BLD AUTO: 0.05 X10*3/UL (ref 0–0.5)
IMM GRANULOCYTES NFR BLD AUTO: 0.5 % (ref 0–0.9)
INR PPP: 1 (ref 0.9–1.1)
LYMPHOCYTES # BLD AUTO: 1.44 X10*3/UL (ref 0.8–3)
LYMPHOCYTES NFR BLD AUTO: 13 %
MCH RBC QN AUTO: 31.4 PG (ref 26–34)
MCHC RBC AUTO-ENTMCNC: 33.2 G/DL (ref 32–36)
MCV RBC AUTO: 95 FL (ref 80–100)
MONOCYTES # BLD AUTO: 0.67 X10*3/UL (ref 0.05–0.8)
MONOCYTES NFR BLD AUTO: 6.1 %
NEUTROPHILS # BLD AUTO: 8.67 X10*3/UL (ref 1.6–5.5)
NEUTROPHILS NFR BLD AUTO: 78.2 %
NRBC BLD-RTO: 0 /100 WBCS (ref 0–0)
PLATELET # BLD AUTO: 197 X10*3/UL (ref 150–450)
POTASSIUM SERPL-SCNC: 3.6 MMOL/L (ref 3.5–5.3)
PROT SERPL-MCNC: 6.6 G/DL (ref 6.4–8.2)
PROTHROMBIN TIME: 11.6 SECONDS (ref 9.8–12.8)
RBC # BLD AUTO: 3.5 X10*6/UL (ref 4–5.2)
SODIUM SERPL-SCNC: 140 MMOL/L (ref 136–145)
WBC # BLD AUTO: 11.1 X10*3/UL (ref 4.4–11.3)

## 2024-08-20 PROCEDURE — 96375 TX/PRO/DX INJ NEW DRUG ADDON: CPT

## 2024-08-20 PROCEDURE — 93005 ELECTROCARDIOGRAM TRACING: CPT

## 2024-08-20 PROCEDURE — 99285 EMERGENCY DEPT VISIT HI MDM: CPT | Mod: 25

## 2024-08-20 PROCEDURE — 85610 PROTHROMBIN TIME: CPT | Performed by: EMERGENCY MEDICINE

## 2024-08-20 PROCEDURE — 85730 THROMBOPLASTIN TIME PARTIAL: CPT | Performed by: EMERGENCY MEDICINE

## 2024-08-20 PROCEDURE — 80053 COMPREHEN METABOLIC PANEL: CPT | Performed by: EMERGENCY MEDICINE

## 2024-08-20 PROCEDURE — 2550000001 HC RX 255 CONTRASTS: Performed by: EMERGENCY MEDICINE

## 2024-08-20 PROCEDURE — 82947 ASSAY GLUCOSE BLOOD QUANT: CPT

## 2024-08-20 PROCEDURE — 85025 COMPLETE CBC W/AUTO DIFF WBC: CPT | Performed by: EMERGENCY MEDICINE

## 2024-08-20 PROCEDURE — 96374 THER/PROPH/DIAG INJ IV PUSH: CPT

## 2024-08-20 PROCEDURE — 70498 CT ANGIOGRAPHY NECK: CPT

## 2024-08-20 PROCEDURE — 1200000002 HC GENERAL ROOM WITH TELEMETRY DAILY

## 2024-08-20 PROCEDURE — 2500000004 HC RX 250 GENERAL PHARMACY W/ HCPCS (ALT 636 FOR OP/ED): Performed by: EMERGENCY MEDICINE

## 2024-08-20 PROCEDURE — 36415 COLL VENOUS BLD VENIPUNCTURE: CPT | Performed by: EMERGENCY MEDICINE

## 2024-08-20 PROCEDURE — 99223 1ST HOSP IP/OBS HIGH 75: CPT | Performed by: NURSE PRACTITIONER

## 2024-08-20 PROCEDURE — 70450 CT HEAD/BRAIN W/O DYE: CPT

## 2024-08-20 PROCEDURE — 84484 ASSAY OF TROPONIN QUANT: CPT | Performed by: EMERGENCY MEDICINE

## 2024-08-20 RX ORDER — POLYETHYLENE GLYCOL 3350 17 G/17G
17 POWDER, FOR SOLUTION ORAL DAILY
Status: DISCONTINUED | OUTPATIENT
Start: 2024-08-21 | End: 2024-08-24 | Stop reason: HOSPADM

## 2024-08-20 RX ORDER — LORAZEPAM 2 MG/ML
1 INJECTION INTRAMUSCULAR ONCE
Status: COMPLETED | OUTPATIENT
Start: 2024-08-20 | End: 2024-08-20

## 2024-08-20 RX ORDER — DOCUSATE SODIUM 100 MG/1
100 CAPSULE, LIQUID FILLED ORAL 2 TIMES DAILY
Status: DISCONTINUED | OUTPATIENT
Start: 2024-08-20 | End: 2024-08-21

## 2024-08-20 RX ORDER — UMECLIDINIUM BROMIDE AND VILANTEROL TRIFENATATE 62.5; 25 UG/1; UG/1
1 POWDER RESPIRATORY (INHALATION) DAILY
COMMUNITY
Start: 2024-07-25

## 2024-08-20 RX ORDER — INSULIN LISPRO 100 [IU]/ML
0-5 INJECTION, SOLUTION INTRAVENOUS; SUBCUTANEOUS
Status: DISCONTINUED | OUTPATIENT
Start: 2024-08-21 | End: 2024-08-24 | Stop reason: HOSPADM

## 2024-08-20 RX ORDER — DEXTROSE 50 % IN WATER (D50W) INTRAVENOUS SYRINGE
12.5
Status: DISCONTINUED | OUTPATIENT
Start: 2024-08-20 | End: 2024-08-24 | Stop reason: HOSPADM

## 2024-08-20 RX ORDER — TRAZODONE HYDROCHLORIDE 100 MG/1
100 TABLET ORAL NIGHTLY
COMMUNITY
Start: 2024-03-15

## 2024-08-20 RX ORDER — ASPIRIN 81 MG/1
1 TABLET ORAL DAILY
Status: ON HOLD | COMMUNITY
End: 2024-08-22

## 2024-08-20 RX ORDER — ALBUTEROL SULFATE 90 UG/1
2 INHALANT RESPIRATORY (INHALATION) EVERY 4 HOURS PRN
COMMUNITY
Start: 2024-08-19

## 2024-08-20 RX ORDER — PANTOPRAZOLE SODIUM 40 MG/10ML
40 INJECTION, POWDER, LYOPHILIZED, FOR SOLUTION INTRAVENOUS DAILY
Status: DISCONTINUED | OUTPATIENT
Start: 2024-08-21 | End: 2024-08-21

## 2024-08-20 RX ORDER — ONDANSETRON 4 MG/1
4 TABLET, FILM COATED ORAL EVERY 8 HOURS PRN
Status: DISCONTINUED | OUTPATIENT
Start: 2024-08-20 | End: 2024-08-24 | Stop reason: HOSPADM

## 2024-08-20 RX ORDER — ONDANSETRON HYDROCHLORIDE 2 MG/ML
4 INJECTION, SOLUTION INTRAVENOUS EVERY 8 HOURS PRN
Status: DISCONTINUED | OUTPATIENT
Start: 2024-08-20 | End: 2024-08-24 | Stop reason: HOSPADM

## 2024-08-20 RX ORDER — LEVETIRACETAM 15 MG/ML
1500 INJECTION INTRAVASCULAR ONCE
Status: COMPLETED | OUTPATIENT
Start: 2024-08-20 | End: 2024-08-20

## 2024-08-20 RX ORDER — ATORVASTATIN CALCIUM 40 MG/1
40 TABLET, FILM COATED ORAL DAILY
COMMUNITY
Start: 2023-10-16

## 2024-08-20 RX ORDER — CARVEDILOL 12.5 MG/1
1 TABLET ORAL 2 TIMES DAILY
COMMUNITY
Start: 2024-06-10 | End: 2024-08-24 | Stop reason: HOSPADM

## 2024-08-20 RX ORDER — HYDRALAZINE HYDROCHLORIDE 25 MG/1
25 TABLET, FILM COATED ORAL EVERY 6 HOURS PRN
Status: DISCONTINUED | OUTPATIENT
Start: 2024-08-22 | End: 2024-08-21

## 2024-08-20 RX ORDER — ACETAMINOPHEN 325 MG/1
650 TABLET ORAL EVERY 4 HOURS PRN
Status: DISCONTINUED | OUTPATIENT
Start: 2024-08-20 | End: 2024-08-24 | Stop reason: HOSPADM

## 2024-08-20 RX ORDER — NAPROXEN SODIUM 220 MG/1
81 TABLET, FILM COATED ORAL DAILY
Status: DISCONTINUED | OUTPATIENT
Start: 2024-08-21 | End: 2024-08-24 | Stop reason: HOSPADM

## 2024-08-20 RX ORDER — PANTOPRAZOLE SODIUM 40 MG/1
40 TABLET, DELAYED RELEASE ORAL DAILY
Status: DISCONTINUED | OUTPATIENT
Start: 2024-08-21 | End: 2024-08-24 | Stop reason: HOSPADM

## 2024-08-20 RX ORDER — DEXTROSE 50 % IN WATER (D50W) INTRAVENOUS SYRINGE
25
Status: DISCONTINUED | OUTPATIENT
Start: 2024-08-20 | End: 2024-08-24 | Stop reason: HOSPADM

## 2024-08-20 RX ORDER — ACETAMINOPHEN 650 MG/1
650 SUPPOSITORY RECTAL EVERY 4 HOURS PRN
Status: DISCONTINUED | OUTPATIENT
Start: 2024-08-20 | End: 2024-08-24 | Stop reason: HOSPADM

## 2024-08-20 RX ORDER — HYDRALAZINE HYDROCHLORIDE 20 MG/ML
10 INJECTION INTRAMUSCULAR; INTRAVENOUS
Status: DISCONTINUED | OUTPATIENT
Start: 2024-08-20 | End: 2024-08-21

## 2024-08-20 RX ORDER — LOSARTAN POTASSIUM AND HYDROCHLOROTHIAZIDE 12.5; 1 MG/1; MG/1
1 TABLET ORAL DAILY
COMMUNITY
Start: 2023-04-19 | End: 2024-08-24 | Stop reason: HOSPADM

## 2024-08-20 RX ORDER — ACETAMINOPHEN 500 MG
5 TABLET ORAL NIGHTLY PRN
Status: DISCONTINUED | OUTPATIENT
Start: 2024-08-20 | End: 2024-08-24 | Stop reason: HOSPADM

## 2024-08-20 RX ORDER — HEPARIN SODIUM 5000 [USP'U]/ML
5000 INJECTION, SOLUTION INTRAVENOUS; SUBCUTANEOUS EVERY 8 HOURS
Status: DISCONTINUED | OUTPATIENT
Start: 2024-08-20 | End: 2024-08-24 | Stop reason: HOSPADM

## 2024-08-20 RX ORDER — LABETALOL HYDROCHLORIDE 5 MG/ML
10 INJECTION, SOLUTION INTRAVENOUS EVERY 10 MIN PRN
Status: DISCONTINUED | OUTPATIENT
Start: 2024-08-20 | End: 2024-08-22

## 2024-08-20 RX ORDER — ACETAMINOPHEN 160 MG/5ML
650 SOLUTION ORAL EVERY 4 HOURS PRN
Status: DISCONTINUED | OUTPATIENT
Start: 2024-08-20 | End: 2024-08-24 | Stop reason: HOSPADM

## 2024-08-20 SDOH — SOCIAL STABILITY: SOCIAL INSECURITY: HAVE YOU HAD ANY THOUGHTS OF HARMING ANYONE ELSE?: UNABLE TO ASSESS

## 2024-08-20 SDOH — SOCIAL STABILITY: SOCIAL INSECURITY: WERE YOU ABLE TO COMPLETE ALL THE BEHAVIORAL HEALTH SCREENINGS?: NO

## 2024-08-20 SDOH — SOCIAL STABILITY: SOCIAL INSECURITY: DO YOU FEEL UNSAFE GOING BACK TO THE PLACE WHERE YOU ARE LIVING?: UNABLE TO ASSESS

## 2024-08-20 SDOH — SOCIAL STABILITY: SOCIAL INSECURITY: HAVE YOU HAD THOUGHTS OF HARMING ANYONE ELSE?: NO

## 2024-08-20 SDOH — SOCIAL STABILITY: SOCIAL INSECURITY: HAS ANYONE EVER THREATENED TO HURT YOUR FAMILY OR YOUR PETS?: UNABLE TO ASSESS

## 2024-08-20 SDOH — SOCIAL STABILITY: SOCIAL INSECURITY: ARE THERE ANY APPARENT SIGNS OF INJURIES/BEHAVIORS THAT COULD BE RELATED TO ABUSE/NEGLECT?: UNABLE TO ASSESS

## 2024-08-20 SDOH — SOCIAL STABILITY: SOCIAL INSECURITY: DOES ANYONE TRY TO KEEP YOU FROM HAVING/CONTACTING OTHER FRIENDS OR DOING THINGS OUTSIDE YOUR HOME?: UNABLE TO ASSESS

## 2024-08-20 SDOH — SOCIAL STABILITY: SOCIAL INSECURITY: DO YOU FEEL ANYONE HAS EXPLOITED OR TAKEN ADVANTAGE OF YOU FINANCIALLY OR OF YOUR PERSONAL PROPERTY?: UNABLE TO ASSESS

## 2024-08-20 SDOH — SOCIAL STABILITY: SOCIAL INSECURITY: ABUSE: ADULT

## 2024-08-20 SDOH — SOCIAL STABILITY: SOCIAL INSECURITY: ARE YOU OR HAVE YOU BEEN THREATENED OR ABUSED PHYSICALLY, EMOTIONALLY, OR SEXUALLY BY ANYONE?: UNABLE TO ASSESS

## 2024-08-20 ASSESSMENT — COGNITIVE AND FUNCTIONAL STATUS - GENERAL
PATIENT BASELINE BEDBOUND: NO
DAILY ACTIVITIY SCORE: 6
MOVING TO AND FROM BED TO CHAIR: TOTAL
MOBILITY SCORE: 9
WALKING IN HOSPITAL ROOM: TOTAL
EATING MEALS: TOTAL
MOVING FROM LYING ON BACK TO SITTING ON SIDE OF FLAT BED WITH BEDRAILS: A LITTLE
DRESSING REGULAR LOWER BODY CLOTHING: TOTAL
CLIMB 3 TO 5 STEPS WITH RAILING: TOTAL
HELP NEEDED FOR BATHING: TOTAL
PERSONAL GROOMING: TOTAL
TURNING FROM BACK TO SIDE WHILE IN FLAT BAD: A LOT
STANDING UP FROM CHAIR USING ARMS: TOTAL
DRESSING REGULAR UPPER BODY CLOTHING: TOTAL
TOILETING: TOTAL

## 2024-08-20 ASSESSMENT — ACTIVITIES OF DAILY LIVING (ADL)
DRESSING YOURSELF: UNABLE TO ASSESS
JUDGMENT_ADEQUATE_SAFELY_COMPLETE_DAILY_ACTIVITIES: UNABLE TO ASSESS
ADEQUATE_TO_COMPLETE_ADL: UNABLE TO ASSESS
WALKS IN HOME: UNABLE TO ASSESS
TOILETING: UNABLE TO ASSESS
GROOMING: UNABLE TO ASSESS
PATIENT'S MEMORY ADEQUATE TO SAFELY COMPLETE DAILY ACTIVITIES?: UNABLE TO ASSESS
FEEDING YOURSELF: UNABLE TO ASSESS
HEARING - LEFT EAR: FUNCTIONAL
HEARING - RIGHT EAR: FUNCTIONAL
BATHING: UNABLE TO ASSESS

## 2024-08-20 ASSESSMENT — LIFESTYLE VARIABLES
AUDIT-C TOTAL SCORE: 1
HOW MANY STANDARD DRINKS CONTAINING ALCOHOL DO YOU HAVE ON A TYPICAL DAY: 1 OR 2
AUDIT-C TOTAL SCORE: 1
SKIP TO QUESTIONS 9-10: 1
HOW OFTEN DO YOU HAVE A DRINK CONTAINING ALCOHOL: MONTHLY OR LESS
HOW OFTEN DO YOU HAVE 6 OR MORE DRINKS ON ONE OCCASION: NEVER

## 2024-08-20 ASSESSMENT — PAIN SCALES - WONG BAKER
WONGBAKER_NUMERICALRESPONSE: NO HURT
WONGBAKER_NUMERICALRESPONSE: NO HURT

## 2024-08-20 ASSESSMENT — PATIENT HEALTH QUESTIONNAIRE - PHQ9
2. FEELING DOWN, DEPRESSED OR HOPELESS: NOT AT ALL
SUM OF ALL RESPONSES TO PHQ9 QUESTIONS 1 & 2: 0
1. LITTLE INTEREST OR PLEASURE IN DOING THINGS: NOT AT ALL

## 2024-08-20 ASSESSMENT — PAIN - FUNCTIONAL ASSESSMENT: PAIN_FUNCTIONAL_ASSESSMENT: WONG-BAKER FACES

## 2024-08-21 ENCOUNTER — APPOINTMENT (OUTPATIENT)
Dept: CARDIOLOGY | Facility: HOSPITAL | Age: 84
End: 2024-08-21
Payer: MEDICARE

## 2024-08-21 ENCOUNTER — APPOINTMENT (OUTPATIENT)
Dept: RADIOLOGY | Facility: HOSPITAL | Age: 84
End: 2024-08-21
Payer: MEDICARE

## 2024-08-21 ENCOUNTER — APPOINTMENT (OUTPATIENT)
Dept: NEUROLOGY | Facility: HOSPITAL | Age: 84
End: 2024-08-21
Payer: MEDICARE

## 2024-08-21 LAB
ALBUMIN SERPL BCP-MCNC: 4 G/DL (ref 3.4–5)
ALP SERPL-CCNC: 53 U/L (ref 33–136)
ALT SERPL W P-5'-P-CCNC: 42 U/L (ref 7–45)
ANION GAP SERPL CALC-SCNC: 11 MMOL/L (ref 10–20)
AST SERPL W P-5'-P-CCNC: 43 U/L (ref 9–39)
BASOPHILS # BLD AUTO: 0.01 X10*3/UL (ref 0–0.1)
BASOPHILS NFR BLD AUTO: 0.1 %
BILIRUB SERPL-MCNC: 0.4 MG/DL (ref 0–1.2)
BUN SERPL-MCNC: 34 MG/DL (ref 6–23)
CALCIUM SERPL-MCNC: 9.2 MG/DL (ref 8.6–10.3)
CHLORIDE SERPL-SCNC: 109 MMOL/L (ref 98–107)
CHOLEST SERPL-MCNC: 161 MG/DL (ref 0–199)
CHOLESTEROL/HDL RATIO: 3
CO2 SERPL-SCNC: 25 MMOL/L (ref 21–32)
CREAT SERPL-MCNC: 0.94 MG/DL (ref 0.5–1.05)
CRP SERPL-MCNC: 0.15 MG/DL
EGFRCR SERPLBLD CKD-EPI 2021: 60 ML/MIN/1.73M*2
EOSINOPHIL # BLD AUTO: 0.1 X10*3/UL (ref 0–0.4)
EOSINOPHIL NFR BLD AUTO: 1.4 %
ERYTHROCYTE [DISTWIDTH] IN BLOOD BY AUTOMATED COUNT: 12 % (ref 11.5–14.5)
EST. AVERAGE GLUCOSE BLD GHB EST-MCNC: 120 MG/DL
GLUCOSE BLD MANUAL STRIP-MCNC: 100 MG/DL (ref 74–99)
GLUCOSE BLD MANUAL STRIP-MCNC: 105 MG/DL (ref 74–99)
GLUCOSE BLD MANUAL STRIP-MCNC: 125 MG/DL (ref 74–99)
GLUCOSE SERPL-MCNC: 95 MG/DL (ref 74–99)
HBA1C MFR BLD: 5.8 %
HCT VFR BLD AUTO: 34 % (ref 36–46)
HDLC SERPL-MCNC: 53.8 MG/DL
HGB BLD-MCNC: 11.2 G/DL (ref 12–16)
HOLD SPECIMEN: NORMAL
IMM GRANULOCYTES # BLD AUTO: 0.02 X10*3/UL (ref 0–0.5)
IMM GRANULOCYTES NFR BLD AUTO: 0.3 % (ref 0–0.9)
LDLC SERPL CALC-MCNC: 75 MG/DL
LYMPHOCYTES # BLD AUTO: 1.65 X10*3/UL (ref 0.8–3)
LYMPHOCYTES NFR BLD AUTO: 22.5 %
MAGNESIUM SERPL-MCNC: 2.26 MG/DL (ref 1.6–2.4)
MCH RBC QN AUTO: 31.1 PG (ref 26–34)
MCHC RBC AUTO-ENTMCNC: 32.9 G/DL (ref 32–36)
MCV RBC AUTO: 94 FL (ref 80–100)
MONOCYTES # BLD AUTO: 0.8 X10*3/UL (ref 0.05–0.8)
MONOCYTES NFR BLD AUTO: 10.9 %
NEUTROPHILS # BLD AUTO: 4.76 X10*3/UL (ref 1.6–5.5)
NEUTROPHILS NFR BLD AUTO: 64.8 %
NON HDL CHOLESTEROL: 107 MG/DL (ref 0–149)
NRBC BLD-RTO: 0 /100 WBCS (ref 0–0)
PLATELET # BLD AUTO: 193 X10*3/UL (ref 150–450)
POTASSIUM SERPL-SCNC: 3.5 MMOL/L (ref 3.5–5.3)
PROT SERPL-MCNC: 6.7 G/DL (ref 6.4–8.2)
RBC # BLD AUTO: 3.6 X10*6/UL (ref 4–5.2)
SODIUM SERPL-SCNC: 141 MMOL/L (ref 136–145)
TRIGL SERPL-MCNC: 163 MG/DL (ref 0–149)
VLDL: 33 MG/DL (ref 0–40)
WBC # BLD AUTO: 7.3 X10*3/UL (ref 4.4–11.3)

## 2024-08-21 PROCEDURE — 93306 TTE W/DOPPLER COMPLETE: CPT | Performed by: INTERNAL MEDICINE

## 2024-08-21 PROCEDURE — 83735 ASSAY OF MAGNESIUM: CPT | Performed by: NURSE PRACTITIONER

## 2024-08-21 PROCEDURE — 97162 PT EVAL MOD COMPLEX 30 MIN: CPT | Mod: GP | Performed by: PHYSICAL THERAPIST

## 2024-08-21 PROCEDURE — 94640 AIRWAY INHALATION TREATMENT: CPT

## 2024-08-21 PROCEDURE — 2500000004 HC RX 250 GENERAL PHARMACY W/ HCPCS (ALT 636 FOR OP/ED): Performed by: INTERNAL MEDICINE

## 2024-08-21 PROCEDURE — 2500000001 HC RX 250 WO HCPCS SELF ADMINISTERED DRUGS (ALT 637 FOR MEDICARE OP): Performed by: PSYCHIATRY & NEUROLOGY

## 2024-08-21 PROCEDURE — 93306 TTE W/DOPPLER COMPLETE: CPT

## 2024-08-21 PROCEDURE — 36415 COLL VENOUS BLD VENIPUNCTURE: CPT | Performed by: NURSE PRACTITIONER

## 2024-08-21 PROCEDURE — 1200000002 HC GENERAL ROOM WITH TELEMETRY DAILY

## 2024-08-21 PROCEDURE — S4991 NICOTINE PATCH NONLEGEND: HCPCS | Performed by: INTERNAL MEDICINE

## 2024-08-21 PROCEDURE — 70470 CT HEAD/BRAIN W/O & W/DYE: CPT | Performed by: RADIOLOGY

## 2024-08-21 PROCEDURE — 85025 COMPLETE CBC W/AUTO DIFF WBC: CPT | Performed by: NURSE PRACTITIONER

## 2024-08-21 PROCEDURE — 2500000001 HC RX 250 WO HCPCS SELF ADMINISTERED DRUGS (ALT 637 FOR MEDICARE OP): Performed by: INTERNAL MEDICINE

## 2024-08-21 PROCEDURE — 2500000001 HC RX 250 WO HCPCS SELF ADMINISTERED DRUGS (ALT 637 FOR MEDICARE OP): Performed by: NURSE PRACTITIONER

## 2024-08-21 PROCEDURE — 82947 ASSAY GLUCOSE BLOOD QUANT: CPT

## 2024-08-21 PROCEDURE — 86140 C-REACTIVE PROTEIN: CPT | Performed by: NURSE PRACTITIONER

## 2024-08-21 PROCEDURE — 80053 COMPREHEN METABOLIC PANEL: CPT | Performed by: NURSE PRACTITIONER

## 2024-08-21 PROCEDURE — 70470 CT HEAD/BRAIN W/O & W/DYE: CPT

## 2024-08-21 PROCEDURE — 2500000004 HC RX 250 GENERAL PHARMACY W/ HCPCS (ALT 636 FOR OP/ED): Performed by: NURSE PRACTITIONER

## 2024-08-21 PROCEDURE — 2500000002 HC RX 250 W HCPCS SELF ADMINISTERED DRUGS (ALT 637 FOR MEDICARE OP, ALT 636 FOR OP/ED): Performed by: INTERNAL MEDICINE

## 2024-08-21 PROCEDURE — 99223 1ST HOSP IP/OBS HIGH 75: CPT | Performed by: PSYCHIATRY & NEUROLOGY

## 2024-08-21 PROCEDURE — 97165 OT EVAL LOW COMPLEX 30 MIN: CPT | Mod: GO

## 2024-08-21 PROCEDURE — 2550000001 HC RX 255 CONTRASTS: Performed by: INTERNAL MEDICINE

## 2024-08-21 PROCEDURE — 80061 LIPID PANEL: CPT | Performed by: NURSE PRACTITIONER

## 2024-08-21 PROCEDURE — 83036 HEMOGLOBIN GLYCOSYLATED A1C: CPT | Mod: ELYLAB | Performed by: NURSE PRACTITIONER

## 2024-08-21 PROCEDURE — 95816 EEG AWAKE AND DROWSY: CPT

## 2024-08-21 PROCEDURE — 99232 SBSQ HOSP IP/OBS MODERATE 35: CPT | Performed by: INTERNAL MEDICINE

## 2024-08-21 PROCEDURE — 92610 EVALUATE SWALLOWING FUNCTION: CPT | Mod: GN | Performed by: STUDENT IN AN ORGANIZED HEALTH CARE EDUCATION/TRAINING PROGRAM

## 2024-08-21 RX ORDER — LEVETIRACETAM 500 MG/1
500 TABLET ORAL 2 TIMES DAILY
Status: DISCONTINUED | OUTPATIENT
Start: 2024-08-21 | End: 2024-08-22

## 2024-08-21 RX ORDER — ATORVASTATIN CALCIUM 20 MG/1
40 TABLET, FILM COATED ORAL NIGHTLY
Status: DISCONTINUED | OUTPATIENT
Start: 2024-08-21 | End: 2024-08-24 | Stop reason: HOSPADM

## 2024-08-21 RX ORDER — HYDRALAZINE HYDROCHLORIDE 20 MG/ML
10 INJECTION INTRAMUSCULAR; INTRAVENOUS EVERY 4 HOURS PRN
Status: DISCONTINUED | OUTPATIENT
Start: 2024-08-21 | End: 2024-08-22

## 2024-08-21 RX ORDER — CLOPIDOGREL BISULFATE 75 MG/1
75 TABLET ORAL DAILY
Status: DISCONTINUED | OUTPATIENT
Start: 2024-08-21 | End: 2024-08-24 | Stop reason: HOSPADM

## 2024-08-21 RX ORDER — FORMOTEROL FUMARATE DIHYDRATE 20 UG/2ML
20 SOLUTION RESPIRATORY (INHALATION)
Status: DISCONTINUED | OUTPATIENT
Start: 2024-08-21 | End: 2024-08-24 | Stop reason: HOSPADM

## 2024-08-21 RX ORDER — IBUPROFEN 200 MG
1 TABLET ORAL DAILY
Status: DISCONTINUED | OUTPATIENT
Start: 2024-08-21 | End: 2024-08-24 | Stop reason: HOSPADM

## 2024-08-21 ASSESSMENT — PAIN - FUNCTIONAL ASSESSMENT
PAIN_FUNCTIONAL_ASSESSMENT: 0-10

## 2024-08-21 ASSESSMENT — ENCOUNTER SYMPTOMS
PALPITATIONS: 0
SPEECH DIFFICULTY: 0
NUMBNESS: 0
NECK STIFFNESS: 0
SLEEP DISTURBANCE: 0
TROUBLE SWALLOWING: 0
FACIAL ASYMMETRY: 0
SEIZURES: 0
PHOTOPHOBIA: 0
FATIGUE: 0
FEVER: 0
CONFUSION: 0
BRUISES/BLEEDS EASILY: 0
DIFFICULTY URINATING: 0
SHORTNESS OF BREATH: 0
AGITATION: 0
NAUSEA: 0
FREQUENCY: 0
EYE PAIN: 0
HYPERACTIVE: 0
HALLUCINATIONS: 0
VOMITING: 0
JOINT SWELLING: 0
TREMORS: 0
ARTHRALGIAS: 0
DIZZINESS: 0
WEAKNESS: 0
UNEXPECTED WEIGHT CHANGE: 0
ABDOMINAL PAIN: 0
HEADACHES: 0
BACK PAIN: 0
COUGH: 0
LIGHT-HEADEDNESS: 0
NECK PAIN: 0
WHEEZING: 0
ADENOPATHY: 0
SINUS PRESSURE: 0

## 2024-08-21 ASSESSMENT — COGNITIVE AND FUNCTIONAL STATUS - GENERAL
MOBILITY SCORE: 13
CLIMB 3 TO 5 STEPS WITH RAILING: TOTAL
DRESSING REGULAR LOWER BODY CLOTHING: TOTAL
EATING MEALS: A LITTLE
MOVING TO AND FROM BED TO CHAIR: A LOT
MOVING FROM LYING ON BACK TO SITTING ON SIDE OF FLAT BED WITH BEDRAILS: A LITTLE
DRESSING REGULAR UPPER BODY CLOTHING: A LOT
WALKING IN HOSPITAL ROOM: A LOT
STANDING UP FROM CHAIR USING ARMS: A LOT
HELP NEEDED FOR BATHING: A LOT
PERSONAL GROOMING: A LITTLE
TOILETING: A LOT
TURNING FROM BACK TO SIDE WHILE IN FLAT BAD: A LITTLE
DAILY ACTIVITIY SCORE: 13

## 2024-08-21 ASSESSMENT — PAIN SCALES - GENERAL
PAINLEVEL_OUTOF10: 0 - NO PAIN

## 2024-08-21 ASSESSMENT — ACTIVITIES OF DAILY LIVING (ADL): BATHING_ASSISTANCE: MAXIMAL

## 2024-08-21 NOTE — PROGRESS NOTES
Physical Therapy                 Therapy Communication Note    Patient Name: Isabell Swan  MRN: [id number]  Today's Date: 8/21/2024     Discipline: Physical Therapy    Missed Visit Reason: Missed Visit Reason:  (Pt. off floor for ECHO (attempted @1052 and 1135). Will reattempt as able.)    Missed Time: Attempt

## 2024-08-21 NOTE — PROGRESS NOTES
Occupational Therapy    Evaluation    Patient Name: Isabell Swan  MRN: [id number]  Today's Date: 8/21/2024  Time Calculation  Start Time: 1250  Stop Time: 1307  Time Calculation (min): 17 min      Assessment:  OT Assessment: Mod A for sit <> stands, min/mod A for upper body ADLs and max/total A for lower body ADLs.  Prognosis: Excellent  End of Session Communication: Bedside nurse  End of Session Patient Position: Bed, 3 rail up, Alarm on (Daughter present.)  OT Assessment Results: Decreased ADL status, Decreased upper extremity range of motion, Decreased upper extremity strength, Decreased cognition, Decreased endurance, Decreased fine motor control, Decreased functional mobility, Decreased gross motor control, Decreased IADLs  Prognosis: Excellent  Plan:  Treatment Interventions: ADL retraining, Functional transfer training, UE strengthening/ROM, Endurance training, Cognitive reorientation, Patient/family training, Neuromuscular reeducation, Fine motor coordination activities, Compensatory technique education  OT Frequency: 3 times per week  OT Discharge Recommendations: High intensity level of continued care  OT - OK to Discharge: Yes (Once medically appropriate.)  Treatment Interventions: ADL retraining, Functional transfer training, UE strengthening/ROM, Endurance training, Cognitive reorientation, Patient/family training, Neuromuscular reeducation, Fine motor coordination activities, Compensatory technique education    Subjective     General:  General  Reason for Referral: ADLs  Referred By: Rich GOSS 8/20  Past Medical History Relevant to Rehab: brain aneurysm with clips, left frontotemporal craniotomy (16 yrs ago, per Neuro note), CVA (frontal lobe), CKD, HTN, HLD, COPD, MI, B/L THR  Family/Caregiver Present: Yes  Caregiver Feedback: Daughter present - very supportive.  Co-Treatment: PT  Co-Treatment Reason: To maximize functional outcomes and patient safety.  Prior to Session Communication: Bedside nurse  "(Cleared for OT evaluation by RN.)  Patient Position Received: Bed, 3 rail up (Barbi, tele.)  General Comment: ED 8/20 with right-sided weakness, aphasia, confusion and right facial droop. CT head- chronic B/L frontal lobe infarcts, L frontotemporal craniotomy. CT angio head and neck- occlusion of origin and proximal L verterbral artery; postsurgical changes. Neuro and ST consulted. Unable to have MRI d/t brain clips. Permissive HTN. Per Neuro consult-> Acute speech difficulty with right-sided weakness most likely due to left MCA infarct versus new onset seizure with Stanislaw's paralysis.  Precautions:  Hearing/Visual Limitations: Wears glasses.  Medical Precautions: Fall precautions  Precautions Comment: R sided weakness  Vital Signs:  Heart Rate: 64 (Beginning of therapy evaluation.)  Pain:  Pain Assessment  Pain Assessment: 0-10  0-10 (Numeric) Pain Score: 0 - No pain    Objective   Cognition:  Overall Cognitive Status:  (Patient presents with expressive aphasia, only verbalized \"Pat\" when asked her name. Patient demonstrated difficulty with command following at times. Pleasant and motivated.)  Following Commands:  (Patient followed 1 step commands ~50-75% of the time with repetition and/or demonstration.)           Home Living:  Home Living Comments: Patients daughter able to provide information re: PLOF/home setup. Patient lives alone in a 1 story house with 0 RONALDO. Walk in shower with a shower seat. Laundry on the main level.  Prior Function:  Prior Function Comments: Patient ambulates at mod I level with a cane, owns a walker (unsure if it is a FWW or SW). Independent with ADLs and IADLs. Denies falls in the past 3 months. Patient drives.     ADL:  Eating Assistance: Minimal  Grooming Assistance: Minimal  Bathing Assistance: Maximal  UE Dressing Assistance: Moderate  LE Dressing Assistance: Total  Toileting Assistance with Device: Maximal  Activity Tolerance:  Endurance: Decreased tolerance for upright " activites  Bed Mobility/Transfers: Bed Mobility  Bed Mobility: Yes  Bed Mobility 1  Bed Mobility 1: Supine to sitting, Sitting to supine  Bed Mobility Comments 1: HOB elevated. Min A level.    Transfers  Transfer: Yes  Transfer 1  Technique 1: Sit to stand, Stand to sit  Trials/Comments 1: Patient completed sit <> stand from EOB - mod A level.    Functional Mobility:  Functional Mobility  Functional Mobility Performed: Yes  Functional Mobility 1  Comments 1: Patient completed functional mobility throughout the room at mod A level with hand held assist (L UE). Gait belt used for safety during mobility.  Sitting Balance:  Dynamic Sitting Balance  Dynamic Sitting-Comments: Min A level, occasional mod A level.  Standing Balance:  Dynamic Standing Balance  Dynamic Standing-Comments: Mod A level.   Sensation:  Sensation Comment: Difficult to fully assess d/t aphasia.  Strength:  Strength Comments: L UE MMT WFL during functional tasks. R digit flex 1/5, shoulder flex and elbow flex 2 to 2+/5.  Perception:  Inattention/Neglect:  (Cues to attend to the R UE during functional tasks.)  Coordination:  Coordination Comment: Unable to complete finger to nose with the R UE. L UE finger to nose intact with cues and repetition.      Extremities: RUE   RUE :  (R UE AROM shoulder flex ~70 degrees, AROM elbow flex/ext 1/2 range, PROM wrist flex/ext and digit flex/ext WFL. Patient compensates with lateral trunk flex.) and LUE   LUE: Within Functional Limits    Outcome Measures:[facility] Daily Activity  Putting on and taking off regular lower body clothing: Total  Bathing (including washing, rinsing, drying): A lot  Putting on and taking off regular upper body clothing: A lot  Toileting, which includes using toilet, bedpan or urinal: A lot  Taking care of personal grooming such as brushing teeth: A little  Eating Meals: A little  Daily Activity - Total Score: 13    Education Documentation  Body Mechanics, taught by Hailee Bhatt, OT at  8/21/2024  2:54 PM.  Learner: Patient  Readiness: Acceptance  Method: Explanation  Response: Needs Reinforcement    OP EDUCATION:  Education  Individual(s) Educated: Patient, Child  Education Provided: POC discussed and agreed upon, Fall precautons, Risk and benefits of OT discussed with patient or other  Patient Response to Education:  (Daughter verbalized understanding, patient would benefit from continued education.)    Goals:  Encounter Problems       Encounter Problems (Active)       OT Goals       Patient will complete functional transfers at a CGA level.  (Progressing)       Start:  08/21/24    Expected End:  09/04/24            Patient will demonstrate good sitting and fair/fair + standing balance during functional tasks. (Progressing)       Start:  08/21/24    Expected End:  09/04/24            Patient will complete upper body dressing at setup/S level.  (Progressing)       Start:  08/21/24    Expected End:  09/04/24            Patient will complete lower body dressing at a CGA level.  (Progressing)       Start:  08/21/24    Expected End:  09/04/24            Patient will demonstrate R UE 3/5 MMT to increase strength for functional tasks.  (Progressing)       Start:  08/21/24    Expected End:  09/04/24

## 2024-08-21 NOTE — H&P
Medical Group History and Physical    ASSESSMENT & PLAN:     Stroke-like symptoms  Hx CVA and aneurysm with clips  Right-sided weakness  Right sided facial droop  RUE flaccid  Aphasic  Possible seizure ?  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  Neurosurgery consulted downtown, suggest treating for seizures given history of craniotomy.  No documented history of seizures this would be first episode. Pupils -> R +3 brisk and L +1 sluggish   - Consult neurology - appreciate recs  - Given IV Keppra and Ativan  - MRI/MRA recommended however pt has hx of aneurysmal clips  - EEG ordered   - seizures precautions  - Neurochecks and VS Q4hr  - NIH as needed  - Swallow screening   - n.p.o. until cleared  - Check A1c, lipid panel - on statin, CBC, CMP  - renal function at or near her baseline: July 2024: BUN 56, Scr 1.52, GFR 33.8  - Last echo [?] limited records for review   - Echo ordered for AM  - tele overnight    HTN  - will start hydral/labetalol for SBP >220 and/or DBP >110 AND HR >60  - permissive HTN overnight - monitor    VTE Prophylaxis: Heparin subcu    Erica Villanueva, MAEVE-CNP    HISTORY OF PRESENT ILLNESS:   Chief Complaint: Aphasia and right-sided weakness    History Of Present Illness:    Isabell Swan is a 84 y.o. female with a significant past medical history of brain aneurysm with clips, left upper lobectomy, CABG x 5, COVID, RENE with CKD, HTN, HLD, presenting to [facility] with right-sided weakness aphasia and right facial droop LKW 11 AM, glucose 188 on arrival, presenting to hospital with daughter who reports last speaking to her mother at 11 AM, when she was unable to speak with her after  work she went by the house and found patient in chair staring, initially stood up from chair but required her help to sit back down; pt unable to talk, right arm flaccid and right facial droop present.  At baseline patient is alert and oriented x 4, on presentation she is ANO x 0 she is able to move bilateral lower extremities, right upper extremity is flaccid right facial droop is present, nonproductive cough present, afebrile with evidence of mild hypertension, possibly due to not taking home medications.    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 Not on seizure meds prior to today.     Treated with IV Keppra and Ativan for possible seizures, no improvement in mentation or focal deficits since arrival.    VSS, pt is ready for admit to general medicine for stroke workup.    Review of systems: 10 point review of systems is otherwise negative except as mentioned above.    PAST HISTORIES:       Past Medical History:  Medical Problems       Problem List       * (Principal) Stroke-like symptoms    Overview Signed 8/20/2024  9:25 PM by MAEVE Ahuja-CNP     Aphasia, right-sided weakness, Left pupil +1, Right pupil +3 Hx aneurysm with clips, CABGx5, COVID.     Plan: MRI/MRA in a.m. contraindicated repeat head CT, EEG and neuro consult.         Acute renal failure syndrome (CMS-HCC)    Anemia of chronic renal failure    Essential hypertension    Hyperlipidemia    Myocardial infarction (Multi)    Primary insomnia    Acute right-sided weakness           Past Surgical History:  History  "reviewed. No pertinent surgical history.   CABGx5, KRISTINA lobectomy      Social History:  She has no history on file for tobacco use, alcohol use, and drug use.  ~1/2 ppd smoker  No alcohol use  No ilicit drug use    Family History:  No family history on file.     Allergies:  Amoxicillin    OBJECTIVE:       Last Recorded Vitals:  Vitals:    08/20/24 1956 08/20/24 2027 08/20/24 2040 08/20/24 2056   BP:  (!) 182/98  175/81   Pulse: 65 63  65   Resp: 16 16  16   Temp:  36.5 °C (97.7 °F)     SpO2: 99% 100%  99%   Weight: 57 kg (125 lb 10.6 oz)  57 kg (125 lb 10.6 oz)    Height: 1.549 m (5' 1\")  1.549 m (5' 1\")        Last I/O:  No intake/output data recorded.    Physical Exam  Vitals and nursing note reviewed.   Constitutional:       Appearance: Normal appearance. She is normal weight.   HENT:      Head: Normocephalic and atraumatic.      Nose: Nose normal.      Mouth/Throat:      Mouth: Mucous membranes are moist.      Pharynx: Oropharynx is clear.   Eyes:      Conjunctiva/sclera: Conjunctivae normal.      Comments: L eye +1; R eye +3   Cardiovascular:      Rate and Rhythm: Normal rate and regular rhythm.      Pulses: Normal pulses.      Heart sounds: Normal heart sounds.   Pulmonary:      Effort: Pulmonary effort is normal.      Breath sounds: Normal breath sounds.   Abdominal:      General: Abdomen is flat. Bowel sounds are normal.      Palpations: Abdomen is soft.   Genitourinary:     Comments: Unable to assess  Musculoskeletal:         General: Normal range of motion.      Cervical back: Normal range of motion and neck supple.   Skin:     General: Skin is warm and dry.      Capillary Refill: Capillary refill takes 2 to 3 seconds.   Neurological:      Mental Status: She is alert. She is disoriented.      Cranial Nerves: Cranial nerve deficit present.      Motor: Weakness present.      Comments: RUE flaccid, RLE full ROM, Left side no deficits, unable to follow commands, unable to perform full neurologic exam, sensation " intact bilaterally to lower extremities.  Palpable pulses   Psychiatric:      Comments: Unable to assess           Scheduled Medications    PRN Medications    Continuous Medications      Outpatient Medications:  Prior to Admission medications    Not on File       LABS AND IMAGING:     Labs:  Results for orders placed or performed during the hospital encounter of 08/20/24 (from the past 24 hour(s))   CBC and Auto Differential   Result Value Ref Range    WBC 11.1 4.4 - 11.3 x10*3/uL    nRBC 0.0 0.0 - 0.0 /100 WBCs    RBC 3.50 (L) 4.00 - 5.20 x10*6/uL    Hemoglobin 11.0 (L) 12.0 - 16.0 g/dL    Hematocrit 33.1 (L) 36.0 - 46.0 %    MCV 95 80 - 100 fL    MCH 31.4 26.0 - 34.0 pg    MCHC 33.2 32.0 - 36.0 g/dL    RDW 12.0 11.5 - 14.5 %    Platelets 197 150 - 450 x10*3/uL    Neutrophils % 78.2 40.0 - 80.0 %    Immature Granulocytes %, Automated 0.5 0.0 - 0.9 %    Lymphocytes % 13.0 13.0 - 44.0 %    Monocytes % 6.1 2.0 - 10.0 %    Eosinophils % 2.0 0.0 - 6.0 %    Basophils % 0.2 0.0 - 2.0 %    Neutrophils Absolute 8.67 (H) 1.60 - 5.50 x10*3/uL    Immature Granulocytes Absolute, Automated 0.05 0.00 - 0.50 x10*3/uL    Lymphocytes Absolute 1.44 0.80 - 3.00 x10*3/uL    Monocytes Absolute 0.67 0.05 - 0.80 x10*3/uL    Eosinophils Absolute 0.22 0.00 - 0.40 x10*3/uL    Basophils Absolute 0.02 0.00 - 0.10 x10*3/uL   Comprehensive metabolic panel   Result Value Ref Range    Glucose 188 (H) 74 - 99 mg/dL    Sodium 140 136 - 145 mmol/L    Potassium 3.6 3.5 - 5.3 mmol/L    Chloride 109 (H) 98 - 107 mmol/L    Bicarbonate 22 21 - 32 mmol/L    Anion Gap 13 10 - 20 mmol/L    Urea Nitrogen 44 (H) 6 - 23 mg/dL    Creatinine 1.21 (H) 0.50 - 1.05 mg/dL    eGFR 45 (L) >60 mL/min/1.73m*2    Calcium 9.1 8.6 - 10.3 mg/dL    Albumin 4.0 3.4 - 5.0 g/dL    Alkaline Phosphatase 45 33 - 136 U/L    Total Protein 6.6 6.4 - 8.2 g/dL    AST 16 9 - 39 U/L    Bilirubin, Total 0.3 0.0 - 1.2 mg/dL    ALT 11 7 - 45 U/L   Troponin I, High Sensitivity   Result Value  Ref Range    Troponin I, High Sensitivity 7 0 - 13 ng/L   Protime-INR   Result Value Ref Range    Protime 11.6 9.8 - 12.8 seconds    INR 1.0 0.9 - 1.1   APTT   Result Value Ref Range    aPTT 27 27 - 38 seconds   POCT GLUCOSE   Result Value Ref Range    POCT Glucose 193 (H) 74 - 99 mg/dL        Imaging:  CT brain attack angio head and neck W and WO IV contrast   Final Result   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 There is   minimal opacification of the left vertebral artery distally at level   C4 vertebral body with minimal opacification of the V2, V3, and V4   segments distally.        Postsurgical change of left craniotomy and aneurysm clips in the   region of the left anterior communicating artery and the left MCA   bifurcation. No definite evidence of acute major proximal   intracranial arterial occlusion.        Encephalomalacia in the right frontal lobe compatible with old   infarct.        If there is persistent concern for acute ischemia, MRI may be   obtained for further evaluation.        MACRO:   Salazar Styles discussed the significance and urgency of this critical   finding by telephone with  Dr. Weber on 8/20/2024 at 9:18 pm.   (**-RCF-**) Findings:  See findings.             Signed by: Salazar Styles 8/20/2024 9:19 PM   Dictation workstation:   [id number]      CT brain attack head wo IV contrast   Final Result   No acute intracranial abnormality.        Chronic bilateral frontal lobe infarcts. Postsurgical changes as   above.        MACRO:   Kadie Lowry discussed the significance and urgency of this critical   finding by telephone with  NATHALIE MORRISON on 8/20/2024 at 8:17 pm.   (**-RCF-**) Findings:  See findings.        Signed by: Kadie Lowry 8/20/2024 8:18 PM   Dictation workstation:   [id number]

## 2024-08-21 NOTE — PROGRESS NOTES
Physical Therapy    Physical Therapy Evaluation    Patient Name: Isabell Swan  MRN: [id number]  Today's Date: 8/21/2024   Time Calculation  Start Time: 1249  Stop Time: 1307  Time Calculation (min): 18 min; PLOF and home environment information collected from dtr earlier in AM by therapist  1005/1005-B    Assessment/Plan   PT Assessment  PT Assessment Results: Decreased strength, Decreased endurance, Impaired balance, Decreased mobility, Decreased coordination, Decreased cognition, Decreased safety awareness, Impaired tone  Rehab Prognosis: Good  Barriers to Discharge: Pt. lives alone.  End of Session Communication: Bedside nurse, Care Coordinator, Physician  Assessment Comment: Pt. presents with R sided weakness and requires up to modAx1 for transfers and ambulation at this time. Pt. would benefit from continued PT to increase mobility and indep.  End of Session Patient Position: Bed, 3 rail up  IP OR SWING BED PT PLAN  Inpatient or Swing Bed: Inpatient (dtr present)  PT Plan  Treatment/Interventions: Bed mobility, Transfer training, Gait training, Stair training, Balance training, Neuromuscular re-education, Strengthening, Endurance training, Therapeutic exercise, Therapeutic activity, Home exercise program  PT Plan: Ongoing PT  PT Frequency: 4 times per week  PT Discharge Recommendations: High intensity level of continued care  Equipment Recommended upon Discharge:  (TBD)  PT Recommended Transfer Status: Assist x1 (DO NOT PULL ON PATIENT'S RUE)  PT - OK to Discharge: Yes (to next level of care, when medically stable)            General Visit Information:  General  Reason for Referral: Impaired mobility  Referred By: Rich GOSS 8/20  Past Medical History Relevant to Rehab: brain aneurysm with clips, left frontotemporal craniotomy (16 yrs ago, per Neuro note), CVA (frontal lobe), CKD, HTN, HLD, COPD, MI, B/L THR  Missed Visit: Yes  Missed Visit Reason:  (Pt. off floor for ECHO (attempted @1052 and 1135). Will  "reattempt as able.)  Family/Caregiver Present: Yes  Caregiver Feedback: Dtr present; dtr provided home and PLOF information while patient was at ECHO this AM.  Prior to Session Communication: Bedside nurse  Patient Position Received: Bed, 3 rail up (Pt. prefers the name \"Pat;\" Purewick and tele intact. R facial droop)  General Comment: ED 8/20 with right-sided weakness, aphasia, confusion and right facial droop. CT head- chronic B/L frontal lobe infarcts, L frontotemporal craniotomy. CT angio head and neck- occlusion of origin and proximal L verterbral artery; postsurgical changes. Neuro and ST consulted. Unable to have MRI d/t brain clips. Permissive HTN. Per Neuro consult-> Acute speech difficulty with right-sided weakness most likely due to left MCA infarct versus new onset seizure with Stanislaw's paralysis.    Home Living:  Home Living  Home Living Comments: Lives alone in 1 story home; no RONALDO. Owns walker (unsure if there are wheels) and SPC. Walk-in shower with seat.    Prior Level of Function:  Prior Function Per Pt/Caregiver Report  Prior Function Comments: Indep. with amb., ADLs and IADLs without AD. Dtr reports no recent falls that she is aware of. Pt. was driving PTA.    Precautions:  Precautions  Medical Precautions: Fall precautions, Swallowing precautions    Vital Signs:  Vital Signs  Heart Rate: 64  Heart Rate Source: Monitor  Objective     Pain:  Pain Assessment  Pain Assessment: 0-10  0-10 (Numeric) Pain Score: 0 - No pain    Cognition:  Cognition  Overall Cognitive Status:  (Pt. alert and cooperative. Pt. presents with expressive aphasia. The only word pt. verbalized during session was \"Pat\" when asked her name. Pt. appears to have some receptive aphasia as well. See ST eval for details.)  Following Commands:  (1 step commands 50-75% of the time with repetition and/or demonstration)  Insight: Mild  Impulsive: Mildly    General Assessments:      Activity Tolerance  Activity Tolerance Comments: Pt. " presents with increased fatigue  Sensation  Sensation Comment: difficult to assess, but suspect some numbness/tingling on R side of face and RUE.  Strength  Strength Comments: LUE and LLE at least 4+/5; R hip flex 3/5, R quad 4/5, R ankle DF/PF 4/5; RUE 3-/5 grossly- see OT eval for details  Perception  Inattention/Neglect:  (Cues required to attend to RUE)  Coordination  Movements are Fluid and Coordinated: No  Upper Body Coordination: decreased tone RUE  Coordination Comment: Gait is mildly ataxic     Static Sitting Balance  Static Sitting-Balance Support: Left upper extremity supported, Feet supported  Static Sitting-Level of Assistance: Close supervision  Static Sitting-Comment/Number of Minutes: 3 min.  Dynamic Sitting Balance  Dynamic Sitting-Level of Assistance:  (mostly minAx1, occasional modAx1)  Static Standing Balance  Static Standing-Level of Assistance: Minimum assistance  Dynamic Standing Balance  Dynamic Standing-Balance Support: Left upper extremity supported  Dynamic Standing-Level of Assistance: Moderate assistance    Functional Assessments:     Bed Mobility  Bed Mobility:  (supine to/from sit with minAx1)  Transfers  Transfer:  (sit to/from stand with gait belt and modAx1)  Ambulation/Gait Training  Ambulation/Gait Training Performed:  (Amb. 40 ft with L HHA, gait belt and modAx1; slow pace, NBOS, decreased step length and foot clearance bilaterally, reciprocal gait, mildly ataxic.)          Extremity/Trunk Assessments:  RUE   RUE :  (AAROM/PROM WFL; hypotonic)  LUE   LUE: Within Functional Limits (ROM)  RLE   RLE : Within Functional Limits (ROM)  LLE   LLE : Within Functional Limits (ROM)    Outcome Measures:     [facility] Basic Mobility  Turning from your back to your side while in a flat bed without using bedrails: A little  Moving from lying on your back to sitting on the side of a flat bed without using bedrails: A little  Moving to and from bed to chair (including a wheelchair): A lot  Standing  up from a chair using your arms (e.g. wheelchair or bedside chair): A lot  To walk in hospital room: A lot  Climbing 3-5 steps with railing: Total  Basic Mobility - Total Score: 13                                                             Goals:  Encounter Problems       Encounter Problems (Active)       Impaired mobility        Perform all bed mobility with supervision        Start:  08/21/24    Expected End:  09/04/24            Perform all transfers without device vs LRAD and SBAx1       Start:  08/21/24    Expected End:  09/04/24            Patient will ambulate >/= 100 ft without device vs LRAD and CGAx1       Start:  08/21/24    Expected End:  09/04/24            Patient will sit unsupported with indep. for static balance and SBA for multidirectional reaching        Start:  08/21/24    Expected End:  09/04/24            Patient will perform seated and/or standing (with 1UE support) LE HEP with supervision to improve strength, coordination and balance        Start:  08/21/24    Expected End:  09/04/24                 Education Documentation  Mobility Training, taught by Mariela Alvarez PT at 8/21/2024  2:16 PM.  Learner: Patient  Readiness: Acceptance  Method: Explanation  Response: Verbalizes Understanding, Demonstrated Understanding, Needs Reinforcement  Comment: see note    Education Comments  No comments found.

## 2024-08-21 NOTE — PROGRESS NOTES
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  -CT head noncon showed chronic b/l frontal lobe infarcts, and post surgical changes including aneurysm clips and L frontotemporal craniotomy, and chronic small vessel ischemic changes  -CTA H+N showed L vertebral proximal occlusion, redemonstrated post surgical changes as above  Plan:  -neuro following  -MRI brain unable to be done due to aneurysm clips, will likely do rpt CTH to eval for any interval changes  -ASA and Plavix per neuro, high intensity statin  -empiric Keppra for now for possible seizure, EEG done, read pending  -PT/OT/SLP  -tele monitoring  -TTE pending    HTN - permissive HTN for now, will slowly resume home meds  DLD - high intensity statin  COPD - not in acute exacerbation, home inhalers    Vte ppx sqh  Dispo tbd, pending pt/ot kesha Watkins MD    SUBJECTIVE     NAEON. No acute complaints today.       OBJECTIVE:       Last Recorded Vitals:  Vitals:    08/20/24 2254 08/21/24 0016 08/21/24 0424 08/21/24 0755   BP: 176/74 (!) 172/103 125/59 (!) 205/83   BP Location: Left arm Left arm Left arm    Patient Position: Lying Lying Lying    Pulse: 72  65 64   Resp: 18 16 16    Temp: 36.4 °C (97.5 °F) 36.3 °C (97.3 °F) 35.9 °C (96.6 °F) 35.1 °C (95.2 °F)   TempSrc: Temporal Temporal Temporal    SpO2: 99% 97% 98% 98%   Weight: 63.5 kg (139 lb 15.9 oz)      Height:           Last I/O:  No intake/output data recorded.    Physical Exam:  GEN:  appears stated age, NAD  HEENT: NCAT, PERRLA, EOMI  CV: RRR, no m/r/g, no LE edema  LUNGS: CTAB, no w/r/c  ABD: soft, NT  SKIN: no rashes  MSK; no gross deformities, normal joints  NEURO: aphasic, seems to be oriented to self and daughters, R sided facial droop, RUE  2/5, R shoulder 3/5,  RLE 3/5 strength, L sided strength normal  PSYCH: flat affect    Inpatient Medications:  aspirin, 81 mg, oral, Daily  atorvastatin, 40 mg, oral, Nightly  clopidogrel, 75 mg, oral, Daily  docusate sodium, 100 mg, oral, BID  tiotropium, 2 puff, inhalation, Daily   And  formoterol, 20 mcg, nebulization, q12h  heparin (porcine), 5,000 Units, subcutaneous, q8h  insulin lispro, 0-5 Units, subcutaneous, TID  levETIRAcetam, 500 mg, oral, BID  pantoprazole, 40 mg, oral, Daily   Or  pantoprazole, 40 mg, intravenous, Daily  polyethylene glycol, 17 g, oral, Daily        PRN Medications  PRN medications: acetaminophen **OR** acetaminophen **OR** acetaminophen, dextrose, dextrose, glucagon, glucagon, hydrALAZINE **FOLLOWED BY** [START ON 8/22/2024] hydrALAZINE, labetaloL, melatonin, ondansetron **OR** ondansetron, oxygen    Continuous Medications:         LABS AND IMAGING:     Labs:  Results for orders placed or performed during the hospital encounter of 08/20/24 (from the past 24 hour(s))   CBC and Auto Differential   Result Value Ref Range    WBC 11.1 4.4 - 11.3 x10*3/uL    nRBC 0.0 0.0 - 0.0 /100 WBCs    RBC 3.50 (L) 4.00 - 5.20 x10*6/uL    Hemoglobin 11.0 (L) 12.0 - 16.0 g/dL    Hematocrit 33.1 (L) 36.0 - 46.0 %    MCV 95 80 - 100 fL    MCH 31.4 26.0 - 34.0 pg    MCHC 33.2 32.0 - 36.0 g/dL    RDW 12.0 11.5 - 14.5 %    Platelets 197 150 - 450 x10*3/uL    Neutrophils % 78.2 40.0 - 80.0 %    Immature Granulocytes %, Automated 0.5 0.0 - 0.9 %    Lymphocytes % 13.0 13.0 - 44.0 %    Monocytes % 6.1 2.0 - 10.0 %    Eosinophils % 2.0 0.0 - 6.0 %    Basophils % 0.2 0.0 - 2.0 %    Neutrophils Absolute 8.67 (H) 1.60 - 5.50 x10*3/uL    Immature Granulocytes Absolute, Automated 0.05 0.00 - 0.50 x10*3/uL    Lymphocytes Absolute 1.44 0.80 - 3.00 x10*3/uL    Monocytes Absolute 0.67 0.05 - 0.80 x10*3/uL    Eosinophils Absolute 0.22 0.00 - 0.40 x10*3/uL    Basophils Absolute 0.02 0.00 - 0.10 x10*3/uL   Comprehensive metabolic panel   Result  Value Ref Range    Glucose 188 (H) 74 - 99 mg/dL    Sodium 140 136 - 145 mmol/L    Potassium 3.6 3.5 - 5.3 mmol/L    Chloride 109 (H) 98 - 107 mmol/L    Bicarbonate 22 21 - 32 mmol/L    Anion Gap 13 10 - 20 mmol/L    Urea Nitrogen 44 (H) 6 - 23 mg/dL    Creatinine 1.21 (H) 0.50 - 1.05 mg/dL    eGFR 45 (L) >60 mL/min/1.73m*2    Calcium 9.1 8.6 - 10.3 mg/dL    Albumin 4.0 3.4 - 5.0 g/dL    Alkaline Phosphatase 45 33 - 136 U/L    Total Protein 6.6 6.4 - 8.2 g/dL    AST 16 9 - 39 U/L    Bilirubin, Total 0.3 0.0 - 1.2 mg/dL    ALT 11 7 - 45 U/L   Troponin I, High Sensitivity   Result Value Ref Range    Troponin I, High Sensitivity 7 0 - 13 ng/L   Protime-INR   Result Value Ref Range    Protime 11.6 9.8 - 12.8 seconds    INR 1.0 0.9 - 1.1   APTT   Result Value Ref Range    aPTT 27 27 - 38 seconds   Gray Top   Result Value Ref Range    Extra Tube Hold for add-ons.    POCT GLUCOSE   Result Value Ref Range    POCT Glucose 193 (H) 74 - 99 mg/dL   ECG 12 lead   Result Value Ref Range    Ventricular Rate 64 BPM    Atrial Rate 64 BPM    MS Interval 162 ms    QRS Duration 100 ms    QT Interval 466 ms    QTC Calculation(Bazett) 480 ms    P Axis 73 degrees    R Axis 69 degrees    T Axis 89 degrees    QRS Count 10 beats    Q Onset 222 ms    P Onset 141 ms    P Offset 194 ms    T Offset 455 ms    QTC Fredericia 476 ms   Comprehensive metabolic panel   Result Value Ref Range    Glucose 95 74 - 99 mg/dL    Sodium 141 136 - 145 mmol/L    Potassium 3.5 3.5 - 5.3 mmol/L    Chloride 109 (H) 98 - 107 mmol/L    Bicarbonate 25 21 - 32 mmol/L    Anion Gap 11 10 - 20 mmol/L    Urea Nitrogen 34 (H) 6 - 23 mg/dL    Creatinine 0.94 0.50 - 1.05 mg/dL    eGFR 60 (L) >60 mL/min/1.73m*2    Calcium 9.2 8.6 - 10.3 mg/dL    Albumin 4.0 3.4 - 5.0 g/dL    Alkaline Phosphatase 53 33 - 136 U/L    Total Protein 6.7 6.4 - 8.2 g/dL    AST 43 (H) 9 - 39 U/L    Bilirubin, Total 0.4 0.0 - 1.2 mg/dL    ALT 42 7 - 45 U/L   CBC and Auto Differential   Result Value  Ref Range    WBC 7.3 4.4 - 11.3 x10*3/uL    nRBC 0.0 0.0 - 0.0 /100 WBCs    RBC 3.60 (L) 4.00 - 5.20 x10*6/uL    Hemoglobin 11.2 (L) 12.0 - 16.0 g/dL    Hematocrit 34.0 (L) 36.0 - 46.0 %    MCV 94 80 - 100 fL    MCH 31.1 26.0 - 34.0 pg    MCHC 32.9 32.0 - 36.0 g/dL    RDW 12.0 11.5 - 14.5 %    Platelets 193 150 - 450 x10*3/uL    Neutrophils % 64.8 40.0 - 80.0 %    Immature Granulocytes %, Automated 0.3 0.0 - 0.9 %    Lymphocytes % 22.5 13.0 - 44.0 %    Monocytes % 10.9 2.0 - 10.0 %    Eosinophils % 1.4 0.0 - 6.0 %    Basophils % 0.1 0.0 - 2.0 %    Neutrophils Absolute 4.76 1.60 - 5.50 x10*3/uL    Immature Granulocytes Absolute, Automated 0.02 0.00 - 0.50 x10*3/uL    Lymphocytes Absolute 1.65 0.80 - 3.00 x10*3/uL    Monocytes Absolute 0.80 0.05 - 0.80 x10*3/uL    Eosinophils Absolute 0.10 0.00 - 0.40 x10*3/uL    Basophils Absolute 0.01 0.00 - 0.10 x10*3/uL   Magnesium   Result Value Ref Range    Magnesium 2.26 1.60 - 2.40 mg/dL   C-Reactive Protein   Result Value Ref Range    C-Reactive Protein 0.15 <1.00 mg/dL   Lipid Panel   Result Value Ref Range    Cholesterol 161 0 - 199 mg/dL    HDL-Cholesterol 53.8 mg/dL    Cholesterol/HDL Ratio 3.0     LDL Calculated 75 <=99 mg/dL    VLDL 33 0 - 40 mg/dL    Triglycerides 163 (H) 0 - 149 mg/dL    Non HDL Cholesterol 107 0 - 149 mg/dL   Hemoglobin A1C   Result Value Ref Range    Hemoglobin A1C 5.8 (H) see below %    Estimated Average Glucose 120 Not Established mg/dL   SST TOP   Result Value Ref Range    Extra Tube Hold for add-ons.    Transthoracic Echo (TTE) Complete   Result Value Ref Range    BSA 1.65 m2        Imaging:  ECG 12 lead  Sinus rhythm with Premature atrial complexes  Possible Left atrial enlargement  Nonspecific ST abnormality  Abnormal ECG  When compared with ECG of 17-MAR-2000 07:37,  Premature atrial complexes are now Present  Vent. rate has decreased BY  40 BPM  QRS duration has increased

## 2024-08-21 NOTE — ED PROVIDER NOTES
HPI   Chief Complaint   Patient presents with    Stroke     Lkw 11 am        Patient is a 84-year-old female with a history of COPD hypertension last known well as per the daughter was 11 AM when she talked to her and since then nobody has talked to her seen her the daughter tried to call later but the phone was not being onsite so when she went to the house she opened the door and the patient was sitting on the chair with a blank stare aphasic somewhat confused had a facial droop and was weak and unsteady on her feet when she called 911.              Patient History   History reviewed. No pertinent past medical history.  History reviewed. No pertinent surgical history.  No family history on file.  Social History     Tobacco Use    Smoking status: Unknown    Smokeless tobacco: Not on file   Substance Use Topics    Alcohol use: Not on file    Drug use: Not on file       Physical Exam   ED Triage Vitals   Temp Pulse Resp BP   -- -- -- --      SpO2 Temp src Heart Rate Source Patient Position   -- -- -- --      BP Location FiO2 (%)     -- --       Physical Exam  Vitals reviewed.   Constitutional:       Appearance: She is ill-appearing.   Cardiovascular:      Rate and Rhythm: Normal rate and regular rhythm.   Pulmonary:      Effort: Pulmonary effort is normal.      Breath sounds: Normal breath sounds.   Skin:     General: Skin is warm.   Neurological:      Mental Status: She is alert.      Cranial Nerves: Cranial nerve deficit present.      Motor: Weakness present.      Gait: Gait abnormal.           ED Course & MDM   Diagnoses as of 08/20/24 7137   Disorientation   Acute CVA (cerebrovascular accident) (Multi)   Acute right-sided weakness                 No data recorded     [place] Coma Scale Score: 10 (08/20/24 2056 : Janie Rodriguez RN)       NIH Stroke Scale: 15 (08/20/24 2156 : Janie Rodriguez RN)                   Medical Decision Making  Patient EKG was interpreted by me as normal sinus rhythm rate of 64 normal QRS  nonspecific ST changes  Patient's NIH score is 16 and is Van positive  Last well-known was 11 AM so patient is not a candidate for intravenous thrombolytic  Differential diagnosis  Acute CVA acute hemorrhagic CVA acute electrolyte imbalance  Stroke protocol was initiated CT scan of the head with out contrast and then CT angiogram head and neck with and without IV contrast was initiated because patient was Van positive,  Further workup and management to be done based upon the results of the test ordered  Patient CT scan of the head was negative for acute stroke at this time I reached out to stroke attending at [facility] who reviewed the CT angiogram with me and at this time we did not see any thrombus and patient not a candidate for IV thrombolytics as passed the window with the last well-known time at 11 AM and CT angiogram with IV contrast was negative for any clots that could be retrieved.  Dr. Luu did recommend that I give the patient some Ativan and loaded with Keppra as she could be having a seizure also even the patient has no history of seizures.  Plan is to admit the patient here at [facility]  Labs Reviewed  CBC WITH AUTO DIFFERENTIAL - Abnormal     WBC                           11.1                   nRBC                          0.0                    RBC                           3.50 (*)               Hemoglobin                    11.0 (*)               Hematocrit                    33.1 (*)               MCV                           95                     MCH                           31.4                   MCHC                          33.2                   RDW                           12.0                   Platelets                     197                    Neutrophils %                 78.2                   Immature Granulocytes %, Automated   0.5                    Lymphocytes %                 13.0                    Monocytes %                   6.1                    Eosinophils %                 2.0                    Basophils %                   0.2                    Neutrophils Absolute          8.67 (*)               Immature Granulocytes Absolute, Au*   0.05                   Lymphocytes Absolute          1.44                   Monocytes Absolute            0.67                   Eosinophils Absolute          0.22                   Basophils Absolute            0.02                COMPREHENSIVE METABOLIC PANEL - Abnormal     Glucose                       188 (*)                Sodium                        140                    Potassium                     3.6                    Chloride                      109 (*)                Bicarbonate                   22                     Anion Gap                     13                     Urea Nitrogen                 44 (*)                 Creatinine                    1.21 (*)               eGFR                          45 (*)                 Calcium                       9.1                    Albumin                       4.0                    Alkaline Phosphatase          45                     Total Protein                 6.6                    AST                           16                     Bilirubin, Total              0.3                    ALT                           11                  POCT GLUCOSE - Abnormal     POCT Glucose                  193 (*)             TROPONIN I, HIGH SENSITIVITY - Normal     Troponin I, High Sensitivity   7                        Narrative: Less than 99th percentile of normal range cutoff-                Female and children under 18 years old <14 ng/L; Male <21 ng/L: Negative                Repeat testing should be performed if clinically indicated.                                 Female and children under 18 years old 14-50 ng/L; Male 21-50 ng/L:                Consistent with possible cardiac damage and possible increased  clinical                 risk. Serial measurements may help to assess extent of myocardial damage.                                 >50 ng/L: Consistent with cardiac damage, increased clinical risk and                myocardial infarction. Serial measurements may help assess extent of                 myocardial damage.                                  NOTE: Children less than 1 year old may have higher baseline troponin                 levels and results should be interpreted in conjunction with the overall                 clinical context.                                 NOTE: Troponin I testing is performed using a different                 testing methodology at [facility] than at other                 [facility] hospitals. Direct result comparisons should only                 be made within the same method.  PROTIME-INR - Normal     Protime                       11.6                   INR                           1.0                 APTT - Normal     aPTT                          27                       Narrative: The APTT is no longer used for monitoring Unfractionated Heparin Therapy. For monitoring Heparin Therapy, use the Heparin Assay.  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 There is    minimal opacification of the left vertebral artery distally at level    C4 vertebral body with minimal opacification of the V2, V3, and V4    segments distally.          Postsurgical change of left craniotomy and aneurysm clips in the    region of the left anterior communicating artery and the left MCA    bifurcation. No definite evidence of acute major proximal    intracranial arterial occlusion.          Encephalomalacia in the right frontal lobe compatible with old    infarct.          If there is persistent concern for acute  ischemia, MRI may be    obtained for further evaluation.          MACRO:    Salazar Styles discussed the significance and urgency of this critical    finding by telephone with  Dr. Weber on 8/20/2024 at 9:18 pm.    (**-RCF-**) Findings:  See findings.                Signed by: Salazar Styles 8/20/2024 9:19 PM    Dictation workstation:   [id number]     CT brain attack head wo IV contrast   Final Result    No acute intracranial abnormality.          Chronic bilateral frontal lobe infarcts. Postsurgical changes as    above.          MACRO:    Kadie Lowry discussed the significance and urgency of this critical    finding by telephone with  MARYSE MORRISON on 8/20/2024 at 8:17 pm.    (**-RCF-**) Findings:  See findings.          Signed by: Kadie Lowry 8/20/2024 8:18 PM    Dictation workstation:   [id number]                    Procedure  Procedures     Maryse Morrison MD  08/20/24 2194

## 2024-08-21 NOTE — PROGRESS NOTES
08/21/24 1021   Discharge Planning   Living Arrangements Alone   Support Systems Children   Assistance Needed PTA - lives alone in a 1 story condo. Owns cane and walker, but does not normally use DME for ambulation. Independent of ADLs and IADLs. Drives.   Type of Residence Private residence   Do you have animals or pets at home? No   Home or Post Acute Services Post acute facilities (Rehab/SNF/etc)   Type of Post Acute Facility Services Rehab   Does the patient need discharge transport arranged? Yes   RoundTrip coordination needed? Yes   Patient Choice   Provider Choice list and CMS website (https://medicare.gov/care-compare#search) for post-acute Quality and Resource Measure Data were provided and reviewed with: Family;Patient   Patient / Family choosing to utilize agency / facility established prior to hospitalization No     Admitted for stroke-like symptoms, unable to do MRI to confirm d/t aneurysm clips. Showing right sided deficits. PT/OT to see pt.

## 2024-08-21 NOTE — NURSING NOTE
At 1616, informed Doctor Roland of patient's elevated blood pressure. Order via text to give 10 mg hydralazine for blood pressure.

## 2024-08-21 NOTE — CONSULTS
History Of Present Illness  Isabell Swan is a 84 y.o. female presenting with acute change in mental status with right-sided weakness and aphasia with facial droop.  According to the daughter she was unable to talk and then was staring at the ceiling.  No seizure or seizure-like activity.  She was brought to the emergency room and brain attack protocol was called in.  Patient was last known well at 11 AM.  After consulting with the stroke team and main campus with neurosurgeon patient was started on Keppra thinking that this could be a new onset seizure    At the time of my evaluation patient does have speech difficulty with right upper extremity weakness.  No seizure or seizure-like activity.  She is able to engage without any difficulty    Prior to coming to the hospital she was independent living by herself and she was alert awake and oriented x 4    She had a history of aneurysm repair done 16 years ago in [place] at the [facility] and has clipping in place    She had a CAT scan which showed occlusion of the left vertebral artery, postsurgical changes with left craniotomy, bifrontal infarct more on the right than the left side with area of encephalomalacia in the right frontal area.    Please refer to the initial H&P and the ER records for details    Patient is DNR Comfort Care arrest after discussing with patient's daughter who was at the bedside.      Past Medical History  History reviewed. No pertinent past medical history.  Surgical History  History reviewed. No pertinent surgical history.  Social History  Social History     Tobacco Use    Smoking status: Unknown     Allergies  Amoxicillin and Prednisone  Home Medications  Medications Prior to Admission   Medication Sig Dispense Refill Last Dose    albuterol 90 mcg/actuation inhaler Inhale 2 puffs every 4 hours if needed for wheezing.   Unknown    aspirin 81 mg EC tablet Take 1 tablet (81 mg) by mouth once daily.   8/20/2024    atorvastatin  (Lipitor) 40 mg tablet Take 1 tablet (40 mg) by mouth once daily.   8/20/2024    carvedilol (Coreg) 12.5 mg tablet Take 1 tablet (12.5 mg) by mouth 2 times a day.   8/20/2024    losartan-hydrochlorothiazide (Hyzaar) 100-12.5 mg tablet Take 1 tablet by mouth once daily.   8/20/2024    traZODone (Desyrel) 100 mg tablet Take 1 tablet (100 mg) by mouth once daily at bedtime.   8/19/2024    umeclidinium-vilanteroL (Anoro Ellipta) 62.5-25 mcg/actuation blister with device Inhale 1 puff once daily.   8/20/2024       Review of Systems   Unable to perform ROS: Mental status change   Constitutional:  Negative for fatigue, fever and unexpected weight change.   HENT:  Negative for dental problem, ear pain, hearing loss, sinus pressure, tinnitus and trouble swallowing.    Eyes:  Negative for photophobia, pain and visual disturbance.   Respiratory:  Negative for cough, shortness of breath and wheezing.    Cardiovascular:  Negative for chest pain, palpitations and leg swelling.   Gastrointestinal:  Negative for abdominal pain, nausea and vomiting.   Genitourinary:  Negative for difficulty urinating, enuresis and frequency.   Musculoskeletal:  Negative for arthralgias, back pain, joint swelling, neck pain and neck stiffness.   Skin:  Negative for pallor and rash.   Allergic/Immunologic: Negative for food allergies.   Neurological:  Negative for dizziness, tremors, seizures, syncope, facial asymmetry, speech difficulty, weakness, light-headedness, numbness and headaches.   Hematological:  Negative for adenopathy. Does not bruise/bleed easily.   Psychiatric/Behavioral:  Negative for agitation, behavioral problems, confusion, hallucinations and sleep disturbance. The patient is not hyperactive.        Physical Exam  Patient is alert awake not in any acute distress.  HEENT has revealed to be equal and reactive with slight facial droop on right compared to the left    Neck was supple without lymphadenopathy    Cardiovascular examination  "revealed normal S1-S2 with clear breath sounds bilateral but abdomen was soft nondistended with no organomegaly.  Extremity examination did not reveal edema records that she had decreased movement on the right compared to the left with no cyanosis or edema.  Neurological Exam  Patient was alert awake oriented to herself but she was aphasic and higher functions were difficult evaluate.  No hallucinations or delusions or any agitation.    Cranial nerve revealed normal extraocular movement with pupil being reactive.  She had a right facial droop and tongue was midline and she was able to elevate the palate and shoulder without difficulties.  Hearing was normal.  Sensations were normal.    Motor exam revealed right upper extremity weakness with power of 0/5 in the right lower EXTR which was like 3-4/5 left side was normal with normal tone and bulk    Reflexes were asymmetric with slightly increased reflex on the right compared to the left with plantar extensor response bilaterally    Coordination, Station and gait were deferred because of patient present condition    Sensory exam was intact to light touch and pinprick.  Last Recorded Vitals  Blood pressure (!) 205/83, pulse 64, temperature 35.1 °C (95.2 °F), resp. rate 16, height 1.549 m (5' 1\"), weight 63.5 kg (139 lb 15.9 oz), SpO2 98%.      Relevant Results  Recent Results (from the past 24 hour(s))   CBC and Auto Differential    Collection Time: 08/20/24  8:00 PM   Result Value Ref Range    WBC 11.1 4.4 - 11.3 x10*3/uL    nRBC 0.0 0.0 - 0.0 /100 WBCs    RBC 3.50 (L) 4.00 - 5.20 x10*6/uL    Hemoglobin 11.0 (L) 12.0 - 16.0 g/dL    Hematocrit 33.1 (L) 36.0 - 46.0 %    MCV 95 80 - 100 fL    MCH 31.4 26.0 - 34.0 pg    MCHC 33.2 32.0 - 36.0 g/dL    RDW 12.0 11.5 - 14.5 %    Platelets 197 150 - 450 x10*3/uL    Neutrophils % 78.2 40.0 - 80.0 %    Immature Granulocytes %, Automated 0.5 0.0 - 0.9 %    Lymphocytes % 13.0 13.0 - 44.0 %    Monocytes % 6.1 2.0 - 10.0 %    " Eosinophils % 2.0 0.0 - 6.0 %    Basophils % 0.2 0.0 - 2.0 %    Neutrophils Absolute 8.67 (H) 1.60 - 5.50 x10*3/uL    Immature Granulocytes Absolute, Automated 0.05 0.00 - 0.50 x10*3/uL    Lymphocytes Absolute 1.44 0.80 - 3.00 x10*3/uL    Monocytes Absolute 0.67 0.05 - 0.80 x10*3/uL    Eosinophils Absolute 0.22 0.00 - 0.40 x10*3/uL    Basophils Absolute 0.02 0.00 - 0.10 x10*3/uL   Comprehensive metabolic panel    Collection Time: 08/20/24  8:00 PM   Result Value Ref Range    Glucose 188 (H) 74 - 99 mg/dL    Sodium 140 136 - 145 mmol/L    Potassium 3.6 3.5 - 5.3 mmol/L    Chloride 109 (H) 98 - 107 mmol/L    Bicarbonate 22 21 - 32 mmol/L    Anion Gap 13 10 - 20 mmol/L    Urea Nitrogen 44 (H) 6 - 23 mg/dL    Creatinine 1.21 (H) 0.50 - 1.05 mg/dL    eGFR 45 (L) >60 mL/min/1.73m*2    Calcium 9.1 8.6 - 10.3 mg/dL    Albumin 4.0 3.4 - 5.0 g/dL    Alkaline Phosphatase 45 33 - 136 U/L    Total Protein 6.6 6.4 - 8.2 g/dL    AST 16 9 - 39 U/L    Bilirubin, Total 0.3 0.0 - 1.2 mg/dL    ALT 11 7 - 45 U/L   Troponin I, High Sensitivity    Collection Time: 08/20/24  8:00 PM   Result Value Ref Range    Troponin I, High Sensitivity 7 0 - 13 ng/L   Protime-INR    Collection Time: 08/20/24  8:00 PM   Result Value Ref Range    Protime 11.6 9.8 - 12.8 seconds    INR 1.0 0.9 - 1.1   APTT    Collection Time: 08/20/24  8:00 PM   Result Value Ref Range    aPTT 27 27 - 38 seconds   Gray Top    Collection Time: 08/20/24  8:00 PM   Result Value Ref Range    Extra Tube Hold for add-ons.    POCT GLUCOSE    Collection Time: 08/20/24  8:02 PM   Result Value Ref Range    POCT Glucose 193 (H) 74 - 99 mg/dL   ECG 12 lead    Collection Time: 08/20/24  8:44 PM   Result Value Ref Range    Ventricular Rate 64 BPM    Atrial Rate 64 BPM    NC Interval 162 ms    QRS Duration 100 ms    QT Interval 466 ms    QTC Calculation(Bazett) 480 ms    P Axis 73 degrees    R Axis 69 degrees    T Axis 89 degrees    QRS Count 10 beats    Q Onset 222 ms    P Onset 141 ms     P Offset 194 ms    T Offset 455 ms    QTC Fredericia 476 ms   Comprehensive metabolic panel    Collection Time: 08/21/24  5:56 AM   Result Value Ref Range    Glucose 95 74 - 99 mg/dL    Sodium 141 136 - 145 mmol/L    Potassium 3.5 3.5 - 5.3 mmol/L    Chloride 109 (H) 98 - 107 mmol/L    Bicarbonate 25 21 - 32 mmol/L    Anion Gap 11 10 - 20 mmol/L    Urea Nitrogen 34 (H) 6 - 23 mg/dL    Creatinine 0.94 0.50 - 1.05 mg/dL    eGFR 60 (L) >60 mL/min/1.73m*2    Calcium 9.2 8.6 - 10.3 mg/dL    Albumin 4.0 3.4 - 5.0 g/dL    Alkaline Phosphatase 53 33 - 136 U/L    Total Protein 6.7 6.4 - 8.2 g/dL    AST 43 (H) 9 - 39 U/L    Bilirubin, Total 0.4 0.0 - 1.2 mg/dL    ALT 42 7 - 45 U/L   CBC and Auto Differential    Collection Time: 08/21/24  5:56 AM   Result Value Ref Range    WBC 7.3 4.4 - 11.3 x10*3/uL    nRBC 0.0 0.0 - 0.0 /100 WBCs    RBC 3.60 (L) 4.00 - 5.20 x10*6/uL    Hemoglobin 11.2 (L) 12.0 - 16.0 g/dL    Hematocrit 34.0 (L) 36.0 - 46.0 %    MCV 94 80 - 100 fL    MCH 31.1 26.0 - 34.0 pg    MCHC 32.9 32.0 - 36.0 g/dL    RDW 12.0 11.5 - 14.5 %    Platelets 193 150 - 450 x10*3/uL    Neutrophils % 64.8 40.0 - 80.0 %    Immature Granulocytes %, Automated 0.3 0.0 - 0.9 %    Lymphocytes % 22.5 13.0 - 44.0 %    Monocytes % 10.9 2.0 - 10.0 %    Eosinophils % 1.4 0.0 - 6.0 %    Basophils % 0.1 0.0 - 2.0 %    Neutrophils Absolute 4.76 1.60 - 5.50 x10*3/uL    Immature Granulocytes Absolute, Automated 0.02 0.00 - 0.50 x10*3/uL    Lymphocytes Absolute 1.65 0.80 - 3.00 x10*3/uL    Monocytes Absolute 0.80 0.05 - 0.80 x10*3/uL    Eosinophils Absolute 0.10 0.00 - 0.40 x10*3/uL    Basophils Absolute 0.01 0.00 - 0.10 x10*3/uL   Magnesium    Collection Time: 08/21/24  5:56 AM   Result Value Ref Range    Magnesium 2.26 1.60 - 2.40 mg/dL   C-Reactive Protein    Collection Time: 08/21/24  5:56 AM   Result Value Ref Range    C-Reactive Protein 0.15 <1.00 mg/dL   Lipid Panel    Collection Time: 08/21/24  5:56 AM   Result Value Ref Range     Cholesterol 161 0 - 199 mg/dL    HDL-Cholesterol 53.8 mg/dL    Cholesterol/HDL Ratio 3.0     LDL Calculated 75 <=99 mg/dL    VLDL 33 0 - 40 mg/dL    Triglycerides 163 (H) 0 - 149 mg/dL    Non HDL Cholesterol 107 0 - 149 mg/dL   Hemoglobin A1C    Collection Time: 08/21/24  5:56 AM   Result Value Ref Range    Hemoglobin A1C 5.8 (H) see below %    Estimated Average Glucose 120 Not Established mg/dL   SST TOP    Collection Time: 08/21/24  5:56 AM   Result Value Ref Range    Extra Tube Hold for add-ons.       NIH Stroke Scale  1A. Level of Consciousness: Alert, Keenly Responsive  1B. Ask Month and Age: No Questions Right  1C. Blink Eyes & Squeeze Hands: Performs Both Tasks  2. Best Gaze: Normal  3. Visual: No Visual Loss  4. Facial Palsy: Minor Paralysis  5A. Motor - Left Arm: No Drift  5B. Motor - Right Arm: No Effort Against Gravity  6A. Motor - Left Leg: No Drift  6B. Motor - Right Leg: No Drift  7. Limb Ataxia: Absent  8. Sensory Loss: Normal  9. Best Language: Severe Aphasia  10. Dysarthria: Normal  11. Extinction and Inattention: Visual, Tactile, Auditory, Spatial, or Personal Inattention  NIH Stroke Scale: 9           Mina Coma Scale  Best Eye Response: Spontaneous  Best Verbal Response: None  Best Motor Response: Follows commands  Mina Coma Scale Score: 11              CMP:    Results from last 7 days   Lab Units 08/21/24  0556 08/20/24  2000   SODIUM mmol/L 141 140   POTASSIUM mmol/L 3.5 3.6   CHLORIDE mmol/L 109* 109*   CO2 mmol/L 25 22   BUN mg/dL 34* 44*   CREATININE mg/dL 0.94 1.21*   GLUCOSE mg/dL 95 188*   PROTEIN TOTAL g/dL 6.7 6.6   CALCIUM mg/dL 9.2 9.1   BILIRUBIN TOTAL mg/dL 0.4 0.3   ALK PHOS U/L 53 45   AST U/L 43* 16   ALT U/L 42 11       Lipid Panel:  Results from last 7 days   Lab Units 08/21/24  0556   HDL mg/dL 53.8   CHOLESTEROL/HDL RATIO  3.0   VLDL mg/dL 33   TRIGLYCERIDES mg/dL 163*   NON HDL CHOL. mg/dL 107            No MRI head results found for the past 14 days  No CT head results  "found for the past 14 days  No echocardiogram results found for the past 14 days      Results from last 7 days   Lab Units 08/21/24  0556   HEMOGLOBIN A1C % 5.8*     No results found for: \"BNP\"     I have personally reviewed the following imaging results ECG 12 lead    Result Date: 8/21/2024  Sinus rhythm with Premature atrial complexes Possible Left atrial enlargement Nonspecific ST abnormality Abnormal ECG When compared with ECG of 17-MAR-2000 07:37, Premature atrial complexes are now Present Vent. rate has decreased BY  40 BPM QRS duration has increased    CT brain attack angio head and neck W and WO IV contrast    Result Date: 8/20/2024  Interpreted By:  Salazar Styles, STUDY: CT BRAIN ATTACK ANGIO HEAD AND NECK W AND WO IV CONTRAST;  8/20/2024 8:20 pm   INDICATION: Signs/Symptoms:VAN positive.   COMPARISON: None   ACCESSION NUMBER(S): [id number]   ORDERING CLINICIAN: NAHTALIE MORRISON   TECHNIQUE: Contiguous axial images of the head and neck were obtained after the intravenous administration of contrast. Coronal and sagittal reformatted images were obtained of the axial images.   FINDINGS: The examination is limited secondary to patient motion.   There is atherosclerotic disease of the right carotid bulb and proximal internal carotid artery without evidence of hemodynamic stenosis and the right common carotid and internal carotid artery are grossly patent.   There is atherosclerotic disease of the left carotid bulb and proximal internal carotid artery with segment approximately 20% stenosis. The left common carotid internal carotid artery are grossly patent.   The right vertebral artery is grossly patent.   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 There is minimal opacification of the left vertebral artery distally at level of C4 vertebral body with minimal opacification of the V2, V3, and V4 segments.   There is postsurgical change of prior left craniotomy and aneurysm clips in the " region of the left anterior communicating artery and the left MCA bifurcation. The bilateral anterior cerebral arteries, middle cerebral arteries, posterior cerebral arteries are grossly patent. No definite evidence of major proximal intracranial vessel occlusion.   There is encephalomalacia in the right frontal lobe compatible with old infarct.       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 There is minimal opacification of the left vertebral artery distally at level C4 vertebral body with minimal opacification of the V2, V3, and V4 segments distally.   Postsurgical change of left craniotomy and aneurysm clips in the region of the left anterior communicating artery and the left MCA bifurcation. No definite evidence of acute major proximal intracranial arterial occlusion.   Encephalomalacia in the right frontal lobe compatible with old infarct.   If there is persistent concern for acute ischemia, MRI may be obtained for further evaluation.   MACRO: Salazar Styles discussed the significance and urgency of this critical finding by telephone with  Dr. Weber on 8/20/2024 at 9:18 pm. (**-RCF-**) Findings:  See findings.     Signed by: Salazar Styles 8/20/2024 9:19 PM Dictation workstation:   [id number]    CT brain attack head wo IV contrast    Result Date: 8/20/2024  Interpreted By:  Kadie Lowry, STUDY: CT BRAIN ATTACK HEAD WO IV CONTRAST;  8/20/2024 8:07 pm   INDICATION: Signs/Symptoms:Stroke Evaluation.   COMPARISON: None.   ACCESSION NUMBER(S): [id number]   ORDERING CLINICIAN: NATHALIE MORRISON   TECHNIQUE: Axial noncontrast CT images of the head.   FINDINGS: BRAIN PARENCHYMA: Aneurysm clips noted in the suprasellar region and left middle cranial fossa. Encephalomalacia in the bilateral frontal lobes, consistent with old infarcts. Gray-white matter interfaces are otherwisepreserved. Deep and periventricular white matter hypodensities are nonspecific, but favored to represent chronic small vessel  ischemic changes. No mass effect or midline shift.   HEMORRHAGE: No acute intracranial hemorrhage. VENTRICLES and EXTRA-AXIAL SPACES: The ventricles and sulci are within normal limits in size for brain volume. No abnormal extraaxial fluid collection. EXTRACRANIAL SOFT TISSUES: Within normal limits. PARANASAL SINUSES/MASTOIDS:  The visualized paranasal sinuses and mastoid air cells are aerated. CALVARIUM: No depressed skull fracture. Left frontotemporal craniotomy.   OTHER FINDINGS: None.       No acute intracranial abnormality.   Chronic bilateral frontal lobe infarcts. Postsurgical changes as above.   MACRO: Kadie Lowry discussed the significance and urgency of this critical finding by telephone with  NATHALIE MORRISON on 8/20/2024 at 8:17 pm. (**-RCF-**) Findings:  See findings.   Signed by: Kadie Lowry 8/20/2024 8:18 PM Dictation workstation:   [id number]    US POST VOID RESIDUAL    Result Date: 8/7/2024  EXAMINATION: RETROPERITONEAL ULTRASOUND OF THE KIDNEYS AND URINARY BLADDER 8/6/2024 COMPARISON: None HISTORY: ORDERING SYSTEM PROVIDED HISTORY: Stage 3b chronic kidney disease (HCC) TECHNOLOGIST PROVIDED HISTORY: Decreased urine output.  UTI.  Dysuria. What reading provider will be dictating this exam?->CRC FINDINGS: Kidneys: The right kidney measures 9.1 in length and the left kidney measures 10.1 in length. Kidneys demonstrate increased cortical echogenicity and thinning.  No evidence of hydronephrosis or intrarenal stones.  There is a 0.6 cm anechoic cyst within the medial left renal cortex.  The proximal right ureter is dilated measuring 0.6 cm. Bladder: Unremarkable appearance of the bladder. Prevoid bladder volume is 635 mL.  Postvoid bladder volume is 44 mL.  There is 7% postvoid residual bladder volume. The bilateral ureteral jets are visualized.    1.  Increased cortical echotexture of the bilateral kidneys with thinning and a left renal cyst, suggestive of medical renal disease. 2.  Dilated  proximal right ureter. 3.  No significant postvoid residual bladder volume.    US renal complete    Result Date: 8/7/2024  EXAMINATION: RETROPERITONEAL ULTRASOUND OF THE KIDNEYS AND URINARY BLADDER 8/6/2024 COMPARISON: None HISTORY: ORDERING SYSTEM PROVIDED HISTORY: Stage 3b chronic kidney disease (HCC) TECHNOLOGIST PROVIDED HISTORY: Decreased urine output.  UTI.  Dysuria. What reading provider will be dictating this exam?->CRC FINDINGS: Kidneys: The right kidney measures 9.1 in length and the left kidney measures 10.1 in length. Kidneys demonstrate increased cortical echogenicity and thinning.  No evidence of hydronephrosis or intrarenal stones.  There is a 0.6 cm anechoic cyst within the medial left renal cortex.  The proximal right ureter is dilated measuring 0.6 cm. Bladder: Unremarkable appearance of the bladder. Prevoid bladder volume is 635 mL.  Postvoid bladder volume is 44 mL.  There is 7% postvoid residual bladder volume. The bilateral ureteral jets are visualized.    1.  Increased cortical echotexture of the bilateral kidneys with thinning and a left renal cyst, suggestive of medical renal disease. 2.  Dilated proximal right ureter. 3.  No significant postvoid residual bladder volume.  .            Assessment/Plan   Acute speech difficulty with right-sided weakness in setting spell most likely due to left MCA infarct versus new onset seizure with Stanislaw's paralysis.  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  Left upper lobe lobectomy etiology unclear.  Hypertension with coronary artery disease.  Hyperlipidemia  Plan.  Continue with baby aspirin as patient was taking.  I would like to add Plavix 75 mg daily and had a long discussion about the signs and the risk factor for CVA bleeding risk and the precautions which daughter understood.  Since patient cannot have an MRI because of the metal clip will do a repeat CAT scan with and without contrast to look for  any intraocular metastasis.  Will continue with the stroke workup and have PT OT and speech to evaluate for discharge planning    Have social service look into disposition    Due to technical limitations of voice recognition and human error, this note may not accurately reflect the care of the patient.    Assessment & Plan  Stroke-like symptoms    Acute right-sided weakness        NIHSS (worst at presentation):     Vascular Risk Factor modification goals:  Blood pressure goals: avoid hypotension SBP <100 that could worsen cerebral perfusion,   Lipid Goals: education on healthy diet and   Glucose Goals: early treatment of hyperglycemia to goal glucose 140-180 mg/dl with long-term goal A1c < 7%   Smoking Cessation and Education  Assessment for Rehabilitation needs   Patient and family education on signs and symptoms of stroke, calling 911, healthy strategies for stroke prevention.           Due to technical limitations of voice recognition and human error, this note may not accurately reflect the care of the patient.   Jeb Mc MD

## 2024-08-22 LAB
GLUCOSE BLD MANUAL STRIP-MCNC: 111 MG/DL (ref 74–99)
GLUCOSE BLD MANUAL STRIP-MCNC: 120 MG/DL (ref 74–99)
GLUCOSE BLD MANUAL STRIP-MCNC: 126 MG/DL (ref 74–99)
GLUCOSE BLD MANUAL STRIP-MCNC: 129 MG/DL (ref 74–99)

## 2024-08-22 PROCEDURE — 2500000004 HC RX 250 GENERAL PHARMACY W/ HCPCS (ALT 636 FOR OP/ED): Performed by: INTERNAL MEDICINE

## 2024-08-22 PROCEDURE — S4991 NICOTINE PATCH NONLEGEND: HCPCS | Performed by: INTERNAL MEDICINE

## 2024-08-22 PROCEDURE — 94640 AIRWAY INHALATION TREATMENT: CPT | Performed by: INTERNAL MEDICINE

## 2024-08-22 PROCEDURE — 97110 THERAPEUTIC EXERCISES: CPT | Mod: GP,CQ

## 2024-08-22 PROCEDURE — 2500000001 HC RX 250 WO HCPCS SELF ADMINISTERED DRUGS (ALT 637 FOR MEDICARE OP): Performed by: INTERNAL MEDICINE

## 2024-08-22 PROCEDURE — 2500000002 HC RX 250 W HCPCS SELF ADMINISTERED DRUGS (ALT 637 FOR MEDICARE OP, ALT 636 FOR OP/ED): Performed by: INTERNAL MEDICINE

## 2024-08-22 PROCEDURE — 99232 SBSQ HOSP IP/OBS MODERATE 35: CPT

## 2024-08-22 PROCEDURE — 1200000002 HC GENERAL ROOM WITH TELEMETRY DAILY

## 2024-08-22 PROCEDURE — 2500000001 HC RX 250 WO HCPCS SELF ADMINISTERED DRUGS (ALT 637 FOR MEDICARE OP): Performed by: PSYCHIATRY & NEUROLOGY

## 2024-08-22 PROCEDURE — 99232 SBSQ HOSP IP/OBS MODERATE 35: CPT | Performed by: INTERNAL MEDICINE

## 2024-08-22 PROCEDURE — 97530 THERAPEUTIC ACTIVITIES: CPT | Mod: GP,CQ

## 2024-08-22 PROCEDURE — 94640 AIRWAY INHALATION TREATMENT: CPT

## 2024-08-22 PROCEDURE — 82947 ASSAY GLUCOSE BLOOD QUANT: CPT

## 2024-08-22 PROCEDURE — 2500000004 HC RX 250 GENERAL PHARMACY W/ HCPCS (ALT 636 FOR OP/ED): Performed by: NURSE PRACTITIONER

## 2024-08-22 PROCEDURE — 2500000001 HC RX 250 WO HCPCS SELF ADMINISTERED DRUGS (ALT 637 FOR MEDICARE OP): Performed by: NURSE PRACTITIONER

## 2024-08-22 RX ORDER — LABETALOL HYDROCHLORIDE 5 MG/ML
10 INJECTION, SOLUTION INTRAVENOUS ONCE
Status: COMPLETED | OUTPATIENT
Start: 2024-08-22 | End: 2024-08-22

## 2024-08-22 RX ORDER — HYDROCHLOROTHIAZIDE 25 MG/1
12.5 TABLET ORAL DAILY
Status: DISCONTINUED | OUTPATIENT
Start: 2024-08-22 | End: 2024-08-23

## 2024-08-22 RX ORDER — LOSARTAN POTASSIUM 100 MG/1
100 TABLET ORAL DAILY
Start: 2024-08-23 | End: 2024-08-22 | Stop reason: HOSPADM

## 2024-08-22 RX ORDER — LOSARTAN POTASSIUM 100 MG/1
100 TABLET ORAL DAILY
Status: DISCONTINUED | OUTPATIENT
Start: 2024-08-22 | End: 2024-08-24 | Stop reason: HOSPADM

## 2024-08-22 RX ORDER — CLONIDINE HYDROCHLORIDE 0.1 MG/1
0.2 TABLET ORAL ONCE
Status: COMPLETED | OUTPATIENT
Start: 2024-08-22 | End: 2024-08-22

## 2024-08-22 RX ORDER — TRAZODONE HYDROCHLORIDE 50 MG/1
100 TABLET ORAL NIGHTLY
Status: DISCONTINUED | OUTPATIENT
Start: 2024-08-22 | End: 2024-08-24 | Stop reason: HOSPADM

## 2024-08-22 RX ORDER — IBUPROFEN 200 MG
1 TABLET ORAL DAILY
Start: 2024-08-23

## 2024-08-22 RX ORDER — ASPIRIN 81 MG/1
81 TABLET ORAL DAILY
Start: 2024-08-22 | End: 2024-11-20

## 2024-08-22 RX ORDER — CARVEDILOL 12.5 MG/1
12.5 TABLET ORAL 2 TIMES DAILY
Status: DISCONTINUED | OUTPATIENT
Start: 2024-08-22 | End: 2024-08-23

## 2024-08-22 RX ORDER — HYDRALAZINE HYDROCHLORIDE 20 MG/ML
10 INJECTION INTRAMUSCULAR; INTRAVENOUS EVERY 4 HOURS PRN
Status: DISCONTINUED | OUTPATIENT
Start: 2024-08-22 | End: 2024-08-24 | Stop reason: HOSPADM

## 2024-08-22 RX ORDER — LOSARTAN POTASSIUM 50 MG/1
50 TABLET ORAL DAILY
Status: DISCONTINUED | OUTPATIENT
Start: 2024-08-22 | End: 2024-08-22

## 2024-08-22 RX ORDER — CLOPIDOGREL BISULFATE 75 MG/1
75 TABLET ORAL DAILY
Start: 2024-08-23

## 2024-08-22 ASSESSMENT — COGNITIVE AND FUNCTIONAL STATUS - GENERAL
MOVING FROM LYING ON BACK TO SITTING ON SIDE OF FLAT BED WITH BEDRAILS: A LITTLE
MOVING TO AND FROM BED TO CHAIR: A LITTLE
CLIMB 3 TO 5 STEPS WITH RAILING: TOTAL
STANDING UP FROM CHAIR USING ARMS: A LITTLE
MOBILITY SCORE: 15
TURNING FROM BACK TO SIDE WHILE IN FLAT BAD: A LOT
WALKING IN HOSPITAL ROOM: A LITTLE

## 2024-08-22 ASSESSMENT — PAIN SCALES - GENERAL
PAINLEVEL_OUTOF10: 0 - NO PAIN

## 2024-08-22 ASSESSMENT — PAIN - FUNCTIONAL ASSESSMENT: PAIN_FUNCTIONAL_ASSESSMENT: 0-10

## 2024-08-22 ASSESSMENT — PAIN SCALES - WONG BAKER: WONGBAKER_NUMERICALRESPONSE: NO HURT

## 2024-08-22 NOTE — PROGRESS NOTES
08/22/24 1125   Discharge Planning   Home or Post Acute Services Post acute facilities (Rehab/SNF/etc)   Type of Post Acute Facility Services Rehab   Expected Discharge Disposition IRF   Does the patient need discharge transport arranged? Yes   RoundTrip coordination needed? Yes   Patient Choice   Provider Choice list and CMS website (https://medicare.gov/care-compare#search) for post-acute Quality and Resource Measure Data were provided and reviewed with: Family;Patient   Patient / Family choosing to utilize agency / facility established prior to hospitalization No     TCC spoke with pt and family regarding AR choice. FOC is [facility] in [place]. Referral sent in [facility], pending acceptance. Per Dr Watkins pt BP has been elevated so possible dc later today vs tomorrow.    UPDATE 1427:  Pt BP still elevated, no plan for dc today. [facility] can accept but does not have a bed today. TCC will follow up tomorrow for bed availability but did update pt and family about possible need for second choice facility.

## 2024-08-22 NOTE — PROGRESS NOTES
Patient currently admitted for Tikosyn loading.  Will monitor for discharge.   Subjective:     Carina Kebede is a 83 y.o. female who complains today of:     Chief Complaint   Patient presents with    Follow-up     6 Month F/U for COPD and Lung Nodule    Results     CT Chest       HPI   Patient presents for COPD and lung    1/24/2024  Presents for follow-up, doing good, symptoms controlled currently on Anoro, no chest pain, no shortness of breath, no coughing, weight is stable, no nasal congestion or post nasal drip and no heartburn.  Continues to smoke unfortunately  7/13/2023  Doing good, symptoms controlled, no coughing, no chest pain, she tolerates Anoro well, no lower extremity edema, no nasal congestion or postnasal drip and no heartburn.  Weight is stable, unfortunately continues to smoke.    3/9/2023  Patient presents for follow-up, she is doing better, currently on Stiolto but she does not like this particular inhaler mainly because she does not feel she is getting any of the medication, otherwise no shortness of breath, no chest pain, minimal coughing, no lower extremity edema, no fever no chills, no nasal congestion or postnasal drip and no heartburn.  Weight is stable        Allergies:  Amoxicillin, Nsaids, and Prednisone  Past Medical History:   Diagnosis Date    Bilateral carotid artery stenosis 11/19/2020    CAD (coronary artery disease)     Carotid bruit     Dyslipidemia 11/19/2020    Essential hypertension 11/19/2020    Hx of CABG 11/19/2020    Hyperlipidemia     Kidney disease     PONV (postoperative nausea and vomiting)     Stage 4 chronic kidney disease (HCC) 11/19/2020    Stenosis of noncoronary bypass graft (HCC)     Tobacco abuse 11/19/2020     Past Surgical History:   Procedure Laterality Date    BACK SURGERY  2003    BRAIN ANEURYSM SURGERY  2012    BREAST LUMPECTOMY Right     CARPAL TUNNEL RELEASE Bilateral     CORONARY ARTERY BYPASS GRAFT      PARTIAL HYSTERECTOMY (CERVIX NOT REMOVED)  1997    THORACOSCOPY Left 1/31/2023    left thoracoscopic wedge, completion left

## 2024-08-22 NOTE — PROGRESS NOTES
ASSESSMENT & PLAN:     Acute ischemic L sided frontal lobe stroke  Hx of cerebral aneurysm s/p remote clipping at L [place]/L MCA bifurcation   Hx of L frontotemporal craniotomy  -p/w aphasia, R sided hemiparesis suspicious for L sided ischemic stroke  -Initial CT head noncon showed chronic b/l frontal lobe infarcts, and post surgical changes including aneurysm clips and L frontotemporal craniotomy, and chronic small vessel ischemic changes  -CTA H+N showed L vertebral proximal occlusion, redemonstrated post surgical changes as above  -rpt CT head with IV contrast showed acute infarct in inferior L frontal lobe  -MRI brain unable to be done due to aneurysm clips  -TTE done, read pending.   Plan:  -neuro following  -ASA and Plavix per neuro, high intensity statin  -dc Keppra unless neuro feels strongly about continuing. CT head with contrast confirmed stroke, EEG neg.   -PT/OT/SLP  -tele monitoring    HTN - has been 48h since sx onset, will resume BP meds and bring BP down  DLD - high intensity statin  COPD - not in acute exacerbation, home inhalers  Tobacco use disorder - nrt with patch    Vte ppx sqh  Dispo acute rehab    Ernesto Watkins MD    SUBJECTIVE     NAEON. No acute complaints today. Improving speech and strength slightly.       OBJECTIVE:       Last Recorded Vitals:  Vitals:    08/22/24 0701 08/22/24 0740 08/22/24 0814 08/22/24 0950   BP: (!) 208/86 (!) 204/86  129/59   BP Location:  Right arm     Patient Position:  Sitting  Sitting   Pulse: 78 81  69   Resp:  18  18   Temp:  36.5 °C (97.7 °F)  36.2 °C (97.2 °F)   TempSrc:  Temporal     SpO2:  95% 96% 97%   Weight:       Height:           Last I/O:  I/O last 3 completed shifts:  In: 750 (11.8 mL/kg) [P.O.:750]  Out: 900 (14.2 mL/kg) [Urine:900 (0.4 mL/kg/hr)]  Weight: 63.5 kg     Physical Exam:  GEN:  appears stated age, NAD  HEENT: NCAT, PERRLA, EOMI  CV: RRR, no m/r/g, no LE edema  LUNGS: CTAB, no w/r/c  ABD: soft, NT  SKIN: no rashes  MSK; no gross  deformities, normal joints  NEURO: aphasic, seems to be oriented to self and daughters, R sided facial droop, RUE  2/5, R shoulder 3/5, RLE 3/5 strength, L sided strength normal  PSYCH: flat affect    Inpatient Medications:  aspirin, 81 mg, oral, Daily  atorvastatin, 40 mg, oral, Nightly  carvedilol, 12.5 mg, oral, BID  clopidogrel, 75 mg, oral, Daily  tiotropium, 2 puff, inhalation, Daily   And  formoterol, 20 mcg, nebulization, q12h  heparin (porcine), 5,000 Units, subcutaneous, q8h  insulin lispro, 0-5 Units, subcutaneous, TID  losartan, 100 mg, oral, Daily  nicotine, 1 patch, transdermal, Daily  pantoprazole, 40 mg, oral, Daily  polyethylene glycol, 17 g, oral, Daily  traZODone, 100 mg, oral, Nightly        PRN Medications  PRN medications: acetaminophen **OR** acetaminophen **OR** acetaminophen, dextrose, dextrose, glucagon, glucagon, hydrALAZINE, labetaloL, melatonin, ondansetron **OR** ondansetron, oxygen    Continuous Medications:         LABS AND IMAGING:     Labs:  Results for orders placed or performed during the hospital encounter of 08/20/24 (from the past 24 hour(s))   Transthoracic Echo (TTE) Complete   Result Value Ref Range    BSA 1.65 m2   POCT GLUCOSE   Result Value Ref Range    POCT Glucose 100 (H) 74 - 99 mg/dL   POCT GLUCOSE   Result Value Ref Range    POCT Glucose 105 (H) 74 - 99 mg/dL   POCT GLUCOSE   Result Value Ref Range    POCT Glucose 125 (H) 74 - 99 mg/dL   POCT GLUCOSE   Result Value Ref Range    POCT Glucose 126 (H) 74 - 99 mg/dL        Imaging:  CT head w and wo IV contrast  Narrative: Interpreted By:  Tiffany Valentine and Meyers Emily   STUDY:  CT HEAD W AND WO IV CONTRAST;  8/21/2024 1:43 pm      INDICATION:  Signs/Symptoms:cva . h/o of lobectomy..      COMPARISON:  CT head without contrast 08/20/2024, CT angio head and neck 08/20/2024      ACCESSION NUMBER(S):  [id number]      ORDERING CLINICIAN:  JESSEE CHAVIS      TECHNIQUE:  Axial images of the head without and following  intravenous  administration of 75 mL Omnipaque 350 were obtained. Additional  sagittal and coronal reformats were provided for review.      FINDINGS:  There is new loss of gray-white differentiation within the inferior  left frontal lobe (series 206, image 11), which is not well  appreciated on prior examination. Further, there is decreased  surrounding hypodensity and adjacent sulcal effacement, likely  suggestive of associated edema. Remote infarct with encephalomalacia  within the inferior right frontal lobe. No intracranial hemorrhage.      Postsurgical change of prior left frontotemporal craniotomy with  aneurysm clips again noted in the region of the left anterior  communicating artery and left MCA bifurcation. No abnormal  enhancement or enhancing mass.      Mild generalized parenchymal volume loss. There are nonspecific  hypodensities within bilateral periventricular white matter which  likely reflect sequela of chronic small vessel ischemic change.      The ventricles, sulci, and basal cisterns are otherwise age  concordant.      The calvarium is unremarkable without acute fracture.      Visualized paranasal sinuses and mastoid air cells are clear.      Impression: 1. New loss of gray-white differentiation with associated sulcal  effacement in the inferior left frontal lobe, suggestive of an acute  to subacute infarct.  2. Postsurgical change of prior left frontotemporal craniotomy and  aneurysm clipping.      I personally reviewed the images/study, and I agree with the findings  as stated above. This study was interpreted at [facility], [place], [place].      MACRO:  Aviva Lin discussed the significance and urgency of this critical  finding via secure chat with Ernesto Watkins MD on 8/21/2024 at 3:31  pm.  (**-RCF-**) Findings:  See findings.          Signed by: Tiffany Valentine 8/21/2024 3:53 PM  Dictation workstation:   [id number]  ECG 12 lead  Sinus rhythm with  Premature atrial complexes  Possible Left atrial enlargement  Nonspecific ST abnormality  Abnormal ECG  When compared with ECG of 17-MAR-2000 07:37,  Premature atrial complexes are now Present  Vent. rate has decreased BY  40 BPM  QRS duration has increased

## 2024-08-22 NOTE — PROGRESS NOTES
Physical Therapy    Physical Therapy Treatment    Patient Name: Isabell Swan  MRN: [id number]  Today's Date: 8/22/2024  Time Calculation  Start Time: 0950  Stop Time: 1015  Time Calculation (min): 25 min     1005/1005-B    Assessment/Plan   PT Assessment  PT Assessment Results: Decreased strength, Decreased endurance, Impaired balance, Decreased mobility, Decreased coordination, Decreased cognition, Decreased safety awareness, Impaired tone  Rehab Prognosis: Good  Barriers to Discharge: Pt. lives alone.  Treatment Tolerance: Patient tolerated treatment well, Patient limited by fatigue  Medical Staff Made Aware: Yes  End of Session Communication: Bedside nurse, PCT/NA/CTA  Assessment Comment: Patient continues to require (A) for safety with transfers/amb/ther ex. Patient will benefit from additional PT to address deficits and improve mobility.  End of Session Patient Position: Up in chair, Alarm on (Call light, phone, and tray table within reach; Neuro NP in with patient and family at end of session.)  PT Plan  Inpatient/Swing Bed or Outpatient: Inpatient  PT Plan  Treatment/Interventions: Bed mobility, Transfer training, Gait training, Stair training, Balance training, Neuromuscular re-education, Strengthening, Endurance training, Therapeutic exercise, Therapeutic activity, Home exercise program  PT Plan: Ongoing PT  PT Frequency: 4 times per week  PT Discharge Recommendations: High intensity level of continued care  PT Recommended Transfer Status: Assist x1 with gait belt (DO NOT PULL ON PATIENT'S RUE)    General Visit Information:   PT  Visit  PT Received On: 08/22/24  General  Family/Caregiver Present: Yes (Daughter present and supportive.)  Prior to Session Communication: Bedside nurse  Patient Position Received: Alarm on, Up in chair  General Comment: Pleasant and cooperative. Encouraged patient to try verbalizing her responses. Frustrations noted with word finding problems.    Subjective  "    Precautions:  Precautions  Medical Precautions: Fall precautions  Precautions Comment: (R)UE weakness    Vital Signs:  Vital Signs  Heart Rate: 70  SpO2: 94 % (on RA during session.)    Objective     Pain:  Pain Assessment  Pain Assessment: 0-10  0-10 (Numeric) Pain Score: 0 - No pain    Cognition:  Cognition  Orientation Level:  (Patient able to verbalize her name and (month/date), but when asked about  year, she kept repeating \"Isabell\". Increased time provided for patient to process questions.)    Treatments:  Therapeutic Exercise  Therapeutic Exercise Performed: Yes  Therapeutic Exercise Activity 1: Instructed patient in supine ther ex. AROM BLE with AP/QS/GS/Heelslides x5-8reps. Alternating movements with heelslides. Patient also performed AROM shoulder shrugs x5 (equally L/R), AROM (L)elbow flex/shoulder flex/making fist x5, PROM (R)elbow flex/making fist x5, AAROM (R)shoulder flex x5.  Therapeutic Exercise Activity 2: Instructed patient in seated ther ex. AROM BLE with LAQ/Hipflex x5-8. Alternating movements. No c/o increase pain, just increased fatigue.   Ambulation/Gait Training  Ambulation/Gait Training Performed: Yes  Ambulation/Gait Training 1  Surface 1: Level tile  Device 1: Small base quad cane  Gait Support Devices: Gait belt  Assistance 1: Minimum assistance  Comments/Distance (ft) 1: ~3ft x2 forward and backwards. NBOS. Slow with wt shifting. Patient holding QC with (L))UE. Demo's safe advancement of QC with proper sequencing of steps. Decreased step height/length B. Patient c/o increased fatigue after a few steps forward and proceeded to step backwards to return to chair instead of turning all the way around. No LOB or buckling with WB through (R)LE.  Transfers  Transfer: Yes  Transfer 1  Technique 1: Sit to stand, Stand to sit  Transfer Device 1: Gait belt (SBQC)  Transfer Level of Assistance 1: Minimum assistance  Trials/Comments 1: (2x). v/c for safe hand placement and technique. " Hand over hand support/(A) of (R)UE to prevent (R)hand from sliding off chair arm rest.     Outcome Measures:     [facility] Basic Mobility  Turning from your back to your side while in a flat bed without using bedrails: A little  Moving from lying on your back to sitting on the side of a flat bed without using bedrails: A lot  Moving to and from bed to chair (including a wheelchair): A little  Standing up from a chair using your arms (e.g. wheelchair or bedside chair): A little  To walk in hospital room: A little  Climbing 3-5 steps with railing: Total  Basic Mobility - Total Score: 15     Education Documentation  Mobility Training, taught by Ирина Morris PTA at 8/22/2024  1:03 PM.  Learner: Patient  Readiness: Acceptance  Method: Explanation, Demonstration  Response: Verbalizes Understanding, Needs Reinforcement  Comment: See therapy note.    EDUCATION:  Individual(s) Educated: Patient, Child  Education Provided: Body Mechanics, Fall Risk, Home Exercise Program, POC, Posture (Balance; Safety with transfers/amb.)  Patient Response to Education: Patient/Caregiver Verbalized Understanding of Information, Patient/Caregiver Performed Return Demonstration of Exercises/Activities, Patient/Caregiver Asked Appropriate Questions    Encounter Problems       Encounter Problems (Active)       Impaired mobility        Perform all bed mobility with supervision  (Progressing)       Start:  08/21/24    Expected End:  09/04/24            Perform all transfers without device vs LRAD and SBAx1 (Progressing)       Start:  08/21/24    Expected End:  09/04/24            Patient will ambulate >/= 100 ft without device vs LRAD and CGAx1 (Progressing)       Start:  08/21/24    Expected End:  09/04/24            Patient will sit unsupported with indep. for static balance and SBA for multidirectional reaching  (Progressing)       Start:  08/21/24    Expected End:  09/04/24            Patient will perform seated and/or standing (with 1UE  support) PB CALVILLO with supervision to improve strength, coordination and balance  (Progressing)       Start:  08/21/24    Expected End:  09/04/24

## 2024-08-22 NOTE — PROGRESS NOTES
Occupational Therapy                 Therapy Communication Note    Patient Name: Isabell Swan  MRN: [id number]  Today's Date: 8/22/2024     Discipline: Occupational Therapy    Missed Visit Reason: Missed Visit Reason:  (RN requesting that therapy hold tx session.)    Comment: Attempted OT tx session. RN requesting that therapy hold at this time d/t elevated BP. Will re attempt as appropriate.

## 2024-08-22 NOTE — PROGRESS NOTES
"Isabell Swan is a 84 y.o. female on day 2 of admission presenting with Stroke-like symptoms.      Subjective   Pt. In chair, working with PT. She continues with right facial droop, slurred speech, expressive aphasia and RUE/RLE weakness. Discussed with family at bedside starting the Plavix, aspirin and statin for stroke prevention. Plan is for acute rehab at [facility].  Review of systems are negative unless otherwise specified in HPI.         Objective     Last Recorded Vitals  Blood pressure 129/59, pulse 69, temperature 36.2 °C (97.2 °F), resp. rate 18, height 1.549 m (5' 1\"), weight 63.5 kg (139 lb 15.9 oz), SpO2 97%.    Physical Exam  Eyes:      Pupils: Pupils are equal, round, and reactive to light.   Neurological:      Cranial Nerves: Dysarthria present.      Deep Tendon Reflexes: Reflexes are normal and symmetric.       Neurological Exam  Mental Status   Oriented only to person and place. Mild dysarthria present. Expressive aphasia present.    Cranial Nerves  CN III, IV, VI: Pupils equal round and reactive to light bilaterally.  And EOM's Normal.    Motor   Strength is 5/5 in all four extremities except as noted.  RUE-2/5  RLE-4/5.    Sensory  Light touch is normal in upper and lower extremities.     Reflexes  Deep tendon reflexes are 2+ and symmetric in all four extremities.    Coordination  Right: Finger-to-nose abnormality:Left: Finger-to-nose normal.      Relevant Results  Results for orders placed or performed during the hospital encounter of 08/20/24 (from the past 24 hour(s))   Transthoracic Echo (TTE) Complete   Result Value Ref Range    BSA 1.65 m2   POCT GLUCOSE   Result Value Ref Range    POCT Glucose 100 (H) 74 - 99 mg/dL   POCT GLUCOSE   Result Value Ref Range    POCT Glucose 105 (H) 74 - 99 mg/dL   POCT GLUCOSE   Result Value Ref Range    POCT Glucose 125 (H) 74 - 99 mg/dL   POCT GLUCOSE   Result Value Ref Range    POCT Glucose 126 (H) 74 - 99 mg/dL   CT head w and wo IV contrast    Result Date: " 8/21/2024  Interpreted By:  Tiffany Valentine and Meyers Emily STUDY: CT HEAD W AND WO IV CONTRAST;  8/21/2024 1:43 pm   INDICATION: Signs/Symptoms:cva . h/o of lobectomy..   COMPARISON: CT head without contrast 08/20/2024, CT angio head and neck 08/20/2024   ACCESSION NUMBER(S): [id number]   ORDERING CLINICIAN: JESSEE CHAVIS   TECHNIQUE: Axial images of the head without and following intravenous administration of 75 mL Omnipaque 350 were obtained. Additional sagittal and coronal reformats were provided for review.   FINDINGS: There is new loss of gray-white differentiation within the inferior left frontal lobe (series 206, image 11), which is not well appreciated on prior examination. Further, there is decreased surrounding hypodensity and adjacent sulcal effacement, likely suggestive of associated edema. Remote infarct with encephalomalacia within the inferior right frontal lobe. No intracranial hemorrhage.   Postsurgical change of prior left frontotemporal craniotomy with aneurysm clips again noted in the region of the left anterior communicating artery and left MCA bifurcation. No abnormal enhancement or enhancing mass.   Mild generalized parenchymal volume loss. There are nonspecific hypodensities within bilateral periventricular white matter which likely reflect sequela of chronic small vessel ischemic change.   The ventricles, sulci, and basal cisterns are otherwise age concordant.   The calvarium is unremarkable without acute fracture.   Visualized paranasal sinuses and mastoid air cells are clear.       1. New loss of gray-white differentiation with associated sulcal effacement in the inferior left frontal lobe, suggestive of an acute to subacute infarct. 2. Postsurgical change of prior left frontotemporal craniotomy and aneurysm clipping.   I personally reviewed the images/study, and I agree with the findings as stated above. This study was interpreted at [facility],  [place], [place].   MACRO: Aviva Lin discussed the significance and urgency of this critical finding via secure chat with Ernesto Watkins MD on 8/21/2024 at 3:31 pm.  (**-RCF-**) Findings:  See findings.     Signed by: Tiffany Valentine 8/21/2024 3:53 PM Dictation workstation:   [id number]    ECG 12 lead    Result Date: 8/21/2024  Sinus rhythm with Premature atrial complexes Possible Left atrial enlargement Nonspecific ST abnormality Abnormal ECG When compared with ECG of 17-MAR-2000 07:37, Premature atrial complexes are now Present Vent. rate has decreased BY  40 BPM QRS duration has increased    CT brain attack angio head and neck W and WO IV contrast    Result Date: 8/20/2024  Interpreted By:  Salazar Styles, STUDY: CT BRAIN ATTACK ANGIO HEAD AND NECK W AND WO IV CONTRAST;  8/20/2024 8:20 pm   INDICATION: Signs/Symptoms:VAN positive.   COMPARISON: None   ACCESSION NUMBER(S): [id number]   ORDERING CLINICIAN: NATHALIE MORRISON   TECHNIQUE: Contiguous axial images of the head and neck were obtained after the intravenous administration of contrast. Coronal and sagittal reformatted images were obtained of the axial images.   FINDINGS: The examination is limited secondary to patient motion.   There is atherosclerotic disease of the right carotid bulb and proximal internal carotid artery without evidence of hemodynamic stenosis and the right common carotid and internal carotid artery are grossly patent.   There is atherosclerotic disease of the left carotid bulb and proximal internal carotid artery with segment approximately 20% stenosis. The left common carotid internal carotid artery are grossly patent.   The right vertebral artery is grossly patent.   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 There is minimal opacification of the left vertebral artery distally at level of C4 vertebral body with minimal opacification of the V2, V3, and V4 segments.   There is postsurgical change of prior left  craniotomy and aneurysm clips in the region of the left anterior communicating artery and the left MCA bifurcation. The bilateral anterior cerebral arteries, middle cerebral arteries, posterior cerebral arteries are grossly patent. No definite evidence of major proximal intracranial vessel occlusion.   There is encephalomalacia in the right frontal lobe compatible with old infarct.       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 There is minimal opacification of the left vertebral artery distally at level C4 vertebral body with minimal opacification of the V2, V3, and V4 segments distally.   Postsurgical change of left craniotomy and aneurysm clips in the region of the left anterior communicating artery and the left MCA bifurcation. No definite evidence of acute major proximal intracranial arterial occlusion.   Encephalomalacia in the right frontal lobe compatible with old infarct.   If there is persistent concern for acute ischemia, MRI may be obtained for further evaluation.   MACRO: Salazar Styles discussed the significance and urgency of this critical finding by telephone with  Dr. Weber on 8/20/2024 at 9:18 pm. (**-RCF-**) Findings:  See findings.     Signed by: Salazar Styles 8/20/2024 9:19 PM Dictation workstation:   [id number]    CT brain attack head wo IV contrast    Result Date: 8/20/2024  Interpreted By:  Kadie Lowry, STUDY: CT BRAIN ATTACK HEAD WO IV CONTRAST;  8/20/2024 8:07 pm   INDICATION: Signs/Symptoms:Stroke Evaluation.   COMPARISON: None.   ACCESSION NUMBER(S): [id number]   ORDERING CLINICIAN: NATHALIE MORRISON   TECHNIQUE: Axial noncontrast CT images of the head.   FINDINGS: BRAIN PARENCHYMA: Aneurysm clips noted in the suprasellar region and left middle cranial fossa. Encephalomalacia in the bilateral frontal lobes, consistent with old infarcts. Gray-white matter interfaces are otherwisepreserved. Deep and periventricular white matter hypodensities are nonspecific, but  favored to represent chronic small vessel ischemic changes. No mass effect or midline shift.   HEMORRHAGE: No acute intracranial hemorrhage. VENTRICLES and EXTRA-AXIAL SPACES: The ventricles and sulci are within normal limits in size for brain volume. No abnormal extraaxial fluid collection. EXTRACRANIAL SOFT TISSUES: Within normal limits. PARANASAL SINUSES/MASTOIDS:  The visualized paranasal sinuses and mastoid air cells are aerated. CALVARIUM: No depressed skull fracture. Left frontotemporal craniotomy.   OTHER FINDINGS: None.       No acute intracranial abnormality.   Chronic bilateral frontal lobe infarcts. Postsurgical changes as above.   MACRO: Kadie Lowry discussed the significance and urgency of this critical finding by telephone with  NATHALIE MORRISON on 8/20/2024 at 8:17 pm. (**-RCF-**) Findings:  See findings.   Signed by: Kadie Lowry 8/20/2024 8:18 PM Dictation workstation:   [id number]   Scheduled medications   Medication Dose Route Frequency    aspirin  81 mg oral Daily    atorvastatin  40 mg oral Nightly    carvedilol  12.5 mg oral BID    clopidogrel  75 mg oral Daily    tiotropium  2 puff inhalation Daily    And    formoterol  20 mcg nebulization q12h    heparin (porcine)  5,000 Units subcutaneous q8h    insulin lispro  0-5 Units subcutaneous TID    losartan  100 mg oral Daily    nicotine  1 patch transdermal Daily    pantoprazole  40 mg oral Daily    polyethylene glycol  17 g oral Daily    traZODone  100 mg oral Nightly     PRN medications   Medication    acetaminophen    Or    acetaminophen    Or    acetaminophen    dextrose    dextrose    glucagon    glucagon    hydrALAZINE    labetaloL    melatonin    ondansetron    Or    ondansetron    oxygen           NIH Stroke Scale  1A. Level of Consciousness: Alert, Keenly Responsive  1B. Ask Month and Age: No Questions Right  1C. Blink Eyes & Squeeze Hands: Performs Both Tasks  2. Best Gaze: Normal  3. Visual: No Visual Loss  4. Facial Palsy: Minor  Paralysis  5A. Motor - Left Arm: No Drift  5B. Motor - Right Arm: Some Effort Against Gravity  6A. Motor - Left Leg: No Drift  6B. Motor - Right Leg: No Drift  7. Limb Ataxia: Present in One Limb  8. Sensory Loss: Normal  9. Best Language: Severe Aphasia  10. Dysarthria: Normal  11. 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 Discussed stroke prevention such as: HgbA1c <7, tight blood pressure control <130/<80, LDL <70 with statin therapy (if indicated), CPAP/BiPAP compliance (if indicated) and lifestyle modification (Mediterranean diet, daily exercise, nicotine cessation & limiting alcohol use).   Discussed s/sx of stroke such as: face drooping, arm/leg weakness, speech difficulty; sudden numbness of face/extremity, sudden confusion, sudden trouble seeing, sudden trouble walking, sudden severe headache, dizziness, loss of balance or coordination and to call 911 immediately.?      I have discussed the patient and the plan of care with the Neurologist on-call, Dr. Mc.            I spent 30 minutes in the professional and overall care of this patient.      Emilia Younger, APRN-CNP

## 2024-08-23 LAB
AORTIC VALVE MEAN GRADIENT: 4 MMHG
AORTIC VALVE PEAK VELOCITY: 1.42 M/S
ATRIAL RATE: 64 BPM
AV PEAK GRADIENT: 8.1 MMHG
AVA (PEAK VEL): 2.1 CM2
AVA (VTI): 2.29 CM2
EJECTION FRACTION APICAL 4 CHAMBER: 63
EJECTION FRACTION: 63 %
GLUCOSE BLD MANUAL STRIP-MCNC: 101 MG/DL (ref 74–99)
GLUCOSE BLD MANUAL STRIP-MCNC: 122 MG/DL (ref 74–99)
GLUCOSE BLD MANUAL STRIP-MCNC: 134 MG/DL (ref 74–99)
GLUCOSE BLD MANUAL STRIP-MCNC: 167 MG/DL (ref 74–99)
LEFT ATRIUM VOLUME AREA LENGTH INDEX BSA: 26.8 ML/M2
LEFT VENTRICLE INTERNAL DIMENSION DIASTOLE: 3.31 CM (ref 3.5–6)
LEFT VENTRICULAR OUTFLOW TRACT DIAMETER: 1.9 CM
LV EJECTION FRACTION BIPLANE: 60 %
MITRAL VALVE E/A RATIO: 1.26
P AXIS: 73 DEGREES
P OFFSET: 194 MS
P ONSET: 141 MS
PR INTERVAL: 162 MS
Q ONSET: 222 MS
QRS COUNT: 10 BEATS
QRS DURATION: 100 MS
QT INTERVAL: 466 MS
QTC CALCULATION(BAZETT): 480 MS
QTC FREDERICIA: 476 MS
R AXIS: 69 DEGREES
RIGHT VENTRICLE FREE WALL PEAK S': 9.25 CM/S
RIGHT VENTRICLE PEAK SYSTOLIC PRESSURE: 29.2 MMHG
T AXIS: 89 DEGREES
T OFFSET: 455 MS
TRICUSPID ANNULAR PLANE SYSTOLIC EXCURSION: 1.6 CM
VENTRICULAR RATE: 64 BPM

## 2024-08-23 PROCEDURE — 94640 AIRWAY INHALATION TREATMENT: CPT

## 2024-08-23 PROCEDURE — 2500000004 HC RX 250 GENERAL PHARMACY W/ HCPCS (ALT 636 FOR OP/ED): Performed by: NURSE PRACTITIONER

## 2024-08-23 PROCEDURE — 1210000001 HC SEMI-PRIVATE ROOM DAILY

## 2024-08-23 PROCEDURE — 2500000001 HC RX 250 WO HCPCS SELF ADMINISTERED DRUGS (ALT 637 FOR MEDICARE OP): Performed by: PSYCHIATRY & NEUROLOGY

## 2024-08-23 PROCEDURE — 2500000001 HC RX 250 WO HCPCS SELF ADMINISTERED DRUGS (ALT 637 FOR MEDICARE OP): Performed by: NURSE PRACTITIONER

## 2024-08-23 PROCEDURE — 92526 ORAL FUNCTION THERAPY: CPT | Mod: GN | Performed by: STUDENT IN AN ORGANIZED HEALTH CARE EDUCATION/TRAINING PROGRAM

## 2024-08-23 PROCEDURE — 2500000002 HC RX 250 W HCPCS SELF ADMINISTERED DRUGS (ALT 637 FOR MEDICARE OP, ALT 636 FOR OP/ED): Performed by: INTERNAL MEDICINE

## 2024-08-23 PROCEDURE — 2500000001 HC RX 250 WO HCPCS SELF ADMINISTERED DRUGS (ALT 637 FOR MEDICARE OP): Performed by: INTERNAL MEDICINE

## 2024-08-23 PROCEDURE — 82947 ASSAY GLUCOSE BLOOD QUANT: CPT

## 2024-08-23 PROCEDURE — 97535 SELF CARE MNGMENT TRAINING: CPT | Mod: GO,CO

## 2024-08-23 PROCEDURE — S4991 NICOTINE PATCH NONLEGEND: HCPCS | Performed by: INTERNAL MEDICINE

## 2024-08-23 PROCEDURE — 92523 SPEECH SOUND LANG COMPREHEN: CPT | Mod: GN | Performed by: STUDENT IN AN ORGANIZED HEALTH CARE EDUCATION/TRAINING PROGRAM

## 2024-08-23 PROCEDURE — 97530 THERAPEUTIC ACTIVITIES: CPT | Mod: GO,CO

## 2024-08-23 PROCEDURE — 99232 SBSQ HOSP IP/OBS MODERATE 35: CPT | Performed by: INTERNAL MEDICINE

## 2024-08-23 RX ORDER — HYDROCHLOROTHIAZIDE 25 MG/1
25 TABLET ORAL DAILY
Status: DISCONTINUED | OUTPATIENT
Start: 2024-08-24 | End: 2024-08-24 | Stop reason: HOSPADM

## 2024-08-23 RX ORDER — CARVEDILOL 25 MG/1
25 TABLET ORAL 2 TIMES DAILY
Start: 2024-08-23

## 2024-08-23 RX ORDER — CARVEDILOL 12.5 MG/1
25 TABLET ORAL 2 TIMES DAILY
Status: DISCONTINUED | OUTPATIENT
Start: 2024-08-23 | End: 2024-08-24 | Stop reason: HOSPADM

## 2024-08-23 RX ORDER — HYDROCHLOROTHIAZIDE 25 MG/1
25 TABLET ORAL DAILY
Start: 2024-08-24

## 2024-08-23 RX ORDER — LOSARTAN POTASSIUM 100 MG/1
100 TABLET ORAL DAILY
Start: 2024-08-24

## 2024-08-23 RX ORDER — HYDROCHLOROTHIAZIDE 25 MG/1
12.5 TABLET ORAL ONCE
Status: COMPLETED | OUTPATIENT
Start: 2024-08-23 | End: 2024-08-23

## 2024-08-23 ASSESSMENT — COGNITIVE AND FUNCTIONAL STATUS - GENERAL
HELP NEEDED FOR BATHING: A LOT
DRESSING REGULAR LOWER BODY CLOTHING: A LOT
STANDING UP FROM CHAIR USING ARMS: A LOT
HELP NEEDED FOR BATHING: A LOT
PERSONAL GROOMING: TOTAL
CLIMB 3 TO 5 STEPS WITH RAILING: A LOT
MOBILITY SCORE: 13
DAILY ACTIVITIY SCORE: 13
WALKING IN HOSPITAL ROOM: A LOT
TOILETING: TOTAL
DRESSING REGULAR UPPER BODY CLOTHING: TOTAL
TURNING FROM BACK TO SIDE WHILE IN FLAT BAD: A LOT
TOILETING: A LOT
MOVING FROM LYING ON BACK TO SITTING ON SIDE OF FLAT BED WITH BEDRAILS: A LITTLE
DRESSING REGULAR LOWER BODY CLOTHING: TOTAL
PERSONAL GROOMING: A LOT
DAILY ACTIVITIY SCORE: 8
DRESSING REGULAR UPPER BODY CLOTHING: A LOT
EATING MEALS: A LITTLE
EATING MEALS: A LOT
MOVING TO AND FROM BED TO CHAIR: A LOT

## 2024-08-23 ASSESSMENT — PAIN SCALES - GENERAL
PAINLEVEL_OUTOF10: 0 - NO PAIN

## 2024-08-23 ASSESSMENT — ACTIVITIES OF DAILY LIVING (ADL): HOME_MANAGEMENT_TIME_ENTRY: 15

## 2024-08-23 ASSESSMENT — PAIN - FUNCTIONAL ASSESSMENT
PAIN_FUNCTIONAL_ASSESSMENT: 0-10
PAIN_FUNCTIONAL_ASSESSMENT: 0-10

## 2024-08-23 NOTE — CARE PLAN
Pt. Improving with speech and RUE weakness. Her diet has been advanced to small bite-sized pieces. Plan is for [facility] acute rehab with discharge tomorrow. Can follow up with me as outpt.  No further needs from neurology; okay for transfer or discharge as per primary team. Please contact if condition changes for re-eval.

## 2024-08-23 NOTE — DOCUMENTATION CLARIFICATION NOTE
"    PATIENT:               XAVIER PERRY  ACCT #:                  [id number]  MRN:                       [id number]  :                       1940  ADMIT DATE:       2024 8:00 PM  DISCH DATE:  RESPONDING PROVIDER #:        [id number]          PROVIDER RESPONSE TEXT:    Encephalopathy 2/2 CVA    CDI QUERY TEXT:    Clarification    Instruction:    Based on your assessment of the patient and the clinical information, please provide the requested documentation by clicking on the appropriate radio button and enter any additional information if prompted.    Question: Please further clarify the most likely etiology of the altered mental status as    When answering this query, please exercise your independent professional judgment. The fact that a question is being asked, does not imply that any particular answer is desired or expected.    The patient's clinical indicators include:  Clinical Information:  The patient is an 84 year old female who presented to the ED with AMS and stroke-like symptoms.  She was found to have a new left-sided infarct.  Her PMH includes HTN, CKD, HLD, CAD and COPD.    Clinical Indicators:    HP  \"At baseline patient is alert and oriented x 4, on presentation she is ANO x 0 she is able to move bilateral lower extremities, right upper extremity is flaccid right facial droop is present, nonproductive cough present, afebrile with evidence of mild hypertension, possibly due to not taking home medications.\"  \"She is disoriented.\"    Medicine PN  \"Aphasic, seems to be oriented to self and daughters.\"    Medicine PN  \"Acute ischemic L sided frontal lobe infarct.\"    Neurology PN  \"Oriented only to person and place.\"    Treatment:  Neurology consult, Head CT, Plavix, ASA, statin    Risk Factors:  L sided frontal lobe infarct  Options provided:  -- Encephalopathy 2/2 CVA  -- Other - I will add my own diagnosis  -- Refer to Clinical Documentation Reviewer    Query created by: Diane, " Sadie on 8/23/2024 9:22 AM      Electronically signed by:  YOSEF SHAW MD 8/23/2024 10:10 AM

## 2024-08-23 NOTE — PROGRESS NOTES
Speech-Language Pathology    SLP Adult Inpatient Speech-Language Evaluation and Treatment    Patient Name: Isabell Swan  MRN: [id number]  Today's Date: 8/23/2024   Time Calculation  Start Time: 0938  Stop Time: 1008  Time Calculation (min): 30 min         Current Problem:   1. Acute right-sided weakness  Transthoracic Echo (TTE) Complete    Transthoracic Echo (TTE) Complete      2. Disorientation        3. Acute CVA (cerebrovascular accident) (Multi)  Transthoracic Echo (TTE) Complete    Transthoracic Echo (TTE) Complete    aspirin 81 mg EC tablet    clopidogrel (Plavix) 75 mg tablet      4. Essential hypertension  Transthoracic Echo (TTE) Complete    Transthoracic Echo (TTE) Complete    DISCONTINUED: losartan (Cozaar) 100 mg tablet      5. Stroke-like symptoms  Transthoracic Echo (TTE) Complete    Transthoracic Echo (TTE) Complete      6. Other cerebrovascular disease  Transthoracic Echo (TTE) Complete    Transthoracic Echo (TTE) Complete      7. Tobacco use disorder  nicotine (Nicoderm CQ) 14 mg/24 hr patch        SLP Assessment:  Pt. Presenting with mild dysarthria characterized by reduced imprecise articulation, blended word boundaries and reduced volume. Pt also presents with moderate expressive aphasia and mild receptive aphasia. The patient was also seen for dysphagia management following a clinical swallow evaluation completed 8/21/23. Skilled speech therapy     Treatment: Patient demonstrated mild anterior leakage not past the vermilion border on R side during straw presentation of thin liquids and with soft solid and solid consistencies. Mastication was still impaired as evidenced by small pieces of unchewed bolus in the R oral cavity of which the patient could not remove with a tongue clear. Pudding was cleared with the napkin or finger and the small piece of hali cracker was pushed out of the mouth and wiped away. No overt s/s of aspiration observed with either consistency. When asked the patient  confirmed she would like to stay with pureed solids at this time. No changes to diet orders at this time.    SLP Plan:  Skilled SLP warranted for language impairment and dysphagia management.   Plan  Inpatient/Swing Bed or Outpatient: Inpatient  SLP TX Plan: Continue Plan of Care  SLP Plan: Skilled SLP  SLP Frequency: 3x per week  Duration: 30 days  SLP Discharge Recommendations: Continue skilled SLP services at the next level of care  Next Treatment Priority: word finding, diet advance?  Discussed POC: Patient, Caregiver/family  Discussed Risks/Benefits: Yes  Patient/Caregiver Agreeable: Yes    Language Goals (Est. 8/23/24):  Patient will:  1. Complete simple word finding tasks targeting common/personal/functional items to assist communicating within the current setting.  2. Complete complex verbal expression tasks (categorization, narration, description, sequencing, etc) with 90% accuracy.  3. Demonstrated knowledge of compensatory strategies to facilitate effective communication for the current setting.    Dysphagia Goals: (Est. 8/20/24):  Patient will:  Patient/Caregiver will demonstrate knowledge of taught compensatory strategies and dietary consistency recommendations to optimize functional swallow function without overt clinical signs and symptoms of aspiration or dysphagia  Goal Status: In progress  Goal Progress: Progressing as follows   -Pt trialed with soft solids and solid consistencies. Mild oral dysphagia still present. Pt may be favorable to cautiously advance diet but she preferred to stay with puree solids. No changes to the current diet at this time.    Complete a modified barium swallow study (MBSS) as warranted upon further assessment/discussion with medical team for objective assessment of oropharyngeal swallow function, to assess for aspiration, and to guide further recommendations and treatment plan.   Goal Status: Not indicated    General Visit Information:  General Information  Chart  Reviewed: Yes  Caregiver Feedback: Daughter present - very supportive.  Reason for Referral: New left side infarct  Referred By: MAEVE Ahuja-CNP  Past Medical History Relevant to Rehab: 84 y.o. female with a significant past medical history of brain aneurysm with clips, left upper lobectomy, CABG x 5, COVID, RENE with CKD, HTN, HLD, presenting to [place] [facility] with right-sided weakness aphasia and right facial droop  Patient Seen During This Visit: Yes  Co-Treatment: PT  Co-Treatment Reason: To maximize functional outcomes and patient safety.  Prior to Session Communication: Bedside nurse    Pain:  Scale: 0-10   Pt. Rating:     Cognition: Not formally assessed    Motor Speech Production:  Mild dysarthria, reduced labial/lingual strength and ROM on R side.     Language Assessment:  [place] Aphasia Screening Test (MAST):     Expressive Index:   -Namin/10  -Automatic Speech: 10/10  -Repetition: 8/10  -Writing: -/10  -Verbal Fluency: 5/10    Exp total:   writing not assessed, patient is right dominant    Receptive Index:   -Yes/No Accuracy:   -Object Recognition: 10/10  -Following Instructions: 8/10  -Reading Instructions: 10/10    Rec total: 48/50    Total: 79/90    Education:  Learner:  Patient, Family  Barriers to Learning: Communication limitations  Method: Verbal  Education - Topic: ST provided patient education regarding role of ST, purpose of assessment, clinical impressions, goals of treatment, and plan of care. Patient verbalized full comprehension, consistent with cognitive status. Education will be reinforced. ST further coordinated with RN regarding recommendations and precautions per this assessment, with RN verbalizing understanding.  Outcome:  Verbalized understanding and agreement, Needs review/reinforcement

## 2024-08-23 NOTE — PROGRESS NOTES
ASSESSMENT & PLAN:     Acute ischemic L sided stroke  Hx of cerebral aneurysm s/p remote clipping at L [place]/L MCA bifurcation   Hx of L frontotemporal craniotomy  -p/w aphasia, R sided hemiparesis suspicious for L sided ischemic stroke  -Initial CT head noncon showed chronic b/l frontal lobe infarcts, and post surgical changes including aneurysm clips and L frontotemporal craniotomy, and chronic small vessel ischemic changes  -CTA H+N showed L vertebral proximal occlusion, redemonstrated post surgical changes as above  -rpt CT head with IV contrast showed acute infarct in inferior L frontal lobe  -MRI brain unable to be done due to aneurysm clips  -TTE done, normal LVEF, neg bubble study, no intracardiac thrombus  Plan:  -neuro following  -DAPT x90d, followed by Plavix monotherapy, high intensity statin  -PT/OT/SLP  -plan for acute rehab on dishcarge    HTN - resumed home meds, increased Coreg to 25mg bid  DLD - high intensity statin  COPD - not in acute exacerbation, home inhalers  Tobacco use disorder - nrt with patch    Vte ppx sqh  Dispo acute rehab, medically can discharge    Ernesto Watkins MD    SUBJECTIVE     NAEON. No acute complaints today. Improving speech and strength slightly.       OBJECTIVE:       Last Recorded Vitals:  Vitals:    08/23/24 0724 08/23/24 0749 08/23/24 0900 08/23/24 1108   BP:  (!) 194/79  160/67   BP Location:       Patient Position:       Pulse:  68  59   Resp:       Temp:  37 °C (98.6 °F)     TempSrc:       SpO2: 95% 93% (!) 89%    Weight:       Height:           Last I/O:  I/O last 3 completed shifts:  In: 350 (5.5 mL/kg) [P.O.:350]  Out: 900 (14.2 mL/kg) [Urine:900 (0.4 mL/kg/hr)]  Weight: 63.5 kg     Physical Exam:  GEN:  appears stated age, NAD  HEENT: NCAT, PERRLA, EOMI  CV: RRR, no m/r/g, no LE edema  LUNGS: CTAB, no w/r/c  ABD: soft, NT  SKIN: no rashes  MSK; no gross deformities, normal joints  NEURO: aphasic, seems to be oriented to self and daughters, R sided facial droop,  RUE  2/5, R shoulder 3/5, RLE 3/5 strength, L sided strength normal  PSYCH: flat affect    Inpatient Medications:  aspirin, 81 mg, oral, Daily  atorvastatin, 40 mg, oral, Nightly  carvedilol, 25 mg, oral, BID  clopidogrel, 75 mg, oral, Daily  tiotropium, 2 puff, inhalation, Daily   And  formoterol, 20 mcg, nebulization, q12h  heparin (porcine), 5,000 Units, subcutaneous, q8h  hydroCHLOROthiazide, 12.5 mg, oral, Daily  insulin lispro, 0-5 Units, subcutaneous, TID  losartan, 100 mg, oral, Daily  nicotine, 1 patch, transdermal, Daily  pantoprazole, 40 mg, oral, Daily  polyethylene glycol, 17 g, oral, Daily  traZODone, 100 mg, oral, Nightly        PRN Medications  PRN medications: acetaminophen **OR** acetaminophen **OR** acetaminophen, dextrose, dextrose, glucagon, glucagon, hydrALAZINE, melatonin, ondansetron **OR** ondansetron, oxygen    Continuous Medications:         LABS AND IMAGING:     Labs:  Results for orders placed or performed during the hospital encounter of 08/20/24 (from the past 24 hour(s))   POCT GLUCOSE   Result Value Ref Range    POCT Glucose 120 (H) 74 - 99 mg/dL   POCT GLUCOSE   Result Value Ref Range    POCT Glucose 111 (H) 74 - 99 mg/dL   POCT GLUCOSE   Result Value Ref Range    POCT Glucose 129 (H) 74 - 99 mg/dL   POCT GLUCOSE   Result Value Ref Range    POCT Glucose 134 (H) 74 - 99 mg/dL        Imaging:  Transthoracic Echo (TTE) [place] [address]   Tel [phone] Fax [phone]    TRANSTHORACIC ECHOCARDIOGRAM REPORT    Patient Name:      XAVIER PERRY     Reading Physician:    [id number] Chris Guerrero MD, [facility]  Study Date:        8/21/2024            Ordering Provider:    [id number] ZACKARY KENNEDY  MRN/PID:           [id number]             Fellow:  Accession#:        [id number]         Nurse:                 Selin Damian  Date of Birth/Age: 1940 / 84 years  Sonographer:          Marilynn Mota                                                                [facility]  Gender:            F                    Additional Staff:  Height:            154.94 cm            Admit Date:           8/20/2024  Weight:            63.05 kg             Admission Status:     Inpatient -                                                                Routine  BSA / BMI:         1.62 m2 / 26.26      Department Location:  [place] [address]                                      Echo Lab  Blood Pressure: 125 /59 mmHg    Study Type:    TRANSTHORACIC ECHO (TTE) COMPLETE  Diagnosis/ICD: Cerebral Infarction, unspecified-I63.9  Indication:    Cerebrovascular Accident  CPT Codes:     Echo Complete w Full Doppler-[id number]    Patient History:  CABG:              Mulitivessel CABG.  Smoker:            Current.  Pertinent History: HTN, Hyperlipidemia, CVA and Brain aneurysm.    Study Detail: The following Echo studies were performed: 2D, M-Mode, Doppler and                color flow. Agitated saline used as a contrast agent for                intraseptal flow evaluation.       PHYSICIAN INTERPRETATION:  Left Ventricle: The left ventricular systolic function is normal, with a visually estimated ejection fraction of 60-65%. There are no regional wall motion abnormalities. The left ventricular cavity size is normal. The left ventricular septal wall thickness is normal. There is normal left ventricular posterior wall thickness. Spectral Doppler shows a normal pattern of left ventricular diastolic filling.  Left Atrium: The left atrium is normal in size. A bubble study using agitated saline was performed. Bubble study is negative. Left atrial volume index 26.8 mL/m2.  Saline bubble contrast study is negative.  Right Ventricle: The right ventricle is normal in size. There is normal right ventricular global systolic function. Normal right  ventricular chamber size and function.  Right Atrium: The right atrium is normal in size.  Aortic Valve: The aortic valve is trileaflet. There is mild aortic valve cusp calcification. The aortic valve dimensionless index is 0.81. There is mild to moderate aortic valve regurgitation. The peak instantaneous gradient of the aortic valve is 8.1 mmHg. The mean gradient of the aortic valve is 4.0 mmHg. 2+ aortic regurgitation.  Mitral Valve: The mitral valve is mildly thickened. There is mild mitral annular calcification. There is mild to moderate mitral valve regurgitation. 2+ mitral regurgitation.  Tricuspid Valve: The tricuspid valve is structurally normal. There is mild to moderate tricuspid regurgitation. 2+ tricuspid regurgitation with estimated RVSP of 29 mmHg.  Pulmonic Valve: The pulmonic valve is structurally normal. There is mild pulmonic valve regurgitation.  Pericardium: There is no pericardial effusion noted.  Aorta: The aortic root is normal.  Systemic Veins: The inferior vena cava appears to be of normal size. There is IVC inspiratory collapse greater than 50%.       CONCLUSIONS:   1. The left ventricular systolic function is normal, with a visually estimated ejection fraction of 60-65%.   2. There is normal right ventricular global systolic function.   3. Left atrial volume index 26.8 mL/m2.      Saline bubble contrast study is negative.   4. Mild to moderate mitral valve regurgitation.   5. 2+ tricuspid regurgitation with estimated RVSP of 29 mmHg.   6. Mild to moderate tricuspid regurgitation.   7. Mild to moderate aortic valve regurgitation.   8. No previous available for comparison.    QUANTITATIVE DATA SUMMARY:  2D MEASUREMENTS:                           Normal Ranges:  Ao Root d:     2.54 cm   (2.0-3.7cm)  LAs:           4.19 cm   (2.7-4.0cm)  IVSd:          1.15 cm   (0.6-1.1cm)  LVPWd:         0.87 cm   (0.6-1.1cm)  LVIDd:         3.31 cm   (3.9-5.9cm)  LVIDs:         2.20 cm  LV Mass Index: 59.4  g/m2  LV % FS        33.5 %    LA VOLUME:                                Normal Ranges:  LA Vol A4C:        40.6 ml    (22+/-6mL/m2)  LA Vol A2C:        43.4 ml  LA Vol BP:         43.4 ml  LA Vol Index A4C:  25.1ml/m2  LA Vol Index A2C:  26.8 ml/m2  LA Vol Index BP:   26.8 ml/m2  LA Area A4C:       15.9 cm2  LA Area A2C:       17.0 cm2  LA Major Axis A4C: 5.3 cm  LA Major Axis A2C: 5.7 cm  LA Volume Index:   25.6 ml/m2    RA VOLUME BY A/L METHOD:                                Normal Ranges:  RA Vol A4C:        29.2 ml    (8.3-19.5ml)  RA Vol Index A4C:  18.0 ml/m2  RA Area A4C:       13.8 cm2  RA Major Axis A4C: 5.6 cm    AORTA MEASUREMENTS:                     Normal Ranges:  Asc Ao, d: 2.50 cm (2.1-3.4cm)    LV SYSTOLIC FUNCTION BY 2D PLANIMETRY (MOD):                       Normal Ranges:  EF-A4C View:    63 % (>=55%)  EF-A2C View:    59 %  EF-Biplane:     60 %  EF-Visual:      63 %  LV EF Reported: 63 %    LV DIASTOLIC FUNCTION:                          Normal Ranges:  MV Peak E:    1.13 m/s  (0.7-1.2 m/s)  MV Peak A:    0.90 m/s  (0.42-0.7 m/s)  E/A Ratio:    1.26      (1.0-2.2)  MV e'         0.080 m/s (>8.0)  MV lateral e' 0.09 m/s  MV medial e'  0.07 m/s  E/e' Ratio:   14.04     (<8.0)    MITRAL VALVE:                  Normal Ranges:  MV DT: 269 msec (150-240msec)    AORTIC VALVE:                                    Normal Ranges:  AoV Vmax:                1.42 m/s (<=1.7m/s)  AoV Peak P.1 mmHg (<20mmHg)  AoV Mean P.0 mmHg (1.7-11.5mmHg)  LVOT Max Wes:            1.05 m/s (<=1.1m/s)  AoV VTI:                 36.20 cm (18-25cm)  LVOT VTI:                29.30 cm  LVOT Diameter:           1.90 cm  (1.8-2.4cm)  AoV Area, VTI:           2.29 cm2 (2.5-5.5cm2)  AoV Area,Vmax:           2.10 cm2 (2.5-4.5cm2)  AoV Dimensionless Index: 0.81    AORTIC INSUFFICIENCY:  AI Vmax:       4.28 m/s  AI Half-time:  304 msec  AI Decel Rate: 354.50 cm/s2       RIGHT VENTRICLE:  RV Basal 3.52  cm  RV Mid   1.79 cm  RV Major 6.3 cm  TAPSE:   15.7 mm  RV s'    0.09 m/s    TRICUSPID VALVE/RVSP:                              Normal Ranges:  Peak TR Velocity: 2.56 m/s  RV Syst Pressure: 29.2 mmHg (< 30mmHg)  IVC Diam:         1.19 cm    PULMONIC VALVE:                          Normal Ranges:  PV Accel Time: 61 msec  (>120ms)  PV Max Wes:    0.9 m/s  (0.6-0.9m/s)  PV Max PG:     3.2 mmHg       [id number] Chris Guerrero MD, FACC  Electronically signed on 8/23/2024 at 10:03:57 AM       ** Final **

## 2024-08-23 NOTE — PROGRESS NOTES
08/23/24 1241   Current Planned Discharge Disposition   Current Planned Discharge Disposition Rehab  ([facility])     Pt cleared for dc Medically. Did have high BP this morning but was treated and has been stable since. Home Coreg dose was increased for dc. [facility] cannot accept today until her BP remains stable overnight. They will connect with [facility] tomorrow morning regarding how pt is and about bed availability.

## 2024-08-23 NOTE — PROGRESS NOTES
Occupational Therapy    OT Treatment    Patient Name: Isabell Swan  MRN: [id number]  Today's Date: 8/23/2024  Time Calculation  Start Time: 1037  Stop Time: 1102  Time Calculation (min): 25 min      Assessment:  End of Session Communication: Bedside nurse  End of Session Patient Position: Up in chair, Alarm on     Plan:  Treatment Interventions: ADL retraining, Functional transfer training, UE strengthening/ROM, Endurance training, Cognitive reorientation, Patient/family training, Neuromuscular reeducation, Fine motor coordination activities, Compensatory technique education  OT Frequency: 3 times per week  Treatment Interventions: ADL retraining, Functional transfer training, UE strengthening/ROM, Endurance training, Cognitive reorientation, Patient/family training, Neuromuscular reeducation, Fine motor coordination activities, Compensatory technique education    Subjective   Previous Visit Info:  OT Last Visit  OT Received On: 08/23/24  General:  General  Reason for Referral: CVA- right sided weakness and aphasia  Family/Caregiver Present:  (daughter left room at beginning of treatment)  Prior to Session Communication: Bedside nurse  Patient Position Received: Bed, 3 rail up, Alarm on  Precautions:  Hearing/Visual Limitations: Wears glasses.  Medical Precautions: Fall precautions     Pain:  Pain Assessment  0-10 (Numeric) Pain Score: 0 - No pain    Objective    Cognition:  Cognition  Overall Cognitive Status: Impaired  Orientation Level: Oriented X4  Following Commands: Follows multistep commands with repetition  Safety Judgment: Decreased awareness of need for safety precautions  Problem Solving: Assistance required to implement solutions  Insight: Mild  Impulsive: Mildly     Activities of Daily Living: Grooming  Grooming Level of Assistance:  (brushed hair sitting in recliner with LUE and SBA)  Grooming Where Assessed: Recliner    UE Bathing  UE Bathing Level of Assistance:  (RUE- min a, LUE- mod a, educated  on comp tech)  UE Bathing Where Assessed:  (recliner)    LE Bathing  LE Bathing Level of Assistance:  (mod a for thoroughness)  LE Bathing Where Assessed:  (recliner)    UE Dressing  UE Dressing Level of Assistance: Moderate assistance, Moderate verbal cues (preet/doff gown with comp tech and verbal cues for seq and performace)  UE Dressing Where Assessed: Recliner    LE Dressing  LE Dressing: Yes  Adult Briefs Level of Assistance:  (doffed underwear with mod a and comp tech. donned with mod a and comp tech. pants mgmt up/down with mod a for right side and back of hips)  LE Dressing Where Assessed: Recliner    Toileting  Toileting Level of Assistance:  (in stance and min a)  Where Assessed:  (standing at EOB)  Functional Standing Tolerance:  Functional Standing Tolerance Comments: patient stood for a total of 4 mins this date with fair/fair- balance with 2-0 ue support as needed during functional transfers and ADL tasks  Bed Mobility/Transfers: Bed Mobility  Bed Mobility: Yes  Bed Mobility 1  Bed Mobility 1: Supine to sitting  Level of Assistance 1: Moderate assistance, Moderate verbal cues    Transfers  Transfer: Yes  Transfer 1  Technique 1: Sit to stand  Transfer Device 1: Gait belt  Transfer Level of Assistance 1: Hand held assistance, Minimum assistance  Transfers 2  Transfer From 2: Bed to  Transfer to 2: Chair with arms  Transfer Device 2: Gait belt  Transfer Level of Assistance 2: Hand held assistance, Minimum assistance    Sitting Balance:  Dynamic Sitting Balance  Dynamic Sitting-Level of Assistance:  (fair/fair-)  Standing Balance:  Dynamic Standing Balance  Dynamic Standing-Level of Assistance:  (fair-/poor+)    Therapy/Activity: Therapeutic Exercise  Therapeutic Exercise Performed: Yes  Therapeutic Exercise Activity 3: AAROM to RUE from shoulder to fingers, educated on comp/correct tech with good understanding. patient educated on positioning and support of RUE for subluxation prevention with good  understanding    Therapeutic Activity  Therapeutic Activity Performed: Yes  Therapeutic Activity 1: patient sat EOB/recliner during ADL Tasks with fair/fair- balance with 2-0 ue support for a total of 15 mins.    Outcome Measures:[facility] Daily Activity  Putting on and taking off regular lower body clothing: A lot  Bathing (including washing, rinsing, drying): A lot  Putting on and taking off regular upper body clothing: A lot  Toileting, which includes using toilet, bedpan or urinal: A lot  Taking care of personal grooming such as brushing teeth: A lot  Eating Meals: A little  Daily Activity - Total Score: 13    Education Documentation  Body Mechanics, taught by ABELARDO Verdugo at 8/23/2024  1:21 PM.  Learner: Patient  Readiness: Acceptance  Method: Explanation  Response: Needs Reinforcement    OP EDUCATION:  Education  Individual(s) Educated: Patient  Education Provided: Symptom management, Ergonomics and postural realignment, Fall precautons, Risk and benefits of OT discussed with patient or other, POC discussed and agreed upon, Joint protection and energy conservation  Equipment:  (AE, EC tech, home DME)  Patient/Caregiver Demonstrated Understanding: yes  Plan of Care Discussed and Agreed Upon: yes  Patient Response to Education: Patient/Caregiver Verbalized Understanding of Information    Goals:  Encounter Problems       Encounter Problems (Active)       OT Goals       Patient will complete functional transfers at a CGA level.  (Progressing)       Start:  08/21/24    Expected End:  09/04/24            Patient will demonstrate good sitting and fair/fair + standing balance during functional tasks. (Progressing)       Start:  08/21/24    Expected End:  09/04/24            Patient will complete upper body dressing at setup/S level.  (Progressing)       Start:  08/21/24    Expected End:  09/04/24            Patient will complete lower body dressing at a CGA level.  (Progressing)       Start:  08/21/24    Expected  End:  09/04/24            Patient will demonstrate R UE 3/5 MMT to increase strength for functional tasks.  (Progressing)       Start:  08/21/24    Expected End:  09/04/24

## 2024-08-23 NOTE — CARE PLAN
The patient's goals for the shift include unable to answer    The clinical goals for the shift include to remain hds

## 2024-08-24 VITALS
BODY MASS INDEX: 26.43 KG/M2 | TEMPERATURE: 96.4 F | DIASTOLIC BLOOD PRESSURE: 65 MMHG | SYSTOLIC BLOOD PRESSURE: 139 MMHG | HEIGHT: 61 IN | OXYGEN SATURATION: 96 % | RESPIRATION RATE: 18 BRPM | HEART RATE: 53 BPM | WEIGHT: 139.99 LBS

## 2024-08-24 PROCEDURE — 2500000001 HC RX 250 WO HCPCS SELF ADMINISTERED DRUGS (ALT 637 FOR MEDICARE OP): Performed by: INTERNAL MEDICINE

## 2024-08-24 PROCEDURE — 97112 NEUROMUSCULAR REEDUCATION: CPT | Mod: GO,CO

## 2024-08-24 PROCEDURE — 99239 HOSP IP/OBS DSCHRG MGMT >30: CPT | Performed by: INTERNAL MEDICINE

## 2024-08-24 PROCEDURE — 2500000001 HC RX 250 WO HCPCS SELF ADMINISTERED DRUGS (ALT 637 FOR MEDICARE OP): Performed by: NURSE PRACTITIONER

## 2024-08-24 PROCEDURE — 2500000004 HC RX 250 GENERAL PHARMACY W/ HCPCS (ALT 636 FOR OP/ED): Performed by: INTERNAL MEDICINE

## 2024-08-24 PROCEDURE — 2500000001 HC RX 250 WO HCPCS SELF ADMINISTERED DRUGS (ALT 637 FOR MEDICARE OP): Performed by: PSYCHIATRY & NEUROLOGY

## 2024-08-24 PROCEDURE — 97116 GAIT TRAINING THERAPY: CPT | Mod: GP,CQ

## 2024-08-24 PROCEDURE — 2500000004 HC RX 250 GENERAL PHARMACY W/ HCPCS (ALT 636 FOR OP/ED): Performed by: NURSE PRACTITIONER

## 2024-08-24 PROCEDURE — 94640 AIRWAY INHALATION TREATMENT: CPT

## 2024-08-24 PROCEDURE — 2500000002 HC RX 250 W HCPCS SELF ADMINISTERED DRUGS (ALT 637 FOR MEDICARE OP, ALT 636 FOR OP/ED): Performed by: INTERNAL MEDICINE

## 2024-08-24 PROCEDURE — S4991 NICOTINE PATCH NONLEGEND: HCPCS | Performed by: INTERNAL MEDICINE

## 2024-08-24 SDOH — ECONOMIC STABILITY: HOUSING INSECURITY: AT ANY TIME IN THE PAST 12 MONTHS, WERE YOU HOMELESS OR LIVING IN A SHELTER (INCLUDING NOW)?: NO

## 2024-08-24 SDOH — ECONOMIC STABILITY: INCOME INSECURITY: HOW HARD IS IT FOR YOU TO PAY FOR THE VERY BASICS LIKE FOOD, HOUSING, MEDICAL CARE, AND HEATING?: NOT VERY HARD

## 2024-08-24 SDOH — ECONOMIC STABILITY: INCOME INSECURITY: IN THE LAST 12 MONTHS, WAS THERE A TIME WHEN YOU WERE NOT ABLE TO PAY THE MORTGAGE OR RENT ON TIME?: NO

## 2024-08-24 SDOH — ECONOMIC STABILITY: TRANSPORTATION INSECURITY
IN THE PAST 12 MONTHS, HAS THE LACK OF TRANSPORTATION KEPT YOU FROM MEDICAL APPOINTMENTS OR FROM GETTING MEDICATIONS?: NO

## 2024-08-24 SDOH — ECONOMIC STABILITY: HOUSING INSECURITY: IN THE PAST 12 MONTHS, HOW MANY TIMES HAVE YOU MOVED WHERE YOU WERE LIVING?: 1

## 2024-08-24 SDOH — ECONOMIC STABILITY: TRANSPORTATION INSECURITY
IN THE PAST 12 MONTHS, HAS LACK OF TRANSPORTATION KEPT YOU FROM MEETINGS, WORK, OR FROM GETTING THINGS NEEDED FOR DAILY LIVING?: NO

## 2024-08-24 ASSESSMENT — PAIN - FUNCTIONAL ASSESSMENT
PAIN_FUNCTIONAL_ASSESSMENT: 0-10

## 2024-08-24 ASSESSMENT — COGNITIVE AND FUNCTIONAL STATUS - GENERAL
DRESSING REGULAR UPPER BODY CLOTHING: A LOT
CLIMB 3 TO 5 STEPS WITH RAILING: TOTAL
STANDING UP FROM CHAIR USING ARMS: A LOT
DRESSING REGULAR LOWER BODY CLOTHING: A LOT
MOVING FROM LYING ON BACK TO SITTING ON SIDE OF FLAT BED WITH BEDRAILS: A LITTLE
TURNING FROM BACK TO SIDE WHILE IN FLAT BAD: A LOT
HELP NEEDED FOR BATHING: A LOT
DAILY ACTIVITIY SCORE: 15
WALKING IN HOSPITAL ROOM: A LOT
PERSONAL GROOMING: A LITTLE
MOBILITY SCORE: 13
TOILETING: A LOT
WALKING IN HOSPITAL ROOM: A LITTLE
TOILETING: A LOT
EATING MEALS: A LITTLE
EATING MEALS: A LITTLE
HELP NEEDED FOR BATHING: A LOT
MOVING FROM LYING ON BACK TO SITTING ON SIDE OF FLAT BED WITH BEDRAILS: A LITTLE
DAILY ACTIVITIY SCORE: 13
STANDING UP FROM CHAIR USING ARMS: A LOT
DRESSING REGULAR LOWER BODY CLOTHING: A LOT
CLIMB 3 TO 5 STEPS WITH RAILING: A LOT
DRESSING REGULAR UPPER BODY CLOTHING: A LITTLE
MOVING TO AND FROM BED TO CHAIR: A LOT
MOBILITY SCORE: 13
PERSONAL GROOMING: A LOT
TURNING FROM BACK TO SIDE WHILE IN FLAT BAD: A LOT
MOVING TO AND FROM BED TO CHAIR: A LOT

## 2024-08-24 ASSESSMENT — PAIN SCALES - GENERAL
PAINLEVEL_OUTOF10: 0 - NO PAIN
PAINLEVEL_OUTOF10: 3
PAINLEVEL_OUTOF10: 0 - NO PAIN
PAINLEVEL_OUTOF10: 0 - NO PAIN

## 2024-08-24 ASSESSMENT — PAIN DESCRIPTION - LOCATION: LOCATION: GENERALIZED

## 2024-08-24 NOTE — CARE PLAN
The patient's goals for the shift include unable to answer    The clinical goals for the shift include up to chair

## 2024-08-24 NOTE — PROGRESS NOTES
Occupational Therapy    OT Treatment    Patient Name: Isabell Swan  MRN: [id number]  Today's Date: 8/24/2024  Time Calculation  Start Time: 0846  Stop Time: 0910  Time Calculation (min): 24 min        Assessment:  OT Assessment: Pt demonstrates ADL impairment with deficits in functional transfers and functional mobility. Pt would benefit from skilled OT to address these deficits.  End of Session Communication: Bedside nurse  End of Session Patient Position: Up in chair, Alarm on (Dtr present.  All needs met, call light within reach. Dtr requesting b/s table to be left at end of bed per pt's wishes.)     Plan:  Treatment Interventions: ADL retraining, Functional transfer training, UE strengthening/ROM, Endurance training, Cognitive reorientation, Patient/family training, Neuromuscular reeducation, Fine motor coordination activities, Compensatory technique education  OT Frequency: 3 times per week  OT Discharge Recommendations: High intensity level of continued care  OT Recommended Transfer Status: Assist of 2  Treatment Interventions: ADL retraining, Functional transfer training, UE strengthening/ROM, Endurance training, Cognitive reorientation, Patient/family training, Neuromuscular reeducation, Fine motor coordination activities, Compensatory technique education    Subjective   Previous Visit Info:  OT Last Visit  OT Received On: 08/24/24  General:  General  Reason for Referral: CVA- right sided weakness and aphasia  Referred By: MAEVE Ahuja-CNP  Past Medical History Relevant to Rehab: 84 y.o. female with a significant past medical history of brain aneurysm with clips, left upper lobectomy, CABG x 5, COVID, RENE with CKD, HTN, HLD, presenting to [place] [facility] with right-sided weakness aphasia and right facial droop  Family/Caregiver Present: Yes  Caregiver Feedback: Daughter present - very supportive.  Co-Treatment: PT  Co-Treatment Reason: To maximize pt and staff safety while completing discipline specific  treatments  Prior to Session Communication: Bedside nurse (cleared for participation in OT tx)  Patient Position Received: Bed, 3 rail up, Alarm on  General Comment: Pt agreeable to OT, pleasant and cooperative.  Precautions:  Medical Precautions: Fall precautions  Precautions Comment: (R)UE weakness       Pain:  Pain Assessment  0-10 (Numeric) Pain Score: 0 - No pain    Objective    Cognition:  Cognition  Overall Cognitive Status: Impaired  Following Commands: Follows one step commands without difficulty       Bed Mobility/Transfers: Bed Mobility  Bed Mobility: Yes  Bed Mobility 1  Bed Mobility 1: Supine to sitting  Level of Assistance 1: Moderate assistance, +2  Bed Mobility Comments 1: Assist to reach for rail to facilitate rolling and elevation of trunk.    Transfer 1  Trials/Comments 1: Sit<>stand transfer from EOB with mod A x 2 with R side weakness and using vu walker/gait belt  Transfers 2  Transfer From 2: Bed to  Transfer to 2: Chair with arms  Transfer Device 2: Vu-walker  Transfer Level of Assistance 2: Minimum assistance, +2  Trials/Comments 2: Cues for safety and sequencing. Assist to boost and steady, education on fall prevention.      Functional Mobility:  Functional Mobility 1  Comments 1: Functional mobility HHD with vu-walker in room with min assist x 2         Therapy/Activity: Therapeutic Exercise  Therapeutic Exercise Performed: Yes  Therapeutic Exercise Activity 1: Pt educated and engaged in R UE AAROM and SROM techniques utilizing L/R UE to assist through all jts/planes with emphasis on pacing, technique and proper support/stabilization.. Pt receptive to SROM, tolerated well with no c/o pain.    Balance/Neuromuscular Re-Education  Balance/Neuromuscular Re-Education Activity Performed:  (Pt tolerated stand at EOB for ~4 min with gait belt and vu walker with cues for upright posture and assist for lateral weight shifting.)  Balance/Neuromuscular Re-Education Activity 1: While seated at  EOB, pt participating in dyn weight shifting laterally and anteriorly with assist from clinician for positioning of R UE to facilitate weightbearing.  Pt also participating in reaching activities with use of L arm with support at right elbow to prevent buckling    Outcome Measures:[facility] Daily Activity  Putting on and taking off regular lower body clothing: A lot  Bathing (including washing, rinsing, drying): A lot  Putting on and taking off regular upper body clothing: A little  Toileting, which includes using toilet, bedpan or urinal: A lot  Taking care of personal grooming such as brushing teeth: A little  Eating Meals: A little  Daily Activity - Total Score: 15        Education Documentation  Body Mechanics, taught by Jennifer L Felty, OTA at 8/24/2024  3:01 PM.  Learner: Patient  Readiness: Acceptance  Method: Explanation, Demonstration  Response: Needs Reinforcement           Goals:  Encounter Problems       Encounter Problems (Active)       OT Goals       Patient will complete functional transfers at a CGA level.  (Progressing)       Start:  08/21/24    Expected End:  09/04/24            Patient will demonstrate good sitting and fair/fair + standing balance during functional tasks. (Progressing)       Start:  08/21/24    Expected End:  09/04/24            Patient will complete upper body dressing at setup/S level.  (Progressing)       Start:  08/21/24    Expected End:  09/04/24            Patient will complete lower body dressing at a CGA level.  (Progressing)       Start:  08/21/24    Expected End:  09/04/24            Patient will demonstrate R UE 3/5 MMT to increase strength for functional tasks.  (Progressing)       Start:  08/21/24    Expected End:  09/04/24

## 2024-08-24 NOTE — DISCHARGE SUMMARY
DISCHARGE DIAGNOSIS     Acute ischemic L sided stroke   HTN    HOSPITAL COURSE AND DETAILS     Acute ischemic L sided stroke  Hx of cerebral aneurysm s/p remote clipping at L [place]/L MCA bifurcation   Hx of L frontotemporal craniotomy  -p/w aphasia, R sided hemiparesis suspicious for L sided ischemic stroke  -Initial CT head noncon showed chronic b/l frontal lobe infarcts, and post surgical changes including aneurysm clips and L frontotemporal craniotomy, and chronic small vessel ischemic changes  -CTA H+N showed L vertebral proximal occlusion, redemonstrated post surgical changes as above  -rpt CT head with IV contrast confirmed acute infarct in inferior L frontal lobe  -MRI brain unable to be done due to aneurysm clips  -TTE done, normal LVEF, neg bubble study, no intracardiac thrombus  -neuro saw here  -DAPT x90d, followed by Plavix monotherapy, high intensity statin  -acute rehab dispo, cont PT/OT/SLP there     HTN - resumed home meds, increased Coreg to 25mg bid, inc hydrochlorothiazide to 25mg daily. Better controlled now.   DLD - high intensity statin  Tobacco use disorder - nrt with patch     Stable for dc to acute rehab. Total time spent on discharge services 31 minutes.     DISCHARGE PHYSICAL EXAM     Last Recorded Vitals:  Vitals:    08/24/24 0000 08/24/24 0537 08/24/24 0602 08/24/24 0743   BP: 146/67 145/90  (!) 211/82   BP Location: Left arm Left arm     Patient Position: Lying Lying     Pulse: 63 60  62   Resp: 16 16  18   Temp: 36.6 °C (97.9 °F) 36.9 °C (98.4 °F)  36.4 °C (97.5 °F)   TempSrc: Temporal Temporal     SpO2: 93% 94% 96% 94%   Weight:       Height:           Physical Exam:  GEN: healthy appearing, appears stated age, NAD  CV: RRR, no m/r/g, no LE edema  LUNGS: CTAB, no w/r/c  ABD: soft, NT, ND, NBS  SKIN: no rashes  MSK; no gross deformities, normal joints  NEURO: A+Ox3, R facial droop, RUE 3/5 strength,  strength 2/5. L sided strength normal  PSYCH: appropriate mood,  affect      PERTINENT LABS AND IMAGING     Results for orders placed or performed during the hospital encounter of 08/20/24 (from the past 96 hour(s))   CBC and Auto Differential   Result Value Ref Range    WBC 11.1 4.4 - 11.3 x10*3/uL    nRBC 0.0 0.0 - 0.0 /100 WBCs    RBC 3.50 (L) 4.00 - 5.20 x10*6/uL    Hemoglobin 11.0 (L) 12.0 - 16.0 g/dL    Hematocrit 33.1 (L) 36.0 - 46.0 %    MCV 95 80 - 100 fL    MCH 31.4 26.0 - 34.0 pg    MCHC 33.2 32.0 - 36.0 g/dL    RDW 12.0 11.5 - 14.5 %    Platelets 197 150 - 450 x10*3/uL    Neutrophils % 78.2 40.0 - 80.0 %    Immature Granulocytes %, Automated 0.5 0.0 - 0.9 %    Lymphocytes % 13.0 13.0 - 44.0 %    Monocytes % 6.1 2.0 - 10.0 %    Eosinophils % 2.0 0.0 - 6.0 %    Basophils % 0.2 0.0 - 2.0 %    Neutrophils Absolute 8.67 (H) 1.60 - 5.50 x10*3/uL    Immature Granulocytes Absolute, Automated 0.05 0.00 - 0.50 x10*3/uL    Lymphocytes Absolute 1.44 0.80 - 3.00 x10*3/uL    Monocytes Absolute 0.67 0.05 - 0.80 x10*3/uL    Eosinophils Absolute 0.22 0.00 - 0.40 x10*3/uL    Basophils Absolute 0.02 0.00 - 0.10 x10*3/uL   Comprehensive metabolic panel   Result Value Ref Range    Glucose 188 (H) 74 - 99 mg/dL    Sodium 140 136 - 145 mmol/L    Potassium 3.6 3.5 - 5.3 mmol/L    Chloride 109 (H) 98 - 107 mmol/L    Bicarbonate 22 21 - 32 mmol/L    Anion Gap 13 10 - 20 mmol/L    Urea Nitrogen 44 (H) 6 - 23 mg/dL    Creatinine 1.21 (H) 0.50 - 1.05 mg/dL    eGFR 45 (L) >60 mL/min/1.73m*2    Calcium 9.1 8.6 - 10.3 mg/dL    Albumin 4.0 3.4 - 5.0 g/dL    Alkaline Phosphatase 45 33 - 136 U/L    Total Protein 6.6 6.4 - 8.2 g/dL    AST 16 9 - 39 U/L    Bilirubin, Total 0.3 0.0 - 1.2 mg/dL    ALT 11 7 - 45 U/L   Troponin I, High Sensitivity   Result Value Ref Range    Troponin I, High Sensitivity 7 0 - 13 ng/L   Protime-INR   Result Value Ref Range    Protime 11.6 9.8 - 12.8 seconds    INR 1.0 0.9 - 1.1   APTT   Result Value Ref Range    aPTT 27 27 - 38 seconds   Gray Top   Result Value Ref Range     Extra Tube Hold for add-ons.    POCT GLUCOSE   Result Value Ref Range    POCT Glucose 193 (H) 74 - 99 mg/dL   ECG 12 lead   Result Value Ref Range    Ventricular Rate 64 BPM    Atrial Rate 64 BPM    DC Interval 162 ms    QRS Duration 100 ms    QT Interval 466 ms    QTC Calculation(Bazett) 480 ms    P Axis 73 degrees    R Axis 69 degrees    T Axis 89 degrees    QRS Count 10 beats    Q Onset 222 ms    P Onset 141 ms    P Offset 194 ms    T Offset 455 ms    QTC Fredericia 476 ms   Comprehensive metabolic panel   Result Value Ref Range    Glucose 95 74 - 99 mg/dL    Sodium 141 136 - 145 mmol/L    Potassium 3.5 3.5 - 5.3 mmol/L    Chloride 109 (H) 98 - 107 mmol/L    Bicarbonate 25 21 - 32 mmol/L    Anion Gap 11 10 - 20 mmol/L    Urea Nitrogen 34 (H) 6 - 23 mg/dL    Creatinine 0.94 0.50 - 1.05 mg/dL    eGFR 60 (L) >60 mL/min/1.73m*2    Calcium 9.2 8.6 - 10.3 mg/dL    Albumin 4.0 3.4 - 5.0 g/dL    Alkaline Phosphatase 53 33 - 136 U/L    Total Protein 6.7 6.4 - 8.2 g/dL    AST 43 (H) 9 - 39 U/L    Bilirubin, Total 0.4 0.0 - 1.2 mg/dL    ALT 42 7 - 45 U/L   CBC and Auto Differential   Result Value Ref Range    WBC 7.3 4.4 - 11.3 x10*3/uL    nRBC 0.0 0.0 - 0.0 /100 WBCs    RBC 3.60 (L) 4.00 - 5.20 x10*6/uL    Hemoglobin 11.2 (L) 12.0 - 16.0 g/dL    Hematocrit 34.0 (L) 36.0 - 46.0 %    MCV 94 80 - 100 fL    MCH 31.1 26.0 - 34.0 pg    MCHC 32.9 32.0 - 36.0 g/dL    RDW 12.0 11.5 - 14.5 %    Platelets 193 150 - 450 x10*3/uL    Neutrophils % 64.8 40.0 - 80.0 %    Immature Granulocytes %, Automated 0.3 0.0 - 0.9 %    Lymphocytes % 22.5 13.0 - 44.0 %    Monocytes % 10.9 2.0 - 10.0 %    Eosinophils % 1.4 0.0 - 6.0 %    Basophils % 0.1 0.0 - 2.0 %    Neutrophils Absolute 4.76 1.60 - 5.50 x10*3/uL    Immature Granulocytes Absolute, Automated 0.02 0.00 - 0.50 x10*3/uL    Lymphocytes Absolute 1.65 0.80 - 3.00 x10*3/uL    Monocytes Absolute 0.80 0.05 - 0.80 x10*3/uL    Eosinophils Absolute 0.10 0.00 - 0.40 x10*3/uL    Basophils Absolute  0.01 0.00 - 0.10 x10*3/uL   Magnesium   Result Value Ref Range    Magnesium 2.26 1.60 - 2.40 mg/dL   C-Reactive Protein   Result Value Ref Range    C-Reactive Protein 0.15 <1.00 mg/dL   Lipid Panel   Result Value Ref Range    Cholesterol 161 0 - 199 mg/dL    HDL-Cholesterol 53.8 mg/dL    Cholesterol/HDL Ratio 3.0     LDL Calculated 75 <=99 mg/dL    VLDL 33 0 - 40 mg/dL    Triglycerides 163 (H) 0 - 149 mg/dL    Non HDL Cholesterol 107 0 - 149 mg/dL   Hemoglobin A1C   Result Value Ref Range    Hemoglobin A1C 5.8 (H) see below %    Estimated Average Glucose 120 Not Established mg/dL   SST TOP   Result Value Ref Range    Extra Tube Hold for add-ons.    Transthoracic Echo (TTE) Complete   Result Value Ref Range    AV mn grad 4.0 mmHg    AV pk herlinda 1.42 m/s    LV Biplane EF 60 %    LVOT diam 1.90 cm    MV E/A ratio 1.26     Tricuspid annular plane systolic excursion 1.6 cm    LA vol index A/L 26.8 ml/m2    LV EF 63 %    RV free wall pk S' 9.25 cm/s    RVSP 29.2 mmHg    LVIDd 3.31 cm    Aortic Valve Area by Continuity of Peak Velocity 2.10 cm2    AV pk grad 8.1 mmHg    Aortic Valve Area by Continuity of VTI 2.29 cm2    LV A4C EF 63.0    POCT GLUCOSE   Result Value Ref Range    POCT Glucose 100 (H) 74 - 99 mg/dL   POCT GLUCOSE   Result Value Ref Range    POCT Glucose 105 (H) 74 - 99 mg/dL   POCT GLUCOSE   Result Value Ref Range    POCT Glucose 125 (H) 74 - 99 mg/dL   POCT GLUCOSE   Result Value Ref Range    POCT Glucose 126 (H) 74 - 99 mg/dL   POCT GLUCOSE   Result Value Ref Range    POCT Glucose 120 (H) 74 - 99 mg/dL   POCT GLUCOSE   Result Value Ref Range    POCT Glucose 111 (H) 74 - 99 mg/dL   POCT GLUCOSE   Result Value Ref Range    POCT Glucose 129 (H) 74 - 99 mg/dL   POCT GLUCOSE   Result Value Ref Range    POCT Glucose 134 (H) 74 - 99 mg/dL   POCT GLUCOSE   Result Value Ref Range    POCT Glucose 167 (H) 74 - 99 mg/dL   POCT GLUCOSE   Result Value Ref Range    POCT Glucose 101 (H) 74 - 99 mg/dL   POCT GLUCOSE   Result  Value Ref Range    POCT Glucose 122 (H) 74 - 99 mg/dL        CT head w and wo IV contrast   Final Result   1. New loss of gray-white differentiation with associated sulcal   effacement in the inferior left frontal lobe, suggestive of an acute   to subacute infarct.   2. Postsurgical change of prior left frontotemporal craniotomy and   aneurysm clipping.        I personally reviewed the images/study, and I agree with the findings   as stated above. This study was interpreted at [facility], [place], [place].        MACRO:   Aviva Lin discussed the significance and urgency of this critical   finding via secure chat with Ernesto Watkins MD on 8/21/2024 at 3:31   pm.  (**-RCF-**) Findings:  See findings.             Signed by: Tiffany Valentine 8/21/2024 3:53 PM   Dictation workstation:   [id number]      Transthoracic Echo (TTE) Complete   Final Result      CT brain attack angio head and neck W and WO IV contrast   Final Result   Occlusion of the origin and proximal left vertebral artery. There is   minimal opacification of the left vertebral artery distally at level   C4 vertebral body with minimal opacification of the V2, V3, and V4   segments distally.        Postsurgical change of left craniotomy and aneurysm clips in the   region of the left anterior communicating artery and the left MCA   bifurcation. No definite evidence of acute major proximal   intracranial arterial occlusion.        Encephalomalacia in the right frontal lobe compatible with old   infarct.        If there is persistent concern for acute ischemia, MRI may be   obtained for further evaluation.        MACRO:   Salazar Styles discussed the significance and urgency of this critical   finding by telephone with  Dr. Weber on 8/20/2024 at 9:18 pm.   (**-RCF-**) Findings:  See findings.             Signed by: Salazar Styles 8/20/2024 9:19 PM   Dictation workstation:   [id number]      CT brain attack head wo IV contrast    Final Result   No acute intracranial abnormality.        Chronic bilateral frontal lobe infarcts. Postsurgical changes as   above.        MACRO:   Kadie Lowry discussed the significance and urgency of this critical   finding by telephone with  NATHALIE MORRISON on 8/20/2024 at 8:17 pm.   (**-RCF-**) Findings:  See findings.        Signed by: Kadie Lowry 8/20/2024 8:18 PM   Dictation workstation:   [id number]          Transthoracic Echo (TTE) Complete    Result Date: 8/23/2024          [place] [address]  Tel [phone] Fax [phone] TRANSTHORACIC ECHOCARDIOGRAM REPORT Patient Name:      XAVIER PERRY     Reading Physician:    [id number] Chris Guerrero MD, [facility] Study Date:        8/21/2024            Ordering Provider:    [id number] ZACKARY KENNEDY MRN/PID:           [id number]             Fellow: Accession#:        [id number]         Nurse:                Selin Damian Date of Birth/Age: 1940 / 84 years  Sonographer:          Marilynn Mota                                                               [facility] Gender:            F                    Additional Staff: Height:            154.94 cm            Admit Date:           8/20/2024 Weight:            63.05 kg             Admission Status:     Inpatient -                                                               Routine BSA / BMI:         1.62 m2 / 26.26      Department Location:  [place] [address]                                      Echo Lab Blood Pressure: 125 /59 mmHg Study Type:    TRANSTHORACIC ECHO (TTE) COMPLETE Diagnosis/ICD: Cerebral Infarction, unspecified-I63.9 Indication:    Cerebrovascular Accident CPT Codes:     Echo Complete w Full [id number] Patient History: CABG:              Mulitivessel CABG. Smoker:            Current. Pertinent History: HTN,  Hyperlipidemia, CVA and Brain aneurysm. Study Detail: The following Echo studies were performed: 2D, M-Mode, Doppler and               color flow. Agitated saline used as a contrast agent for               intraseptal flow evaluation.  PHYSICIAN INTERPRETATION: Left Ventricle: The left ventricular systolic function is normal, with a visually estimated ejection fraction of 60-65%. There are no regional wall motion abnormalities. The left ventricular cavity size is normal. The left ventricular septal wall thickness is normal. There is normal left ventricular posterior wall thickness. Spectral Doppler shows a normal pattern of left ventricular diastolic filling. Left Atrium: The left atrium is normal in size. A bubble study using agitated saline was performed. Bubble study is negative. Left atrial volume index 26.8 mL/m2. Saline bubble contrast study is negative. Right Ventricle: The right ventricle is normal in size. There is normal right ventricular global systolic function. Normal right ventricular chamber size and function. Right Atrium: The right atrium is normal in size. Aortic Valve: The aortic valve is trileaflet. There is mild aortic valve cusp calcification. The aortic valve dimensionless index is 0.81. There is mild to moderate aortic valve regurgitation. The peak instantaneous gradient of the aortic valve is 8.1 mmHg. The mean gradient of the aortic valve is 4.0 mmHg. 2+ aortic regurgitation. Mitral Valve: The mitral valve is mildly thickened. There is mild mitral annular calcification. There is mild to moderate mitral valve regurgitation. 2+ mitral regurgitation. Tricuspid Valve: The tricuspid valve is structurally normal. There is mild to moderate tricuspid regurgitation. 2+ tricuspid regurgitation with estimated RVSP of 29 mmHg. Pulmonic Valve: The pulmonic valve is structurally normal. There is mild pulmonic valve regurgitation. Pericardium: There is no pericardial effusion noted. Aorta: The aortic root  is normal. Systemic Veins: The inferior vena cava appears to be of normal size. There is IVC inspiratory collapse greater than 50%.  CONCLUSIONS:  1. The left ventricular systolic function is normal, with a visually estimated ejection fraction of 60-65%.  2. There is normal right ventricular global systolic function.  3. Left atrial volume index 26.8 mL/m2.     Saline bubble contrast study is negative.  4. Mild to moderate mitral valve regurgitation.  5. 2+ tricuspid regurgitation with estimated RVSP of 29 mmHg.  6. Mild to moderate tricuspid regurgitation.  7. Mild to moderate aortic valve regurgitation.  8. No previous available for comparison. QUANTITATIVE DATA SUMMARY: 2D MEASUREMENTS:                          Normal Ranges: Ao Root d:     2.54 cm   (2.0-3.7cm) LAs:           4.19 cm   (2.7-4.0cm) IVSd:          1.15 cm   (0.6-1.1cm) LVPWd:         0.87 cm   (0.6-1.1cm) LVIDd:         3.31 cm   (3.9-5.9cm) LVIDs:         2.20 cm LV Mass Index: 59.4 g/m2 LV % FS        33.5 % LA VOLUME:                               Normal Ranges: LA Vol A4C:        40.6 ml    (22+/-6mL/m2) LA Vol A2C:        43.4 ml LA Vol BP:         43.4 ml LA Vol Index A4C:  25.1ml/m2 LA Vol Index A2C:  26.8 ml/m2 LA Vol Index BP:   26.8 ml/m2 LA Area A4C:       15.9 cm2 LA Area A2C:       17.0 cm2 LA Major Axis A4C: 5.3 cm LA Major Axis A2C: 5.7 cm LA Volume Index:   25.6 ml/m2 RA VOLUME BY A/L METHOD:                               Normal Ranges: RA Vol A4C:        29.2 ml    (8.3-19.5ml) RA Vol Index A4C:  18.0 ml/m2 RA Area A4C:       13.8 cm2 RA Major Axis A4C: 5.6 cm AORTA MEASUREMENTS:                    Normal Ranges: Asc Ao, d: 2.50 cm (2.1-3.4cm) LV SYSTOLIC FUNCTION BY 2D PLANIMETRY (MOD):                      Normal Ranges: EF-A4C View:    63 % (>=55%) EF-A2C View:    59 % EF-Biplane:     60 % EF-Visual:      63 % LV EF Reported: 63 % LV DIASTOLIC FUNCTION:                         Normal Ranges: MV Peak E:    1.13 m/s  (0.7-1.2  m/s) MV Peak A:    0.90 m/s  (0.42-0.7 m/s) E/A Ratio:    1.26      (1.0-2.2) MV e'         0.080 m/s (>8.0) MV lateral e' 0.09 m/s MV medial e'  0.07 m/s E/e' Ratio:   14.04     (<8.0) MITRAL VALVE:                 Normal Ranges: MV DT: 269 msec (150-240msec) AORTIC VALVE:                                   Normal Ranges: AoV Vmax:                1.42 m/s (<=1.7m/s) AoV Peak P.1 mmHg (<20mmHg) AoV Mean P.0 mmHg (1.7-11.5mmHg) LVOT Max Wes:            1.05 m/s (<=1.1m/s) AoV VTI:                 36.20 cm (18-25cm) LVOT VTI:                29.30 cm LVOT Diameter:           1.90 cm  (1.8-2.4cm) AoV Area, VTI:           2.29 cm2 (2.5-5.5cm2) AoV Area,Vmax:           2.10 cm2 (2.5-4.5cm2) AoV Dimensionless Index: 0.81 AORTIC INSUFFICIENCY: AI Vmax:       4.28 m/s AI Half-time:  304 msec AI Decel Rate: 354.50 cm/s2  RIGHT VENTRICLE: RV Basal 3.52 cm RV Mid   1.79 cm RV Major 6.3 cm TAPSE:   15.7 mm RV s'    0.09 m/s TRICUSPID VALVE/RVSP:                             Normal Ranges: Peak TR Velocity: 2.56 m/s RV Syst Pressure: 29.2 mmHg (< 30mmHg) IVC Diam:         1.19 cm PULMONIC VALVE:                         Normal Ranges: PV Accel Time: 61 msec  (>120ms) PV Max Wes:    0.9 m/s  (0.6-0.9m/s) PV Max PG:     3.2 mmHg  [id number] Chris Guerrero MD, FACC Electronically signed on 2024 at 10:03:57 AM  ** Final **      DISCHARGE MEDICATIONS        Your medication list        START taking these medications        Instructions Last Dose Given Next Dose Due   clopidogrel 75 mg tablet  Commonly known as: Plavix      Take 1 tablet (75 mg) by mouth once daily.       hydroCHLOROthiazide 25 mg tablet  Commonly known as: HYDRODiuril  Start taking on: 2024      Take 1 tablet (25 mg) by mouth once daily. Do not fill before 2024.       losartan 100 mg tablet  Commonly known as: Cozaar  Start taking on: 2024      Take 1 tablet (100 mg) by mouth once daily.       nicotine 14  mg/24 hr patch  Commonly known as: Nicoderm CQ      Place 1 patch over 24 hours on the skin once daily.              CHANGE how you take these medications        Instructions Last Dose Given Next Dose Due   carvedilol 25 mg tablet  Commonly known as: Coreg  What changed:   medication strength  how much to take      Take 1 tablet (25 mg) by mouth 2 times a day.              CONTINUE taking these medications        Instructions Last Dose Given Next Dose Due   albuterol 90 mcg/actuation inhaler           Anoro Ellipta 62.5-25 mcg/actuation blister with device  Generic drug: umeclidinium-vilanteroL           aspirin 81 mg EC tablet      Take 1 tablet (81 mg) by mouth once daily.       atorvastatin 40 mg tablet  Commonly known as: Lipitor           traZODone 100 mg tablet  Commonly known as: Desyrel                  STOP taking these medications      losartan-hydrochlorothiazide 100-12.5 mg tablet  Commonly known as: Hyzaar                  Where to Get Your Medications        Information about where to get these medications is not yet available    Ask your nurse or doctor about these medications  aspirin 81 mg EC tablet  carvedilol 25 mg tablet  clopidogrel 75 mg tablet  hydroCHLOROthiazide 25 mg tablet  losartan 100 mg tablet  nicotine 14 mg/24 hr patch         OUTPATIENT FOLLOW-UP     No future appointments.

## 2024-08-24 NOTE — PROGRESS NOTES
Writer spoke with patient/ family informed them [facility] has no beds and unsure of when a bed will become available. Next preference is [facility]. Referral sent in careport/ await acceptance. EMMANUELLE Herbert    Update 1515 family requesting referral to [facility]. Referral sent and was notified by [facility] liaison for [facility] able to accept. [facility] arranging for a 1500 pickup. Patient/ family updated- in agreement. Writer notified bedside nurse/  of discharge time and report number. EMMANUELLE Herbert

## 2024-08-24 NOTE — PROGRESS NOTES
Physical Therapy    Physical Therapy Treatment    Patient Name: Isabell Swan  MRN: [id number]  Today's Date: 8/24/2024  Time Calculation  Start Time: 0845  Stop Time: 0909  Time Calculation (min): 24 min    Assessment/Plan   PT Assessment  PT Assessment Results: Decreased strength, Decreased endurance, Impaired balance, Decreased mobility, Decreased coordination, Decreased cognition, Decreased safety awareness, Impaired tone  Rehab Prognosis: Good  Barriers to Discharge: Pt. lives alone.  End of Session Communication: Bedside nurse  Assessment Comment: Patient continues to require (A) for safety with transfers/amb/ther ex. Patient will benefit from additional PT to address deficits and improve mobility. C/O fatigue at end of session.  End of Session Patient Position: Up in chair, Alarm on  PT Plan  Inpatient/Swing Bed or Outpatient: Inpatient  PT Plan  Treatment/Interventions: Bed mobility, Transfer training, Gait training, Therapeutic exercise  PT Plan: Ongoing PT  PT Frequency: 4 times per week  PT Discharge Recommendations: High intensity level of continued care  Equipment Recommended upon Discharge:  (TBD)  PT Recommended Transfer Status: Assist x1 (DO NOT PULL ON PATIENT'S RUE)  General Visit Information:   PT  Visit  PT Received On: 08/24/24  General  Reason for Referral: CVA- right sided weakness and aphasia  Referred By: Erica Villanueva APRN-CNP  Past Medical History Relevant to Rehab: 84 y.o. female with a significant past medical history of brain aneurysm with clips, left upper lobectomy, CABG x 5, COVID, RENE with CKD, HTN, HLD, presenting to [facility] with right-sided weakness aphasia and right facial droop  Family/Caregiver Present: Yes (Daughter present and supportive)  Co-Treatment: OT  Co-Treatment Reason: To maximize functional outcomes and patient safety.  Prior to Session Communication: Bedside nurse  Patient Position Received: Bed, 3 rail up, Alarm on  General Comment: Pt is pleasant and  coopertive with daughter present in the room throughout    Subjective   Precautions:  Precautions  Medical Precautions: Fall precautions  Precautions Comment: (R)UE weakness  Vital Signs:       Objective   Pain:  Pain Assessment  Pain Assessment: 0-10  0-10 (Numeric) Pain Score: 0 - No pain  Cognition:  Cognition  Orientation Level: Oriented X4  Coordination:     Postural Control:     Extremity/Trunk Assessments:        Activity Tolerance:  Activity Tolerance  Endurance: Decreased tolerance for upright activites  Treatments:  Therapeutic Exercise  Therapeutic Exercise Performed: Yes (seatd AP, LAQ and marches x10ea BLE)    Bed Mobility  Bed Mobility: Yes (Sup>sit with mod A x 2 this session with hand over hand assist to reach for bed rail. Moves BLE toward EOB with assist to move fully off the bed.)    Ambulation/Gait Training  Ambulation/Gait Training Performed: Yes (Pt ambulating 15'x 2 with belén walker and min A x 2. OT managing RUE during session do to flaccidity. Pt demos decreased step height/length mild unsteadiness. Pt states she prefers quad cane)  Transfers  Transfer: Yes (Sit<>stand transfer from EOB with mod A x 2 with R side weakness and using belén walker/gait belt)    Outcome Measures:  [facility] Basic Mobility  Turning from your back to your side while in a flat bed without using bedrails: A little  Moving from lying on your back to sitting on the side of a flat bed without using bedrails: A lot  Moving to and from bed to chair (including a wheelchair): A lot  Standing up from a chair using your arms (e.g. wheelchair or bedside chair): A lot  To walk in hospital room: A little  Climbing 3-5 steps with railing: Total  Basic Mobility - Total Score: 13    Education Documentation  Mobility Training, taught by Jeannine Kasper PTA at 8/24/2024  1:34 PM.  Learner: Family, Patient  Readiness: Acceptance  Method: Explanation  Response: Needs Reinforcement    Education Comments  No comments  found.        EDUCATION:  Outpatient Education  Individual(s) Educated: Patient, Child  Education Provided: Body Mechanics, Fall Risk, Home Exercise Program, POC, Posture    Encounter Problems       Encounter Problems (Active)       Impaired mobility        Perform all bed mobility with supervision  (Progressing)       Start:  08/21/24    Expected End:  09/04/24            Perform all transfers without device vs LRAD and SBAx1 (Progressing)       Start:  08/21/24    Expected End:  09/04/24            Patient will ambulate >/= 100 ft without device vs LRAD and CGAx1 (Progressing)       Start:  08/21/24    Expected End:  09/04/24            Patient will sit unsupported with indep. for static balance and SBA for multidirectional reaching  (Progressing)       Start:  08/21/24    Expected End:  09/04/24            Patient will perform seated and/or standing (with 1UE support) LE HEP with supervision to improve strength, coordination and balance  (Progressing)       Start:  08/21/24    Expected End:  09/04/24               Pain - Adult

## 2024-08-28 NOTE — CONSULTS
[facility]           Radiation Oncology      [address] [place] [id number]        O: [phone]        F: [phone]       [facility]                   Dr. Bartolo Teixeira MD PhD    CONSULT NOTE     Date of Service: 2024  Patient ID: Carina Kebede   : 1940  MRN: [id number]   Acct Number: [id number]       Carina Kebede  84 y.o.   1940    REFERRING PROVIDER: Espinoza    PCP:  Fabio Baugh MD    DIAGNOSIS:  1. Lung nodule        STAGING: Cancer Staging   No matching staging information was found for the patient.      HISTORY OF PRESENT ILLNESS: Ms. Carina Kebede  is a 84 y.o. year old female  This is an 84-year-old female with history of CAD, hypertension, hyperlipidemia, CABG, CKD stage IV, brain aneurysm status post surgery, left upper lobe lobectomy for non-small lung cancer in 2023 and more recent finding of an enlarging right upper lobe pulmonary nodule with suspicious features.  She presents to discuss the role and rationale of empiric stereotactic body radiation therapy to this right upper lobe lung nodule as it is not amenable to resection due to its groundglass nature and is high risk for biopsy.    The patient's history of lung cancer dates back to 2022 when she had a CT of the chest which revealed a left lung nodule that was suspicious and read as lung RADS 4.  On 2023 she underwent a left upper lobectomy and mediastinal lymph node dissection with pathology revealing non-small cell lung cancer.  The patient did not have any adjuvant therapy at that time.  She has since been followed with regular imaging.  CT of the chest on 2023 revealed a stable appearance of the left upper lobectomy postoperative region in the left lung without evidence for nodularity or recurrent mass.  There was a groundglass nodular density somewhat ill-defined in the right upper lobe measuring 9 mm that was similar to prior imaging.  Repeat

## 2024-09-01 ENCOUNTER — HOME HEALTH ADMISSION (OUTPATIENT)
Dept: HOME HEALTH SERVICES | Facility: HOME HEALTH | Age: 84
End: 2024-09-01
Payer: MEDICARE

## 2024-09-01 ENCOUNTER — DOCUMENTATION (OUTPATIENT)
Dept: HOME HEALTH SERVICES | Facility: HOME HEALTH | Age: 84
End: 2024-09-01
Payer: MEDICARE

## 2024-09-01 NOTE — HH CARE COORDINATION
Home Care received a Referral for Nursing, Physical Therapy, Occupational Therapy, and Home Health Aide. We have processed the referral for a Start of Care on 9.4 or 9.5.     If you have any questions or concerns, please feel free to contact us at [phone]. Follow the prompts, enter your five digit zip code, and you will be directed to your care team on WEST 1.

## 2024-09-04 ENCOUNTER — HOME CARE VISIT (OUTPATIENT)
Dept: HOME HEALTH SERVICES | Facility: HOME HEALTH | Age: 84
End: 2024-09-04
Payer: MEDICARE

## 2024-09-04 VITALS — HEART RATE: 58 BPM | OXYGEN SATURATION: 100 % | RESPIRATION RATE: 18 BRPM

## 2024-09-04 PROCEDURE — 169592 NO-PAY CLAIM PROCEDURE

## 2024-09-04 PROCEDURE — G0299 HHS/HOSPICE OF RN EA 15 MIN: HCPCS | Mod: HHH

## 2024-09-04 PROCEDURE — G0152 HHCP-SERV OF OT,EA 15 MIN: HCPCS | Mod: HHH

## 2024-09-04 SDOH — ECONOMIC STABILITY: HOUSING INSECURITY: HOME SAFETY: PATIENT LIVES ALONE IN 1 STORY APARTMENT. FAMILY HAS SETUP 24 HOUR CARE SINCE RETURNING HOME.

## 2024-09-04 ASSESSMENT — ENCOUNTER SYMPTOMS
SUBJECTIVE PAIN PROGRESSION: UNCHANGED
HIGHEST PAIN SEVERITY IN PAST 24 HOURS: 0/10
PAIN SEVERITY GOAL: 0/10
PERSON REPORTING PAIN: PATIENT
PAIN LOCATION - PAIN DURATION: CONSTANT
HIGHEST PAIN SEVERITY IN PAST 24 HOURS: 4/10
DENIES PAIN: 1
PAIN: 1
LOWEST PAIN SEVERITY IN PAST 24 HOURS: 0/10
LOWEST PAIN SEVERITY IN PAST 24 HOURS: 1/10
PAIN LOCATION: RIGHT HAND
PAIN LOCATION - PAIN SEVERITY: 2/10
PAIN LOCATION - EXACERBATING FACTORS: SINCE CVA
PERSON REPORTING PAIN: PATIENT
PAIN LOCATION - PAIN QUALITY: ACHES
PAIN LOCATION - PAIN FREQUENCY: CONSTANT

## 2024-09-04 ASSESSMENT — ACTIVITIES OF DAILY LIVING (ADL)
GROOMING ASSESSED: 1
PREPARING MEALS: DEPENDENT
DRESSING_LB_CURRENT_FUNCTION: MODERATE ASSIST
HOUSEKEEPING ASSESSED: 1
AMBULATION ASSISTANCE: 1
LAUNDRY: DEPENDENT
TOILETING: MODERATE ASSIST
LIGHT HOUSEKEEPING: DEPENDENT
AMBULATION ASSISTANCE: SUPERVISION
BATHING ASSESSED: 1
LAUNDRY ASSESSED: 1
GROOMING_CURRENT_FUNCTION: MODERATE ASSIST
ENTERING_EXITING_HOME: MODERATE ASSIST
TOILETING: 1
DRESSING_UB_CURRENT_FUNCTION: MODERATE ASSIST

## 2024-09-04 ASSESSMENT — PAIN SCALES - PAIN ASSESSMENT IN ADVANCED DEMENTIA (PAINAD)
FACIALEXPRESSION: 0
BREATHING: 0
NEGVOCALIZATION: 0 - NONE.
FACIALEXPRESSION: 0 - SMILING OR INEXPRESSIVE.
CONSOLABILITY: 0
TOTALSCORE: 0
BODYLANGUAGE: 0
NEGVOCALIZATION: 0
BODYLANGUAGE: 0 - RELAXED.
CONSOLABILITY: 0 - NO NEED TO CONSOLE.

## 2024-09-05 ENCOUNTER — OFFICE VISIT (OUTPATIENT)
Dept: FAMILY MEDICINE CLINIC | Age: 84
End: 2024-09-05

## 2024-09-05 ENCOUNTER — TELEPHONE (OUTPATIENT)
Dept: FAMILY MEDICINE CLINIC | Age: 84
End: 2024-09-05

## 2024-09-05 ENCOUNTER — HOME CARE VISIT (OUTPATIENT)
Dept: HOME HEALTH SERVICES | Facility: HOME HEALTH | Age: 84
End: 2024-09-05
Payer: MEDICARE

## 2024-09-05 VITALS
BODY MASS INDEX: 26.43 KG/M2 | WEIGHT: 140 LBS | HEART RATE: 52 BPM | DIASTOLIC BLOOD PRESSURE: 60 MMHG | RESPIRATION RATE: 16 BRPM | OXYGEN SATURATION: 99 % | SYSTOLIC BLOOD PRESSURE: 98 MMHG | HEIGHT: 61 IN | TEMPERATURE: 97.3 F

## 2024-09-05 DIAGNOSIS — I10 ESSENTIAL HYPERTENSION: ICD-10-CM

## 2024-09-05 DIAGNOSIS — I10 PRIMARY HYPERTENSION: ICD-10-CM

## 2024-09-05 DIAGNOSIS — I63.9 CEREBROVASCULAR ACCIDENT (CVA), UNSPECIFIED MECHANISM (HCC): Primary | ICD-10-CM

## 2024-09-05 DIAGNOSIS — Z72.0 TOBACCO ABUSE: ICD-10-CM

## 2024-09-05 PROCEDURE — G0151 HHCP-SERV OF PT,EA 15 MIN: HCPCS | Mod: HHH

## 2024-09-05 RX ORDER — HYDROCHLOROTHIAZIDE 25 MG/1
25 TABLET ORAL DAILY
COMMUNITY
End: 2024-09-05 | Stop reason: SDUPTHER

## 2024-09-05 RX ORDER — CARVEDILOL 25 MG/1
25 TABLET ORAL 2 TIMES DAILY
COMMUNITY
Start: 2024-08-30 | End: 2024-09-05 | Stop reason: DRUGHIGH

## 2024-09-05 RX ORDER — CLOPIDOGREL BISULFATE 75 MG/1
75 TABLET ORAL DAILY
Qty: 90 TABLET | Refills: 0 | Status: SHIPPED | OUTPATIENT
Start: 2024-09-05

## 2024-09-05 RX ORDER — CLOPIDOGREL BISULFATE 75 MG/1
75 TABLET ORAL DAILY
COMMUNITY
End: 2024-09-05 | Stop reason: SDUPTHER

## 2024-09-05 RX ORDER — AMLODIPINE BESYLATE 5 MG/1
5 TABLET ORAL DAILY
COMMUNITY

## 2024-09-05 RX ORDER — TRAZODONE HYDROCHLORIDE 100 MG/1
TABLET ORAL
Qty: 90 TABLET | Refills: 1 | Status: SHIPPED | OUTPATIENT
Start: 2024-09-05

## 2024-09-05 RX ORDER — NICOTINE 21 MG/24HR
1 PATCH, TRANSDERMAL 24 HOURS TRANSDERMAL DAILY
Qty: 42 PATCH | Refills: 0 | Status: SHIPPED | OUTPATIENT
Start: 2024-09-05 | End: 2024-10-17

## 2024-09-05 RX ORDER — CARVEDILOL 25 MG/1
12.5 TABLET ORAL 2 TIMES DAILY
Status: SHIPPED | COMMUNITY
Start: 2024-09-05 | End: 2024-09-06

## 2024-09-05 RX ORDER — HYDROCHLOROTHIAZIDE 25 MG/1
25 TABLET ORAL DAILY
Qty: 90 TABLET | Refills: 0 | Status: SHIPPED | OUTPATIENT
Start: 2024-09-05

## 2024-09-05 ASSESSMENT — GAIT ASSESSMENTS
INITIATION OF GAIT IMMEDIATELY AFTER GO: 1 - NO HESITANCY
TRUNK: 2 - NO SWAY, NO FLEXION, NO USE OF ARMS, NO WALKING AID
WALKING STANCE: 1 - HEELS ALMOST TOUCHING WHILE WALKING
PATH: 2 - STRAIGHT WITHOUT WALKING AID
BALANCE AND GAIT SCORE: 26
PATH SCORE: 2
TRUNK SCORE: 2
GAIT SCORE: 11
STEP SYMMETRY: 1 - RIGHT AND LEFT STEP LENGTH APPEAR EQUAL
STEP CONTINUITY: 0 - STOPPING OR DISCONTINUITY BETWEEN STEPS

## 2024-09-05 ASSESSMENT — ACTIVITIES OF DAILY LIVING (ADL)
AMBULATION ASSISTANCE: 1
OASIS_M1830: 03
AMBULATION ASSISTANCE ON FLAT SURFACES: 1
AMBULATION ASSISTANCE: STAND BY ASSIST
AMBULATION_DISTANCE/DURATION_TOLERATED: 100'

## 2024-09-05 ASSESSMENT — ENCOUNTER SYMPTOMS
PAIN: 1
MUSCLE WEAKNESS: 1
APPETITE LEVEL: GOOD
CHANGE IN APPETITE: UNCHANGED
PERSON REPORTING PAIN: PATIENT
DEPRESSION: 0
HIGHEST PAIN SEVERITY IN PAST 24 HOURS: 2/10
LOSS OF SENSATION IN FEET: 0
PAIN LOCATION - PAIN SEVERITY: 1/10
OCCASIONAL FEELINGS OF UNSTEADINESS: 0
PAIN LOCATION: RIGHT HAND

## 2024-09-05 ASSESSMENT — BALANCE ASSESSMENTS
IMMEDIATE STANDING BALANCE FIRST 5 SECONDS: 2 - STEADY WITHOUT WALKER OR OTHER SUPPORT
SITTING BALANCE: 1 - STEADY, SAFE
TURNING 360 DEGREES STEPS: 1 - CONTINUOUS STEPS
SITTING DOWN: 1 - USES ARMS OR NOT SMOOTH MOTION
NUDGED SCORE: 2
EYES CLOSED AT MAXIMUM POSITION NUDGED: 1 - STEADY
ARISES: 2 - ABLE WITHOUT USING ARMS
ATTEMPTS TO ARISE: 2 - ABLE TO RISE, ONE ATTEMPT
STANDING BALANCE: 2 - NARROW STANCE WITHOUT SUPPORT
ARISING SCORE: 2
BALANCE SCORE: 15
NUDGED: 2 - STEADY

## 2024-09-05 ASSESSMENT — LIFESTYLE VARIABLES: SMOKING_STATUS: 1

## 2024-09-05 NOTE — TELEPHONE ENCOUNTER
Patient's daughter, Payal (ON HIPAA) called and stated there was a change to the dose of Carvedilol 25 MG and she wants to discuss changes.

## 2024-09-05 NOTE — PROGRESS NOTES
Subjective  Carina Kebede, 84 y.o. female presents today with:  Chief Complaint   Patient presents with    Follow-Up from Hospital     Patient is here for a hospital follow up from [facility] from 08/20/2024-08/24/2024 for a stroke and just got out of rehab at . Patient has issues with her right hand and speech as a result.     Discuss Medications     Patient's daughter has a question on if she is to be taking both the Hyzaar and Hyrochlorothiazide.      Patient was hospitalized at  from 8/20/2024 to 8/24/2024 for left ischemic stroke.  Of note, she does have a history of cerebral aneurysm status post remote clipping and a left frontotemporal craniotomy.  She initially presented to the hospital with aphasia and right-sided weakness, neurology consulted.  She was discharged to rehab after she clinically improved..  Completed physical therapy there.  Was discharged to home 2 days ago.  She still has right hand weakness and difficulty with speech from the stroke.    She is compliant with her medications.  She presents to the office today with her daughter and they have questions about her medication list.  For her cardiac medications, she is on aspirin 81 mg daily, Plavix, Coreg 25 mg twice a day, and hydrochlorothiazide 25 mg daily.  She is still smoking. Report speech has improved, continuing PT and OT at home. R arm still feels weak.  She has not been taking losartan.    Review of Systems   Constitutional:  Negative for fatigue.   Cardiovascular:  Negative for chest pain and palpitations.   Neurological:  Negative for headaches.        R arm weakness       Past Medical History:   Diagnosis Date    Bilateral carotid artery stenosis 11/19/2020    CAD (coronary artery disease)     Carotid bruit     Dyslipidemia 11/19/2020    Essential hypertension 11/19/2020    Hx of CABG 11/19/2020    Hyperlipidemia     Kidney disease     PONV (postoperative nausea and vomiting)     Stage 4 chronic kidney disease

## 2024-09-05 NOTE — TELEPHONE ENCOUNTER
Future Appointments    Encounter Information   Provider Department Appt Notes   9/5/2024 Charlotte Powers MD [facility] pt dc from  Rehab on 9/3- needs to review med list   10/3/2024 Fabio Baugh MD [facility] 5 month follow up   11/13/2024 Bartolo Teixeira MD; SCHEDULE, [facility] NURSE [facility] 3 month follow up with ct chest [facility]   11/14/2024 Beverley Chacon MD [facility] Pulmonology 3m fu   6/20/2025 Phill Coombs DO [facility] Cardiology 1 YEAR     Past Visits    Date Provider Specialty Visit Type Primary Dx   09/05/2024 Charlotte Powers MD Family Medicine Office Visit    08/01/2024 Bhanu Doran MD Cardiothoracic Surgery Office Visit Lung nodule   07/25/2024 Beverley Chacon MD Pulmonology Office Visit Lung nodule   06/20/2024 Phill Coombs DO Cardiology Office Visit Essential hypertension   05/31/2024 Perla Zimmerman, APRN - CNP Family Medicine Telemedicine Medicare annual wellness visit, [facility]

## 2024-09-05 NOTE — TELEPHONE ENCOUNTER
Pt seen in office today.  Her Coreg was decreased from 25 mg twice a day to 12.5 mg twice a day. This was also discussed with the patient and a family member who was present with her at the office visit.    Charlotte Powers MD

## 2024-09-06 ENCOUNTER — HOSPITAL ENCOUNTER (OUTPATIENT)
Dept: LAB | Age: 84
Discharge: HOME | End: 2024-09-06
Payer: MEDICARE

## 2024-09-06 ENCOUNTER — HOME CARE VISIT (OUTPATIENT)
Dept: HOME HEALTH SERVICES | Facility: HOME HEALTH | Age: 84
End: 2024-09-06
Payer: MEDICARE

## 2024-09-06 DIAGNOSIS — I10 ESSENTIAL HYPERTENSION: ICD-10-CM

## 2024-09-06 LAB
ALBUMIN SERPL-MCNC: 4.2 G/DL (ref 3.5–4.6)
ANION GAP SERPL CALCULATED.3IONS-SCNC: 13 MEQ/L (ref 9–15)
BUN SERPL-MCNC: 56 MG/DL (ref 8–23)
CALCIUM SERPL-MCNC: 10.3 MG/DL (ref 8.5–9.9)
CHLORIDE SERPL-SCNC: 106 MEQ/L (ref 95–107)
CO2 SERPL-SCNC: 21 MEQ/L (ref 20–31)
CREAT SERPL-MCNC: 1.77 MG/DL (ref 0.5–0.9)
GLUCOSE SERPL-MCNC: 147 MG/DL (ref 70–99)
PHOSPHATE SERPL-MCNC: 4.7 MG/DL (ref 2.3–4.8)
POTASSIUM SERPL-SCNC: 5.1 MEQ/L (ref 3.4–4.9)
SODIUM SERPL-SCNC: 140 MEQ/L (ref 135–144)

## 2024-09-06 PROCEDURE — 36415 COLL VENOUS BLD VENIPUNCTURE: CPT

## 2024-09-06 RX ORDER — CARVEDILOL 12.5 MG/1
12.5 TABLET ORAL 2 TIMES DAILY
Qty: 180 TABLET | Refills: 0 | Status: SHIPPED | OUTPATIENT
Start: 2024-09-06

## 2024-09-06 NOTE — TELEPHONE ENCOUNTER
Please approve or deny request. Thank you!    Rx requested:  Requested Prescriptions     Pending Prescriptions Disp Refills    carvedilol (COREG) 12.5 MG tablet 180 tablet 0     Sig: Take 1 tablet by mouth 2 times daily         Last Office Visit:   9/5/2024      Next Visit Date:  Future Appointments   Date Time Provider Department Center   10/3/2024 11:00 AM Fabio Baguh MD [facility]   11/13/2024  8:30 AM SCHEDULE, [facility] NURSE [facility]   11/14/2024 11:00 AM Beverley Chacon MD [facility]   6/20/2025 11:00 AM Holiday, DO Vicky Cruz

## 2024-09-09 ENCOUNTER — HOME CARE VISIT (OUTPATIENT)
Dept: HOME HEALTH SERVICES | Facility: HOME HEALTH | Age: 84
End: 2024-09-09
Payer: MEDICARE

## 2024-09-09 PROCEDURE — G0158 HHC OT ASSISTANT EA 15: HCPCS | Mod: CO,HHH

## 2024-09-09 ASSESSMENT — ENCOUNTER SYMPTOMS
TINGLING: 1
PAIN LOCATION - RELIEVING FACTORS: MOVEMENT
PERSON REPORTING PAIN: PATIENT
PAIN LOCATION - PAIN QUALITY: SORE
PAIN SEVERITY GOAL: 0/10
ANGER WITHIN DEFINED LIMITS: 1
LOWEST PAIN SEVERITY IN PAST 24 HOURS: 1/10
HIGHEST PAIN SEVERITY IN PAST 24 HOURS: 3/10
PAIN: 1
PAIN LOCATION: RIGHT WRIST
PAIN LOCATION - PAIN FREQUENCY: CONSTANT
AGGRESSION WITHIN DEFINED LIMITS: 1
SUBJECTIVE PAIN PROGRESSION: GRADUALLY IMPROVING
PAIN LOCATION - PAIN SEVERITY: 2/10
PAIN LOCATION - EXACERBATING FACTORS: LACK OF MOVEMENT

## 2024-09-09 ASSESSMENT — PAIN SCALES - PAIN ASSESSMENT IN ADVANCED DEMENTIA (PAINAD)
CONSOLABILITY: 0 - NO NEED TO CONSOLE.
BODYLANGUAGE: 0 - RELAXED.
BODYLANGUAGE: 0
FACIALEXPRESSION: 0 - SMILING OR INEXPRESSIVE.
BREATHING: 0
FACIALEXPRESSION: 0
CONSOLABILITY: 0
NEGVOCALIZATION: 0 - NONE.
NEGVOCALIZATION: 0
TOTALSCORE: 0

## 2024-09-10 LAB
BACTERIA URNS QL MICRO: ABNORMAL /HPF
BILIRUB UR QL STRIP: NEGATIVE
CLARITY UR: CLEAR
COLOR UR: YELLOW
CREAT UR-MCNC: 102.1 MG/DL
EPI CELLS #/AREA URNS AUTO: ABNORMAL /HPF (ref 0–5)
GLUCOSE UR STRIP-MCNC: NEGATIVE MG/DL
HGB UR QL STRIP: NEGATIVE
HYALINE CASTS #/AREA URNS AUTO: ABNORMAL /HPF (ref 0–5)
KETONES UR STRIP-MCNC: NEGATIVE MG/DL
LEUKOCYTE ESTERASE UR QL STRIP: ABNORMAL
MICROALBUMIN UR-MCNC: <1.2 MG/DL
MICROALBUMIN/CREAT UR-RTO: NORMAL MG/G (ref 0–30)
NITRITE UR QL STRIP: NEGATIVE
PH UR STRIP: 5.5 [PH] (ref 5–9)
PROT UR STRIP-MCNC: NEGATIVE MG/DL
PROT UR-MCNC: 14 MG/DL
PROT/CREAT UR-RTO: 0.1 ML/ML
PROT/CREAT UR-RTO: 0.1 ML/ML (ref 0–0.2)
RBC #/AREA URNS AUTO: ABNORMAL /HPF (ref 0–5)
SP GR UR STRIP: 1.02 (ref 1–1.03)
UROBILINOGEN UR STRIP-ACNC: 0.2 E.U./DL
WBC #/AREA URNS AUTO: ABNORMAL /HPF (ref 0–5)

## 2024-09-11 ENCOUNTER — HOME CARE VISIT (OUTPATIENT)
Dept: HOME HEALTH SERVICES | Facility: HOME HEALTH | Age: 84
End: 2024-09-11
Payer: MEDICARE

## 2024-09-11 PROCEDURE — G0158 HHC OT ASSISTANT EA 15: HCPCS | Mod: CO,HHH

## 2024-09-11 ASSESSMENT — ENCOUNTER SYMPTOMS
ANGER WITHIN DEFINED LIMITS: 1
DENIES PAIN: 1
PERSON REPORTING PAIN: PATIENT
AGGRESSION WITHIN DEFINED LIMITS: 1

## 2024-09-11 ASSESSMENT — PAIN SCALES - PAIN ASSESSMENT IN ADVANCED DEMENTIA (PAINAD)
BODYLANGUAGE: 0
BREATHING: 0
NEGVOCALIZATION: 0
FACIALEXPRESSION: 0
CONSOLABILITY: 0 - NO NEED TO CONSOLE.
CONSOLABILITY: 0
TOTALSCORE: 0
BODYLANGUAGE: 0 - RELAXED.
NEGVOCALIZATION: 0 - NONE.
FACIALEXPRESSION: 0 - SMILING OR INEXPRESSIVE.

## 2024-09-12 LAB
BACTERIA UR CULT: ABNORMAL
BACTERIA UR CULT: ABNORMAL
ORGANISM: ABNORMAL

## 2024-09-13 ENCOUNTER — HOME CARE VISIT (OUTPATIENT)
Dept: HOME HEALTH SERVICES | Facility: HOME HEALTH | Age: 84
End: 2024-09-13
Payer: MEDICARE

## 2024-09-13 VITALS
HEART RATE: 60 BPM | RESPIRATION RATE: 18 BRPM | OXYGEN SATURATION: 98 % | TEMPERATURE: 97.5 F | SYSTOLIC BLOOD PRESSURE: 110 MMHG | DIASTOLIC BLOOD PRESSURE: 60 MMHG

## 2024-09-13 PROCEDURE — G0300 HHS/HOSPICE OF LPN EA 15 MIN: HCPCS | Mod: HHH

## 2024-09-13 NOTE — HOME HEALTH
SN visit completed. VSS. Denies pain.  LS CTA. HR regular. No concerns.  [Negative] : Gastrointestinal

## 2024-09-16 ENCOUNTER — HOME CARE VISIT (OUTPATIENT)
Dept: HOME HEALTH SERVICES | Facility: HOME HEALTH | Age: 84
End: 2024-09-16
Payer: MEDICARE

## 2024-09-16 ENCOUNTER — TELEPHONE (OUTPATIENT)
Dept: FAMILY MEDICINE CLINIC | Age: 84
End: 2024-09-16

## 2024-09-16 PROCEDURE — G0158 HHC OT ASSISTANT EA 15: HCPCS | Mod: CO,HHH

## 2024-09-16 ASSESSMENT — PAIN SCALES - PAIN ASSESSMENT IN ADVANCED DEMENTIA (PAINAD)
FACIALEXPRESSION: 0
FACIALEXPRESSION: 0
NEGVOCALIZATION: 0
BODYLANGUAGE: 0 - RELAXED.
CONSOLABILITY: 0
FACIALEXPRESSION: 0 - SMILING OR INEXPRESSIVE.
CONSOLABILITY: 0 - NO NEED TO CONSOLE.
BODYLANGUAGE: 0
BREATHING: 0
NEGVOCALIZATION: 0 - NONE.
CONSOLABILITY: 0
TOTALSCORE: 0
CONSOLABILITY: 0 - NO NEED TO CONSOLE.
BODYLANGUAGE: 0
NEGVOCALIZATION: 0 - NONE.
TOTALSCORE: 0
NEGVOCALIZATION: 0
FACIALEXPRESSION: 0 - SMILING OR INEXPRESSIVE.
BODYLANGUAGE: 0 - RELAXED.
BREATHING: 0

## 2024-09-16 ASSESSMENT — ENCOUNTER SYMPTOMS
PERSON REPORTING PAIN: PATIENT
DENIES PAIN: 1
PERSON REPORTING PAIN: PATIENT
ANGER WITHIN DEFINED LIMITS: 1
OCCASIONAL FEELINGS OF UNSTEADINESS: 0
DENIES PAIN: 1
FORGETFULNESS: 1
AGGRESSION WITHIN DEFINED LIMITS: 1

## 2024-09-16 NOTE — TELEPHONE ENCOUNTER
Patient's daughter called asking for a referral for outpatient therapy in [place]. Patient will be done with in home on 10/2.      Thank you.

## 2024-09-18 ENCOUNTER — HOME CARE VISIT (OUTPATIENT)
Dept: HOME HEALTH SERVICES | Facility: HOME HEALTH | Age: 84
End: 2024-09-18
Payer: MEDICARE

## 2024-09-18 PROCEDURE — G0158 HHC OT ASSISTANT EA 15: HCPCS | Mod: CO,HHH

## 2024-09-18 ASSESSMENT — ENCOUNTER SYMPTOMS
PERSON REPORTING PAIN: PATIENT
ANGER WITHIN DEFINED LIMITS: 1
AGGRESSION WITHIN DEFINED LIMITS: 1
DENIES PAIN: 1

## 2024-09-18 ASSESSMENT — PAIN SCALES - PAIN ASSESSMENT IN ADVANCED DEMENTIA (PAINAD)
TOTALSCORE: 0
FACIALEXPRESSION: 0
BODYLANGUAGE: 0
FACIALEXPRESSION: 0 - SMILING OR INEXPRESSIVE.
NEGVOCALIZATION: 0 - NONE.
CONSOLABILITY: 0
BODYLANGUAGE: 0 - RELAXED.
BREATHING: 0
NEGVOCALIZATION: 0
CONSOLABILITY: 0 - NO NEED TO CONSOLE.

## 2024-09-19 ENCOUNTER — HOME CARE VISIT (OUTPATIENT)
Dept: HOME HEALTH SERVICES | Facility: HOME HEALTH | Age: 84
End: 2024-09-19
Payer: MEDICARE

## 2024-09-23 ENCOUNTER — HOME CARE VISIT (OUTPATIENT)
Dept: HOME HEALTH SERVICES | Facility: HOME HEALTH | Age: 84
End: 2024-09-23
Payer: MEDICARE

## 2024-09-23 PROCEDURE — G0158 HHC OT ASSISTANT EA 15: HCPCS | Mod: CO,HHH

## 2024-09-23 SDOH — ECONOMIC STABILITY: HOUSING INSECURITY: TEMPORARY ARRANGEMENT: 1

## 2024-09-23 ASSESSMENT — PAIN SCALES - PAIN ASSESSMENT IN ADVANCED DEMENTIA (PAINAD)
TOTALSCORE: 0
NEGVOCALIZATION: 0
BREATHING: 0
NEGVOCALIZATION: 0 - NONE.
FACIALEXPRESSION: 0 - SMILING OR INEXPRESSIVE.
CONSOLABILITY: 0 - NO NEED TO CONSOLE.
BODYLANGUAGE: 0 - RELAXED.
CONSOLABILITY: 0
BODYLANGUAGE: 0
FACIALEXPRESSION: 0

## 2024-09-24 DIAGNOSIS — E78.5 DYSLIPIDEMIA: ICD-10-CM

## 2024-09-24 RX ORDER — ATORVASTATIN CALCIUM 40 MG/1
40 TABLET, FILM COATED ORAL DAILY
Qty: 90 TABLET | Refills: 3 | Status: SHIPPED | OUTPATIENT
Start: 2024-09-24

## 2024-09-25 ENCOUNTER — HOME CARE VISIT (OUTPATIENT)
Dept: HOME HEALTH SERVICES | Facility: HOME HEALTH | Age: 84
End: 2024-09-25
Payer: MEDICARE

## 2024-09-25 VITALS
SYSTOLIC BLOOD PRESSURE: 110 MMHG | DIASTOLIC BLOOD PRESSURE: 62 MMHG | RESPIRATION RATE: 16 BRPM | OXYGEN SATURATION: 97 % | HEART RATE: 52 BPM | TEMPERATURE: 98 F

## 2024-09-25 PROCEDURE — G0158 HHC OT ASSISTANT EA 15: HCPCS | Mod: CO,HHH

## 2024-09-25 PROCEDURE — G0299 HHS/HOSPICE OF RN EA 15 MIN: HCPCS | Mod: HHH

## 2024-09-25 ASSESSMENT — ENCOUNTER SYMPTOMS
MUSCLE WEAKNESS: 1
DENIES PAIN: 1
CHANGE IN APPETITE: UNCHANGED

## 2024-09-26 SDOH — ECONOMIC STABILITY: HOUSING INSECURITY: TEMPORARY ARRANGEMENT: 1

## 2024-09-26 ASSESSMENT — PAIN SCALES - PAIN ASSESSMENT IN ADVANCED DEMENTIA (PAINAD)
BODYLANGUAGE: 0 - RELAXED.
TOTALSCORE: 0
NEGVOCALIZATION: 0 - NONE.
BREATHING: 0
CONSOLABILITY: 0
CONSOLABILITY: 0 - NO NEED TO CONSOLE.
NEGVOCALIZATION: 0
BODYLANGUAGE: 0
FACIALEXPRESSION: 0
FACIALEXPRESSION: 0 - SMILING OR INEXPRESSIVE.

## 2024-09-26 ASSESSMENT — ENCOUNTER SYMPTOMS
ANGER WITHIN DEFINED LIMITS: 1
DENIES PAIN: 1
PERSON REPORTING PAIN: PATIENT
AGGRESSION WITHIN DEFINED LIMITS: 1
AGITATION: 1

## 2024-09-30 ENCOUNTER — APPOINTMENT (OUTPATIENT)
Dept: NEUROLOGY | Facility: CLINIC | Age: 84
End: 2024-09-30
Payer: MEDICARE

## 2024-10-01 NOTE — PROGRESS NOTES
Subjective   Isabell Swan is a 84 y.o.   female. [facility] 8-20-24, Dr. Mc, me   HPI  PMH of brain aneurysm with clips, left upper lobectomy, CABG x 5, COVID, RENE with CKD, HTN, HLD is here being seen s/p hospitalization for stroke-like symptoms of right sided weakness, facial droop and aphasia. She was not a candidate for TNK due to being outside of the window. CTH did not show any infarct, bleed, mass. CTA head/neck revealed postsurgical change of left craniotomy and aneurysm clips in the left MCA with right frontal lobe encephalomalacia compatible with old infarct. Repeat CTH di reveal an acute/subacute left frontal lobe infarct.  TTE unremarkable. She was started on DAPT x 90 days and statin and discharged to  acute rehab for PT/OT/ST x 10 days.  Today, she has been doing ok. She is back at home, living with her dtr.  She continues with DAPT until November and then just Plavix. No issues with bleeding or bruising. She still continues with Lipitor. Per dtr, cognitive issues since stroke, she has good days and bad days. She does still have some expressive aphasia and right sided weakness and hand contracture.    She has followed up with her PCP and Nephrologist.   Review of systems are negative unless otherwise specified in HPI.    Objective   Neurological Exam  Mental Status  Awake, alert and oriented to person, place and time. Recent and remote memory are intact. Speech is normal. Expressive aphasia present. Attention and concentration are normal.    Cranial Nerves  CN II: Right visual acuity: Counts fingers. Left visual acuity: Counts fingers. Right normal visual field. Left normal visual field.  CN III, IV, VI: Extraocular movements intact bilaterally. No nystagmus.   Right pupil: 3 mm. Round. Reactive to light. Reactive to accommodation.   Left pupil: 3 mm. Round. Reactive to light. Reactive to accommodation.  CN V:  Right: Facial sensation is normal.  Left: Facial sensation is normal on the left.  CN  VII:  Right: There is central facial weakness.  Left: There is no facial weakness.  CN VIII:  Right: Hearing is normal.  Left: Hearing is normal.  CN IX, X:  Right: Palate is normal.  Left: Palate is normal.  CN XI:  Right: Trapezius strength is normal.  Left: Trapezius strength is normal.  CN XII: Tongue midline without atrophy or fasciculations.    Motor  Normal muscle bulk throughout. Normal muscle tone. Strength is 5/5 in all four extremities except as noted.  Right sided weakness upper>lower.    Sensory  Light touch abnormality: Decreased sensation right finger tips.     Reflexes                                            Right                      Left  Brachioradialis                    3+                          Biceps                                 3+                          Triceps                                3+                            Coordination  Right: Finger-to-nose abnormality:Left: Finger-to-nose normal.    Gait  Casual gait: Wide stance. Reduced stride length.    Physical Exam  HENT:      Right Ear: Hearing normal.      Left Ear: Hearing normal.   Eyes:      Extraocular Movements: EOM normal. No nystagmus.   Neurological:      Deep Tendon Reflexes:      Reflex Scores:       Tricep reflexes are 3+ on the right side.       Bicep reflexes are 3+ on the right side.       Brachioradialis reflexes are 3+ on the right side.  Psychiatric:         Speech: Speech normal.           Assessment/Plan   #CVA with vascular risk factors of HTN, HLD, CAD    Continue with current regimen. Discussed doing exercises at home to help prevent contractures. Dtr will have her follow up with Dr. Paz s/p hospital discharge for further cardiac work up or any prolonged heart monitoring.     Discussed bleeding precautions with patient while on anti platelet and/or anticoagulation therapy. Discussed stroke prevention such as: HgbA1c <7, tight blood pressure control <130/<80, LDL <70 with statin therapy (if  indicated), CPAP/BiPAP compliance (if indicated) and lifestyle modification (Mediterranean diet, daily exercise, nicotine cessation & limiting alcohol use).   Discussed s/sx of stroke such as: face drooping, arm/leg weakness, speech difficulty; sudden numbness of face/extremity, sudden confusion, sudden trouble seeing, sudden trouble walking, sudden severe headache, dizziness, loss of balance or coordination and to call 911 immediately.?

## 2024-10-02 ENCOUNTER — APPOINTMENT (OUTPATIENT)
Dept: NEUROLOGY | Facility: CLINIC | Age: 84
End: 2024-10-02
Payer: MEDICARE

## 2024-10-02 VITALS
BODY MASS INDEX: 25.15 KG/M2 | DIASTOLIC BLOOD PRESSURE: 54 MMHG | SYSTOLIC BLOOD PRESSURE: 122 MMHG | HEART RATE: 64 BPM | WEIGHT: 133.2 LBS | HEIGHT: 61 IN

## 2024-10-02 DIAGNOSIS — I63.9 CEREBROVASCULAR ACCIDENT (CVA), UNSPECIFIED MECHANISM (MULTI): Primary | ICD-10-CM

## 2024-10-02 PROCEDURE — 3074F SYST BP LT 130 MM HG: CPT

## 2024-10-02 PROCEDURE — 1160F RVW MEDS BY RX/DR IN RCRD: CPT

## 2024-10-02 PROCEDURE — 3078F DIAST BP <80 MM HG: CPT

## 2024-10-02 PROCEDURE — 99215 OFFICE O/P EST HI 40 MIN: CPT

## 2024-10-02 PROCEDURE — 1159F MED LIST DOCD IN RCRD: CPT

## 2024-10-02 ASSESSMENT — PATIENT HEALTH QUESTIONNAIRE - PHQ9
SUM OF ALL RESPONSES TO PHQ9 QUESTIONS 1 & 2: 0
2. FEELING DOWN, DEPRESSED OR HOPELESS: NOT AT ALL
1. LITTLE INTEREST OR PLEASURE IN DOING THINGS: NOT AT ALL

## 2024-10-02 ASSESSMENT — ENCOUNTER SYMPTOMS
EYE ITCHING: 0
CONSTIPATION: 0
BLOOD IN STOOL: 0
EYE PAIN: 0
RECTAL PAIN: 0
BACK PAIN: 0
RHINORRHEA: 0
ABDOMINAL DISTENTION: 0
VOMITING: 0
NAUSEA: 0
DIARRHEA: 0
ABDOMINAL PAIN: 0
SHORTNESS OF BREATH: 0
EYE REDNESS: 0
SINUS PAIN: 0
PHOTOPHOBIA: 0
APNEA: 0
VOICE CHANGE: 0
TROUBLE SWALLOWING: 0
WHEEZING: 0
COUGH: 0
SORE THROAT: 0
FACIAL SWELLING: 0
CHEST TIGHTNESS: 0
SINUS PRESSURE: 0
COLOR CHANGE: 0
EYE DISCHARGE: 0

## 2024-10-02 ASSESSMENT — VISUAL ACUITY: METHOD_CF: 1

## 2024-10-03 ENCOUNTER — OFFICE VISIT (OUTPATIENT)
Dept: FAMILY MEDICINE CLINIC | Age: 84
End: 2024-10-03

## 2024-10-03 ENCOUNTER — HOME CARE VISIT (OUTPATIENT)
Dept: HOME HEALTH SERVICES | Facility: HOME HEALTH | Age: 84
End: 2024-10-03
Payer: MEDICARE

## 2024-10-03 VITALS
RESPIRATION RATE: 14 BRPM | SYSTOLIC BLOOD PRESSURE: 116 MMHG | BODY MASS INDEX: 25.11 KG/M2 | HEART RATE: 56 BPM | OXYGEN SATURATION: 95 % | DIASTOLIC BLOOD PRESSURE: 60 MMHG | HEIGHT: 61 IN | WEIGHT: 133 LBS

## 2024-10-03 DIAGNOSIS — I10 ESSENTIAL HYPERTENSION: Primary | ICD-10-CM

## 2024-10-03 DIAGNOSIS — E78.5 DYSLIPIDEMIA: ICD-10-CM

## 2024-10-03 DIAGNOSIS — N18.31 STAGE 3A CHRONIC KIDNEY DISEASE (HCC): ICD-10-CM

## 2024-10-03 DIAGNOSIS — I63.9 CEREBROVASCULAR ACCIDENT (CVA), UNSPECIFIED MECHANISM (HCC): ICD-10-CM

## 2024-10-03 RX ORDER — CLOPIDOGREL BISULFATE 75 MG/1
75 TABLET ORAL DAILY
Qty: 90 TABLET | Refills: 3 | Status: SHIPPED | OUTPATIENT
Start: 2024-10-03 | End: 2025-09-28

## 2024-10-03 RX ORDER — AMLODIPINE BESYLATE 5 MG/1
5 TABLET ORAL DAILY
Qty: 30 TABLET | Refills: 1 | Status: SHIPPED | OUTPATIENT
Start: 2024-10-03 | End: 2024-10-03

## 2024-10-03 RX ORDER — AMLODIPINE BESYLATE 5 MG/1
5 TABLET ORAL DAILY
Qty: 90 TABLET | Refills: 3 | Status: SHIPPED | OUTPATIENT
Start: 2024-10-03 | End: 2025-09-28

## 2024-10-03 RX ORDER — HYDROCHLOROTHIAZIDE 25 MG/1
25 TABLET ORAL DAILY
Qty: 90 TABLET | Refills: 3 | Status: SHIPPED | OUTPATIENT
Start: 2024-10-03 | End: 2025-09-28

## 2024-10-03 RX ORDER — CARVEDILOL 12.5 MG/1
12.5 TABLET ORAL 2 TIMES DAILY
Qty: 180 TABLET | Refills: 3 | Status: SHIPPED | OUTPATIENT
Start: 2024-10-03 | End: 2025-09-28

## 2024-10-03 RX ORDER — CARVEDILOL 12.5 MG/1
12.5 TABLET ORAL 2 TIMES DAILY
Qty: 180 TABLET | Refills: 0 | Status: SHIPPED | OUTPATIENT
Start: 2024-10-03 | End: 2024-10-03

## 2024-10-03 NOTE — TELEPHONE ENCOUNTER
Patient was seen this morning, called in she is requesting the referral for out patient occ therapy please advise once complete

## 2024-10-03 NOTE — PROGRESS NOTES
CSubjective:      Patient ID: Carina Kebede is a 84 y.o. female Established patient, here for evaluation of the following chief complaint(s):  Chief Complaint   Patient presents with    Hypertension    Cerebrovascular Accident     Pt states she had a stroke on 8/20/24       HPI     81-year-old female with history of hypertension, chronic kidney disease stage III, dyslipidemia, hyperlipidemia, nicotine dependence presents for follow-up visit of an acutely elevated creatinine and CT scan of the lung which revealed a lung nodule      With Anika Moe.           CKD:She has a history of chronic kidney  disease stage III . Most recent renal function assessment is noted below:  BUN/creat:28/1.77.          Lung nodule: CT scan of the lung revealed nodules as noted below:        A nodule that is seen in the lingula has increased in size from 5.6 mm to 8.6   mm.  Other nodules that  measure less than 5 mm are stable.       RECOMMENDATIONS:   Lung rads 4 x       Findings:  Category 3 or 4 nodules with additional features or imaging   findings that increases the suspicion of malignancy.       Management: Chest CT with or without contrast, PET/CT and/or tissue sampling   depending on the probability of malignancy and comorbidities. PET/CT may be   used when there is a > 8 mm  solid component. For new large nodules that   develop on an annual repeat screening CT, a 1 month LDCT may be recommended   to address potentially infectious or inflammatory conditions.         The patient previously reported bilateral shoulder pain that is characterizes achy, nonradiating, associated with reduced ability to abduct her arms.  Her discomfort is mildly relieved by over-the-counter analgesics.        Essential hypertension-compliant with Cozaar 100 mg orally daily, Cardura 4 mg orally daily and Lopressor 25 mg twice daily.  The patient's current blood pressure is 116/60          Dyslipidemia-compliant with Lipitor 40 mg orally

## 2024-10-04 ENCOUNTER — HOME CARE VISIT (OUTPATIENT)
Dept: HOME HEALTH SERVICES | Facility: HOME HEALTH | Age: 84
End: 2024-10-04
Payer: MEDICARE

## 2024-10-07 ENCOUNTER — HOME CARE VISIT (OUTPATIENT)
Dept: HOME HEALTH SERVICES | Facility: HOME HEALTH | Age: 84
End: 2024-10-07
Payer: MEDICARE

## 2024-10-07 ENCOUNTER — APPOINTMENT (OUTPATIENT)
Dept: HOME HEALTH SERVICES | Facility: HOME HEALTH | Age: 84
End: 2024-10-07
Payer: MEDICARE

## 2024-10-08 ENCOUNTER — HOME CARE VISIT (OUTPATIENT)
Dept: HOME HEALTH SERVICES | Facility: HOME HEALTH | Age: 84
End: 2024-10-08
Payer: MEDICARE

## 2024-10-08 PROCEDURE — G0152 HHCP-SERV OF OT,EA 15 MIN: HCPCS | Mod: HHH

## 2024-10-09 SDOH — ECONOMIC STABILITY: HOUSING INSECURITY: HOME SAFETY: STAYING WITH HER DAUGHTER FOR THE PAST 3 WEEKS. SHE WANTS TO RETURN HOME BUT DAUGHTER DOES NOT WANT HER TO.

## 2024-10-09 ASSESSMENT — ACTIVITIES OF DAILY LIVING (ADL)
HOUSEKEEPING ASSESSED: 1
LIGHT HOUSEKEEPING: INDEPENDENT
LAUNDRY ASSESSED: 1
PREPARING MEALS: INDEPENDENT
HOME_HEALTH_OASIS: 01
OASIS_M1830: 01

## 2024-10-09 ASSESSMENT — ENCOUNTER SYMPTOMS
PERSON REPORTING PAIN: PATIENT
DENIES PAIN: 1

## 2024-10-11 DIAGNOSIS — R53.1 GENERALIZED WEAKNESS: Primary | ICD-10-CM

## 2024-10-11 NOTE — TELEPHONE ENCOUNTER
Daughter Payal called back inquiring about the referral being placed for outpatient therapy.    Relayed the previous message to her, which states patient was informed the referral was placed on 10/4, but daughter states she was informed it has not been.

## 2024-10-24 ENCOUNTER — TELEPHONE (OUTPATIENT)
Dept: FAMILY MEDICINE CLINIC | Age: 84
End: 2024-10-24

## 2024-10-24 ENCOUNTER — HOSPITAL ENCOUNTER (OUTPATIENT)
Dept: PHYSICAL THERAPY | Age: 84
Setting detail: THERAPIES SERIES
Discharge: HOME | End: 2024-10-24
Attending: INTERNAL MEDICINE

## 2024-10-24 DIAGNOSIS — I63.9 CEREBROVASCULAR ACCIDENT (CVA), UNSPECIFIED MECHANISM (HCC): Primary | ICD-10-CM

## 2024-10-24 NOTE — TELEPHONE ENCOUNTER
Patient stating referral for PT should be for OT so a new one is needed.  Requesting our location for this - [place] pt    Patient phone [phone]

## 2024-10-31 ENCOUNTER — HOSPITAL ENCOUNTER (OUTPATIENT)
Dept: OCCUPATIONAL THERAPY | Age: 84
Setting detail: THERAPIES SERIES
Discharge: HOME | End: 2024-10-31
Attending: INTERNAL MEDICINE
Payer: MEDICARE

## 2024-10-31 PROCEDURE — 97166 OT EVAL MOD COMPLEX 45 MIN: CPT

## 2024-10-31 NOTE — TELEPHONE ENCOUNTER
PT calling in, patient has an appointment today at 9am - needs referral for OT    LOV PCP Dr. Baugh 10/3/24  9/5/24 Dr. Powers

## 2024-10-31 NOTE — THERAPY EVALUATION
[facility]   [address], [place] [address]  Phone: [phone]  Fax:[phone]        OCCUPATIONAL THERAPY EVALUATION     Evaluation Date:  10/31/2024       OT Individual Minutes  Time In: 0911  Time Out: 0949  Minutes: 38       OT Eval mod complexity 38 minutes for 1 unit, CPT 57639     Patient Name:Carina Kebede Gender: female YOB: 1940         MRN: [id number]   Referring Practitioner: Dr. Fabio Baugh  Diagnosis: generalized weakness d/t CVA  Treating diagnosis:                  Referral Date:  10/31/2024          Onset Date: Onset Date: 08/20/24    PMH:  Patient Active Problem List   Diagnosis    Carotid bruit    Renal impairment    Coronary atherosclerosis    Bilateral carotid artery stenosis    Hx of CABGx5    Tobacco abuse    Essential hypertension    Dyslipidemia    Chronic renal disease, stage III (HCC) [726120]    Cervical radiculopathy    Hypervolemia    Onychomycosis    Hypertensive urgency    Dizziness    Lung nodule    SOB (shortness of breath)    Chronic obstructive pulmonary disease, unspecified     Past Medical History:   Diagnosis Date    Bilateral carotid artery stenosis 11/19/2020    CAD (coronary artery disease)     Carotid bruit     Dyslipidemia 11/19/2020    Essential hypertension 11/19/2020    Hx of CABG 11/19/2020    Hyperlipidemia     Kidney disease     PONV (postoperative nausea and vomiting)     Stage 4 chronic kidney disease (HCC) 11/19/2020    Stenosis of noncoronary bypass graft (HCC)     Tobacco abuse 11/19/2020     Past Surgical History:   Procedure Laterality Date    BACK SURGERY  2003    BRAIN ANEURYSM SURGERY  2012    BREAST LUMPECTOMY Right     CARPAL TUNNEL RELEASE Bilateral     CORONARY ARTERY BYPASS GRAFT      PARTIAL HYSTERECTOMY (CERVIX NOT REMOVED)  1997    THORACOSCOPY Left 1/31/2023    left thoracoscopic wedge, completion left upper lobectomy performed by Bhanu Doran MD at [facility] OR    TOTAL HIP ARTHROPLASTY

## 2024-11-07 ENCOUNTER — HOSPITAL ENCOUNTER (OUTPATIENT)
Dept: OCCUPATIONAL THERAPY | Age: 84
Setting detail: THERAPIES SERIES
Discharge: HOME | End: 2024-11-07
Attending: INTERNAL MEDICINE
Payer: MEDICARE

## 2024-11-07 PROCEDURE — 97530 THERAPEUTIC ACTIVITIES: CPT

## 2024-11-07 NOTE — PROGRESS NOTES
[facility] OCCUPATIONAL THERAPY     Occupational Therapy: Daily Note   Patient: Carina Kebede (84 y.o. female)   Date: 2024  Plan of Care Certification Period:     Progress Note Due Date: 24   :  1940  MRN: [id number]  CSN: [id number]   Insurance: Payor: MEDICARE / Plan: MEDICARE PART A AND B / Product Type: *No Product type* /   Insurance ID: [id number] - (Medicare) Secondary Insurance (if applicable): YONATAN   Referring Physician: Fabio Baugh MD     PCP: Fabio Baugh MD Visits to Date: Total # of Visits to Date: 2   Progress note:Progress Note Counter: -  Visits Approved:  (BMN)    No Show:    Cancelled Appts:      Medical Diagnosis: Generalized weakness [R53.1] generalized weakness d/t CVA        Therapy Time     Time in 1105   Time out 1200   Total treatment minutes 55   Total time code minutes  55 Minutes        OT Therapeutic activities 55 minutes for 4 unit(s), CPT 07683       SUBJECTIVE EXAMINATION     Patient's date of birth confirmed: Yes     Pain Level:   Pt denies pain  Location: R hand and digits   Description: numbness/tingling     Patient Comments:   \"When I am working, I will get distracted by others and forget to look at what I am doing.\"     Learning/Language barrier: no,      HEP/Strategies/Orthosis Compliance: Patient verbally confirmed compliant with HEP's         Restrictions: none per pt report            OBJECTIVE EXAMINATION     TREATMENT     Focus of treatment was on the following:   Improving R hand function, education/training, visual perception, addressing tactile sensation     Exercises/Activities:  RUE exercises/activities focusing on control, coordination and sensation:  -all digit flexion/extension to scrunch towel in hand x 10 reps  -grasp and release rolled up towel  x 10 reps  -cross midline to grasp bean bag, place in front of linnea to push across table,  2 sets x 5 reps  -container of rice  -grasping and releasing rice with full digit

## 2024-11-08 ENCOUNTER — HOSPITAL ENCOUNTER (OUTPATIENT)
Dept: CT IMAGING | Age: 84
Discharge: HOME | End: 2024-11-10
Payer: MEDICARE

## 2024-11-08 DIAGNOSIS — R91.1 LUNG NODULE: ICD-10-CM

## 2024-11-08 PROCEDURE — 71250 CT THORAX DX C-: CPT

## 2024-11-14 ENCOUNTER — HOSPITAL ENCOUNTER (OUTPATIENT)
Dept: OCCUPATIONAL THERAPY | Age: 84
Setting detail: THERAPIES SERIES
Discharge: HOME | End: 2024-11-14
Attending: INTERNAL MEDICINE
Payer: MEDICARE

## 2024-11-14 PROCEDURE — 97530 THERAPEUTIC ACTIVITIES: CPT

## 2024-11-14 NOTE — PROGRESS NOTES
will increase RUE coordination as observed by being able to write legibly, fasten all clothing and carry items in R hand without dropping items..         TREATMENT PLAN   Continue POC            Electronically signed by OMID Agrawal  on 11/14/2024 at 12:19 PM

## 2024-11-20 ENCOUNTER — HOSPITAL ENCOUNTER (OUTPATIENT)
Dept: OCCUPATIONAL THERAPY | Age: 84
Setting detail: THERAPIES SERIES
Discharge: HOME | End: 2024-11-20
Attending: INTERNAL MEDICINE
Payer: MEDICARE

## 2024-11-20 PROCEDURE — 97530 THERAPEUTIC ACTIVITIES: CPT

## 2024-11-20 NOTE — PROGRESS NOTES
[facility]     Occupational Therapy: Daily Note   Patient: Carina Kebede (84 y.o. female)   Date: 2024  Plan of Care Certification Period:     Progress Note Due Date: 24   :  1940  MRN: [id number]  CSN: [id number]   Insurance: Payor: MEDICARE / Plan: MEDICARE PART A AND B / Product Type: *No Product type* /   Insurance ID: [id number] - (Medicare) Secondary Insurance (if applicable): YONATAN   Referring Physician: Fabio Baugh MD     PCP: Fabio Baugh MD Visits to Date: Total # of Visits to Date: 4   Progress note:Progress Note Counter: -6  Visits Approved:  (BMN)    No Show:    Cancelled Appts:      Medical Diagnosis: Generalized weakness [R53.1] generalized weakness d/t CVA        Therapy Time     Time in 1002   Time out 1100   Total treatment minutes 58   Total time code minutes  58 Minutes        OT Therapeutic activities 58 minutes for 4 unit(s), CPT 91875       SUBJECTIVE EXAMINATION     Patient's date of birth confirmed: Yes     Pain Level:   Pt denies pain  Location: R hand  Description: \"My hand is tingling most of the time.\"    Patient Comments:   \"I don't do much, but I did do some exercises and I ran the vacuum.  I dropped the handle about 30 times though.\"     Learning/Language barrier: no     HEP/Strategies/Orthosis Compliance: Patient verbally confirmed compliant with HEP's Patient demo understanding.  Pt. reports she is inconsistent with completing HEP.  Encouraged pt. to perform daily to assist with progress of dominant hand.        Restrictions: none         OBJECTIVE EXAMINATION     TREATMENT     Focus of treatment was on the following:   improving sensory skills and coordination of R hand, upgrading HEP and increasing R side awareness.      Exercises/Activities:  2 handed tasks:  rolling hair in velcor hair rollers x 3 attempts.                              dowel tiffanie ex in sitting, visually cueing RUE through all motions x 5 reps each:

## 2024-12-04 ENCOUNTER — HOSPITAL ENCOUNTER (OUTPATIENT)
Dept: RADIATION ONCOLOGY | Age: 84
Discharge: HOME | End: 2024-12-04
Payer: MEDICARE

## 2024-12-04 VITALS
RESPIRATION RATE: 18 BRPM | OXYGEN SATURATION: 99 % | SYSTOLIC BLOOD PRESSURE: 146 MMHG | HEART RATE: 54 BPM | WEIGHT: 133.8 LBS | DIASTOLIC BLOOD PRESSURE: 59 MMHG | TEMPERATURE: 96.8 F | BODY MASS INDEX: 25.28 KG/M2

## 2024-12-04 DIAGNOSIS — R91.1 LUNG NODULE: Primary | ICD-10-CM

## 2024-12-04 DIAGNOSIS — Z72.0 TOBACCO ABUSE: ICD-10-CM

## 2024-12-04 PROCEDURE — 99214 OFFICE O/P EST MOD 30 MIN: CPT | Performed by: RADIOLOGY

## 2024-12-04 PROCEDURE — 99212 OFFICE O/P EST SF 10 MIN: CPT | Performed by: RADIOLOGY

## 2024-12-04 RX ORDER — LOSARTAN POTASSIUM AND HYDROCHLOROTHIAZIDE 12.5; 1 MG/1; MG/1
1 TABLET ORAL DAILY
COMMUNITY

## 2024-12-04 NOTE — PROGRESS NOTES
NURSING ASSESSMENT     Date: 12/4/2024        Patient Name: Carina Kebede     YOB: 1940      Age:  84 y.o.      MRN: [id number]       Chaperone [x] Yes   [] No  DaughterPayal    Advance Directives:   Do you currently have completed advance directives (living will)? [x] Yes   [] No         *If yes, please bring us a copy for your records.  *If no, would you like info or assistance in completing advance directives (living will)?   [] Yes   [x] No    Pain Score:   Pain Score (1-10): Mild discomfort    Pain Location: right hand   Pain Duration: since CVA on 8/20/24   Pain Management/Control: None      Is pain affecting your ability to take care of yourself or move throughout your home?                        [] Yes   [x] No    General: Appetite is returning since having a CVA in 8/2024  Patient has gained weight [] Yes   [x] No  Patient has lost weight [x] Yes   [] No  How much weight in pounds and over what length of time: Down 7.2 lbs since last visit on 8/9/24    Eyes (Ophthalmic): Wears glasses as needed     Skin (Dermatological): Ongoing eczema     ENT: Mild sinus drainage in the mornings     Respiratory: Dry cough has improved since last visit, ongoing ESCOBAR since left upper lobectomy in 1/2023     Cardiovascular: h/o heart surgery in 2010. Sees Dr. Coombs routinely. Denies any current heart issues.      Device   [] Yes   [x] No   Copy of Card Obtained [] Yes   [x] No    Gastrointestinal: H/O constipation, takes stool stool softeners as needed.    Genito-Urinary: No Problems       Breast: No Problems     Musculoskeletal: Joint Pain left hip on occasion    Neurological: Numbness , weakness and some discomfort to right hand since CVA in August. Follows with Dr. Samuel. Takes plavix      Hematological and Lymphatic:  Denies     Endocrine: Thyroid nodule, did not see Dr. Rnad as scheduled due health issues.    Gyn History:   Small amount of blond vaginal discharge daily since menopause.    A

## 2024-12-05 ENCOUNTER — HOSPITAL ENCOUNTER (OUTPATIENT)
Dept: OCCUPATIONAL THERAPY | Age: 84
Setting detail: THERAPIES SERIES
Discharge: HOME | End: 2024-12-05
Attending: INTERNAL MEDICINE
Payer: MEDICARE

## 2024-12-05 PROCEDURE — 97530 THERAPEUTIC ACTIVITIES: CPT

## 2024-12-05 NOTE — PROGRESS NOTES
exercises.    Patient Education/HEP:   index finger trigger exercises and R hand strengthening with sponge. Pt demonstrates good understanding. Daughter stated she will assist with pt follow through.         ASSESSMENT       Assessment: Pt making good progress towards goals. Pt did demonstrate decreased  strength, thus provided HEP to address this area. Pt is not dropping items as frequently when using visual cues to attend to RUE. Pt is living by herself and able to complete functional tasks with little to no difficulty. Pt reports good follow through of HEP and recommendations.     Post Treatment Pain: Pt denies pain    Patient's Activity Tolerance: good                 GOALS         Long Term Goals  Time Frame for Long Term Goals : 4-6 visits  Long Term Goal 1: Pt. will be able to safely cut food and perform simple meal prep with positioning and adaptive devices.  Long Term Goal 2: Patient  will be indep with all recommended HEP's, adaptive strategies, and adaptive techniques.  Long Term Goal 3: Patient will report decreased frequency of numbness/tingling in R hand and fingers and visually cue RUE during all functional tasks  Long Term Goal 4: Patient  will increase RUE strength from current by 1/2 muscle grade  to increase performance with I/ADL's.  Long Term Goal 5: Patient  will increase R  strength from current by 5-8 lbs to increase performance with I/ADL's.  Long Term Goal 6: Patient  will increase RUE coordination as observed by being able to write legibly, fasten all clothing and carry items in R hand without dropping items..         TREATMENT PLAN   1 visit left on current POC to upgrade HEP PRN and assess follow through.            Electronically signed by OMID Agrawal  on 12/5/2024 at 1:56 PM

## 2024-12-09 ENCOUNTER — HOSPITAL ENCOUNTER (OUTPATIENT)
Dept: LAB | Age: 84
Discharge: HOME | End: 2024-12-09
Payer: MEDICARE

## 2024-12-09 LAB
ALBUMIN SERPL-MCNC: 4.2 G/DL (ref 3.5–4.6)
ANION GAP SERPL CALCULATED.3IONS-SCNC: 12 MEQ/L (ref 9–15)
BILIRUB UR QL STRIP: NEGATIVE
BUN SERPL-MCNC: 32 MG/DL (ref 8–23)
CALCIUM SERPL-MCNC: 9.6 MG/DL (ref 8.5–9.9)
CHLORIDE SERPL-SCNC: 106 MEQ/L (ref 95–107)
CLARITY UR: CLEAR
CO2 SERPL-SCNC: 25 MEQ/L (ref 20–31)
COLOR UR: YELLOW
CREAT SERPL-MCNC: 1.22 MG/DL (ref 0.5–0.9)
CREAT UR-MCNC: 84.8 MG/DL
ERYTHROCYTE [DISTWIDTH] IN BLOOD BY AUTOMATED COUNT: 12.1 % (ref 11.5–14.5)
GLUCOSE SERPL-MCNC: 88 MG/DL (ref 70–99)
GLUCOSE UR STRIP-MCNC: NEGATIVE MG/DL
HCT VFR BLD AUTO: 33.8 % (ref 37–47)
HGB BLD-MCNC: 11.4 G/DL (ref 12–16)
HGB UR QL STRIP: NEGATIVE
KETONES UR STRIP-MCNC: NEGATIVE MG/DL
LEUKOCYTE ESTERASE UR QL STRIP: NEGATIVE
MCH RBC QN AUTO: 31.8 PG (ref 27–31.3)
MCHC RBC AUTO-ENTMCNC: 33.7 % (ref 33–37)
MCV RBC AUTO: 94.4 FL (ref 79.4–94.8)
NITRITE UR QL STRIP: NEGATIVE
PH UR STRIP: 7 [PH] (ref 5–9)
PHOSPHATE SERPL-MCNC: 3.9 MG/DL (ref 2.3–4.8)
PLATELET # BLD AUTO: 267 K/UL (ref 130–400)
POTASSIUM SERPL-SCNC: 4.4 MEQ/L (ref 3.4–4.9)
PROT UR STRIP-MCNC: NEGATIVE MG/DL
PROT UR-MCNC: 13 MG/DL
PROT/CREAT UR-RTO: 0.2 ML/ML
PROT/CREAT UR-RTO: 0.2 ML/ML (ref 0–0.2)
PTH-INTACT SERPL-MCNC: 45.9 PG/ML (ref 15–65)
RBC # BLD AUTO: 3.58 M/UL (ref 4.2–5.4)
SODIUM SERPL-SCNC: 143 MEQ/L (ref 135–144)
SP GR UR STRIP: 1.02 (ref 1–1.03)
UROBILINOGEN UR STRIP-ACNC: 0.2 E.U./DL
WBC # BLD AUTO: 7.2 K/UL (ref 4.8–10.8)

## 2024-12-09 PROCEDURE — 83970 ASSAY OF PARATHORMONE: CPT

## 2024-12-09 PROCEDURE — 81003 URINALYSIS AUTO W/O SCOPE: CPT

## 2024-12-09 PROCEDURE — 36415 COLL VENOUS BLD VENIPUNCTURE: CPT

## 2024-12-09 PROCEDURE — 85027 COMPLETE CBC AUTOMATED: CPT

## 2024-12-09 PROCEDURE — 84156 ASSAY OF PROTEIN URINE: CPT

## 2024-12-09 PROCEDURE — 80069 RENAL FUNCTION PANEL: CPT

## 2024-12-12 ENCOUNTER — HOSPITAL ENCOUNTER (OUTPATIENT)
Dept: OCCUPATIONAL THERAPY | Age: 84
Setting detail: THERAPIES SERIES
Discharge: HOME | End: 2024-12-12
Attending: INTERNAL MEDICINE
Payer: MEDICARE

## 2024-12-12 NOTE — PROGRESS NOTES
Therapy                            Cancellation/No-show Note    Date: 2024  Patient Name: Carina Kebede    : 1940  (84 y.o.)     MRN: [id number]    Account #: [id number]       Canceled Appointment: 1    Comments:      For today's appointment patient:  [x]  Cancelled  []  Rescheduled appointment  []  No-show   []  Called pt to remind of next appointment     Reason given by patient:  []  Patient ill  []  Conflicting appointment  []  No transportation    []  Conflict with work  []  No reason given  [x]  Other:  weather too cold.  Pt. stated she will call back to schedule appt.    [] Pt has future appointments scheduled, no follow up needed  [] Pt requests to be on hold.    Reason:   If > 2 weeks please discuss with therapist.  [] Therapist to call pt for follow up     Signature: BRYCE Kline/L 2024 5:52 PM

## 2024-12-19 ENCOUNTER — HOSPITAL ENCOUNTER (OUTPATIENT)
Dept: PULMONOLOGY | Age: 84
Discharge: HOME | End: 2024-12-19
Payer: MEDICARE

## 2024-12-19 DIAGNOSIS — J44.9 CHRONIC OBSTRUCTIVE PULMONARY DISEASE, UNSPECIFIED COPD TYPE (HCC): ICD-10-CM

## 2024-12-19 PROCEDURE — 6360000002 HC RX W HCPCS

## 2024-12-19 PROCEDURE — 94726 PLETHYSMOGRAPHY LUNG VOLUMES: CPT

## 2024-12-19 PROCEDURE — 94060 EVALUATION OF WHEEZING: CPT

## 2024-12-19 PROCEDURE — 94729 DIFFUSING CAPACITY: CPT

## 2024-12-19 RX ORDER — ALBUTEROL SULFATE 0.83 MG/ML
SOLUTION RESPIRATORY (INHALATION)
Status: COMPLETED
Start: 2024-12-19 | End: 2024-12-19

## 2024-12-19 RX ADMIN — ALBUTEROL SULFATE 2.5 MG: 2.5 SOLUTION RESPIRATORY (INHALATION) at 10:53

## 2024-12-26 RX ORDER — TRAZODONE HYDROCHLORIDE 100 MG/1
TABLET ORAL
Qty: 90 TABLET | Refills: 1 | OUTPATIENT
Start: 2024-12-26

## 2025-01-02 ENCOUNTER — OFFICE VISIT (OUTPATIENT)
Dept: PULMONOLOGY | Age: 85
End: 2025-01-02

## 2025-01-02 VITALS
DIASTOLIC BLOOD PRESSURE: 60 MMHG | OXYGEN SATURATION: 99 % | TEMPERATURE: 97.3 F | SYSTOLIC BLOOD PRESSURE: 138 MMHG | HEART RATE: 55 BPM | RESPIRATION RATE: 18 BRPM | BODY MASS INDEX: 25.04 KG/M2 | HEIGHT: 61 IN | WEIGHT: 132.6 LBS

## 2025-01-02 DIAGNOSIS — R91.1 LUNG NODULE: ICD-10-CM

## 2025-01-02 DIAGNOSIS — C34.92 ADENOCARCINOMA OF LEFT LUNG (HCC): ICD-10-CM

## 2025-01-02 DIAGNOSIS — F17.200 SMOKING: ICD-10-CM

## 2025-01-02 DIAGNOSIS — I51.89 DIASTOLIC DYSFUNCTION: ICD-10-CM

## 2025-01-02 DIAGNOSIS — J44.9 CHRONIC OBSTRUCTIVE PULMONARY DISEASE, UNSPECIFIED COPD TYPE (HCC): Primary | ICD-10-CM

## 2025-01-02 RX ORDER — HYDROCHLOROTHIAZIDE 25 MG/1
25 TABLET ORAL DAILY
COMMUNITY
Start: 2024-12-22

## 2025-01-02 RX ORDER — FLUTICASONE FUROATE, UMECLIDINIUM BROMIDE AND VILANTEROL TRIFENATATE 100; 62.5; 25 UG/1; UG/1; UG/1
1 POWDER RESPIRATORY (INHALATION) DAILY
Qty: 1 EACH | Refills: 4 | Status: SHIPPED | OUTPATIENT
Start: 2025-01-02

## 2025-01-02 NOTE — PROGRESS NOTES
Subjective:     Carina Kebede is a 84 y.o. female who complains today of:     Chief Complaint   Patient presents with    Follow-up     6 Month F/U for COPD and Lung Nodule    Results     PFT    Shortness of Breath     More increased       HPI   Patient presents for COPD and lung nodule    1/2/2025  Presents for follow-up, she feels more short of breath, coughing, no chest pain, no lower extremity edema, no heartburn, no nasal congestion or postnasal drip.  Weight is stable  No snoring or choking while asleep,    7/25/2024  Presents for follow-up, doing good, no coughing, no chest pain, weight is stable, no nasal congestion or postnasal drip, no heartburn.  No lower extremity edema.      1/24/2024  Presents for follow-up, doing good, symptoms controlled currently on Anoro, no chest pain, no shortness of breath, no coughing, weight is stable, no nasal congestion or post nasal drip and no heartburn.  Continues to smoke unfortunately  7/13/2023  Doing good, symptoms controlled, no coughing, no chest pain, she tolerates Anoro well, no lower extremity edema, no nasal congestion or postnasal drip and no heartburn.  Weight is stable, unfortunately continues to smoke.    3/9/2023  Patient presents for follow-up, she is doing better, currently on Stiolto but she does not like this particular inhaler mainly because she does not feel she is getting any of the medication, otherwise no shortness of breath, no chest pain, minimal coughing, no lower extremity edema, no fever no chills, no nasal congestion or postnasal drip and no heartburn.  Weight is stable        Allergies:  Amoxicillin, Nsaids, and Prednisone  Past Medical History:   Diagnosis Date    Bilateral carotid artery stenosis 11/19/2020    CAD (coronary artery disease)     Carotid bruit     CVA (cerebral vascular accident) (HCC) 08/20/2024    Dyslipidemia 11/19/2020    Essential hypertension 11/19/2020    Hx of CABG 11/19/2020    Hyperlipidemia     Kidney disease

## 2025-01-03 NOTE — PROGRESS NOTES
Subjective   Isabell Swan is a 84 y.o.   female. Here with Dtr  HPI  The patient is being seen as a 3 month follow up to their left frontal lobe stroke on 8-20-24. At last appointment they had expressive aphasia and right sided weakness as residual deficits. The patient continued with Plavix and statin for stroke prevention. Other risk factors include: CAD, HTN, HLD.  Today, she is doing well, aphasia and right sided weakness is better since therapy. She continues with Plavix and statin. No overt bleeding or bruising on meds. She has quit smoking. She is currently she is living on her own. She is not driving. She was previously driving before stroke, short distances, no issues with getting lost. She is inquiring about resuming driving. Dtr states the pt. Was prescribed Aricept, but it was never sent to her pharmacy. She follows with Cardiologist Dr. Paz and her PCP.  Review of systems are negative unless otherwise specified in HPI.    Objective   Neurological Exam  Mental Status  Awake, alert and oriented to person, place and time. Recent and remote memory are intact. Speech is normal. Language is fluent with no aphasia. Attention and concentration are normal.    Cranial Nerves  CN II: Right visual acuity: Counts fingers. Left visual acuity: Counts fingers. Right normal visual field. Left normal visual field.  CN III, IV, VI: Extraocular movements intact bilaterally. No nystagmus.   Right pupil: 3 mm. Round. Reactive to light. Reactive to accommodation.   Left pupil: 3 mm. Round. Reactive to light. Reactive to accommodation.  CN V:  Right: Facial sensation is normal.  Left: Facial sensation is normal on the left.  CN VII:  Right: There is no facial weakness.  Left: There is no facial weakness.  CN VIII:  Right: Hearing is normal.  Left: Hearing is normal.  CN IX, X:  Right: Palate is normal.  Left: Palate is normal.  CN XI:  Right: Trapezius strength is normal.  Left: Trapezius strength is normal.  CN XII:  Tongue midline without atrophy or fasciculations.    Motor  Normal muscle bulk throughout. Normal muscle tone. Strength is 5/5 in all four extremities except as noted.  Mild right sided weakness.    Sensory  Light touch is normal in upper and lower extremities.     Reflexes  Deep tendon reflexes are 2+ and symmetric in all four extremities.    Coordination  Right: Finger-to-nose normal.Left: Finger-to-nose normal.    Gait  Casual gait: Normal stance. Reduced stride length.    Physical Exam  HENT:      Right Ear: Hearing normal.      Left Ear: Hearing normal.   Eyes:      Extraocular Movements: EOM normal. No nystagmus.   Neurological:      Deep Tendon Reflexes: Reflexes are normal and symmetric.   Psychiatric:         Speech: Speech normal.           Assessment/Plan   #CVA with vascular risk factors of CAD, HTN, HLD    Continue with current regimen of asa and statin.    Discussed bleeding precautions with patient while on anti platelet and/or anticoagulation therapy. Discussed stroke prevention such as: HgbA1c <7, tight blood pressure control <130/<80, LDL <70 with statin therapy (if indicated), CPAP/BiPAP compliance (if indicated) and lifestyle modification (Mediterranean diet, daily exercise, nicotine cessation & limiting alcohol use).   Discussed s/sx of stroke such as: face drooping, arm/leg weakness, speech difficulty; sudden numbness of face/extremity, sudden confusion, sudden trouble seeing, sudden trouble walking, sudden severe headache, dizziness, loss of balance or coordination and to call 911 immediately.?      Clock drawing test performed with total accuracy. Pt. Is conversing and answering questions appropriately. She is AxOx3. The pt. Is cleared to drive with caution, short distances only. Family to place tracker naresh on the pt.'s phone.

## 2025-01-08 ENCOUNTER — APPOINTMENT (OUTPATIENT)
Dept: NEUROLOGY | Facility: CLINIC | Age: 85
End: 2025-01-08
Payer: MEDICARE

## 2025-01-08 VITALS
WEIGHT: 130.6 LBS | BODY MASS INDEX: 24.66 KG/M2 | DIASTOLIC BLOOD PRESSURE: 50 MMHG | SYSTOLIC BLOOD PRESSURE: 146 MMHG | HEART RATE: 56 BPM | HEIGHT: 61 IN

## 2025-01-08 DIAGNOSIS — I63.9 ACUTE CVA (CEREBROVASCULAR ACCIDENT) (MULTI): Primary | ICD-10-CM

## 2025-01-08 PROCEDURE — 1036F TOBACCO NON-USER: CPT

## 2025-01-08 PROCEDURE — 3078F DIAST BP <80 MM HG: CPT

## 2025-01-08 PROCEDURE — 99214 OFFICE O/P EST MOD 30 MIN: CPT

## 2025-01-08 PROCEDURE — 1160F RVW MEDS BY RX/DR IN RCRD: CPT

## 2025-01-08 PROCEDURE — 1159F MED LIST DOCD IN RCRD: CPT

## 2025-01-08 PROCEDURE — 3077F SYST BP >= 140 MM HG: CPT

## 2025-01-08 RX ORDER — CLOPIDOGREL BISULFATE 75 MG/1
75 TABLET ORAL DAILY
Qty: 90 TABLET | Refills: 4 | Status: SHIPPED | OUTPATIENT
Start: 2025-01-08

## 2025-01-08 RX ORDER — DONEPEZIL HYDROCHLORIDE 5 MG/1
5 TABLET, FILM COATED ORAL NIGHTLY
Qty: 90 TABLET | Refills: 4 | Status: SHIPPED | OUTPATIENT
Start: 2025-01-08 | End: 2026-04-03

## 2025-01-08 RX ORDER — FLUTICASONE FUROATE, UMECLIDINIUM BROMIDE AND VILANTEROL TRIFENATATE 100; 62.5; 25 UG/1; UG/1; UG/1
1 POWDER RESPIRATORY (INHALATION) DAILY
COMMUNITY

## 2025-01-08 ASSESSMENT — VISUAL ACUITY: METHOD_CF: 1

## 2025-01-09 ENCOUNTER — OFFICE VISIT (OUTPATIENT)
Age: 85
End: 2025-01-09
Payer: MEDICARE

## 2025-01-09 VITALS
OXYGEN SATURATION: 94 % | SYSTOLIC BLOOD PRESSURE: 110 MMHG | RESPIRATION RATE: 14 BRPM | DIASTOLIC BLOOD PRESSURE: 64 MMHG | HEIGHT: 61 IN | WEIGHT: 130 LBS | HEART RATE: 58 BPM | BODY MASS INDEX: 24.55 KG/M2

## 2025-01-09 DIAGNOSIS — I10 ESSENTIAL HYPERTENSION: Primary | ICD-10-CM

## 2025-01-09 DIAGNOSIS — R21 SKIN RASH: ICD-10-CM

## 2025-01-09 DIAGNOSIS — N18.31 STAGE 3A CHRONIC KIDNEY DISEASE (HCC): ICD-10-CM

## 2025-01-09 DIAGNOSIS — I63.9 CEREBROVASCULAR ACCIDENT (CVA), UNSPECIFIED MECHANISM (HCC): ICD-10-CM

## 2025-01-09 PROCEDURE — 99213 OFFICE O/P EST LOW 20 MIN: CPT | Performed by: INTERNAL MEDICINE

## 2025-01-09 PROCEDURE — G0008 ADMIN INFLUENZA VIRUS VAC: HCPCS | Performed by: INTERNAL MEDICINE

## 2025-01-09 SDOH — ECONOMIC STABILITY: FOOD INSECURITY: WITHIN THE PAST 12 MONTHS, YOU WORRIED THAT YOUR FOOD WOULD RUN OUT BEFORE YOU GOT MONEY TO BUY MORE.: NEVER TRUE

## 2025-01-09 SDOH — ECONOMIC STABILITY: FOOD INSECURITY: WITHIN THE PAST 12 MONTHS, THE FOOD YOU BOUGHT JUST DIDN'T LAST AND YOU DIDN'T HAVE MONEY TO GET MORE.: NEVER TRUE

## 2025-01-09 ASSESSMENT — PATIENT HEALTH QUESTIONNAIRE - PHQ9
SUM OF ALL RESPONSES TO PHQ QUESTIONS 1-9: 0
SUM OF ALL RESPONSES TO PHQ QUESTIONS 1-9: 0
1. LITTLE INTEREST OR PLEASURE IN DOING THINGS: NOT AT ALL
2. FEELING DOWN, DEPRESSED OR HOPELESS: NOT AT ALL
SUM OF ALL RESPONSES TO PHQ9 QUESTIONS 1 & 2: 0
SUM OF ALL RESPONSES TO PHQ QUESTIONS 1-9: 0
SUM OF ALL RESPONSES TO PHQ QUESTIONS 1-9: 0

## 2025-01-09 NOTE — PROGRESS NOTES
Carina Kebede (:  1940) is a 84 y.o. female, Established patient, here for evaluation of the following chief complaint(s):  Hyperlipidemia and Hypertension          Subjective   History of Present Illness  The patient is an 84-year-old female who presents for evaluation of eczema, memory issues, and blood pressure management.        Skin rash  She reports a recurrent issue with eczema, which she believes falls under the purview of dermatology. Despite applying topical lotion this morning, she has not observed any significant improvement in her condition.        Established coronary artery disease  She is currently on a regimen of Plavix, hydrochlorothiazide, Coreg (carvedilol), Norvasc, trazodone, Lipitor, and memantine. She has discontinued the use of losartan. The dose of memantine is unknown.          Essential hypertension-compliant with Cozaar 100 mg orally daily, Cardura 4 mg orally daily and Lopressor 25 mg twice daily.  The patient's current blood pressure is 110/64        She mentions that her blood pressure was not within the normal range during her visit to the neurologist yesterday. She had a 3-month follow-up with Dr. Samuel's PA, Selam, who informed her that she could resume driving. She has not experienced any symptoms indicative of a stroke.          Dyslipidemia-compliant with Lipitor 40 mg orally daily          Nicotine dependence, uncomplicated, unspecified nicotine product type-the patient has achieved smoking cessation.        Thyroid nodule: The patient has had a history of a thyroid nodule.  It was last evaluated on 2022    She expresses a desire to receive her influenza vaccine today.    MEDICATIONS  Plavix, hydrochlorothiazide, Coreg (carvedilol), Norvasc, trazodone, Lipitor, memantine, omeprazole.  Discontinued: losartan.    Past Medical History:   Diagnosis Date    Bilateral carotid artery stenosis 2020    CAD (coronary artery disease)     Carotid bruit

## 2025-03-27 ENCOUNTER — HOSPITAL ENCOUNTER (OUTPATIENT)
Dept: CT IMAGING | Age: 85
Discharge: HOME | End: 2025-03-29
Payer: MEDICARE

## 2025-03-27 DIAGNOSIS — R91.1 LUNG NODULE: ICD-10-CM

## 2025-03-27 PROCEDURE — 71250 CT THORAX DX C-: CPT

## 2025-04-02 ENCOUNTER — OFFICE VISIT (OUTPATIENT)
Age: 85
End: 2025-04-02
Payer: MEDICARE

## 2025-04-02 VITALS
HEIGHT: 61 IN | WEIGHT: 135.2 LBS | OXYGEN SATURATION: 98 % | HEART RATE: 63 BPM | TEMPERATURE: 97.8 F | RESPIRATION RATE: 18 BRPM | DIASTOLIC BLOOD PRESSURE: 64 MMHG | SYSTOLIC BLOOD PRESSURE: 150 MMHG | BODY MASS INDEX: 25.53 KG/M2

## 2025-04-02 DIAGNOSIS — J44.9 CHRONIC OBSTRUCTIVE PULMONARY DISEASE, UNSPECIFIED COPD TYPE (HCC): ICD-10-CM

## 2025-04-02 DIAGNOSIS — I51.89 DIASTOLIC DYSFUNCTION: ICD-10-CM

## 2025-04-02 DIAGNOSIS — R91.1 LUNG NODULE: Primary | ICD-10-CM

## 2025-04-02 DIAGNOSIS — C34.92 ADENOCARCINOMA OF LEFT LUNG (HCC): ICD-10-CM

## 2025-04-02 PROCEDURE — 1123F ACP DISCUSS/DSCN MKR DOCD: CPT | Performed by: INTERNAL MEDICINE

## 2025-04-02 PROCEDURE — 3077F SYST BP >= 140 MM HG: CPT | Performed by: INTERNAL MEDICINE

## 2025-04-02 PROCEDURE — 99213 OFFICE O/P EST LOW 20 MIN: CPT | Performed by: INTERNAL MEDICINE

## 2025-04-02 PROCEDURE — G8417 CALC BMI ABV UP PARAM F/U: HCPCS | Performed by: INTERNAL MEDICINE

## 2025-04-02 PROCEDURE — 99214 OFFICE O/P EST MOD 30 MIN: CPT | Performed by: INTERNAL MEDICINE

## 2025-04-02 PROCEDURE — 1036F TOBACCO NON-USER: CPT | Performed by: INTERNAL MEDICINE

## 2025-04-02 PROCEDURE — G8427 DOCREV CUR MEDS BY ELIG CLIN: HCPCS | Performed by: INTERNAL MEDICINE

## 2025-04-02 PROCEDURE — 3023F SPIROM DOC REV: CPT | Performed by: INTERNAL MEDICINE

## 2025-04-02 PROCEDURE — G8399 PT W/DXA RESULTS DOCUMENT: HCPCS | Performed by: INTERNAL MEDICINE

## 2025-04-02 PROCEDURE — 1159F MED LIST DOCD IN RCRD: CPT | Performed by: INTERNAL MEDICINE

## 2025-04-02 PROCEDURE — 1090F PRES/ABSN URINE INCON ASSESS: CPT | Performed by: INTERNAL MEDICINE

## 2025-04-02 PROCEDURE — 3078F DIAST BP <80 MM HG: CPT | Performed by: INTERNAL MEDICINE

## 2025-04-02 NOTE — PROGRESS NOTES
Subjective:     Carina Kebede is a 84 y.o. female who complains today of:     Chief Complaint   Patient presents with    Follow-up     3 Month F/U for COPD and Lung Nodule    Results     CT Chest       HPI   Patient presents for COPD and lung nodule    4/2/2025  Doing good, symptoms controlled, no coughing, no chest pain, no shortness of breath, weight is stable, no heartburn, no nasal congestion or postnasal drip and no lower extremity edema.    1/2/2025  Presents for follow-up, she feels more short of breath, coughing, no chest pain, no lower extremity edema, no heartburn, no nasal congestion or postnasal drip.  Weight is stable  No snoring or choking while asleep,    7/25/2024  Presents for follow-up, doing good, no coughing, no chest pain, weight is stable, no nasal congestion or postnasal drip, no heartburn.  No lower extremity edema.      1/24/2024  Presents for follow-up, doing good, symptoms controlled currently on Anoro, no chest pain, no shortness of breath, no coughing, weight is stable, no nasal congestion or post nasal drip and no heartburn.  Continues to smoke unfortunately  7/13/2023  Doing good, symptoms controlled, no coughing, no chest pain, she tolerates Anoro well, no lower extremity edema, no nasal congestion or postnasal drip and no heartburn.  Weight is stable, unfortunately continues to smoke.    3/9/2023  Patient presents for follow-up, she is doing better, currently on Stiolto but she does not like this particular inhaler mainly because she does not feel she is getting any of the medication, otherwise no shortness of breath, no chest pain, minimal coughing, no lower extremity edema, no fever no chills, no nasal congestion or postnasal drip and no heartburn.  Weight is stable        Allergies:  Amoxicillin, Nsaids, and Prednisone  Past Medical History:   Diagnosis Date    Bilateral carotid artery stenosis 11/19/2020    CAD (coronary artery disease)     Carotid bruit     CVA (cerebral

## 2025-04-03 ENCOUNTER — HOSPITAL ENCOUNTER (OUTPATIENT)
Dept: RADIATION ONCOLOGY | Age: 85
Discharge: HOME | End: 2025-04-03
Payer: MEDICARE

## 2025-04-03 VITALS
DIASTOLIC BLOOD PRESSURE: 72 MMHG | HEART RATE: 57 BPM | TEMPERATURE: 97.1 F | OXYGEN SATURATION: 97 % | WEIGHT: 135.4 LBS | BODY MASS INDEX: 25.58 KG/M2 | RESPIRATION RATE: 16 BRPM | SYSTOLIC BLOOD PRESSURE: 150 MMHG

## 2025-04-03 DIAGNOSIS — C34.92 ADENOCARCINOMA OF LEFT LUNG (HCC): Primary | ICD-10-CM

## 2025-04-03 DIAGNOSIS — R91.1 LUNG NODULE: ICD-10-CM

## 2025-04-03 PROCEDURE — 99212 OFFICE O/P EST SF 10 MIN: CPT | Performed by: RADIOLOGY

## 2025-04-03 PROCEDURE — 99214 OFFICE O/P EST MOD 30 MIN: CPT | Performed by: RADIOLOGY

## 2025-04-03 RX ORDER — ACETAMINOPHEN 500 MG
500 TABLET ORAL EVERY 6 HOURS PRN
COMMUNITY

## 2025-04-03 RX ORDER — TRAZODONE HYDROCHLORIDE 100 MG/1
100 TABLET ORAL NIGHTLY
Qty: 90 TABLET | Refills: 1 | Status: SHIPPED | OUTPATIENT
Start: 2025-04-03

## 2025-04-03 NOTE — PROGRESS NOTES
NURSING ASSESSMENT     Date: 4/3/2025        Patient Name: Carina Kebede     YOB: 1940      Age:  84 y.o.      MRN: [id number]       Chaperone [x] Yes   [] No  DaughterPayal    Advance Directives:   Do you currently have completed advance directives (living will)? [x] Yes   [] No         *If yes, please bring us a copy for your records.  *If no, would you like info or assistance in completing advance directives (living will)?   [] Yes   [x] No    Pain Score:   Pain Score (1-10): Moderate to severe depending on activity and weather.   Pain Location: Low back and hipss   Pain Duration: Chronic   Pain Management/Control: Tylenol      Is pain affecting your ability to take care of yourself or move throughout your home?                        [] Yes   [x] No    General: Fatigue, possibly due to not sleeping well.  Patient has gained weight [] Yes   [x] No  Patient has lost weight [] Yes   [x] No  How much weight in pounds and over what length of time:     Eyes (Ophthalmic): Wears glasses as needed     Skin (Dermatological): Ongoing eczema     ENT: Ongoing mild sinus drainage.     Respiratory: Improved dry cough, ongoing unchanged SOB since left upper lobectomy in 1/2023     Cardiovascular: No Problems- Follows Dr. Coombs routinely      Device   [] Yes   [x] No   Copy of Card Obtained [] Yes   [x] No    Gastrointestinal: h/o constipation, takes stool softeners as needed.    Genito-Urinary:    No Problems       Breast: No Problems     Musculoskeletal: Joint Pain and Back Pain    Neurological: Ongoing numbness and weakness to right hand since CVA in 8/2024. Follows with Dr. Samuel routinely. Next visit scheduled for 7/16/25      Hematological and Lymphatic: Easy Bruising- takes Plavix     Endocrine: H/O thyroid nodule    Gyn History:   Ongoing vaginal cream colored discharge since menopause.    A 10-point review of systems  has been conducted and pertinent positives have been   recorded. All other

## 2025-04-04 NOTE — TELEPHONE ENCOUNTER
Rx requested:  Requested Prescriptions     Pending Prescriptions Disp Refills    TRELEGY ELLIPTA 100-62.5-25 MCG/ACT AEPB inhaler [Pharmacy Med Name: TRELEGY ELLIPTA 100-62.5-25] 180 each 1     Sig: TAKE 1 PUFF BY MOUTH EVERY DAY       Last Office Visit:   1/2/2025      Next Visit Date:  Future Appointments   Date Time Provider Department Center   4/8/2025  9:00 AM SCHEDULE, MLOZ CT SIM [facility] RAD ONC [facility]   6/12/2025 10:45 AM Fabio Baugh MD [facility]   6/20/2025 11:00 AM Holiday, DO [facility]   8/7/2025 11:00 AM Beverley Chacon MD [facility]

## 2025-04-07 RX ORDER — FLUTICASONE FUROATE, UMECLIDINIUM BROMIDE AND VILANTEROL TRIFENATATE 100; 62.5; 25 UG/1; UG/1; UG/1
POWDER RESPIRATORY (INHALATION)
Qty: 180 EACH | Refills: 1 | Status: SHIPPED | OUTPATIENT
Start: 2025-04-07

## 2025-04-08 ENCOUNTER — HOSPITAL ENCOUNTER (OUTPATIENT)
Dept: RADIATION ONCOLOGY | Age: 85
Discharge: HOME | End: 2025-04-08
Payer: MEDICARE

## 2025-04-08 DIAGNOSIS — R91.1 LUNG NODULE: Primary | ICD-10-CM

## 2025-04-08 PROCEDURE — 77263 THER RADIOLOGY TX PLNG CPLX: CPT | Performed by: RADIOLOGY

## 2025-04-08 PROCEDURE — 77334 RADIATION TREATMENT AID(S): CPT | Performed by: RADIOLOGY

## 2025-04-08 NOTE — PROGRESS NOTES
Teaching & Instructions - Chest  General  Simulation  Initial Treatment  Self-Care Info  Nutrition  Social Service    Site-Specific  Side Effects  Cough Mgmt  Esophagitis/Pharyngitis  Fatigue Mgmt  Pain Mgmt  SOB Mgmt  Skin Care    Other/Prevention  Smoking Cessation    Educational Handouts  Radiation Therapy & You  SBRT booklet  Site Specific Instructions  Radiation Oncology Dept Information  CBMS Program    Patient eager to learn, verbalized understanding of verbal education and handouts.

## 2025-04-21 ENCOUNTER — HOSPITAL ENCOUNTER (OUTPATIENT)
Dept: RADIATION ONCOLOGY | Age: 85
Discharge: HOME | End: 2025-04-21
Payer: MEDICARE

## 2025-04-21 DIAGNOSIS — C34.92 ADENOCARCINOMA OF LEFT LUNG (HCC): Primary | ICD-10-CM

## 2025-04-21 DIAGNOSIS — R91.1 LUNG NODULE: ICD-10-CM

## 2025-04-21 PROCEDURE — 77373 STRTCTC BDY RAD THER TX DLVR: CPT | Performed by: RADIOLOGY

## 2025-04-22 ENCOUNTER — HOSPITAL ENCOUNTER (OUTPATIENT)
Dept: RADIATION ONCOLOGY | Age: 85
Discharge: HOME | End: 2025-04-22
Payer: MEDICARE

## 2025-04-22 DIAGNOSIS — R91.1 LUNG NODULE: Primary | ICD-10-CM

## 2025-04-22 DIAGNOSIS — C34.92 ADENOCARCINOMA OF LEFT LUNG (HCC): ICD-10-CM

## 2025-04-22 PROCEDURE — 77373 STRTCTC BDY RAD THER TX DLVR: CPT | Performed by: RADIOLOGY

## 2025-04-23 ENCOUNTER — HOSPITAL ENCOUNTER (OUTPATIENT)
Dept: RADIATION ONCOLOGY | Age: 85
Discharge: HOME | End: 2025-04-23
Payer: MEDICARE

## 2025-04-23 VITALS
HEART RATE: 62 BPM | OXYGEN SATURATION: 98 % | SYSTOLIC BLOOD PRESSURE: 138 MMHG | TEMPERATURE: 97.1 F | RESPIRATION RATE: 16 BRPM | DIASTOLIC BLOOD PRESSURE: 64 MMHG | BODY MASS INDEX: 25.24 KG/M2 | WEIGHT: 133.6 LBS

## 2025-04-23 DIAGNOSIS — R91.1 LUNG NODULE: Primary | ICD-10-CM

## 2025-04-23 DIAGNOSIS — R91.1 LUNG NODULE: ICD-10-CM

## 2025-04-23 DIAGNOSIS — C34.92 ADENOCARCINOMA OF LEFT LUNG (HCC): Primary | ICD-10-CM

## 2025-04-23 PROCEDURE — 77373 STRTCTC BDY RAD THER TX DLVR: CPT | Performed by: RADIOLOGY

## 2025-04-23 NOTE — PROGRESS NOTES
[facility]           Radiation Oncology      [address] [id number]        O: [phone]        F: [phone]       [facility]                   Dr. Bartolo Teixeira MD PhD    ON TREATMENT VISIT (OTV) NOTE     Date of Service: 2025  Patient ID: Carina Kebede   : 1940  MRN: [id number]   Acct Number: [id number]     DIAGNOSIS:   Cancer Staging   No matching staging information was found for the patient.      Treatment Area: Thoracic    Current Total Dose(cGy): 3000  Current Fraction: 3    Patient was seen today for weekly visit.     Wt Readings from Last 3 Encounters:   25 60.6 kg (133 lb 9.6 oz)   25 61.4 kg (135 lb 6.4 oz)   25 61.3 kg (135 lb 3.2 oz)       /64   Pulse 62   Temp 97.1 °F (36.2 °C) (Temporal)   Resp 16   Wt 60.6 kg (133 lb 9.6 oz)   SpO2 98%   BMI 25.24 kg/m²     Lab Results   Component Value Date    WBC 7.2 2024    HGB 11.4 (L) 2024    HCT 33.8 (L) 2024     2024       Comfort Alteration  Fatigue:None, able to perform daily activities    Pain Location: ongoing back and hip pain  Pain Intensity (Current): 7 Between severe and very severe pain  Pain Treatment: tylenol  Pain Relief: Pain relieved 50%    Emotional Alteration:   Coping: effective    Nutritional Alteration  Anorexia: none   Nausea: No nausea noted  Vomiting: No vomiting   Dyspepsia/Heartburn: None  Dysphagia/Esophagitis: None    Skin Alteration   Skin reaction: No changes noted, reviewed skin care  Alopecia: No loss    Mucous Membrane Alteration  Mucositis XRT Related: None  Pharynx and Esophagus- Acute: No change over baseline  Voice Changes: Normal    Ventilation Alteration  Cough: None  Hemoptysis: None  Dyspnea: baseline shortness of breath on exertion.  Mucous Quantity/Quality: n/a    Additional Comments:     MEDICATIONS:     Current Outpatient Medications   Medication Sig Dispense Refill    TRELEGY ELLIPTA

## 2025-04-23 NOTE — PROGRESS NOTES
Discharge instructions provided and reviewed, including follow up appointment scheduled for 6 weeks. Patient and daughter verbalized understanding.

## 2025-04-24 ENCOUNTER — HOSPITAL ENCOUNTER (OUTPATIENT)
Dept: RADIATION ONCOLOGY | Age: 85
Discharge: HOME | End: 2025-04-24
Payer: MEDICARE

## 2025-04-24 DIAGNOSIS — R91.1 LUNG NODULE: Primary | ICD-10-CM

## 2025-04-24 PROCEDURE — 77373 STRTCTC BDY RAD THER TX DLVR: CPT | Performed by: RADIOLOGY

## 2025-04-25 ENCOUNTER — HOSPITAL ENCOUNTER (OUTPATIENT)
Dept: RADIATION ONCOLOGY | Age: 85
Discharge: HOME | End: 2025-04-25
Payer: MEDICARE

## 2025-04-25 DIAGNOSIS — R91.1 LUNG NODULE: Primary | ICD-10-CM

## 2025-04-25 PROCEDURE — 77336 RADIATION PHYSICS CONSULT: CPT | Performed by: RADIOLOGY

## 2025-04-25 PROCEDURE — 77373 STRTCTC BDY RAD THER TX DLVR: CPT | Performed by: RADIOLOGY

## 2025-04-25 NOTE — PROGRESS NOTES
[facility]           Radiation Oncology      [id number] [place], [place] [id number]        O: [phone]        F: [phone]       [facility]                   Dr. Bartolo Teixeira MD PhD    RADIATION TREATMENT SUMMARY NOTE     Date of Service: 2025  Patient ID: Carina Kebede   : 1940  MRN: [id number]   Acct Number: [id number]       Patient Name:  Carina Kebede,  1940,  84 y.o., female       Referring Physician: Bhanu Doran MD  [address] [place] [address]      PCP: Fabio Baugh MD       Diagnosis:  No matching staging information was found for the patient.        Recent History:  ***    Site Start TX Last TX ED Fractions Dose Fx Dose Technique   RUL Lung 2025 4 5 5,000 cGy 1,000 cGy SBRT               Concurrent therapy:      None    Technique:                 ***      Treatment course:       ***    Plan:  ***         Bartolo Teixeira MD PhD  Radiation Oncologist, [facility]    Department of Radiation Oncology  [facility]

## 2025-05-13 ENCOUNTER — APPOINTMENT (OUTPATIENT)
Dept: RADIATION ONCOLOGY | Age: 85
End: 2025-05-13
Payer: MEDICARE

## 2025-05-27 ENCOUNTER — HOSPITAL ENCOUNTER (OUTPATIENT)
Dept: RADIATION ONCOLOGY | Age: 85
Discharge: HOME | End: 2025-05-27
Payer: MEDICARE

## 2025-05-27 VITALS
OXYGEN SATURATION: 96 % | BODY MASS INDEX: 25.36 KG/M2 | TEMPERATURE: 96.2 F | SYSTOLIC BLOOD PRESSURE: 169 MMHG | WEIGHT: 134.2 LBS | HEART RATE: 57 BPM | DIASTOLIC BLOOD PRESSURE: 66 MMHG | RESPIRATION RATE: 18 BRPM

## 2025-05-27 DIAGNOSIS — R91.1 LUNG NODULE: Primary | ICD-10-CM

## 2025-05-27 PROCEDURE — 99212 OFFICE O/P EST SF 10 MIN: CPT | Performed by: RADIOLOGY

## 2025-05-27 RX ORDER — DOCUSATE SODIUM 100 MG/1
100 CAPSULE, LIQUID FILLED ORAL 2 TIMES DAILY
COMMUNITY

## 2025-05-27 NOTE — PROGRESS NOTES
NURSING ASSESSMENT     Date: 5/27/2025        Patient Name: Carina Kebede     YOB: 1940      Age:  84 y.o.      MRN: [id number]       Chaperone [x] Yes   [] No  daughter    Advance Directives:   Do you currently have completed advance directives (living will)? [x] Yes   [] No         *If yes, please bring us a copy for your records.  *If no, would you like info or assistance in completing advance directives (living will)?   [] Yes   [x] No    Pain Score:   Pain Score (1-10): 7-8  Pain Location: has generalized aches and pains   Pain Duration: daily  Pain Management/Control: tylenol helps for about an hour        Is pain affecting your ability to take care of yourself or move throughout your home?                        [] Yes   [x] No    General: Fatigue doesn't sleep at night  Patient has gained weight [] Yes   [x] No  Patient has lost weight [] Yes   [x] No  How much weight in pounds and over what length of time:     Eyes (Ophthalmic): No Problem     Skin (Dermatological): No Problems     ENT: No Problems     Respiratory: Cough, SOB, and ESCOBAR, rare cough, has SOB and ESCOBAR at times which is baseline, uses vape on occasion     Cardiovascular: occasional ankle swelling      Device   [] Yes   [x] No   Copy of Card Obtained [] Yes   [x] No    Gastrointestinal: constipation is baseline, takes stool softener daily, encouraged fruits and vegetables and whole grains    Genito-Urinary: CKD 3     Breast: No Problems     Musculoskeletal: Joint Pain and Back Pain chronic    Neurological: Weakness right hand weakness from stroke      Hematological and Lymphatic: No Problems     Endocrine: No Problems     Gyn History: No problems      A 10-point review of systems  has been conducted and pertinent positives have been   recorded. All other review of systems are negative    Was the patient admitted during the course of treatment OR within 30 days of treatment? no

## 2025-06-12 ENCOUNTER — OFFICE VISIT (OUTPATIENT)
Age: 85
End: 2025-06-12
Payer: MEDICARE

## 2025-06-12 ENCOUNTER — RESULTS FOLLOW-UP (OUTPATIENT)
Age: 85
End: 2025-06-12

## 2025-06-12 VITALS
SYSTOLIC BLOOD PRESSURE: 128 MMHG | HEIGHT: 61 IN | TEMPERATURE: 97 F | DIASTOLIC BLOOD PRESSURE: 60 MMHG | BODY MASS INDEX: 25.49 KG/M2 | HEART RATE: 58 BPM | OXYGEN SATURATION: 96 % | WEIGHT: 135 LBS

## 2025-06-12 DIAGNOSIS — I10 ESSENTIAL HYPERTENSION: Primary | ICD-10-CM

## 2025-06-12 DIAGNOSIS — Z71.89 ACP (ADVANCE CARE PLANNING): ICD-10-CM

## 2025-06-12 DIAGNOSIS — Z51.81 THERAPEUTIC DRUG MONITORING: ICD-10-CM

## 2025-06-12 DIAGNOSIS — G47.00 INSOMNIA, UNSPECIFIED TYPE: ICD-10-CM

## 2025-06-12 DIAGNOSIS — I10 ESSENTIAL HYPERTENSION: ICD-10-CM

## 2025-06-12 DIAGNOSIS — N18.31 STAGE 3A CHRONIC KIDNEY DISEASE (HCC): ICD-10-CM

## 2025-06-12 LAB
AMPHET UR QL SCN: NORMAL
ANION GAP SERPL CALCULATED.3IONS-SCNC: 14 MEQ/L (ref 9–15)
BARBITURATES UR QL SCN: NORMAL
BENZODIAZ UR QL SCN: NORMAL
BUN SERPL-MCNC: 35 MG/DL (ref 8–23)
CALCIUM SERPL-MCNC: 9.5 MG/DL (ref 8.5–9.9)
CANNABINOIDS UR QL SCN: NORMAL
CHLORIDE SERPL-SCNC: 102 MEQ/L (ref 95–107)
CO2 SERPL-SCNC: 24 MEQ/L (ref 20–31)
COCAINE UR QL SCN: NORMAL
CREAT SERPL-MCNC: 1.39 MG/DL (ref 0.5–0.9)
DRUG SCREEN COMMENT UR-IMP: NORMAL
FENTANYL SCREEN, URINE: NORMAL
GLUCOSE SERPL-MCNC: 97 MG/DL (ref 70–99)
METHADONE UR QL SCN: NORMAL
OPIATES UR QL SCN: NORMAL
OXYCODONE UR QL SCN: NORMAL
PCP UR QL SCN: NORMAL
POTASSIUM SERPL-SCNC: 4.6 MEQ/L (ref 3.4–4.9)
PROPOXYPH UR QL SCN: NORMAL
SODIUM SERPL-SCNC: 140 MEQ/L (ref 135–144)

## 2025-06-12 PROCEDURE — 3074F SYST BP LT 130 MM HG: CPT | Performed by: INTERNAL MEDICINE

## 2025-06-12 PROCEDURE — G8417 CALC BMI ABV UP PARAM F/U: HCPCS | Performed by: INTERNAL MEDICINE

## 2025-06-12 PROCEDURE — 1090F PRES/ABSN URINE INCON ASSESS: CPT | Performed by: INTERNAL MEDICINE

## 2025-06-12 PROCEDURE — 3078F DIAST BP <80 MM HG: CPT | Performed by: INTERNAL MEDICINE

## 2025-06-12 PROCEDURE — 1159F MED LIST DOCD IN RCRD: CPT | Performed by: INTERNAL MEDICINE

## 2025-06-12 PROCEDURE — 99214 OFFICE O/P EST MOD 30 MIN: CPT | Performed by: INTERNAL MEDICINE

## 2025-06-12 PROCEDURE — G8399 PT W/DXA RESULTS DOCUMENT: HCPCS | Performed by: INTERNAL MEDICINE

## 2025-06-12 PROCEDURE — 1123F ACP DISCUSS/DSCN MKR DOCD: CPT | Performed by: INTERNAL MEDICINE

## 2025-06-12 PROCEDURE — G8427 DOCREV CUR MEDS BY ELIG CLIN: HCPCS | Performed by: INTERNAL MEDICINE

## 2025-06-12 PROCEDURE — 1036F TOBACCO NON-USER: CPT | Performed by: INTERNAL MEDICINE

## 2025-06-12 PROCEDURE — 99213 OFFICE O/P EST LOW 20 MIN: CPT | Performed by: INTERNAL MEDICINE

## 2025-06-12 RX ORDER — ZOLPIDEM TARTRATE 5 MG/1
5 TABLET ORAL NIGHTLY PRN
Qty: 30 TABLET | Refills: 0 | Status: SHIPPED | OUTPATIENT
Start: 2025-06-12 | End: 2025-07-12

## 2025-06-12 NOTE — PROGRESS NOTES
Carina Kebede (:  1940) is a 84 y.o. female, Established patient, here for evaluation of the following chief complaint(s):  Follow-up (Pt doing well)          Subjective   History of Present Illness        The patient presents for evaluation of hypertension, sleep issues, and eczema.    She reports no current complaints but experiences occasional shortness of breath. She has a scheduled follow-up appointment with her pulmonologist in 2025 and is under the care of Hematology Oncology for her lung condition. Her current medication regimen includes albuterol and Trelegy Ellipta, with albuterol being used sparingly.    Her blood pressure has been well-controlled recently, although she has experienced elevated readings in the past. She is compliant with her medication regimen, which includes Norvasc, hydrochlorothiazide, and Coreg 12.5 mg twice daily.    She has a history of stroke and is under the care of a neurologist. Aricept was prescribed by her neurologist, but she is unable to take it due to her use of trazodone. She has been on trazodone for several years, but it is no longer effective in managing her sleep issues. She is considering discontinuing trazodone or increasing the dose. She is open to trying Ambien long-term if it does not interfere with her other medications. She also takes melatonin and is curious about its compatibility with Ambien.    She has been dealing with eczema for approximately one year. She has been using a cream prescribed by her dermatologist, which provides intermittent relief. Additionally, she has found some benefit from a natural lotion.    Past Medical History:   Diagnosis Date    Bilateral carotid artery stenosis 2020    CAD (coronary artery disease)     Carotid bruit     CVA (cerebral vascular accident) (HCC) 2024    Dyslipidemia 2020    Essential hypertension 2020    Hx of CABG 2020    Hyperlipidemia     Kidney disease     PONV

## 2025-06-12 NOTE — PATIENT INSTRUCTIONS

## 2025-06-20 ENCOUNTER — OFFICE VISIT (OUTPATIENT)
Age: 85
End: 2025-06-20
Payer: MEDICARE

## 2025-06-20 VITALS
DIASTOLIC BLOOD PRESSURE: 80 MMHG | BODY MASS INDEX: 25.85 KG/M2 | WEIGHT: 136.8 LBS | SYSTOLIC BLOOD PRESSURE: 140 MMHG | HEART RATE: 55 BPM

## 2025-06-20 DIAGNOSIS — I35.1 NONRHEUMATIC AORTIC VALVE INSUFFICIENCY: ICD-10-CM

## 2025-06-20 DIAGNOSIS — I10 ESSENTIAL HYPERTENSION: Primary | ICD-10-CM

## 2025-06-20 DIAGNOSIS — E78.5 DYSLIPIDEMIA: ICD-10-CM

## 2025-06-20 DIAGNOSIS — Z72.0 TOBACCO ABUSE: ICD-10-CM

## 2025-06-20 DIAGNOSIS — I65.23 BILATERAL CAROTID ARTERY STENOSIS: ICD-10-CM

## 2025-06-20 DIAGNOSIS — Z95.1 HX OF CABG: ICD-10-CM

## 2025-06-20 DIAGNOSIS — I10 HYPERTENSION, UNSPECIFIED TYPE: ICD-10-CM

## 2025-06-20 PROCEDURE — 99213 OFFICE O/P EST LOW 20 MIN: CPT | Performed by: INTERNAL MEDICINE

## 2025-06-20 PROCEDURE — 3079F DIAST BP 80-89 MM HG: CPT | Performed by: INTERNAL MEDICINE

## 2025-06-20 PROCEDURE — 1126F AMNT PAIN NOTED NONE PRSNT: CPT | Performed by: INTERNAL MEDICINE

## 2025-06-20 PROCEDURE — 1159F MED LIST DOCD IN RCRD: CPT | Performed by: INTERNAL MEDICINE

## 2025-06-20 PROCEDURE — G8427 DOCREV CUR MEDS BY ELIG CLIN: HCPCS | Performed by: INTERNAL MEDICINE

## 2025-06-20 PROCEDURE — G8399 PT W/DXA RESULTS DOCUMENT: HCPCS | Performed by: INTERNAL MEDICINE

## 2025-06-20 PROCEDURE — 1036F TOBACCO NON-USER: CPT | Performed by: INTERNAL MEDICINE

## 2025-06-20 PROCEDURE — 93005 ELECTROCARDIOGRAM TRACING: CPT | Performed by: INTERNAL MEDICINE

## 2025-06-20 PROCEDURE — 1090F PRES/ABSN URINE INCON ASSESS: CPT | Performed by: INTERNAL MEDICINE

## 2025-06-20 PROCEDURE — G8417 CALC BMI ABV UP PARAM F/U: HCPCS | Performed by: INTERNAL MEDICINE

## 2025-06-20 PROCEDURE — 1123F ACP DISCUSS/DSCN MKR DOCD: CPT | Performed by: INTERNAL MEDICINE

## 2025-06-20 PROCEDURE — 93010 ELECTROCARDIOGRAM REPORT: CPT | Performed by: INTERNAL MEDICINE

## 2025-06-20 PROCEDURE — 99214 OFFICE O/P EST MOD 30 MIN: CPT | Performed by: INTERNAL MEDICINE

## 2025-06-20 PROCEDURE — 3077F SYST BP >= 140 MM HG: CPT | Performed by: INTERNAL MEDICINE

## 2025-06-20 NOTE — PROGRESS NOTES
Chief Complaint   Patient presents with    Coronary Artery Disease    Hypertension    Hyperlipidemia       11/19/2020: Patient presents for initial medical evaluation. Patient is followed on a regular basis by Fabio Valdez MD recently moved from [place].   Hx of CAD s/p CABGx5 in 2010.  Status post negative nuclear stress test in 2017.  Pt denies chest pain, dyspnea, dyspnea on exertion, change in exercise capacity, fatigue,  nausea, vomiting, diarrhea, constipation, motor weakness, insomnia, weight loss, syncope, dizziness, lightheadedness, palpitations, PND, orthopnea, or claudication. No hx of PAD . No hx of CHF of arrhythmia.   He continues to smoke approximate 10 cigarettes a day.   Status post carotid ultrasound on April 2020 with 60% stenosis in the left and right internal carotid arteries.    1/7/2021: Status post echocardiogram ejection fraction of 55%, 1-2+ aortic regurgitation, 1+ mitral and tricuspid regurgitation, RVSP of 27 mmHg, grade 2 diastolic dysfunction.  Status post abdominal aorta ultrasound with no evidence of any aneurysm.  She will follow up with primary care Dr. Smallwood as well as nephrology. Pt denies chest pain, dyspnea, dyspnea on exertion, change in exercise capacity, fatigue,  nausea, vomiting, diarrhea, constipation, motor weakness, insomnia, weight loss, syncope, dizziness, lightheadedness, palpitations, PND, orthopnea, or claudication.  she continues to smoke. Has CKD.  Blood pressure is 136/71 heart rate of 56 bpm.  With history of coronary disease status post CABG x5 in 2010    10-14-21: Pt denies chest pain, dyspnea, dyspnea on exertion, change in exercise capacity, fatigue,  nausea, vomiting, diarrhea, constipation, motor weakness, insomnia, weight loss, syncope, dizziness, lightheadedness, palpitations, PND, orthopnea, or claudication. No nitro use. BP and hr are good. CAD is stable. No LE discoloration or ulcers. No LE edema. No CHF type symptoms. Lipid profile is

## 2025-07-13 NOTE — PROGRESS NOTES
Subjective   Isabell Swan is a 85 y.o.   female. Here with Dtr  HPI  The patient is being seen as a 1 year follow up to their left frontal lobe stroke on 8-20-24. At last appointment they had aphasia and weakness residual deficits. The patient continued with asa, Plavix and statin for stroke prevention. Other risk factors include: CAD, HTN, HLD.  Today, she continues with mild right sided weakness, occasional speech issues like word finding. She was unable to take the Aricept due to interaction with other medications. She lives alone and manages her own ADLs. Family or friends drive her to stores, family events, and appointments. No falls. She continues with DAPT (per Cardiologist, he wants her to continue both). Continues with statin. No major side effects form meds. She is having trouble sleeping, she will begin Trazodone again.    Review of systems are negative unless otherwise specified in HPI.    Objective   Neurological Exam  Mental Status  Awake, alert and oriented to person, place and time. Speech is normal. Language is fluent with no aphasia. Attention and concentration are normal.    Cranial Nerves  CN III, IV, VI: Pupils equal round and reactive to light bilaterally.  And EOM's Normal.    Motor   Strength is 5/5 in all four extremities except as noted.  4+ RUE and RLE  .    Sensory  Light touch is normal in upper and lower extremities.     Reflexes  Deep tendon reflexes are 2+ and symmetric except as noted.                                            Right                      Left  Brachioradialis                    3+                          Biceps                                 3+                          Triceps                                3+                            Coordination  Right: Finger-to-nose normal.Left: Finger-to-nose normal.    Gait  Casual gait: Reduced stride length.    Physical Exam    Eyes:      Pupils: Pupils are equal, round, and reactive to light.       Neurological:       Deep Tendon Reflexes:      Reflex Scores:       Tricep reflexes are 3+ on the right side.       Bicep reflexes are 3+ on the right side.       Brachioradialis reflexes are 3+ on the right side.    Psychiatric:         Speech: Speech normal.           Assessment/Plan   #CVA with vascular risk factors of CAD, HTN, HLD    Continue with current regimen.  Bleeding precautions with patient while on anti platelet and/or anticoagulation therapy. S/sx of stroke such as: face drooping, arm/leg weakness, speech difficulty; sudden numbness of face/extremity, sudden confusion, sudden trouble seeing, sudden trouble walking, sudden severe headache, dizziness, loss of balance or coordination and to call 911 immediately.?      Follow up as needed.

## 2025-07-16 ENCOUNTER — APPOINTMENT (OUTPATIENT)
Dept: NEUROLOGY | Facility: CLINIC | Age: 85
End: 2025-07-16
Payer: MEDICARE

## 2025-07-16 VITALS
WEIGHT: 135.2 LBS | BODY MASS INDEX: 25.53 KG/M2 | SYSTOLIC BLOOD PRESSURE: 163 MMHG | HEIGHT: 61 IN | HEART RATE: 61 BPM | DIASTOLIC BLOOD PRESSURE: 68 MMHG

## 2025-07-16 DIAGNOSIS — I63.9 ACUTE CVA (CEREBROVASCULAR ACCIDENT) (MULTI): Primary | ICD-10-CM

## 2025-07-16 PROCEDURE — 3078F DIAST BP <80 MM HG: CPT

## 2025-07-16 PROCEDURE — 1159F MED LIST DOCD IN RCRD: CPT

## 2025-07-16 PROCEDURE — 3077F SYST BP >= 140 MM HG: CPT

## 2025-07-16 PROCEDURE — 99214 OFFICE O/P EST MOD 30 MIN: CPT

## 2025-07-16 PROCEDURE — 1160F RVW MEDS BY RX/DR IN RCRD: CPT

## 2025-07-16 RX ORDER — ZOLPIDEM TARTRATE 5 MG/1
TABLET ORAL
COMMUNITY
Start: 2025-06-12

## 2025-07-16 RX ORDER — TRIAMCINOLONE ACETONIDE 1 MG/G
CREAM TOPICAL
COMMUNITY
Start: 2025-03-21

## 2025-07-16 RX ORDER — ASPIRIN 81 MG/1
81 TABLET ORAL DAILY
COMMUNITY

## 2025-07-16 ASSESSMENT — PATIENT HEALTH QUESTIONNAIRE - PHQ9
2. FEELING DOWN, DEPRESSED OR HOPELESS: NOT AT ALL
1. LITTLE INTEREST OR PLEASURE IN DOING THINGS: NOT AT ALL
SUM OF ALL RESPONSES TO PHQ9 QUESTIONS 1 & 2: 0

## 2025-07-18 ENCOUNTER — TELEPHONE (OUTPATIENT)
Age: 85
End: 2025-07-18

## 2025-07-18 NOTE — TELEPHONE ENCOUNTER
PT daughter called- she can not take the ambien and wants to know if it's okay to go back on the  trazodone- if so will it be the same dosage and instructions?    Please advise

## 2025-07-31 ENCOUNTER — HOSPITAL ENCOUNTER (OUTPATIENT)
Dept: CT IMAGING | Age: 85
Discharge: HOME | End: 2025-08-02
Payer: MEDICARE

## 2025-07-31 DIAGNOSIS — R91.1 LUNG NODULE: ICD-10-CM

## 2025-07-31 PROCEDURE — 71250 CT THORAX DX C-: CPT

## 2025-08-07 ENCOUNTER — OFFICE VISIT (OUTPATIENT)
Age: 85
End: 2025-08-07
Payer: MEDICARE

## 2025-08-07 VITALS
HEART RATE: 55 BPM | RESPIRATION RATE: 18 BRPM | BODY MASS INDEX: 26.17 KG/M2 | TEMPERATURE: 98.2 F | DIASTOLIC BLOOD PRESSURE: 66 MMHG | OXYGEN SATURATION: 98 % | WEIGHT: 138.6 LBS | HEIGHT: 61 IN | SYSTOLIC BLOOD PRESSURE: 146 MMHG

## 2025-08-07 DIAGNOSIS — I51.89 DIASTOLIC DYSFUNCTION: ICD-10-CM

## 2025-08-07 DIAGNOSIS — C34.92 ADENOCARCINOMA OF LEFT LUNG (HCC): ICD-10-CM

## 2025-08-07 DIAGNOSIS — R91.1 LUNG NODULE: Primary | ICD-10-CM

## 2025-08-07 DIAGNOSIS — J44.9 CHRONIC OBSTRUCTIVE PULMONARY DISEASE, UNSPECIFIED COPD TYPE (HCC): ICD-10-CM

## 2025-08-07 PROCEDURE — 1159F MED LIST DOCD IN RCRD: CPT | Performed by: INTERNAL MEDICINE

## 2025-08-07 PROCEDURE — 1036F TOBACCO NON-USER: CPT | Performed by: INTERNAL MEDICINE

## 2025-08-07 PROCEDURE — 3023F SPIROM DOC REV: CPT | Performed by: INTERNAL MEDICINE

## 2025-08-07 PROCEDURE — 1090F PRES/ABSN URINE INCON ASSESS: CPT | Performed by: INTERNAL MEDICINE

## 2025-08-07 PROCEDURE — G8399 PT W/DXA RESULTS DOCUMENT: HCPCS | Performed by: INTERNAL MEDICINE

## 2025-08-07 PROCEDURE — 99214 OFFICE O/P EST MOD 30 MIN: CPT | Performed by: INTERNAL MEDICINE

## 2025-08-07 PROCEDURE — 3078F DIAST BP <80 MM HG: CPT | Performed by: INTERNAL MEDICINE

## 2025-08-07 PROCEDURE — 99213 OFFICE O/P EST LOW 20 MIN: CPT | Performed by: INTERNAL MEDICINE

## 2025-08-07 PROCEDURE — G8417 CALC BMI ABV UP PARAM F/U: HCPCS | Performed by: INTERNAL MEDICINE

## 2025-08-07 PROCEDURE — 3077F SYST BP >= 140 MM HG: CPT | Performed by: INTERNAL MEDICINE

## 2025-08-07 PROCEDURE — G8427 DOCREV CUR MEDS BY ELIG CLIN: HCPCS | Performed by: INTERNAL MEDICINE

## 2025-08-07 PROCEDURE — 1123F ACP DISCUSS/DSCN MKR DOCD: CPT | Performed by: INTERNAL MEDICINE

## 2025-08-07 RX ORDER — ASPIRIN 81 MG/1
81 TABLET ORAL DAILY
COMMUNITY

## 2025-08-07 RX ORDER — FLUTICASONE FUROATE, UMECLIDINIUM BROMIDE AND VILANTEROL TRIFENATATE 200; 62.5; 25 UG/1; UG/1; UG/1
1 POWDER RESPIRATORY (INHALATION) DAILY
Qty: 1 EACH | Refills: 4 | Status: SHIPPED | OUTPATIENT
Start: 2025-08-07

## 2025-08-14 ENCOUNTER — HOSPITAL ENCOUNTER (OUTPATIENT)
Dept: RADIATION ONCOLOGY | Age: 85
Discharge: HOME | End: 2025-08-14
Payer: MEDICARE

## 2025-08-14 VITALS
BODY MASS INDEX: 25.51 KG/M2 | RESPIRATION RATE: 18 BRPM | SYSTOLIC BLOOD PRESSURE: 152 MMHG | TEMPERATURE: 96.1 F | OXYGEN SATURATION: 95 % | DIASTOLIC BLOOD PRESSURE: 71 MMHG | HEART RATE: 57 BPM | WEIGHT: 135 LBS

## 2025-08-14 PROCEDURE — 99212 OFFICE O/P EST SF 10 MIN: CPT | Performed by: RADIOLOGY

## 2025-08-22 ENCOUNTER — OFFICE VISIT (OUTPATIENT)
Age: 85
End: 2025-08-22
Payer: MEDICARE

## 2025-08-22 VITALS
HEART RATE: 61 BPM | BODY MASS INDEX: 25.81 KG/M2 | SYSTOLIC BLOOD PRESSURE: 124 MMHG | DIASTOLIC BLOOD PRESSURE: 52 MMHG | WEIGHT: 136.6 LBS

## 2025-08-22 DIAGNOSIS — I10 ESSENTIAL HYPERTENSION: ICD-10-CM

## 2025-08-22 DIAGNOSIS — I35.1 NONRHEUMATIC AORTIC VALVE INSUFFICIENCY: ICD-10-CM

## 2025-08-22 DIAGNOSIS — R07.9 CHEST PAIN, UNSPECIFIED TYPE: ICD-10-CM

## 2025-08-22 DIAGNOSIS — R94.31 ABNORMAL ELECTROCARDIOGRAPHY: ICD-10-CM

## 2025-08-22 DIAGNOSIS — E78.5 DYSLIPIDEMIA: ICD-10-CM

## 2025-08-22 DIAGNOSIS — Z95.1 HX OF CABG: ICD-10-CM

## 2025-08-22 DIAGNOSIS — I10 HYPERTENSION, UNSPECIFIED TYPE: ICD-10-CM

## 2025-08-22 DIAGNOSIS — R06.02 SHORTNESS OF BREATH: ICD-10-CM

## 2025-08-22 DIAGNOSIS — R06.02 SOB (SHORTNESS OF BREATH): Primary | ICD-10-CM

## 2025-08-22 DIAGNOSIS — Z72.0 TOBACCO ABUSE: ICD-10-CM

## 2025-08-22 DIAGNOSIS — R01.1 HEART MURMUR: ICD-10-CM

## 2025-08-22 PROCEDURE — G8427 DOCREV CUR MEDS BY ELIG CLIN: HCPCS | Performed by: INTERNAL MEDICINE

## 2025-08-22 PROCEDURE — 3074F SYST BP LT 130 MM HG: CPT | Performed by: INTERNAL MEDICINE

## 2025-08-22 PROCEDURE — 99213 OFFICE O/P EST LOW 20 MIN: CPT | Performed by: INTERNAL MEDICINE

## 2025-08-22 PROCEDURE — 1126F AMNT PAIN NOTED NONE PRSNT: CPT | Performed by: INTERNAL MEDICINE

## 2025-08-22 PROCEDURE — 1123F ACP DISCUSS/DSCN MKR DOCD: CPT | Performed by: INTERNAL MEDICINE

## 2025-08-22 PROCEDURE — 93010 ELECTROCARDIOGRAM REPORT: CPT | Performed by: INTERNAL MEDICINE

## 2025-08-22 PROCEDURE — G8417 CALC BMI ABV UP PARAM F/U: HCPCS | Performed by: INTERNAL MEDICINE

## 2025-08-22 PROCEDURE — 93005 ELECTROCARDIOGRAM TRACING: CPT | Performed by: INTERNAL MEDICINE

## 2025-08-22 PROCEDURE — 1036F TOBACCO NON-USER: CPT | Performed by: INTERNAL MEDICINE

## 2025-08-22 PROCEDURE — 1090F PRES/ABSN URINE INCON ASSESS: CPT | Performed by: INTERNAL MEDICINE

## 2025-08-22 PROCEDURE — 3078F DIAST BP <80 MM HG: CPT | Performed by: INTERNAL MEDICINE

## 2025-08-22 PROCEDURE — 1159F MED LIST DOCD IN RCRD: CPT | Performed by: INTERNAL MEDICINE

## 2025-08-22 PROCEDURE — G8399 PT W/DXA RESULTS DOCUMENT: HCPCS | Performed by: INTERNAL MEDICINE

## 2025-08-22 PROCEDURE — 99214 OFFICE O/P EST MOD 30 MIN: CPT | Performed by: INTERNAL MEDICINE

## 2025-08-22 RX ORDER — ATORVASTATIN CALCIUM 40 MG/1
40 TABLET, FILM COATED ORAL DAILY
Qty: 90 TABLET | Refills: 3 | Status: SHIPPED | OUTPATIENT
Start: 2025-08-22

## 2025-09-03 DIAGNOSIS — J44.9 CHRONIC OBSTRUCTIVE PULMONARY DISEASE, UNSPECIFIED COPD TYPE (HCC): Primary | ICD-10-CM

## 2025-09-03 RX ORDER — FLUTICASONE FUROATE, UMECLIDINIUM BROMIDE AND VILANTEROL TRIFENATATE 200; 62.5; 25 UG/1; UG/1; UG/1
1 POWDER RESPIRATORY (INHALATION) DAILY
Qty: 1 EACH | Refills: 3 | Status: SHIPPED | OUTPATIENT
Start: 2025-09-03

## 2025-11-20 NOTE — PROGRESS NOTES
[facility]           Radiation Oncology      [address] [place], [place] [id number]        O: [phone]        F: [phone]       [facility]                   Dr. Bartolo Teixeira MD PhD    FOLLOW-UP NOTE     Date of Service: 2025  Patient ID: Carina Kebede   : 1940  MRN: [id number]   Acct Number: [id number]       NAME:  Carina Kebede    :  1940 84 y.o. female     PCP: Fabio Baugh MD    REFERRING PROVIDER: Espinoza    DIAGNOSIS:  1. Lung nodule    2. Adenocarcinoma of left lung (HCC)        STAGING: Cancer Staging   No matching staging information was found for the patient.      PRIOR TREATMENT: 5000 cGy in 5 fractions to the right upper lung nodule with ablative stereotactic body radiation therapy    TIME SINCE TREATMENT: 1 month    RECENT HISTORY: Carina Kebede returns for quick check approximately 1 month status post completion of ablative stereotactic body radiation therapy for the above diagnosis.    She tolerated therapy well without the need for any treatment breaks.  Today she notes baseline dyspnea on exertion and rare cough.  She has baseline dysphagia.  She has been continuing vaping nicotine daily she otherwise denies any new complaints at this time.    Past medical, surgical, social and family histories reviewed and updated as indicated.    ALLERGIES:  Amoxicillin, Nsaids, and Prednisone       MEDICATIONS:   Current Outpatient Medications:     docusate sodium (COLACE) 100 MG capsule, Take 1 capsule by mouth 2 times daily, Disp: , Rfl:     acetaminophen (TYLENOL) 500 MG tablet, Take 1 tablet by mouth every 6 hours as needed for Pain, Disp: , Rfl:     albuterol sulfate HFA (PROVENTIL;VENTOLIN;PROAIR) 108 (90 Base) MCG/ACT inhaler, INHALE 2 PUFFS INTO THE LUNGS 4 TIMES DAILY AS NEEDED FOR WHEEZING., Disp: 18 each, Rfl: 3    Handicap Placard MISC, by Does not apply route Diagnosis: COPD ; Duration: 2025-2030, Disp: 1 each,

## (undated) DEVICE — FLEXIBLE ENDOSCOPE: Brand: ASCOPE™ 4 BRONCHO REGULAR 5.0/2.2

## (undated) DEVICE — GOWN,SIRUS,NONRNF,SETINSLV,2XL,18/CS: Brand: MEDLINE

## (undated) DEVICE — GOWN,AURORA,NONREINFORCED,LARGE: Brand: MEDLINE

## (undated) DEVICE — STAPLER SKIN L320MM 35MM VASC TISS 12 FIRING B FRM PWR

## (undated) DEVICE — ELECTRODE PT RET AD L9FT HI MOIST COND ADH HYDRGEL CORDED

## (undated) DEVICE — KIT,ANTI FOG,W/SPONGE & FLUID,SOFT PACK: Brand: MEDLINE

## (undated) DEVICE — PAD,NON-ADHERENT,3X8,STERILE,LF,1/PK: Brand: MEDLINE

## (undated) DEVICE — GLOVE ORANGE PI 8   MSG9080

## (undated) DEVICE — BAG RETRIEVAL SPECIMEN SUPERBAG 15 2XL NYLON ITRODUCER

## (undated) DEVICE — DRESSING COMP W4XL4IN N ADH PD W2.5XL2.5IN GZ BORDERED ADH

## (undated) DEVICE — CATHETER THOR 20FR L22IN PVC 4 EYELET STR ATRAUM

## (undated) DEVICE — CONNECTOR TBNG WHT PLAS SUCT STR 5IN1 LTWT W/ M CONN

## (undated) DEVICE — BLADE ES ELASTOMERIC COAT INSUL DURABLE BEND UPTO 90DEG

## (undated) DEVICE — APPLICATOR MEDICATED 26 CC SOLUTION HI LT ORNG CHLORAPREP

## (undated) DEVICE — SUTURE PERMAHAND SZ 0 L30IN NONABSORBABLE BLK FSL L30MM 3/8 680H

## (undated) DEVICE — WARMER SCP 2 ANTIFOG LAP DISP

## (undated) DEVICE — TROCAR ENDOSCP L12MM BLK NONCONDUCTIVE SL SFT REP DISP

## (undated) DEVICE — TAPE,ELASTIC,FOAM,CURAD,3"X5.5YD,LF: Brand: CURAD

## (undated) DEVICE — Device

## (undated) DEVICE — CONTAINER,SPECIMEN,OR STERILE,4OZ: Brand: MEDLINE

## (undated) DEVICE — COUNTER NDL 40 COUNT HLD 70 FOAM BLK ADH W/ MAG

## (undated) DEVICE — 4-PORT MANIFOLD: Brand: NEPTUNE 2

## (undated) DEVICE — RELOAD STPL L60MM H1.5-3.6MM REG TISS BLU GRIPPING SURF B

## (undated) DEVICE — LABEL MED MINI W/ MARKER

## (undated) DEVICE — SUTURE VCRL SZ 4-0 L18IN ABSRB UD L19MM PS-2 3/8 CIR PRIM J496H

## (undated) DEVICE — INTENDED FOR TISSUE SEPARATION, AND OTHER PROCEDURES THAT REQUIRE A SHARP SURGICAL BLADE TO PUNCTURE OR CUT.: Brand: BARD-PARKER ® CARBON RIB-BACK BLADES

## (undated) DEVICE — PACK,BASIC: Brand: MEDLINE

## (undated) DEVICE — TUBING, SUCTION, 9/32" X 12', STRAIGHT: Brand: MEDLINE INDUSTRIES, INC.

## (undated) DEVICE — ECHELON 60MM REINFORCEMENT: Brand: ECHLEON

## (undated) DEVICE — HYPODERMIC SAFETY NEEDLE: Brand: MAGELLAN

## (undated) DEVICE — DRAPE,LAP,CHOLE,W/TROUGHS,STERILE: Brand: MEDLINE

## (undated) DEVICE — RELOAD STPL H1-2.5XL35MM VASC THN TISS WHT B FRM NAT ARTC

## (undated) DEVICE — TOWEL,OR,DSP,ST,BLUE,STD,4/PK,20PK/CS: Brand: MEDLINE

## (undated) DEVICE — BLADE ES L6IN ELASTOMERIC COAT INSUL DURABLE BEND UPTO

## (undated) DEVICE — COVER LT HNDL BLU PLAS

## (undated) DEVICE — SYRINGE MED 10ML LUERLOCK TIP W/O SFTY DISP

## (undated) DEVICE — SUTURE VCRL SZ 3-0 L27IN ABSRB UD L26MM SH 1/2 CIR J416H

## (undated) DEVICE — DRESSING GZ W1XL8IN COT XRFRM N ADH OVERWRAP CURAD

## (undated) DEVICE — SPONGE,LAP,18"X18",DLX,XR,ST,5/PK,40/PK: Brand: MEDLINE

## (undated) DEVICE — MAGNETIC INSTR DRAPE 20X16: Brand: MEDLINE INDUSTRIES, INC.

## (undated) DEVICE — GAUZE,SPONGE,4"X4",16PLY,XRAY,STRL,LF: Brand: MEDLINE

## (undated) DEVICE — STAPLER INT L34CM 60MM LNG ENDOSCP ARTC PWR + ECHELON FLX

## (undated) DEVICE — NEPTUNE E-SEP SMOKE EVACUATION PENCIL, COATED, 70MM BLADE, PUSH BUTTON SWITCH: Brand: NEPTUNE E-SEP